# Patient Record
Sex: FEMALE | Race: WHITE | Employment: OTHER | URBAN - METROPOLITAN AREA
[De-identification: names, ages, dates, MRNs, and addresses within clinical notes are randomized per-mention and may not be internally consistent; named-entity substitution may affect disease eponyms.]

---

## 2017-01-12 PROBLEM — J11.1 INFLUENZA: Status: ACTIVE | Noted: 2017-01-12

## 2017-01-12 PROBLEM — B35.3 TINEA PEDIS OF BOTH FEET: Status: ACTIVE | Noted: 2017-01-12

## 2017-03-29 PROBLEM — B35.3 TINEA PEDIS OF BOTH FEET: Status: RESOLVED | Noted: 2017-01-12 | Resolved: 2017-03-29

## 2017-03-29 PROBLEM — J11.1 INFLUENZA: Status: RESOLVED | Noted: 2017-01-12 | Resolved: 2017-03-29

## 2017-03-30 PROBLEM — Z86.79 HISTORY OF ISCHEMIC HEART DISEASE: Status: ACTIVE | Noted: 2017-03-30

## 2017-03-30 PROBLEM — Z87.19 S/P HERNIA REPAIR: Status: ACTIVE | Noted: 2017-03-30

## 2017-03-30 PROBLEM — Z98.890 S/P HERNIA REPAIR: Status: ACTIVE | Noted: 2017-03-30

## 2017-04-17 PROBLEM — E87.6 HYPOKALEMIA: Status: ACTIVE | Noted: 2017-04-17

## 2017-08-18 PROBLEM — M54.16 LUMBAR RADICULOPATHY: Status: ACTIVE | Noted: 2017-08-18

## 2017-08-19 PROBLEM — Z79.899 MEDICATION MANAGEMENT: Status: ACTIVE | Noted: 2017-08-19

## 2017-08-21 PROBLEM — K61.0 PERIANAL ABSCESS: Status: ACTIVE | Noted: 2017-08-21

## 2017-08-21 PROBLEM — B37.0 ORAL THRUSH: Status: ACTIVE | Noted: 2017-08-21
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
## 2017-08-22 PROBLEM — E43 SEVERE MALNUTRITION (HCC): Chronic | Status: ACTIVE | Noted: 2017-08-22

## 2017-08-25 PROBLEM — I73.9 PVD (PERIPHERAL VASCULAR DISEASE) WITH CLAUDICATION (HCC): Status: ACTIVE | Noted: 2017-08-25

## 2017-08-27 PROBLEM — Z86.73 H/O: STROKE: Status: ACTIVE | Noted: 2017-08-27

## 2017-08-27 PROBLEM — S32.010A COMPRESSION FRACTURE OF L1 LUMBAR VERTEBRA (HCC): Status: ACTIVE | Noted: 2017-08-27

## 2017-08-27 PROBLEM — S32.040A COMPRESSION FRACTURE OF L4 LUMBAR VERTEBRA: Status: ACTIVE | Noted: 2017-08-27

## 2017-09-16 PROBLEM — Z02.89 PAIN MEDICATION AGREEMENT: Status: ACTIVE | Noted: 2017-09-16

## 2018-03-21 ENCOUNTER — OFFICE VISIT (OUTPATIENT)
Dept: FAMILY MEDICINE CLINIC | Age: 53
End: 2018-03-21
Payer: MEDICAID

## 2018-03-21 VITALS
WEIGHT: 156 LBS | BODY MASS INDEX: 25.99 KG/M2 | SYSTOLIC BLOOD PRESSURE: 130 MMHG | HEART RATE: 82 BPM | HEIGHT: 65 IN | RESPIRATION RATE: 16 BRPM | OXYGEN SATURATION: 96 % | DIASTOLIC BLOOD PRESSURE: 80 MMHG

## 2018-03-21 DIAGNOSIS — Q79.60 EHLERS-DANLOS SYNDROME: ICD-10-CM

## 2018-03-21 DIAGNOSIS — N76.1 SUBACUTE VAGINITIS: ICD-10-CM

## 2018-03-21 DIAGNOSIS — Z79.899 MEDICATION MANAGEMENT: ICD-10-CM

## 2018-03-21 DIAGNOSIS — J44.9 CHRONIC OBSTRUCTIVE PULMONARY DISEASE, UNSPECIFIED COPD TYPE (HCC): Primary | ICD-10-CM

## 2018-03-21 DIAGNOSIS — F33.1 MODERATE EPISODE OF RECURRENT MAJOR DEPRESSIVE DISORDER (HCC): ICD-10-CM

## 2018-03-21 LAB
CREATININE URINE POCT: NORMAL
HBA1C MFR BLD: 10.7 %
MICROALBUMIN/CREAT 24H UR: NORMAL MG/G{CREAT}
MICROALBUMIN/CREAT UR-RTO: NORMAL

## 2018-03-21 PROCEDURE — G8427 DOCREV CUR MEDS BY ELIG CLIN: HCPCS | Performed by: FAMILY MEDICINE

## 2018-03-21 PROCEDURE — G8598 ASA/ANTIPLAT THER USED: HCPCS | Performed by: FAMILY MEDICINE

## 2018-03-21 PROCEDURE — 3046F HEMOGLOBIN A1C LEVEL >9.0%: CPT | Performed by: FAMILY MEDICINE

## 2018-03-21 PROCEDURE — 99214 OFFICE O/P EST MOD 30 MIN: CPT | Performed by: FAMILY MEDICINE

## 2018-03-21 PROCEDURE — G8926 SPIRO NO PERF OR DOC: HCPCS | Performed by: FAMILY MEDICINE

## 2018-03-21 PROCEDURE — G8419 CALC BMI OUT NRM PARAM NOF/U: HCPCS | Performed by: FAMILY MEDICINE

## 2018-03-21 PROCEDURE — 3017F COLORECTAL CA SCREEN DOC REV: CPT | Performed by: FAMILY MEDICINE

## 2018-03-21 PROCEDURE — 3014F SCREEN MAMMO DOC REV: CPT | Performed by: FAMILY MEDICINE

## 2018-03-21 PROCEDURE — 4004F PT TOBACCO SCREEN RCVD TLK: CPT | Performed by: FAMILY MEDICINE

## 2018-03-21 PROCEDURE — 83036 HEMOGLOBIN GLYCOSYLATED A1C: CPT | Performed by: FAMILY MEDICINE

## 2018-03-21 PROCEDURE — 82044 UR ALBUMIN SEMIQUANTITATIVE: CPT | Performed by: FAMILY MEDICINE

## 2018-03-21 PROCEDURE — 3023F SPIROM DOC REV: CPT | Performed by: FAMILY MEDICINE

## 2018-03-21 PROCEDURE — G8482 FLU IMMUNIZE ORDER/ADMIN: HCPCS | Performed by: FAMILY MEDICINE

## 2018-03-21 RX ORDER — PREDNISONE 1 MG/1
7.5 TABLET ORAL DAILY
Qty: 45 TABLET | Refills: 5 | Status: SHIPPED | OUTPATIENT
Start: 2018-03-21 | End: 2018-05-23

## 2018-03-21 RX ORDER — BUPROPION HYDROCHLORIDE 150 MG/1
450 TABLET ORAL EVERY MORNING
Qty: 270 TABLET | Refills: 1 | Status: ON HOLD | OUTPATIENT
Start: 2018-03-21 | End: 2018-07-07 | Stop reason: HOSPADM

## 2018-03-21 RX ORDER — TIZANIDINE HYDROCHLORIDE 2 MG/1
2 CAPSULE, GELATIN COATED ORAL 3 TIMES DAILY PRN
Qty: 60 CAPSULE | Refills: 2 | Status: SHIPPED | OUTPATIENT
Start: 2018-03-21 | End: 2019-02-06 | Stop reason: SDUPTHER

## 2018-03-21 RX ORDER — HYDROXYZINE PAMOATE 50 MG/1
50 CAPSULE ORAL 3 TIMES DAILY
Qty: 270 CAPSULE | Refills: 3 | Status: SHIPPED | OUTPATIENT
Start: 2018-03-21 | End: 2018-05-24

## 2018-03-22 ASSESSMENT — ENCOUNTER SYMPTOMS
BLOOD IN STOOL: 0
SHORTNESS OF BREATH: 1
HEARTBURN: 0
BACK PAIN: 1
ABDOMINAL PAIN: 0
WHEEZING: 0
NAUSEA: 0
SPUTUM PRODUCTION: 0
COUGH: 1

## 2018-03-22 NOTE — PROGRESS NOTES
CC   Chief Complaint   Patient presents with    Diabetes     check up     HPI:   Patient comes in today for the below    DM2:   Chronic issue, present for years  Patient feels well. Issue is not controlled. Patient does not have complaints or concerns today. Medications reviewed. Currently on long acting insulin and SS insulin at meal time . Taking all medications and tolerating well. Glucose screens being checked  no  hypoglycemic episodes no  Patient is taking ASA Yes  Taking  Ace Inhibitor/ARB. Yes  Patient is  on appropriately-dosed statin. Patient does not see Podiatry regularly. Patient is aware that it is necessary to see an Eye Dr yearly. Patient does smoke. Most recent labs reviewed with patient. Lab Results   Component Value Date    LABA1C 10.7 03/21/2018     No results found for: EAG  Lab Results   Component Value Date    LDLCALC 43 04/13/2016     Chronic pain/Ama Danlos  Patient is working with physical therapy for chronic cervicalgia and upper extremity pain. She is reporting increased pain diffusely. She is seeing improvement in mobility and range of motion however. She thinks therapy is been beneficial  She is reporting increased muscle tone and tightness. Asking for muscle relaxer. Vaginitis  She is reporting her last few weeks intermittent vaginal itching. She denies any severino discharge. No menstrual. Her vaginal bleeding. Denies new sexual contacts. Depression  Chronic issue, present for years  Patient actually ran out of her medications, and her nail away/prepackaged pharmacy has not been including them lately. She has noticed a decompensation in mood. She feels more irritable. COPD:   Chronic issue  Symptoms began several years ago. Patient acutely complains of increased dyspnea because she states she has not been getting her daily prednisone. Patient chronically complains of dyspnea, cough and wheezing. Symptoms  does worsen with exertion.    Cough INJECT 65 UNITS UNDER THE SKIN TWICE DAILY  -     tiZANidine (ZANAFLEX) 2 MG capsule; Take 1 capsule by mouth 3 times daily as needed for Muscle spasms  -     hydrOXYzine (VISTARIL) 50 MG capsule; Take 1 capsule by mouth 3 times daily  -     buPROPion (WELLBUTRIN XL) 150 MG extended release tablet; Take 3 tablets by mouth every morning    Moderate episode of recurrent major depressive disorder ([facility] [address]        Plan:   Discussed her uncontrolled diabetes. I'm not sure why sure sugars have decompensated other than either noncompliance of medications or diet. Encourage compliance with both. Adjusting long-acting insulin as above. COPD issues are ongoing. Refilled her prednisone. Regarding the vaginitis, this is most likely fungal without doing an exam it's difficult to be more specific. Trial of the above; if not improving, would recommend either return to office for pelvic exam or seeing gynecology    Muscle relaxer trial for her soreness after doing therapy. Chronic meds refilled were indicated. Restart her antidepressants. See if this helps her mood    She verbalized comfort and understanding of instructions. Follow Up:     Return in about 3 months (around 6/21/2018), or if symptoms worsen or fail to improve, for DM check. This document was prepared at least partially through the use of voice recognition software. Although effort is taken to assure the accuracy of this document, it is possible that grammatical, syntax,  or spelling errors may occur.       Electronically signed by Young Martinez MD M[facility] with [facility]

## 2018-03-23 ENCOUNTER — TELEPHONE (OUTPATIENT)
Dept: FAMILY MEDICINE CLINIC | Age: 53
End: 2018-03-23

## 2018-04-18 RX ORDER — DIPHENOXYLATE HYDROCHLORIDE AND ATROPINE SULFATE 2.5; .025 MG/1; MG/1
1 TABLET ORAL 4 TIMES DAILY PRN
Qty: 40 TABLET | Refills: 1 | Status: SHIPPED | OUTPATIENT
Start: 2018-04-18 | End: 2018-04-28

## 2018-05-09 DIAGNOSIS — J44.1 COPD EXACERBATION (HCC): ICD-10-CM

## 2018-05-09 RX ORDER — IPRATROPIUM BROMIDE AND ALBUTEROL SULFATE 2.5; .5 MG/3ML; MG/3ML
1 SOLUTION RESPIRATORY (INHALATION) EVERY 6 HOURS PRN
Qty: 360 ML | Refills: 5 | Status: SHIPPED | OUTPATIENT
Start: 2018-05-09 | End: 2018-06-05 | Stop reason: SDUPTHER

## 2018-05-10 ENCOUNTER — TELEPHONE (OUTPATIENT)
Dept: FAMILY MEDICINE CLINIC | Age: 53
End: 2018-05-10

## 2018-05-11 ENCOUNTER — OFFICE VISIT (OUTPATIENT)
Dept: FAMILY MEDICINE CLINIC | Age: 53
End: 2018-05-11
Payer: MEDICAID

## 2018-05-11 VITALS
WEIGHT: 152 LBS | HEIGHT: 65 IN | BODY MASS INDEX: 25.33 KG/M2 | HEART RATE: 98 BPM | TEMPERATURE: 99.1 F | RESPIRATION RATE: 18 BRPM | OXYGEN SATURATION: 96 % | DIASTOLIC BLOOD PRESSURE: 64 MMHG | SYSTOLIC BLOOD PRESSURE: 112 MMHG

## 2018-05-11 DIAGNOSIS — J44.1 COPD EXACERBATION (HCC): Primary | ICD-10-CM

## 2018-05-11 PROCEDURE — 4004F PT TOBACCO SCREEN RCVD TLK: CPT | Performed by: PHYSICIAN ASSISTANT

## 2018-05-11 PROCEDURE — 3023F SPIROM DOC REV: CPT | Performed by: PHYSICIAN ASSISTANT

## 2018-05-11 PROCEDURE — G8926 SPIRO NO PERF OR DOC: HCPCS | Performed by: PHYSICIAN ASSISTANT

## 2018-05-11 PROCEDURE — 99213 OFFICE O/P EST LOW 20 MIN: CPT | Performed by: PHYSICIAN ASSISTANT

## 2018-05-11 PROCEDURE — G8417 CALC BMI ABV UP PARAM F/U: HCPCS | Performed by: PHYSICIAN ASSISTANT

## 2018-05-11 PROCEDURE — 3017F COLORECTAL CA SCREEN DOC REV: CPT | Performed by: PHYSICIAN ASSISTANT

## 2018-05-11 PROCEDURE — 96372 THER/PROPH/DIAG INJ SC/IM: CPT | Performed by: PHYSICIAN ASSISTANT

## 2018-05-11 PROCEDURE — G8427 DOCREV CUR MEDS BY ELIG CLIN: HCPCS | Performed by: PHYSICIAN ASSISTANT

## 2018-05-11 PROCEDURE — G8598 ASA/ANTIPLAT THER USED: HCPCS | Performed by: PHYSICIAN ASSISTANT

## 2018-05-11 RX ORDER — DOXYCYCLINE HYCLATE 100 MG/1
100 CAPSULE ORAL 2 TIMES DAILY
Qty: 20 CAPSULE | Refills: 0 | Status: SHIPPED | OUTPATIENT
Start: 2018-05-11 | End: 2018-05-21

## 2018-05-11 RX ORDER — PREDNISONE 20 MG/1
TABLET ORAL
Qty: 15 TABLET | Refills: 0 | Status: SHIPPED | OUTPATIENT
Start: 2018-05-11 | End: 2018-05-23

## 2018-05-11 RX ORDER — TRIAMCINOLONE ACETONIDE 40 MG/ML
40 INJECTION, SUSPENSION INTRA-ARTICULAR; INTRAMUSCULAR ONCE
Status: COMPLETED | OUTPATIENT
Start: 2018-05-11 | End: 2018-05-11

## 2018-05-11 RX ADMIN — TRIAMCINOLONE ACETONIDE 40 MG: 40 INJECTION, SUSPENSION INTRA-ARTICULAR; INTRAMUSCULAR at 13:19

## 2018-06-05 DIAGNOSIS — J44.1 COPD EXACERBATION (HCC): ICD-10-CM

## 2018-06-05 RX ORDER — IPRATROPIUM BROMIDE AND ALBUTEROL SULFATE 2.5; .5 MG/3ML; MG/3ML
1 SOLUTION RESPIRATORY (INHALATION) EVERY 6 HOURS PRN
Qty: 360 ML | Refills: 5 | Status: SHIPPED | OUTPATIENT
Start: 2018-06-05 | End: 2018-06-27 | Stop reason: SDUPTHER

## 2018-06-27 ENCOUNTER — HOSPITAL ENCOUNTER (OUTPATIENT)
Age: 53
Discharge: HOME | End: 2018-06-29
Payer: MEDICAID

## 2018-06-27 ENCOUNTER — OFFICE VISIT (OUTPATIENT)
Dept: FAMILY MEDICINE CLINIC | Age: 53
End: 2018-06-27
Payer: MEDICAID

## 2018-06-27 VITALS
SYSTOLIC BLOOD PRESSURE: 100 MMHG | DIASTOLIC BLOOD PRESSURE: 58 MMHG | WEIGHT: 146 LBS | BODY MASS INDEX: 24.32 KG/M2 | RESPIRATION RATE: 16 BRPM | HEIGHT: 65 IN | HEART RATE: 97 BPM | OXYGEN SATURATION: 95 %

## 2018-06-27 DIAGNOSIS — Z79.899 MEDICATION MANAGEMENT: ICD-10-CM

## 2018-06-27 DIAGNOSIS — J42 CHRONIC BRONCHITIS, UNSPECIFIED CHRONIC BRONCHITIS TYPE (HCC): ICD-10-CM

## 2018-06-27 DIAGNOSIS — G43.019 INTRACTABLE MIGRAINE WITHOUT AURA AND WITHOUT STATUS MIGRAINOSUS: ICD-10-CM

## 2018-06-27 LAB — HBA1C MFR BLD: 8.7 %

## 2018-06-27 PROCEDURE — G8427 DOCREV CUR MEDS BY ELIG CLIN: HCPCS | Performed by: FAMILY MEDICINE

## 2018-06-27 PROCEDURE — 83036 HEMOGLOBIN GLYCOSYLATED A1C: CPT | Performed by: FAMILY MEDICINE

## 2018-06-27 PROCEDURE — 80053 COMPREHEN METABOLIC PANEL: CPT

## 2018-06-27 PROCEDURE — 3023F SPIROM DOC REV: CPT | Performed by: FAMILY MEDICINE

## 2018-06-27 PROCEDURE — 99214 OFFICE O/P EST MOD 30 MIN: CPT | Performed by: FAMILY MEDICINE

## 2018-06-27 PROCEDURE — 85025 COMPLETE CBC W/AUTO DIFF WBC: CPT

## 2018-06-27 PROCEDURE — 36415 COLL VENOUS BLD VENIPUNCTURE: CPT | Performed by: FAMILY MEDICINE

## 2018-06-27 PROCEDURE — 2022F DILAT RTA XM EVC RTNOPTHY: CPT | Performed by: FAMILY MEDICINE

## 2018-06-27 PROCEDURE — G8420 CALC BMI NORM PARAMETERS: HCPCS | Performed by: FAMILY MEDICINE

## 2018-06-27 PROCEDURE — G8926 SPIRO NO PERF OR DOC: HCPCS | Performed by: FAMILY MEDICINE

## 2018-06-27 PROCEDURE — 3045F PR MOST RECENT HEMOGLOBIN A1C LEVEL 7.0-9.0%: CPT | Performed by: FAMILY MEDICINE

## 2018-06-27 PROCEDURE — 3017F COLORECTAL CA SCREEN DOC REV: CPT | Performed by: FAMILY MEDICINE

## 2018-06-27 PROCEDURE — 4004F PT TOBACCO SCREEN RCVD TLK: CPT | Performed by: FAMILY MEDICINE

## 2018-06-27 PROCEDURE — G8598 ASA/ANTIPLAT THER USED: HCPCS | Performed by: FAMILY MEDICINE

## 2018-06-27 PROCEDURE — 80061 LIPID PANEL: CPT

## 2018-06-27 RX ORDER — IPRATROPIUM BROMIDE AND ALBUTEROL SULFATE 2.5; .5 MG/3ML; MG/3ML
1 SOLUTION RESPIRATORY (INHALATION) EVERY 4 HOURS PRN
Qty: 540 ML | Refills: 5 | Status: SHIPPED | OUTPATIENT
Start: 2018-06-27 | End: 2018-10-17

## 2018-06-27 RX ORDER — SUMATRIPTAN 50 MG/1
50 TABLET, FILM COATED ORAL
Qty: 9 TABLET | Refills: 5 | Status: ON HOLD | OUTPATIENT
Start: 2018-06-27 | End: 2018-07-07 | Stop reason: HOSPADM

## 2018-06-27 RX ORDER — NICOTINE 21 MG/24HR
1 PATCH, TRANSDERMAL 24 HOURS TRANSDERMAL DAILY
Qty: 30 PATCH | Refills: 3 | Status: SHIPPED | OUTPATIENT
Start: 2018-06-27 | End: 2020-01-01

## 2018-06-28 LAB
ALBUMIN SERPL-MCNC: 3.5 G/DL (ref 3.5–5.2)
ALP BLD-CCNC: 64 U/L (ref 35–104)
ALT SERPL-CCNC: 8 U/L (ref 0–32)
ANION GAP SERPL CALCULATED.3IONS-SCNC: 11 MMOL/L (ref 7–16)
AST SERPL-CCNC: 10 U/L (ref 0–31)
BASOPHILS ABSOLUTE: 0.06 E9/L (ref 0–0.2)
BASOPHILS RELATIVE PERCENT: 0.5 % (ref 0–2)
BILIRUB SERPL-MCNC: 0.2 MG/DL (ref 0–1.2)
BUN BLDV-MCNC: 9 MG/DL (ref 6–20)
CALCIUM SERPL-MCNC: 9.4 MG/DL (ref 8.6–10.2)
CHLORIDE BLD-SCNC: 93 MMOL/L (ref 98–107)
CHOLESTEROL, TOTAL: 155 MG/DL (ref 0–199)
CO2: 35 MMOL/L (ref 22–29)
CREAT SERPL-MCNC: 0.3 MG/DL (ref 0.5–1)
EOSINOPHILS ABSOLUTE: 0.22 E9/L (ref 0.05–0.5)
EOSINOPHILS RELATIVE PERCENT: 1.9 % (ref 0–6)
GFR AFRICAN AMERICAN: >60
GFR NON-AFRICAN AMERICAN: >60 ML/MIN/1.73
GLUCOSE BLD-MCNC: 228 MG/DL (ref 74–109)
HCT VFR BLD CALC: 44.5 % (ref 34–48)
HDLC SERPL-MCNC: 34 MG/DL
HEMOGLOBIN: 13.5 G/DL (ref 11.5–15.5)
IMMATURE GRANULOCYTES #: 0.04 E9/L
IMMATURE GRANULOCYTES %: 0.3 % (ref 0–5)
LDL CHOLESTEROL CALCULATED: 52 MG/DL (ref 0–99)
LYMPHOCYTES ABSOLUTE: 3.4 E9/L (ref 1.5–4)
LYMPHOCYTES RELATIVE PERCENT: 29.2 % (ref 20–42)
MCH RBC QN AUTO: 27.8 PG (ref 26–35)
MCHC RBC AUTO-ENTMCNC: 30.3 % (ref 32–34.5)
MCV RBC AUTO: 91.8 FL (ref 80–99.9)
MONOCYTES ABSOLUTE: 0.58 E9/L (ref 0.1–0.95)
MONOCYTES RELATIVE PERCENT: 5 % (ref 2–12)
NEUTROPHILS ABSOLUTE: 7.33 E9/L (ref 1.8–7.3)
NEUTROPHILS RELATIVE PERCENT: 63.1 % (ref 43–80)
PDW BLD-RTO: 13.8 FL (ref 11.5–15)
PLATELET # BLD: 291 E9/L (ref 130–450)
PMV BLD AUTO: 10 FL (ref 7–12)
POTASSIUM SERPL-SCNC: 4 MMOL/L (ref 3.5–5)
RBC # BLD: 4.85 E12/L (ref 3.5–5.5)
SODIUM BLD-SCNC: 139 MMOL/L (ref 132–146)
TOTAL PROTEIN: 6.8 G/DL (ref 6.4–8.3)
TRIGL SERPL-MCNC: 345 MG/DL (ref 0–149)
VLDLC SERPL CALC-MCNC: 69 MG/DL
WBC # BLD: 11.6 E9/L (ref 4.5–11.5)

## 2018-06-28 ASSESSMENT — ENCOUNTER SYMPTOMS
SHORTNESS OF BREATH: 1
DIARRHEA: 1
SINUS PAIN: 0
BACK PAIN: 1
BLOOD IN STOOL: 0
ABDOMINAL PAIN: 0

## 2018-07-01 ENCOUNTER — HOSPITAL ENCOUNTER (EMERGENCY)
Age: 53
Discharge: LEFT BEFORE DISCHARGE | End: 2018-07-01
Attending: EMERGENCY MEDICINE
Payer: MEDICAID

## 2018-07-01 ENCOUNTER — APPOINTMENT (OUTPATIENT)
Dept: CT IMAGING | Age: 53
End: 2018-07-01
Payer: MEDICAID

## 2018-07-01 VITALS
HEART RATE: 106 BPM | WEIGHT: 146 LBS | SYSTOLIC BLOOD PRESSURE: 105 MMHG | TEMPERATURE: 98.3 F | RESPIRATION RATE: 14 BRPM | BODY MASS INDEX: 24.32 KG/M2 | DIASTOLIC BLOOD PRESSURE: 57 MMHG | OXYGEN SATURATION: 97 % | HEIGHT: 65 IN

## 2018-07-01 DIAGNOSIS — K61.2 ABSCESS OF ANAL OR RECTAL REGION: Primary | ICD-10-CM

## 2018-07-01 DIAGNOSIS — Z53.29 LEFT AGAINST MEDICAL ADVICE: ICD-10-CM

## 2018-07-01 LAB
ANION GAP SERPL CALCULATED.3IONS-SCNC: 10 MMOL/L (ref 7–16)
BASOPHILS ABSOLUTE: 0.05 E9/L (ref 0–0.2)
BASOPHILS RELATIVE PERCENT: 0.3 % (ref 0–2)
BUN BLDV-MCNC: 7 MG/DL (ref 6–20)
CALCIUM SERPL-MCNC: 9.4 MG/DL (ref 8.6–10.2)
CHLORIDE BLD-SCNC: 93 MMOL/L (ref 98–107)
CO2: 33 MMOL/L (ref 22–29)
CREAT SERPL-MCNC: 0.3 MG/DL (ref 0.5–1)
EOSINOPHILS ABSOLUTE: 0.14 E9/L (ref 0.05–0.5)
EOSINOPHILS RELATIVE PERCENT: 0.8 % (ref 0–6)
GFR AFRICAN AMERICAN: >60
GFR NON-AFRICAN AMERICAN: >60 ML/MIN/1.73
GLUCOSE BLD-MCNC: 188 MG/DL (ref 74–109)
HCT VFR BLD CALC: 42.6 % (ref 34–48)
HEMOGLOBIN: 13.7 G/DL (ref 11.5–15.5)
IMMATURE GRANULOCYTES #: 0.08 E9/L
IMMATURE GRANULOCYTES %: 0.5 % (ref 0–5)
LYMPHOCYTES ABSOLUTE: 3.26 E9/L (ref 1.5–4)
LYMPHOCYTES RELATIVE PERCENT: 19.4 % (ref 20–42)
MCH RBC QN AUTO: 28.3 PG (ref 26–35)
MCHC RBC AUTO-ENTMCNC: 32.2 % (ref 32–34.5)
MCV RBC AUTO: 88 FL (ref 80–99.9)
MONOCYTES ABSOLUTE: 0.86 E9/L (ref 0.1–0.95)
MONOCYTES RELATIVE PERCENT: 5.1 % (ref 2–12)
NEUTROPHILS ABSOLUTE: 12.4 E9/L (ref 1.8–7.3)
NEUTROPHILS RELATIVE PERCENT: 73.9 % (ref 43–80)
PDW BLD-RTO: 13.4 FL (ref 11.5–15)
PLATELET # BLD: 300 E9/L (ref 130–450)
PMV BLD AUTO: 9.4 FL (ref 7–12)
POTASSIUM SERPL-SCNC: 4.2 MMOL/L (ref 3.5–5)
RBC # BLD: 4.84 E12/L (ref 3.5–5.5)
SODIUM BLD-SCNC: 136 MMOL/L (ref 132–146)
WBC # BLD: 16.8 E9/L (ref 4.5–11.5)

## 2018-07-01 PROCEDURE — 96365 THER/PROPH/DIAG IV INF INIT: CPT

## 2018-07-01 PROCEDURE — 96376 TX/PRO/DX INJ SAME DRUG ADON: CPT

## 2018-07-01 PROCEDURE — 72193 CT PELVIS W/DYE: CPT

## 2018-07-01 PROCEDURE — 85025 COMPLETE CBC W/AUTO DIFF WBC: CPT

## 2018-07-01 PROCEDURE — 6360000004 HC RX CONTRAST MEDICATION: Performed by: RADIOLOGY

## 2018-07-01 PROCEDURE — 99284 EMERGENCY DEPT VISIT MOD MDM: CPT

## 2018-07-01 PROCEDURE — 96375 TX/PRO/DX INJ NEW DRUG ADDON: CPT

## 2018-07-01 PROCEDURE — 6370000000 HC RX 637 (ALT 250 FOR IP): Performed by: NURSE PRACTITIONER

## 2018-07-01 PROCEDURE — 2580000003 HC RX 258: Performed by: NURSE PRACTITIONER

## 2018-07-01 PROCEDURE — 6360000002 HC RX W HCPCS: Performed by: NURSE PRACTITIONER

## 2018-07-01 PROCEDURE — 80048 BASIC METABOLIC PNL TOTAL CA: CPT

## 2018-07-01 RX ORDER — 0.9 % SODIUM CHLORIDE 0.9 %
1000 INTRAVENOUS SOLUTION INTRAVENOUS ONCE
Status: COMPLETED | OUTPATIENT
Start: 2018-07-01 | End: 2018-07-01

## 2018-07-01 RX ORDER — MORPHINE SULFATE 4 MG/ML
4 INJECTION, SOLUTION INTRAMUSCULAR; INTRAVENOUS ONCE
Status: COMPLETED | OUTPATIENT
Start: 2018-07-01 | End: 2018-07-01

## 2018-07-01 RX ORDER — ONDANSETRON 2 MG/ML
4 INJECTION INTRAMUSCULAR; INTRAVENOUS ONCE
Status: COMPLETED | OUTPATIENT
Start: 2018-07-01 | End: 2018-07-01

## 2018-07-01 RX ORDER — DOXYCYCLINE HYCLATE 100 MG
100 TABLET ORAL 2 TIMES DAILY
Qty: 20 TABLET | Refills: 0 | Status: ON HOLD | OUTPATIENT
Start: 2018-07-01 | End: 2018-07-07 | Stop reason: HOSPADM

## 2018-07-01 RX ORDER — DOXYCYCLINE HYCLATE 100 MG/1
100 CAPSULE ORAL ONCE
Status: COMPLETED | OUTPATIENT
Start: 2018-07-01 | End: 2018-07-01

## 2018-07-01 RX ORDER — CEFDINIR 300 MG/1
300 CAPSULE ORAL 2 TIMES DAILY
Qty: 20 CAPSULE | Refills: 0 | Status: ON HOLD | OUTPATIENT
Start: 2018-07-01 | End: 2018-07-07 | Stop reason: HOSPADM

## 2018-07-01 RX ADMIN — DOXYCYCLINE HYCLATE 100 MG: 100 CAPSULE, GELATIN COATED ORAL at 20:19

## 2018-07-01 RX ADMIN — ONDANSETRON 4 MG: 2 INJECTION INTRAMUSCULAR; INTRAVENOUS at 18:30

## 2018-07-01 RX ADMIN — IOPAMIDOL 90 ML: 755 INJECTION, SOLUTION INTRAVENOUS at 19:06

## 2018-07-01 RX ADMIN — CEFTRIAXONE 1 G: 1 INJECTION, POWDER, FOR SOLUTION INTRAMUSCULAR; INTRAVENOUS at 20:20

## 2018-07-01 RX ADMIN — MORPHINE SULFATE 4 MG: 4 INJECTION, SOLUTION INTRAMUSCULAR; INTRAVENOUS at 18:30

## 2018-07-01 RX ADMIN — MORPHINE SULFATE 4 MG: 4 INJECTION, SOLUTION INTRAMUSCULAR; INTRAVENOUS at 20:19

## 2018-07-01 RX ADMIN — SODIUM CHLORIDE 1000 ML: 9 INJECTION, SOLUTION INTRAVENOUS at 18:31

## 2018-07-01 ASSESSMENT — PAIN SCALES - GENERAL
PAINLEVEL_OUTOF10: 7
PAINLEVEL_OUTOF10: 10
PAINLEVEL_OUTOF10: 6

## 2018-07-01 ASSESSMENT — PAIN DESCRIPTION - LOCATION: LOCATION: BUTTOCKS

## 2018-07-01 ASSESSMENT — PAIN DESCRIPTION - DESCRIPTORS: DESCRIPTORS: ACHING

## 2018-07-01 ASSESSMENT — PAIN DESCRIPTION - PAIN TYPE: TYPE: ACUTE PAIN

## 2018-07-02 ENCOUNTER — TELEPHONE (OUTPATIENT)
Dept: FAMILY MEDICINE CLINIC | Age: 53
End: 2018-07-02

## 2018-07-02 NOTE — ED PROVIDER NOTES
signs. No lymphadenopathy. Respiratory:  Clear to auscultation and breath sounds equal.  CV:  Regular rate and rhythm, normal heart sounds, without pathological murmurs, ectopy, gallops, or rubs. GI:  Abdomen Soft, nontender, good bowel sounds. No firm or pulsatile mass. nonsurgical abdomen. Rectal: Female nurse at bedside. Patient has indurated and tender area noted at the 12:00 aspect of the rectum/rectal wall. There is no spontaneous drainage. No surrounding erythema. No fluctuant noted to palpation. No active bleeding. Patient has negative guaiac stool. Back:  No costovertebral tenderness. Extremities: No tenderness or edema noted. Integument:  Normal turgor. Warm, dry, without visible rash, unless noted elsewhere. Lymphatics: No lymphangitis or adenopathy noted. Neurological:  Oriented. Motor functions intact. Patient moves all 4 extremities ×4. Patient is ambulatory in the emergency department.     Lab / Imaging Results   (All laboratory and radiology results have been personally reviewed by myself)  Labs:  Results for orders placed or performed during the hospital encounter of 07/01/18   CBC auto differential   Result Value Ref Range    WBC 16.8 (H) 4.5 - 11.5 E9/L    RBC 4.84 3.50 - 5.50 E12/L    Hemoglobin 13.7 11.5 - 15.5 g/dL    Hematocrit 42.6 34.0 - 48.0 %    MCV 88.0 80.0 - 99.9 fL    MCH 28.3 26.0 - 35.0 pg    MCHC 32.2 32.0 - 34.5 %    RDW 13.4 11.5 - 15.0 fL    Platelets 947 066 - 863 E9/L    MPV 9.4 7.0 - 12.0 fL    Neutrophils % 73.9 43.0 - 80.0 %    Immature Granulocytes % 0.5 0.0 - 5.0 %    Lymphocytes % 19.4 (L) 20.0 - 42.0 %    Monocytes % 5.1 2.0 - 12.0 %    Eosinophils % 0.8 0.0 - 6.0 %    Basophils % 0.3 0.0 - 2.0 %    Neutrophils # 12.40 (H) 1.80 - 7.30 E9/L    Immature Granulocytes # 0.08 E9/L    Lymphocytes # 3.26 1.50 - 4.00 E9/L    Monocytes # 0.86 0.10 - 0.95 E9/L    Eosinophils # 0.14 0.05 - 0.50 E9/L    Basophils # 0.05 0.00 - 0.20 H0/K   Basic metabolic panel   Result Value Ref Range    Sodium 136 132 - 146 mmol/L    Potassium 4.2 3.5 - 5.0 mmol/L    Chloride 93 (L) 98 - 107 mmol/L    CO2 33 (H) 22 - 29 mmol/L    Anion Gap 10 7 - 16 mmol/L    Glucose 188 (H) 74 - 109 mg/dL    BUN 7 6 - 20 mg/dL    CREATININE 0.3 (L) 0.5 - 1.0 mg/dL    GFR Non-African American >60 >=60 mL/min/1.73    GFR African American >60     Calcium 9.4 8.6 - 10.2 mg/dL     Imaging: All Radiology results interpreted by Radiologist unless otherwise noted. CT PELVIS W CONTRAST Additional Contrast? None   Final Result      1. New right perianal abscess measures 2.9 x 1.6 cm with surrounding   inflammatory changes. 2. Periumbilical hernia appears slightly larger compared to prior. ED Course / Medical Decision Making     Medications   0.9 % sodium chloride bolus (0 mLs Intravenous Stopped 7/1/18 1846)   ondansetron (ZOFRAN) injection 4 mg (4 mg Intravenous Given 7/1/18 1830)   morphine injection 4 mg (4 mg Intravenous Given 7/1/18 1830)   iopamidol (ISOVUE-370) 76 % injection 90 mL (90 mLs Intravenous Given 7/1/18 1906)   cefTRIAXone (ROCEPHIN) 1 g in dextrose 5 % 50 mL IVPB (vial-mate) (1 g Intravenous New Bag 7/1/18 2020)   doxycycline hyclate (VIBRAMYCIN) capsule 100 mg (100 mg Oral Given 7/1/18 2019)   morphine injection 4 mg (4 mg Intravenous Given 7/1/18 2019)        Re-examination:  7/1/18       Time: 2000   Patient states that her pain has improved with pain medication however the pain is coming back. Patient was informed of all diagnostic test results. Patient has an elevated white count at 16.8. Patient was informed of her right perianal abscess that measures 2.9 x 1.6cm with surrounding inflammatory changes. I informed patient that we will start IV antibiotics and admit her to the hospital. Patient states that she absolutely refuses admission to the hospital. I explained that she will likely need to see general surgery as well as receive IV antibiotics and pain medication.  Patient states present.   END OF ED PROVIDER NOTE         BEHZAD Mcgovern - SHANKAR  07/01/18 2052

## 2018-07-06 ENCOUNTER — ANESTHESIA (OUTPATIENT)
Dept: OPERATING ROOM | Age: 53
DRG: 720 | End: 2018-07-06
Payer: MEDICAID

## 2018-07-06 ENCOUNTER — TELEPHONE (OUTPATIENT)
Dept: FAMILY MEDICINE CLINIC | Age: 53
End: 2018-07-06

## 2018-07-06 ENCOUNTER — HOSPITAL ENCOUNTER (INPATIENT)
Age: 53
LOS: 1 days | Discharge: HOME HEALTH | DRG: 720 | End: 2018-07-07
Attending: EMERGENCY MEDICINE | Admitting: FAMILY MEDICINE
Payer: MEDICAID

## 2018-07-06 ENCOUNTER — ANESTHESIA EVENT (OUTPATIENT)
Dept: OPERATING ROOM | Age: 53
DRG: 720 | End: 2018-07-06
Payer: MEDICAID

## 2018-07-06 VITALS — OXYGEN SATURATION: 94 % | DIASTOLIC BLOOD PRESSURE: 67 MMHG | TEMPERATURE: 90.3 F | SYSTOLIC BLOOD PRESSURE: 115 MMHG

## 2018-07-06 DIAGNOSIS — K61.0 PERIANAL ABSCESS: Primary | ICD-10-CM

## 2018-07-06 DIAGNOSIS — A41.9 SEPSIS, DUE TO UNSPECIFIED ORGANISM: ICD-10-CM

## 2018-07-06 LAB
ABO/RH: NORMAL
ALBUMIN SERPL-MCNC: 2.9 G/DL (ref 3.5–5.2)
ALP BLD-CCNC: 134 U/L (ref 35–104)
ALT SERPL-CCNC: 5 U/L (ref 0–32)
ANION GAP SERPL CALCULATED.3IONS-SCNC: 10 MMOL/L (ref 7–16)
ANTIBODY SCREEN: NORMAL
AST SERPL-CCNC: 7 U/L (ref 0–31)
BASOPHILS ABSOLUTE: 0.06 E9/L (ref 0–0.2)
BASOPHILS RELATIVE PERCENT: 0.3 % (ref 0–2)
BILIRUB SERPL-MCNC: <0.2 MG/DL (ref 0–1.2)
BUN BLDV-MCNC: 5 MG/DL (ref 6–20)
CALCIUM SERPL-MCNC: 8.3 MG/DL (ref 8.6–10.2)
CHLORIDE BLD-SCNC: 99 MMOL/L (ref 98–107)
CO2: 31 MMOL/L (ref 22–29)
CREAT SERPL-MCNC: 0.3 MG/DL (ref 0.5–1)
EOSINOPHILS ABSOLUTE: 0.09 E9/L (ref 0.05–0.5)
EOSINOPHILS RELATIVE PERCENT: 0.4 % (ref 0–6)
GFR AFRICAN AMERICAN: >60
GFR NON-AFRICAN AMERICAN: >60 ML/MIN/1.73
GLUCOSE BLD-MCNC: 231 MG/DL (ref 74–109)
HCT VFR BLD CALC: 40.2 % (ref 34–48)
HEMOGLOBIN: 13.1 G/DL (ref 11.5–15.5)
IMMATURE GRANULOCYTES #: 0.14 E9/L
IMMATURE GRANULOCYTES %: 0.6 % (ref 0–5)
LACTIC ACID: 1.2 MMOL/L (ref 0.5–2.2)
LIPASE: 10 U/L (ref 13–60)
LYMPHOCYTES ABSOLUTE: 2.32 E9/L (ref 1.5–4)
LYMPHOCYTES RELATIVE PERCENT: 10.7 % (ref 20–42)
MCH RBC QN AUTO: 28.5 PG (ref 26–35)
MCHC RBC AUTO-ENTMCNC: 32.6 % (ref 32–34.5)
MCV RBC AUTO: 87.6 FL (ref 80–99.9)
METER GLUCOSE: 168 MG/DL (ref 70–110)
METER GLUCOSE: 182 MG/DL (ref 70–110)
METER GLUCOSE: 227 MG/DL (ref 70–110)
METER GLUCOSE: 297 MG/DL (ref 70–110)
MONOCYTES ABSOLUTE: 1 E9/L (ref 0.1–0.95)
MONOCYTES RELATIVE PERCENT: 4.6 % (ref 2–12)
NEUTROPHILS ABSOLUTE: 18 E9/L (ref 1.8–7.3)
NEUTROPHILS RELATIVE PERCENT: 83.4 % (ref 43–80)
PDW BLD-RTO: 13.2 FL (ref 11.5–15)
PLATELET # BLD: 379 E9/L (ref 130–450)
PMV BLD AUTO: 9.6 FL (ref 7–12)
POTASSIUM SERPL-SCNC: 4.1 MMOL/L (ref 3.5–5)
RBC # BLD: 4.59 E12/L (ref 3.5–5.5)
SODIUM BLD-SCNC: 140 MMOL/L (ref 132–146)
TOTAL PROTEIN: 5.8 G/DL (ref 6.4–8.3)
WBC # BLD: 21.6 E9/L (ref 4.5–11.5)

## 2018-07-06 PROCEDURE — 83690 ASSAY OF LIPASE: CPT

## 2018-07-06 PROCEDURE — 94640 AIRWAY INHALATION TREATMENT: CPT

## 2018-07-06 PROCEDURE — 87206 SMEAR FLUORESCENT/ACID STAI: CPT

## 2018-07-06 PROCEDURE — 2580000003 HC RX 258: Performed by: GENERAL PRACTICE

## 2018-07-06 PROCEDURE — 85025 COMPLETE CBC W/AUTO DIFF WBC: CPT

## 2018-07-06 PROCEDURE — 6360000002 HC RX W HCPCS

## 2018-07-06 PROCEDURE — 87075 CULTR BACTERIA EXCEPT BLOOD: CPT

## 2018-07-06 PROCEDURE — 86901 BLOOD TYPING SEROLOGIC RH(D): CPT

## 2018-07-06 PROCEDURE — 99254 IP/OBS CNSLTJ NEW/EST MOD 60: CPT | Performed by: SURGERY

## 2018-07-06 PROCEDURE — 6370000000 HC RX 637 (ALT 250 FOR IP): Performed by: STUDENT IN AN ORGANIZED HEALTH CARE EDUCATION/TRAINING PROGRAM

## 2018-07-06 PROCEDURE — 1200000000 HC SEMI PRIVATE

## 2018-07-06 PROCEDURE — 83605 ASSAY OF LACTIC ACID: CPT

## 2018-07-06 PROCEDURE — 86850 RBC ANTIBODY SCREEN: CPT

## 2018-07-06 PROCEDURE — 2500000003 HC RX 250 WO HCPCS

## 2018-07-06 PROCEDURE — 82962 GLUCOSE BLOOD TEST: CPT

## 2018-07-06 PROCEDURE — 36415 COLL VENOUS BLD VENIPUNCTURE: CPT

## 2018-07-06 PROCEDURE — 10061 I&D ABSCESS COMP/MULTIPLE: CPT | Performed by: SURGERY

## 2018-07-06 PROCEDURE — 2580000003 HC RX 258: Performed by: EMERGENCY MEDICINE

## 2018-07-06 PROCEDURE — 7100000001 HC PACU RECOVERY - ADDTL 15 MIN: Performed by: SURGERY

## 2018-07-06 PROCEDURE — 87077 CULTURE AEROBIC IDENTIFY: CPT

## 2018-07-06 PROCEDURE — 96375 TX/PRO/DX INJ NEW DRUG ADDON: CPT

## 2018-07-06 PROCEDURE — 6370000000 HC RX 637 (ALT 250 FOR IP): Performed by: ANESTHESIOLOGY

## 2018-07-06 PROCEDURE — 87040 BLOOD CULTURE FOR BACTERIA: CPT

## 2018-07-06 PROCEDURE — 45990 SURG DX EXAM ANORECTAL: CPT | Performed by: SURGERY

## 2018-07-06 PROCEDURE — 87186 SC STD MICRODIL/AGAR DIL: CPT

## 2018-07-06 PROCEDURE — 3600000012 HC SURGERY LEVEL 2 ADDTL 15MIN: Performed by: SURGERY

## 2018-07-06 PROCEDURE — 96365 THER/PROPH/DIAG IV INF INIT: CPT

## 2018-07-06 PROCEDURE — 87116 MYCOBACTERIA CULTURE: CPT

## 2018-07-06 PROCEDURE — 86900 BLOOD TYPING SEROLOGIC ABO: CPT

## 2018-07-06 PROCEDURE — 3600000002 HC SURGERY LEVEL 2 BASE: Performed by: SURGERY

## 2018-07-06 PROCEDURE — 87070 CULTURE OTHR SPECIMN AEROBIC: CPT

## 2018-07-06 PROCEDURE — 0D9Q3ZZ DRAINAGE OF ANUS, PERCUTANEOUS APPROACH: ICD-10-PCS | Performed by: SURGERY

## 2018-07-06 PROCEDURE — 99285 EMERGENCY DEPT VISIT HI MDM: CPT

## 2018-07-06 PROCEDURE — 6370000000 HC RX 637 (ALT 250 FOR IP): Performed by: GENERAL PRACTICE

## 2018-07-06 PROCEDURE — 80053 COMPREHEN METABOLIC PANEL: CPT

## 2018-07-06 PROCEDURE — 94664 DEMO&/EVAL PT USE INHALER: CPT

## 2018-07-06 PROCEDURE — 6360000002 HC RX W HCPCS: Performed by: EMERGENCY MEDICINE

## 2018-07-06 PROCEDURE — 7100000000 HC PACU RECOVERY - FIRST 15 MIN: Performed by: SURGERY

## 2018-07-06 PROCEDURE — 2709999900 HC NON-CHARGEABLE SUPPLY: Performed by: SURGERY

## 2018-07-06 PROCEDURE — 87205 SMEAR GRAM STAIN: CPT

## 2018-07-06 PROCEDURE — 2580000003 HC RX 258

## 2018-07-06 PROCEDURE — 87015 SPECIMEN INFECT AGNT CONCNTJ: CPT

## 2018-07-06 PROCEDURE — 87102 FUNGUS ISOLATION CULTURE: CPT

## 2018-07-06 PROCEDURE — 3700000000 HC ANESTHESIA ATTENDED CARE: Performed by: SURGERY

## 2018-07-06 PROCEDURE — 3700000001 HC ADD 15 MINUTES (ANESTHESIA): Performed by: SURGERY

## 2018-07-06 RX ORDER — PREDNISONE 20 MG/1
10 TABLET ORAL DAILY
Status: CANCELLED | OUTPATIENT
Start: 2018-07-06

## 2018-07-06 RX ORDER — LIDOCAINE HYDROCHLORIDE 20 MG/ML
INJECTION, SOLUTION INFILTRATION; PERINEURAL PRN
Status: DISCONTINUED | OUTPATIENT
Start: 2018-07-06 | End: 2018-07-06 | Stop reason: SDUPTHER

## 2018-07-06 RX ORDER — NICOTINE 21 MG/24HR
1 PATCH, TRANSDERMAL 24 HOURS TRANSDERMAL DAILY
Status: DISCONTINUED | OUTPATIENT
Start: 2018-07-06 | End: 2018-07-07 | Stop reason: HOSPADM

## 2018-07-06 RX ORDER — ALBUTEROL SULFATE 90 UG/1
2 AEROSOL, METERED RESPIRATORY (INHALATION) EVERY 4 HOURS PRN
Status: CANCELLED | OUTPATIENT
Start: 2018-07-06

## 2018-07-06 RX ORDER — SODIUM CHLORIDE, SODIUM LACTATE, POTASSIUM CHLORIDE, CALCIUM CHLORIDE 600; 310; 30; 20 MG/100ML; MG/100ML; MG/100ML; MG/100ML
INJECTION, SOLUTION INTRAVENOUS CONTINUOUS
Status: DISCONTINUED | OUTPATIENT
Start: 2018-07-06 | End: 2018-07-07

## 2018-07-06 RX ORDER — ACETAMINOPHEN 325 MG/1
650 TABLET ORAL EVERY 6 HOURS PRN
Status: DISCONTINUED | OUTPATIENT
Start: 2018-07-06 | End: 2018-07-07 | Stop reason: HOSPADM

## 2018-07-06 RX ORDER — ONDANSETRON 2 MG/ML
4 INJECTION INTRAMUSCULAR; INTRAVENOUS EVERY 6 HOURS PRN
Status: DISCONTINUED | OUTPATIENT
Start: 2018-07-06 | End: 2018-07-07 | Stop reason: HOSPADM

## 2018-07-06 RX ORDER — FAMOTIDINE 20 MG/1
40 TABLET, FILM COATED ORAL EVERY EVENING
Status: CANCELLED | OUTPATIENT
Start: 2018-07-06

## 2018-07-06 RX ORDER — OXYBUTYNIN CHLORIDE 5 MG/1
1 TABLET, EXTENDED RELEASE ORAL DAILY
Status: DISCONTINUED | OUTPATIENT
Start: 2018-07-06 | End: 2018-07-06 | Stop reason: SDUPTHER

## 2018-07-06 RX ORDER — NICOTINE POLACRILEX 4 MG
15 LOZENGE BUCCAL PRN
Status: DISCONTINUED | OUTPATIENT
Start: 2018-07-06 | End: 2018-07-07 | Stop reason: HOSPADM

## 2018-07-06 RX ORDER — TRAZODONE HYDROCHLORIDE 150 MG/1
150 TABLET ORAL NIGHTLY
Status: CANCELLED | OUTPATIENT
Start: 2018-07-06

## 2018-07-06 RX ORDER — BUMETANIDE 1 MG/1
1 TABLET ORAL DAILY
Status: DISCONTINUED | OUTPATIENT
Start: 2018-07-06 | End: 2018-07-06 | Stop reason: SDUPTHER

## 2018-07-06 RX ORDER — SODIUM CHLORIDE 9 MG/ML
INJECTION, SOLUTION INTRAVENOUS CONTINUOUS PRN
Status: DISCONTINUED | OUTPATIENT
Start: 2018-07-06 | End: 2018-07-06 | Stop reason: SDUPTHER

## 2018-07-06 RX ORDER — 0.9 % SODIUM CHLORIDE 0.9 %
2000 INTRAVENOUS SOLUTION INTRAVENOUS ONCE
Status: COMPLETED | OUTPATIENT
Start: 2018-07-06 | End: 2018-07-06

## 2018-07-06 RX ORDER — ATORVASTATIN CALCIUM 20 MG/1
20 TABLET, FILM COATED ORAL DAILY
Status: DISCONTINUED | OUTPATIENT
Start: 2018-07-06 | End: 2018-07-07 | Stop reason: HOSPADM

## 2018-07-06 RX ORDER — HYDRALAZINE HYDROCHLORIDE 20 MG/ML
10 INJECTION INTRAMUSCULAR; INTRAVENOUS EVERY 6 HOURS PRN
Status: DISCONTINUED | OUTPATIENT
Start: 2018-07-06 | End: 2018-07-07 | Stop reason: HOSPADM

## 2018-07-06 RX ORDER — OXYBUTYNIN CHLORIDE 5 MG/1
1 TABLET, EXTENDED RELEASE ORAL DAILY
Status: CANCELLED | OUTPATIENT
Start: 2018-07-06

## 2018-07-06 RX ORDER — OXYBUTYNIN CHLORIDE 5 MG/1
5 TABLET, EXTENDED RELEASE ORAL DAILY
Status: DISCONTINUED | OUTPATIENT
Start: 2018-07-06 | End: 2018-07-07 | Stop reason: HOSPADM

## 2018-07-06 RX ORDER — ASPIRIN 81 MG/1
81 TABLET ORAL DAILY
Status: CANCELLED | OUTPATIENT
Start: 2018-07-06

## 2018-07-06 RX ORDER — ATORVASTATIN CALCIUM 20 MG/1
1 TABLET, FILM COATED ORAL DAILY
Status: DISCONTINUED | OUTPATIENT
Start: 2018-07-06 | End: 2018-07-06 | Stop reason: SDUPTHER

## 2018-07-06 RX ORDER — SUMATRIPTAN 50 MG/1
50 TABLET, FILM COATED ORAL
Status: CANCELLED | OUTPATIENT
Start: 2018-07-06 | End: 2018-07-06

## 2018-07-06 RX ORDER — IPRATROPIUM BROMIDE AND ALBUTEROL SULFATE 2.5; .5 MG/3ML; MG/3ML
1 SOLUTION RESPIRATORY (INHALATION) ONCE
Status: COMPLETED | OUTPATIENT
Start: 2018-07-06 | End: 2018-07-06

## 2018-07-06 RX ORDER — GUAIFENESIN 600 MG/1
600 TABLET, EXTENDED RELEASE ORAL 2 TIMES DAILY
Status: DISCONTINUED | OUTPATIENT
Start: 2018-07-06 | End: 2018-07-06 | Stop reason: CLARIF

## 2018-07-06 RX ORDER — FENOFIBRATE 160 MG/1
160 TABLET ORAL DAILY
Status: DISCONTINUED | OUTPATIENT
Start: 2018-07-06 | End: 2018-07-07 | Stop reason: HOSPADM

## 2018-07-06 RX ORDER — 0.9 % SODIUM CHLORIDE 0.9 %
1000 INTRAVENOUS SOLUTION INTRAVENOUS ONCE
Status: COMPLETED | OUTPATIENT
Start: 2018-07-06 | End: 2018-07-06

## 2018-07-06 RX ORDER — INSULIN GLARGINE 100 [IU]/ML
40 INJECTION, SOLUTION SUBCUTANEOUS 2 TIMES DAILY
Status: DISCONTINUED | OUTPATIENT
Start: 2018-07-06 | End: 2018-07-06

## 2018-07-06 RX ORDER — ROCURONIUM BROMIDE 10 MG/ML
INJECTION, SOLUTION INTRAVENOUS PRN
Status: DISCONTINUED | OUTPATIENT
Start: 2018-07-06 | End: 2018-07-06 | Stop reason: SDUPTHER

## 2018-07-06 RX ORDER — TIZANIDINE HYDROCHLORIDE 2 MG/1
2 CAPSULE, GELATIN COATED ORAL 3 TIMES DAILY PRN
Status: CANCELLED | OUTPATIENT
Start: 2018-07-06

## 2018-07-06 RX ORDER — PANTOPRAZOLE SODIUM 40 MG/1
40 TABLET, DELAYED RELEASE ORAL
Status: DISCONTINUED | OUTPATIENT
Start: 2018-07-07 | End: 2018-07-07 | Stop reason: HOSPADM

## 2018-07-06 RX ORDER — ERGOCALCIFEROL 1.25 MG/1
50000 CAPSULE ORAL WEEKLY
Status: CANCELLED | OUTPATIENT
Start: 2018-07-06

## 2018-07-06 RX ORDER — GLYCOPYRROLATE 0.2 MG/ML
INJECTION INTRAMUSCULAR; INTRAVENOUS PRN
Status: DISCONTINUED | OUTPATIENT
Start: 2018-07-06 | End: 2018-07-06 | Stop reason: SDUPTHER

## 2018-07-06 RX ORDER — SODIUM CHLORIDE 0.9 % (FLUSH) 0.9 %
10 SYRINGE (ML) INJECTION PRN
Status: DISCONTINUED | OUTPATIENT
Start: 2018-07-06 | End: 2018-07-07 | Stop reason: HOSPADM

## 2018-07-06 RX ORDER — LANOLIN ALCOHOL/MO/W.PET/CERES
1000 CREAM (GRAM) TOPICAL DAILY
Status: CANCELLED | OUTPATIENT
Start: 2018-07-06

## 2018-07-06 RX ORDER — LISINOPRIL 10 MG/1
10 TABLET ORAL DAILY
Status: CANCELLED | OUTPATIENT
Start: 2018-07-06

## 2018-07-06 RX ORDER — INSULIN GLARGINE 100 [IU]/ML
52 INJECTION, SOLUTION SUBCUTANEOUS 2 TIMES DAILY
Status: CANCELLED | OUTPATIENT
Start: 2018-07-06

## 2018-07-06 RX ORDER — TRAZODONE HYDROCHLORIDE 150 MG/1
150 TABLET ORAL NIGHTLY
Status: DISCONTINUED | OUTPATIENT
Start: 2018-07-06 | End: 2018-07-07

## 2018-07-06 RX ORDER — MORPHINE SULFATE 2 MG/ML
2 INJECTION, SOLUTION INTRAMUSCULAR; INTRAVENOUS EVERY 4 HOURS PRN
Status: DISCONTINUED | OUTPATIENT
Start: 2018-07-06 | End: 2018-07-07 | Stop reason: SDUPTHER

## 2018-07-06 RX ORDER — IPRATROPIUM BROMIDE AND ALBUTEROL SULFATE 2.5; .5 MG/3ML; MG/3ML
1 SOLUTION RESPIRATORY (INHALATION) EVERY 4 HOURS PRN
Status: CANCELLED | OUTPATIENT
Start: 2018-07-06

## 2018-07-06 RX ORDER — GUAIFENESIN 400 MG/1
400 TABLET ORAL 3 TIMES DAILY
Status: DISCONTINUED | OUTPATIENT
Start: 2018-07-06 | End: 2018-07-07 | Stop reason: HOSPADM

## 2018-07-06 RX ORDER — KETOCONAZOLE 20 MG/G
CREAM TOPICAL DAILY PRN
Status: DISCONTINUED | OUTPATIENT
Start: 2018-07-06 | End: 2018-07-07 | Stop reason: HOSPADM

## 2018-07-06 RX ORDER — MORPHINE SULFATE 2 MG/ML
1 INJECTION, SOLUTION INTRAMUSCULAR; INTRAVENOUS EVERY 4 HOURS PRN
Status: DISCONTINUED | OUTPATIENT
Start: 2018-07-06 | End: 2018-07-07 | Stop reason: SDUPTHER

## 2018-07-06 RX ORDER — BUMETANIDE 1 MG/1
1 TABLET ORAL DAILY
Status: DISCONTINUED | OUTPATIENT
Start: 2018-07-06 | End: 2018-07-07 | Stop reason: HOSPADM

## 2018-07-06 RX ORDER — BUMETANIDE 1 MG/1
1 TABLET ORAL DAILY
Status: CANCELLED | OUTPATIENT
Start: 2018-07-06

## 2018-07-06 RX ORDER — INSULIN GLARGINE 100 [IU]/ML
40 INJECTION, SOLUTION SUBCUTANEOUS 2 TIMES DAILY
Status: DISCONTINUED | OUTPATIENT
Start: 2018-07-07 | End: 2018-07-07

## 2018-07-06 RX ORDER — GABAPENTIN 800 MG/1
800 TABLET ORAL 3 TIMES DAILY
Status: CANCELLED | OUTPATIENT
Start: 2018-07-06

## 2018-07-06 RX ORDER — MORPHINE SULFATE 4 MG/ML
6 INJECTION, SOLUTION INTRAMUSCULAR; INTRAVENOUS ONCE
Status: COMPLETED | OUTPATIENT
Start: 2018-07-06 | End: 2018-07-06

## 2018-07-06 RX ORDER — SODIUM CHLORIDE 0.9 % (FLUSH) 0.9 %
10 SYRINGE (ML) INJECTION EVERY 12 HOURS SCHEDULED
Status: DISCONTINUED | OUTPATIENT
Start: 2018-07-06 | End: 2018-07-07 | Stop reason: HOSPADM

## 2018-07-06 RX ORDER — PREDNISONE 1 MG/1
5 TABLET ORAL
Status: DISCONTINUED | OUTPATIENT
Start: 2018-07-06 | End: 2018-07-07 | Stop reason: HOSPADM

## 2018-07-06 RX ORDER — TIZANIDINE 4 MG/1
2 TABLET ORAL 3 TIMES DAILY PRN
Status: DISCONTINUED | OUTPATIENT
Start: 2018-07-06 | End: 2018-07-07 | Stop reason: HOSPADM

## 2018-07-06 RX ORDER — GUAIFENESIN 600 MG/1
600 TABLET, EXTENDED RELEASE ORAL 2 TIMES DAILY
Status: CANCELLED | OUTPATIENT
Start: 2018-07-06

## 2018-07-06 RX ORDER — HYDROXYZINE PAMOATE 25 MG/1
50 CAPSULE ORAL 3 TIMES DAILY PRN
Status: DISCONTINUED | OUTPATIENT
Start: 2018-07-06 | End: 2018-07-07 | Stop reason: HOSPADM

## 2018-07-06 RX ORDER — FENOFIBRATE 54 MG/1
54 TABLET ORAL DAILY
Status: CANCELLED | OUTPATIENT
Start: 2018-07-06

## 2018-07-06 RX ORDER — FENTANYL CITRATE 50 UG/ML
INJECTION, SOLUTION INTRAMUSCULAR; INTRAVENOUS PRN
Status: DISCONTINUED | OUTPATIENT
Start: 2018-07-06 | End: 2018-07-06 | Stop reason: SDUPTHER

## 2018-07-06 RX ORDER — CILOSTAZOL 50 MG/1
50 TABLET ORAL
Status: DISCONTINUED | OUTPATIENT
Start: 2018-07-06 | End: 2018-07-07 | Stop reason: HOSPADM

## 2018-07-06 RX ORDER — NICOTINE 21 MG/24HR
1 PATCH, TRANSDERMAL 24 HOURS TRANSDERMAL DAILY
Status: CANCELLED | OUTPATIENT
Start: 2018-07-06

## 2018-07-06 RX ORDER — HYDROXYZINE PAMOATE 25 MG/1
50 CAPSULE ORAL 3 TIMES DAILY PRN
Status: CANCELLED | OUTPATIENT
Start: 2018-07-06

## 2018-07-06 RX ORDER — NEOSTIGMINE METHYLSULFATE 1 MG/ML
INJECTION, SOLUTION INTRAVENOUS PRN
Status: DISCONTINUED | OUTPATIENT
Start: 2018-07-06 | End: 2018-07-06 | Stop reason: SDUPTHER

## 2018-07-06 RX ORDER — DEXTROSE MONOHYDRATE 25 G/50ML
12.5 INJECTION, SOLUTION INTRAVENOUS PRN
Status: DISCONTINUED | OUTPATIENT
Start: 2018-07-06 | End: 2018-07-07 | Stop reason: HOSPADM

## 2018-07-06 RX ORDER — CHOLESTYRAMINE 4 G/9G
4 POWDER, FOR SUSPENSION ORAL 2 TIMES DAILY
Status: CANCELLED | OUTPATIENT
Start: 2018-07-06

## 2018-07-06 RX ORDER — INSULIN GLARGINE 100 [IU]/ML
52 INJECTION, SOLUTION SUBCUTANEOUS 2 TIMES DAILY
Status: DISCONTINUED | OUTPATIENT
Start: 2018-07-06 | End: 2018-07-06

## 2018-07-06 RX ORDER — INSULIN GLARGINE 100 [IU]/ML
20 INJECTION, SOLUTION SUBCUTANEOUS ONCE
Status: COMPLETED | OUTPATIENT
Start: 2018-07-06 | End: 2018-07-06

## 2018-07-06 RX ORDER — DEXTROSE MONOHYDRATE 50 MG/ML
100 INJECTION, SOLUTION INTRAVENOUS PRN
Status: DISCONTINUED | OUTPATIENT
Start: 2018-07-06 | End: 2018-07-07 | Stop reason: HOSPADM

## 2018-07-06 RX ORDER — PROPOFOL 10 MG/ML
INJECTION, EMULSION INTRAVENOUS PRN
Status: DISCONTINUED | OUTPATIENT
Start: 2018-07-06 | End: 2018-07-06 | Stop reason: SDUPTHER

## 2018-07-06 RX ORDER — ONDANSETRON 2 MG/ML
INJECTION INTRAMUSCULAR; INTRAVENOUS PRN
Status: DISCONTINUED | OUTPATIENT
Start: 2018-07-06 | End: 2018-07-06 | Stop reason: SDUPTHER

## 2018-07-06 RX ORDER — ATORVASTATIN CALCIUM 20 MG/1
1 TABLET, FILM COATED ORAL DAILY
Status: CANCELLED | OUTPATIENT
Start: 2018-07-06

## 2018-07-06 RX ORDER — CHOLESTYRAMINE 4 G/9G
4 POWDER, FOR SUSPENSION ORAL 2 TIMES DAILY
Status: DISCONTINUED | OUTPATIENT
Start: 2018-07-06 | End: 2018-07-07 | Stop reason: HOSPADM

## 2018-07-06 RX ORDER — CILOSTAZOL 50 MG/1
50 TABLET ORAL
Status: CANCELLED | OUTPATIENT
Start: 2018-07-06

## 2018-07-06 RX ORDER — KETOCONAZOLE 20 MG/G
CREAM TOPICAL PRN
Status: CANCELLED | OUTPATIENT
Start: 2018-07-06

## 2018-07-06 RX ORDER — IPRATROPIUM BROMIDE AND ALBUTEROL SULFATE 2.5; .5 MG/3ML; MG/3ML
1 SOLUTION RESPIRATORY (INHALATION) EVERY 4 HOURS PRN
Status: DISCONTINUED | OUTPATIENT
Start: 2018-07-06 | End: 2018-07-07 | Stop reason: HOSPADM

## 2018-07-06 RX ORDER — DEXAMETHASONE SODIUM PHOSPHATE 4 MG/ML
INJECTION, SOLUTION INTRA-ARTICULAR; INTRALESIONAL; INTRAMUSCULAR; INTRAVENOUS; SOFT TISSUE PRN
Status: DISCONTINUED | OUTPATIENT
Start: 2018-07-06 | End: 2018-07-06 | Stop reason: SDUPTHER

## 2018-07-06 RX ADMIN — GUAIFENESIN 400 MG: 400 TABLET ORAL at 23:04

## 2018-07-06 RX ADMIN — MORPHINE SULFATE 6 MG: 4 INJECTION, SOLUTION INTRAMUSCULAR; INTRAVENOUS at 10:15

## 2018-07-06 RX ADMIN — LIDOCAINE HYDROCHLORIDE 40 MG: 20 INJECTION, SOLUTION INFILTRATION; PERINEURAL at 13:55

## 2018-07-06 RX ADMIN — Medication 3 MG: at 14:27

## 2018-07-06 RX ADMIN — PROPOFOL 100 MG: 10 INJECTION, EMULSION INTRAVENOUS at 13:55

## 2018-07-06 RX ADMIN — ROFLUMILAST 500 MCG: 500 TABLET ORAL at 17:24

## 2018-07-06 RX ADMIN — PREDNISONE 5 MG: 5 TABLET ORAL at 17:24

## 2018-07-06 RX ADMIN — Medication 0.6 MG: at 14:27

## 2018-07-06 RX ADMIN — IPRATROPIUM BROMIDE AND ALBUTEROL SULFATE 3 ML: .5; 3 SOLUTION RESPIRATORY (INHALATION) at 17:58

## 2018-07-06 RX ADMIN — DEXAMETHASONE SODIUM PHOSPHATE 8 MG: 4 INJECTION, SOLUTION INTRA-ARTICULAR; INTRALESIONAL; INTRAMUSCULAR; INTRAVENOUS; SOFT TISSUE at 13:55

## 2018-07-06 RX ADMIN — CILOSTAZOL 50 MG: 50 TABLET ORAL at 17:24

## 2018-07-06 RX ADMIN — INSULIN GLARGINE 20 UNITS: 100 INJECTION, SOLUTION SUBCUTANEOUS at 23:03

## 2018-07-06 RX ADMIN — SODIUM CHLORIDE, POTASSIUM CHLORIDE, SODIUM LACTATE AND CALCIUM CHLORIDE: 600; 310; 30; 20 INJECTION, SOLUTION INTRAVENOUS at 16:27

## 2018-07-06 RX ADMIN — FENOFIBRATE 160 MG: 160 TABLET ORAL at 17:24

## 2018-07-06 RX ADMIN — BUPROPION HYDROCHLORIDE 450 MG: 300 TABLET, EXTENDED RELEASE ORAL at 17:23

## 2018-07-06 RX ADMIN — BUMETANIDE 1 MG: 1 TABLET ORAL at 17:22

## 2018-07-06 RX ADMIN — PIPERACILLIN SODIUM AND TAZOBACTAM SODIUM 3.38 G: 3; .375 INJECTION, POWDER, LYOPHILIZED, FOR SOLUTION INTRAVENOUS at 10:15

## 2018-07-06 RX ADMIN — FENTANYL CITRATE 100 MCG: 50 INJECTION, SOLUTION INTRAMUSCULAR; INTRAVENOUS at 13:55

## 2018-07-06 RX ADMIN — ROCURONIUM BROMIDE 30 MG: 10 SOLUTION INTRAVENOUS at 13:55

## 2018-07-06 RX ADMIN — SODIUM CHLORIDE 2000 ML: 9 INJECTION, SOLUTION INTRAVENOUS at 10:15

## 2018-07-06 RX ADMIN — GUAIFENESIN 400 MG: 400 TABLET ORAL at 17:22

## 2018-07-06 RX ADMIN — MOMETASONE FUROATE AND FORMOTEROL FUMARATE DIHYDRATE 2 PUFF: 200; 5 AEROSOL RESPIRATORY (INHALATION) at 19:58

## 2018-07-06 RX ADMIN — SODIUM CHLORIDE: 9 INJECTION, SOLUTION INTRAVENOUS at 13:49

## 2018-07-06 RX ADMIN — INSULIN LISPRO 6 UNITS: 100 INJECTION, SOLUTION INTRAVENOUS; SUBCUTANEOUS at 18:02

## 2018-07-06 RX ADMIN — SODIUM CHLORIDE 1000 ML: 9 INJECTION, SOLUTION INTRAVENOUS at 11:38

## 2018-07-06 RX ADMIN — IPRATROPIUM BROMIDE AND ALBUTEROL SULFATE 1 AMPULE: .5; 3 SOLUTION RESPIRATORY (INHALATION) at 12:57

## 2018-07-06 RX ADMIN — OXYBUTYNIN CHLORIDE 5 MG: 5 TABLET, FILM COATED, EXTENDED RELEASE ORAL at 17:23

## 2018-07-06 RX ADMIN — ONDANSETRON 4 MG: 2 INJECTION, SOLUTION INTRAMUSCULAR; INTRAVENOUS at 14:14

## 2018-07-06 RX ADMIN — INSULIN LISPRO 5 UNITS: 100 INJECTION, SOLUTION INTRAVENOUS; SUBCUTANEOUS at 23:04

## 2018-07-06 RX ADMIN — TRAZODONE HYDROCHLORIDE 150 MG: 150 TABLET ORAL at 23:04

## 2018-07-06 RX ADMIN — ATORVASTATIN CALCIUM 20 MG: 20 TABLET, FILM COATED ORAL at 17:24

## 2018-07-06 ASSESSMENT — PULMONARY FUNCTION TESTS
PIF_VALUE: 36
PIF_VALUE: 0
PIF_VALUE: 35
PIF_VALUE: 36
PIF_VALUE: 35
PIF_VALUE: 0
PIF_VALUE: 35
PIF_VALUE: 35
PIF_VALUE: 1
PIF_VALUE: 28
PIF_VALUE: 9
PIF_VALUE: 35
PIF_VALUE: 2
PIF_VALUE: 2
PIF_VALUE: 35
PIF_VALUE: 27
PIF_VALUE: 1
PIF_VALUE: 37
PIF_VALUE: 3
PIF_VALUE: 1
PIF_VALUE: 27
PIF_VALUE: 35
PIF_VALUE: 35
PIF_VALUE: 36
PIF_VALUE: 1
PIF_VALUE: 9
PIF_VALUE: 35
PIF_VALUE: 14
PIF_VALUE: 36
PIF_VALUE: 3
PIF_VALUE: 0
PIF_VALUE: 35
PIF_VALUE: 26
PIF_VALUE: 35
PIF_VALUE: 36
PIF_VALUE: 3
PIF_VALUE: 21
PIF_VALUE: 33
PIF_VALUE: 27
PIF_VALUE: 0
PIF_VALUE: 35
PIF_VALUE: 35
PIF_VALUE: 0
PIF_VALUE: 1
PIF_VALUE: 4
PIF_VALUE: 35
PIF_VALUE: 2

## 2018-07-06 ASSESSMENT — PAIN SCALES - GENERAL
PAINLEVEL_OUTOF10: 10
PAINLEVEL_OUTOF10: 4
PAINLEVEL_OUTOF10: 10
PAINLEVEL_OUTOF10: 8
PAINLEVEL_OUTOF10: 7

## 2018-07-06 ASSESSMENT — PAIN SCALES - WONG BAKER
WONGBAKER_NUMERICALRESPONSE: 0

## 2018-07-06 ASSESSMENT — PAIN DESCRIPTION - DESCRIPTORS
DESCRIPTORS: ACHING
DESCRIPTORS: STABBING
DESCRIPTORS: ACHING;THROBBING

## 2018-07-06 ASSESSMENT — PAIN DESCRIPTION - FREQUENCY
FREQUENCY: CONTINUOUS

## 2018-07-06 ASSESSMENT — PAIN DESCRIPTION - PAIN TYPE
TYPE: SURGICAL PAIN
TYPE: ACUTE PAIN

## 2018-07-06 ASSESSMENT — PAIN DESCRIPTION - ORIENTATION
ORIENTATION: LEFT
ORIENTATION: RIGHT
ORIENTATION: RIGHT

## 2018-07-06 ASSESSMENT — PAIN DESCRIPTION - LOCATION
LOCATION: BUTTOCKS

## 2018-07-06 ASSESSMENT — PAIN DESCRIPTION - ONSET
ONSET: ON-GOING

## 2018-07-06 ASSESSMENT — LIFESTYLE VARIABLES: SMOKING_STATUS: 1

## 2018-07-06 ASSESSMENT — PAIN DESCRIPTION - PROGRESSION: CLINICAL_PROGRESSION: NOT CHANGED

## 2018-07-06 NOTE — ANESTHESIA PRE PROCEDURE
Department of Anesthesiology  Preprocedure Note       Name:  Angelita Luu   Age:  48 y.o.  :  1965                                          MRN:  [id number]         Date:  2018      Surgeon: Natalia Gallagher):  Maricel Gagnon DO    Procedure: Procedure(s):  EXAM UNDER ANESTHESIA  I & D BUTTOCKS    Medications prior to admission:       Current medications:  See epic    Allergies:  No Known Allergies    Problem List:    Patient Active Problem List   Diagnosis Code    COPD exacerbation ([facility] J44.1    Uncontrolled type 2 diabetes mellitus with circulatory disorder, with long-term current use of insulin ([facility] [address] E11.59, E11.65, Z79.4    Hyperlipidemia E78.5    Depression F32.9    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    MARIAA (obstructive sleep apnea) G47.33    Tobacco abuse Z72.0    Carpal tunnel syndrome G56.00    Cerebral infarction ([facility] [address] I63.9    Carotid stenosis I65.29    Vitamin D deficiency E55.9    Ventral hernia K43.9    Diastasis recti M62.08    PAD (peripheral artery disease) ([facility]) I73.9    Leukocytosis D72.829    Low back pain M54.5    Tinea pedis of both feet B35.3    Incarcerated umbilical hernia S22.6    S/P hernia repair Z98.890, Z87.19    History of ischemic heart disease Z86.79    Hypokalemia E87.6    Lumbar radiculopathy M54.16    Medication management Z79.899    Perianal abscess K61.0    Oral thrush B37.0    Fall W19. XXXA    Severe malnutrition ([facility] [address] E43    PVD (peripheral vascular disease) with claudication ([facility]) I73.9    Compression fracture of L4 lumbar vertebra ([facility]) S32.040A    Compression fracture of L1 lumbar vertebra ([facility]) S32.010A    H/O: stroke Z86.73    Pain medication agreement Z02.89    Sepsis ([facility] [address] A41.9       Past Medical History:        Diagnosis Date    Anxiety     Carotid stenosis 2011    50-69% on left    Carotid stenosis 2011    Carpal tunnel syndrome 2011    Cerebral artery occlusion with cerebral infarction ([facility])     Chronic back pain Pulmonary:   (+) COPD:  sleep apnea: on CPAP and noncompliant,  decreased breath sounds,  current smoker    (-) pneumonia                           Cardiovascular:    (+) hypertension:, hyperlipidemia        Rhythm: regular                      Neuro/Psych:   (+) CVA: residual symptoms, neuromuscular disease:, psychiatric history:            GI/Hepatic/Renal:   (+) GERD:,           Endo/Other:    (+) DiabetesType II DM, using insulin, . Abdominal:           Vascular: negative vascular ROS. Anesthesia Plan      general     ASA 3 - emergent     (Prone position)  Induction: intravenous. MIPS: Postoperative opioids intended and Prophylactic antiemetics administered. Anesthetic plan and risks discussed with patient. Plan discussed with CRNA.             [address] Jovanny Mesa DO   7/6/2018     Late entry, patient seen before procedure start

## 2018-07-06 NOTE — H&P
mention of complication, not stated as uncontrolled     Vitamin D deficiency          Past Surgical History:   Procedure Laterality Date    ABDOMINAL EXPLORATION SURGERY  03/29/2017    with bowel resection, incarcerated ventral hernia repair    CARDIAC CATHETERIZATION  1/7/2015    Dr. Piero Pepe  EF=55%   FFR=0.84  distal RCA bifurcation    CYST INCISION AND DRAINAGE Right 07/22/2016    Right gluteal mass incision and drainage    CYST REMOVAL  1/28/2011    excision of left arm inclusion cyst    ECHO COMPL W DOP COLOR FLOW  5/15/2013         ECHOCARDIOGRAM COMPLETE 2D W DOPPLER W COLOR  1/3/2012         ECHOCARDIOGRAM COMPLETE 2D W DOPPLER W COLOR  6/14/2012         EYE SURGERY      OTHER SURGICAL HISTORY  07/26/2016    re exploration left lateral wound    TUBAL LIGATION  1992       Medications Prior to Admission:    Not in a hospital admission. Allergies:    Patient has no known allergies. Social History:    reports that she has been smoking Cigarettes. She has a 17.00 pack-year smoking history. She has never used smokeless tobacco. She reports that she does not drink alcohol or use drugs. Family History:   family history includes COPD in her father and mother; Cancer in her maternal grandmother; Heart Attack in her maternal grandfather; Heart Disease in her brother; High Blood Pressure in her brother, father, and mother; Other in her brother, father, and paternal grandmother. REVIEW OF SYSTEMS:  As above in the HPI, otherwise negative    PHYSICAL EXAM:    Vitals:  BP (!) 111/57   Pulse 98   Temp 98.3 °F (36.8 °C) (Oral)   Resp 14   Ht 5' 5\" (1.651 m)   Wt 146 lb (66.2 kg)   LMP 03/20/2015   SpO2 99%   BMI 24.30 kg/m²     General:  Awake, alert, oriented X 3. Well developed, well nourished, well groomed. No apparent distress. HEENT:  Normocephalic, atraumatic. Pupils equal, round, reactive to light. No scleral icterus. No conjunctival injection. Normal lips, teeth, and gums.   No nasal discharge. Neck:  Supple  Heart:  tachycardic, no murmurs, gallops, or rubs  Lungs:  CTA bilaterally, bilat symmetrical expansion, no wheeze, rales, or rhonchi  Abdomen:   Bowel sounds present, soft, nontender, no masses, no organomegaly, no peritoneal signs  Extremities:  No clubbing, cyanosis, or edema  Skin:  Warm and dry, 8x15 cm erythematous tender, fluctuant abscess located on the right perianal area  Neuro:  Cranial nerves 2-12 intact, no focal deficits    LABS:  CBC with Differential:    Lab Results   Component Value Date    WBC 21.6 07/06/2018    RBC 4.59 07/06/2018    HGB 13.1 07/06/2018    HCT 40.2 07/06/2018     07/06/2018    MCV 87.6 07/06/2018    MCH 28.5 07/06/2018    MCHC 32.6 07/06/2018    RDW 13.2 07/06/2018    SEGSPCT 57 12/10/2013    BANDSPCT 2 07/22/2016    METASPCT 2 07/22/2016    LYMPHOPCT 10.7 07/06/2018    MONOPCT 4.6 07/06/2018    BASOPCT 0.3 07/06/2018    MONOSABS 1.00 07/06/2018    LYMPHSABS 2.32 07/06/2018    EOSABS 0.09 07/06/2018    BASOSABS 0.06 07/06/2018     CMP:    Lab Results   Component Value Date     07/01/2018    K 4.2 07/01/2018    CL 93 07/01/2018    CO2 33 07/01/2018    BUN 7 07/01/2018    CREATININE 0.3 07/01/2018    GFRAA >60 07/01/2018    LABGLOM >60 07/01/2018    GLUCOSE 188 07/01/2018    GLUCOSE 358 05/25/2012    PROT 6.8 06/27/2018    LABALBU 3.5 06/27/2018    LABALBU 4.3 05/25/2012    CALCIUM 9.4 07/01/2018    BILITOT 0.2 06/27/2018    ALKPHOS 64 06/27/2018    AST 10 06/27/2018    ALT 8 06/27/2018     U/A:pending      LIPASE:    Lab Results   Component Value Date    LIPASE 10 07/06/2018         ASSESSMENT/PLAN:    Sepsis  -hypotensive, tachycardic, tachypneic   -blood cultures pending  -received 2L bolus of NS  -monitor vitals and CBC  -ID consulted    Perianal Abscess  -Leukocytosis of 21.6  -received flagyl and zosyn in ER  -blood cultures pending  -surgery consulted  -ID consult  -monitor CBC and CMP    Type II DM  -Lantus 15 units BID  -sliding

## 2018-07-06 NOTE — ED NOTES
Shashank Osullivan is a 48 y.o. female who presented to the ED on 07/06/18 complaining of   Right buttocks abscess for one week. Pt states that she was seen here one week ago for abscess but didn't want to be admitted at that time. Pt now back for abscess on left buttocks that is red and swollen. Wilfrid Records. Odalis Skelton RN  07/06/18 0519 [facility],Third Floor  Odalis Skelton RN  07/06/18 [id number]

## 2018-07-06 NOTE — ED PROVIDER NOTES
ECHO Complete 2D W Doppler W Color (1/3/2012); ECHO Complete 2D W Doppler W Color (6/14/2012); ECHO Compl W Dop Color Flow (5/15/2013); Cardiac catheterization (1/7/2015); cyst incision and drainage (Right, 07/22/2016); other surgical history (07/26/2016); eye surgery; and Abdominal exploration surgery (03/29/2017). Social History:  reports that she has been smoking Cigarettes. She has a 17.00 pack-year smoking history. She has never used smokeless tobacco. She reports that she does not drink alcohol or use drugs. Family History: family history includes COPD in her father and mother; Cancer in her maternal grandmother; Heart Attack in her maternal grandfather; Heart Disease in her brother; High Blood Pressure in her brother, father, and mother; Other in her brother, father, and paternal grandmother. The patients home medications have been reviewed. Allergies: Patient has no known allergies. ---------------------------------------------------PHYSICAL EXAM--------------------------------------    Constitutional/General: Alert and oriented x3, well appearing, non toxic in NAD  Head: Normocephalic and atraumatic  Eyes: PERRL, EOMI  Mouth: Oropharynx clear, handling secretions, no trismus, no asymmetry of the posterior oropharynx or uvular edema   Neck: Supple, full ROM, non tender to palpation in the midline, no stridor, no crepitus, no meningeal signs  Pulmonary: Lungs clear to auscultation bilaterally, no wheezes, rales, or rhonchi. Not in respiratory distress  Cardiovascular:  Tachycardia. Regular rhythm. No murmurs, gallops, or rubs. 2+ distal pulses  Chest: no chest wall tenderness  Abdomen: Soft. Non tender. Non distended. +BS. No rebound, guarding, or rigidity. No pulsatile masses appreciated. Musculoskeletal: Moves all extremities x 4. Warm and well perfused, no clubbing, edema or cyanosis.  Capillary refill <3 seconds  Skin:  Large R sided fluctuant, erythematous and tender area from the perianal region that extends out 8 cm horizontally and 15 cm vertically. Warm and dry. No rashes. Neurologic: GCS 15, CN II-XII grossly intact, no focal deficits, symmetric strength 5/5 in the upper and lower extremities bilaterally  Psych: Normal Affect    -------------------------------------------------- RESULTS -------------------------------------------------  I have personally reviewed all laboratory and imaging results for this patient. Results are listed below.      LABS:  Results for orders placed or performed during the hospital encounter of 07/06/18   CBC auto differential   Result Value Ref Range    WBC 21.6 (H) 4.5 - 11.5 E9/L    RBC 4.59 3.50 - 5.50 E12/L    Hemoglobin 13.1 11.5 - 15.5 g/dL    Hematocrit 40.2 34.0 - 48.0 %    MCV 87.6 80.0 - 99.9 fL    MCH 28.5 26.0 - 35.0 pg    MCHC 32.6 32.0 - 34.5 %    RDW 13.2 11.5 - 15.0 fL    Platelets 934 033 - 527 E9/L    MPV 9.6 7.0 - 12.0 fL    Neutrophils % 83.4 (H) 43.0 - 80.0 %    Immature Granulocytes % 0.6 0.0 - 5.0 %    Lymphocytes % 10.7 (L) 20.0 - 42.0 %    Monocytes % 4.6 2.0 - 12.0 %    Eosinophils % 0.4 0.0 - 6.0 %    Basophils % 0.3 0.0 - 2.0 %    Neutrophils # 18.00 (H) 1.80 - 7.30 E9/L    Immature Granulocytes # 0.14 E9/L    Lymphocytes # 2.32 1.50 - 4.00 E9/L    Monocytes # 1.00 (H) 0.10 - 0.95 E9/L    Eosinophils # 0.09 0.05 - 0.50 E9/L    Basophils # 0.06 0.00 - 0.20 E9/L   Lipase   Result Value Ref Range    Lipase 10 (L) 13 - 60 U/L   Lactic acid, plasma   Result Value Ref Range    Lactic Acid 1.2 0.5 - 2.2 mmol/L   EKG 12 lead   Result Value Ref Range    Ventricular Rate 110 BPM    Atrial Rate 110 BPM    P-R Interval 156 ms    QRS Duration 84 ms    Q-T Interval 324 ms    QTc Calculation (Bazett) 438 ms    P Axis 69 degrees    R Axis -42 degrees    T Axis 77 degrees       RADIOLOGY:  Interpreted by Radiologist.  No orders to display     ------------------------- NURSING NOTES AND VITALS REVIEWED ---------------------------   The nursing documentation has been prepared under my direction and personally reviewed by me in its entirety. I confirm that the note above accurately reflects all work, treatment, procedures, and medical decision making performed by me.            Hipolito Quiroga DO  07/06/18 1053

## 2018-07-06 NOTE — ED NOTES
Pt taken to bathroom by RN. No urine collected as she missed the hat in toilet. Cherelle Diego.  PATY Yung  07/06/18 7203

## 2018-07-06 NOTE — OP NOTE
create a loop. The skin was washed and dried and dressings consisting of 4x4's and a heavy drainage ABD were applied. Needle, sponge, and instrument counts were reported as correct x2. Dr. James Camejo was present and scrubbed throughout the case. The patient tolerated the procedure well without complications. She was transferred to the recovery area in good condition.     Electronically signed by Naima Shea DO on 7/6/18 at 2:28 PM

## 2018-07-07 VITALS
DIASTOLIC BLOOD PRESSURE: 53 MMHG | BODY MASS INDEX: 24.53 KG/M2 | RESPIRATION RATE: 18 BRPM | OXYGEN SATURATION: 92 % | SYSTOLIC BLOOD PRESSURE: 109 MMHG | HEIGHT: 65 IN | HEART RATE: 101 BPM | WEIGHT: 147.2 LBS | TEMPERATURE: 98.2 F

## 2018-07-07 PROBLEM — K61.1 PERIRECTAL ABSCESS: Status: ACTIVE | Noted: 2018-07-07

## 2018-07-07 PROBLEM — A41.9 SEPSIS (HCC): Status: RESOLVED | Noted: 2018-07-06 | Resolved: 2018-07-07

## 2018-07-07 PROBLEM — J41.1 MUCOPURULENT CHRONIC BRONCHITIS (HCC): Chronic | Status: ACTIVE | Noted: 2018-07-07

## 2018-07-07 PROBLEM — K61.1 PERIRECTAL ABSCESS: Status: RESOLVED | Noted: 2018-07-07 | Resolved: 2018-07-07

## 2018-07-07 LAB
ANION GAP SERPL CALCULATED.3IONS-SCNC: 11 MMOL/L (ref 7–16)
BASOPHILS ABSOLUTE: 0.03 E9/L (ref 0–0.2)
BASOPHILS RELATIVE PERCENT: 0.2 % (ref 0–2)
BUN BLDV-MCNC: 5 MG/DL (ref 6–20)
CALCIUM SERPL-MCNC: 8.5 MG/DL (ref 8.6–10.2)
CHLORIDE BLD-SCNC: 98 MMOL/L (ref 98–107)
CO2: 32 MMOL/L (ref 22–29)
CREAT SERPL-MCNC: 0.3 MG/DL (ref 0.5–1)
EOSINOPHILS ABSOLUTE: 0.01 E9/L (ref 0.05–0.5)
EOSINOPHILS RELATIVE PERCENT: 0.1 % (ref 0–6)
GFR AFRICAN AMERICAN: >60
GFR NON-AFRICAN AMERICAN: >60 ML/MIN/1.73
GLUCOSE BLD-MCNC: 207 MG/DL (ref 74–109)
GRAM STAIN ORDERABLE: NORMAL
GRAM STAIN ORDERABLE: NORMAL
HCT VFR BLD CALC: 35.1 % (ref 34–48)
HEMOGLOBIN: 11.3 G/DL (ref 11.5–15.5)
IMMATURE GRANULOCYTES #: 0.18 E9/L
IMMATURE GRANULOCYTES %: 1.1 % (ref 0–5)
LYMPHOCYTES ABSOLUTE: 2.92 E9/L (ref 1.5–4)
LYMPHOCYTES RELATIVE PERCENT: 18.3 % (ref 20–42)
MCH RBC QN AUTO: 28.2 PG (ref 26–35)
MCHC RBC AUTO-ENTMCNC: 32.2 % (ref 32–34.5)
MCV RBC AUTO: 87.5 FL (ref 80–99.9)
METER GLUCOSE: 116 MG/DL (ref 70–110)
METER GLUCOSE: 197 MG/DL (ref 70–110)
METER GLUCOSE: 209 MG/DL (ref 70–110)
METER GLUCOSE: 89 MG/DL (ref 70–110)
MONOCYTES ABSOLUTE: 0.64 E9/L (ref 0.1–0.95)
MONOCYTES RELATIVE PERCENT: 4 % (ref 2–12)
NEUTROPHILS ABSOLUTE: 12.14 E9/L (ref 1.8–7.3)
NEUTROPHILS RELATIVE PERCENT: 76.3 % (ref 43–80)
PDW BLD-RTO: 13.2 FL (ref 11.5–15)
PLATELET # BLD: 337 E9/L (ref 130–450)
PMV BLD AUTO: 9.5 FL (ref 7–12)
POTASSIUM REFLEX MAGNESIUM: 3.6 MMOL/L (ref 3.5–5)
RBC # BLD: 4.01 E12/L (ref 3.5–5.5)
SODIUM BLD-SCNC: 141 MMOL/L (ref 132–146)
WBC # BLD: 15.9 E9/L (ref 4.5–11.5)

## 2018-07-07 PROCEDURE — 2580000003 HC RX 258: Performed by: FAMILY MEDICINE

## 2018-07-07 PROCEDURE — 6370000000 HC RX 637 (ALT 250 FOR IP): Performed by: GENERAL PRACTICE

## 2018-07-07 PROCEDURE — 99222 1ST HOSP IP/OBS MODERATE 55: CPT | Performed by: FAMILY MEDICINE

## 2018-07-07 PROCEDURE — 80048 BASIC METABOLIC PNL TOTAL CA: CPT

## 2018-07-07 PROCEDURE — 6370000000 HC RX 637 (ALT 250 FOR IP): Performed by: STUDENT IN AN ORGANIZED HEALTH CARE EDUCATION/TRAINING PROGRAM

## 2018-07-07 PROCEDURE — 36415 COLL VENOUS BLD VENIPUNCTURE: CPT

## 2018-07-07 PROCEDURE — 76937 US GUIDE VASCULAR ACCESS: CPT

## 2018-07-07 PROCEDURE — 82962 GLUCOSE BLOOD TEST: CPT

## 2018-07-07 PROCEDURE — C1751 CATH, INF, PER/CENT/MIDLINE: HCPCS

## 2018-07-07 PROCEDURE — 02HV33Z INSERTION OF INFUSION DEVICE INTO SUPERIOR VENA CAVA, PERCUTANEOUS APPROACH: ICD-10-PCS | Performed by: FAMILY MEDICINE

## 2018-07-07 PROCEDURE — 6360000002 HC RX W HCPCS: Performed by: FAMILY MEDICINE

## 2018-07-07 PROCEDURE — 94640 AIRWAY INHALATION TREATMENT: CPT

## 2018-07-07 PROCEDURE — 6370000000 HC RX 637 (ALT 250 FOR IP): Performed by: FAMILY MEDICINE

## 2018-07-07 PROCEDURE — 85025 COMPLETE CBC W/AUTO DIFF WBC: CPT

## 2018-07-07 PROCEDURE — 2580000003 HC RX 258: Performed by: STUDENT IN AN ORGANIZED HEALTH CARE EDUCATION/TRAINING PROGRAM

## 2018-07-07 PROCEDURE — 36569 INSJ PICC 5 YR+ W/O IMAGING: CPT

## 2018-07-07 PROCEDURE — 2700000000 HC OXYGEN THERAPY PER DAY

## 2018-07-07 PROCEDURE — 2500000003 HC RX 250 WO HCPCS: Performed by: SPECIALIST

## 2018-07-07 PROCEDURE — 99024 POSTOP FOLLOW-UP VISIT: CPT | Performed by: TRANSPLANT SURGERY

## 2018-07-07 RX ORDER — LIDOCAINE HYDROCHLORIDE 10 MG/ML
5 INJECTION, SOLUTION EPIDURAL; INFILTRATION; INTRACAUDAL; PERINEURAL ONCE
Status: COMPLETED | OUTPATIENT
Start: 2018-07-07 | End: 2018-07-07

## 2018-07-07 RX ORDER — MORPHINE SULFATE 2 MG/ML
1 INJECTION, SOLUTION INTRAMUSCULAR; INTRAVENOUS EVERY 4 HOURS PRN
Status: DISCONTINUED | OUTPATIENT
Start: 2018-07-07 | End: 2018-07-07 | Stop reason: HOSPADM

## 2018-07-07 RX ORDER — LINEZOLID 600 MG/1
600 TABLET, FILM COATED ORAL EVERY 12 HOURS SCHEDULED
Status: DISCONTINUED | OUTPATIENT
Start: 2018-07-07 | End: 2018-07-07 | Stop reason: HOSPADM

## 2018-07-07 RX ORDER — SODIUM CHLORIDE 0.9 % (FLUSH) 0.9 %
10 SYRINGE (ML) INJECTION PRN
Status: DISCONTINUED | OUTPATIENT
Start: 2018-07-07 | End: 2018-07-07 | Stop reason: HOSPADM

## 2018-07-07 RX ORDER — LINEZOLID 600 MG/1
600 TABLET, FILM COATED ORAL 2 TIMES DAILY
Qty: 20 TABLET | Refills: 0 | Status: SHIPPED | OUTPATIENT
Start: 2018-07-07 | End: 2018-07-17

## 2018-07-07 RX ORDER — INSULIN GLARGINE 100 [IU]/ML
65 INJECTION, SOLUTION SUBCUTANEOUS 2 TIMES DAILY
Status: DISCONTINUED | OUTPATIENT
Start: 2018-07-07 | End: 2018-07-07 | Stop reason: HOSPADM

## 2018-07-07 RX ORDER — MORPHINE SULFATE 2 MG/ML
2 INJECTION, SOLUTION INTRAMUSCULAR; INTRAVENOUS EVERY 4 HOURS PRN
Status: DISCONTINUED | OUTPATIENT
Start: 2018-07-07 | End: 2018-07-07 | Stop reason: HOSPADM

## 2018-07-07 RX ADMIN — CILOSTAZOL 50 MG: 50 TABLET ORAL at 16:15

## 2018-07-07 RX ADMIN — IPRATROPIUM BROMIDE AND ALBUTEROL SULFATE 3 ML: .5; 3 SOLUTION RESPIRATORY (INHALATION) at 12:13

## 2018-07-07 RX ADMIN — LIDOCAINE HYDROCHLORIDE 2 ML: 10 INJECTION, SOLUTION EPIDURAL; INFILTRATION; INTRACAUDAL; PERINEURAL at 13:53

## 2018-07-07 RX ADMIN — CILOSTAZOL 50 MG: 50 TABLET ORAL at 06:18

## 2018-07-07 RX ADMIN — ATORVASTATIN CALCIUM 20 MG: 20 TABLET, FILM COATED ORAL at 09:43

## 2018-07-07 RX ADMIN — MOMETASONE FUROATE AND FORMOTEROL FUMARATE DIHYDRATE 2 PUFF: 200; 5 AEROSOL RESPIRATORY (INHALATION) at 09:06

## 2018-07-07 RX ADMIN — PIPERACILLIN SODIUM AND TAZOBACTAM SODIUM 3.38 G: 3; .375 INJECTION, POWDER, LYOPHILIZED, FOR SOLUTION INTRAVENOUS at 09:50

## 2018-07-07 RX ADMIN — INSULIN GLARGINE 65 UNITS: 100 INJECTION, SOLUTION SUBCUTANEOUS at 09:28

## 2018-07-07 RX ADMIN — GUAIFENESIN 400 MG: 400 TABLET ORAL at 13:55

## 2018-07-07 RX ADMIN — IPRATROPIUM BROMIDE AND ALBUTEROL SULFATE 3 ML: .5; 3 SOLUTION RESPIRATORY (INHALATION) at 09:05

## 2018-07-07 RX ADMIN — PREDNISONE 5 MG: 5 TABLET ORAL at 09:44

## 2018-07-07 RX ADMIN — BUPROPION HYDROCHLORIDE 450 MG: 300 TABLET, EXTENDED RELEASE ORAL at 09:42

## 2018-07-07 RX ADMIN — FENOFIBRATE 160 MG: 160 TABLET ORAL at 09:40

## 2018-07-07 RX ADMIN — TIOTROPIUM BROMIDE 18 MCG: 18 CAPSULE ORAL; RESPIRATORY (INHALATION) at 09:06

## 2018-07-07 RX ADMIN — IPRATROPIUM BROMIDE AND ALBUTEROL SULFATE 3 ML: .5; 3 SOLUTION RESPIRATORY (INHALATION) at 16:32

## 2018-07-07 RX ADMIN — PIPERACILLIN SODIUM AND TAZOBACTAM SODIUM 3.38 G: 3; .375 INJECTION, POWDER, LYOPHILIZED, FOR SOLUTION INTRAVENOUS at 16:10

## 2018-07-07 RX ADMIN — PANTOPRAZOLE SODIUM 40 MG: 40 TABLET, DELAYED RELEASE ORAL at 06:18

## 2018-07-07 RX ADMIN — INSULIN LISPRO 6 UNITS: 100 INJECTION, SOLUTION INTRAVENOUS; SUBCUTANEOUS at 13:55

## 2018-07-07 RX ADMIN — OXYBUTYNIN CHLORIDE 5 MG: 5 TABLET, FILM COATED, EXTENDED RELEASE ORAL at 09:44

## 2018-07-07 RX ADMIN — ROFLUMILAST 500 MCG: 500 TABLET ORAL at 09:43

## 2018-07-07 RX ADMIN — Medication 10 ML: at 16:10

## 2018-07-07 RX ADMIN — MOMETASONE FUROATE AND FORMOTEROL FUMARATE DIHYDRATE 2 PUFF: 200; 5 AEROSOL RESPIRATORY (INHALATION) at 20:34

## 2018-07-07 RX ADMIN — IPRATROPIUM BROMIDE AND ALBUTEROL SULFATE 3 ML: .5; 3 SOLUTION RESPIRATORY (INHALATION) at 20:35

## 2018-07-07 RX ADMIN — GUAIFENESIN 400 MG: 400 TABLET ORAL at 09:45

## 2018-07-07 RX ADMIN — BUMETANIDE 1 MG: 1 TABLET ORAL at 09:43

## 2018-07-07 ASSESSMENT — PAIN DESCRIPTION - LOCATION: LOCATION: BUTTOCKS

## 2018-07-07 ASSESSMENT — PAIN DESCRIPTION - PAIN TYPE: TYPE: SURGICAL PAIN

## 2018-07-07 ASSESSMENT — PAIN DESCRIPTION - FREQUENCY: FREQUENCY: CONTINUOUS

## 2018-07-07 ASSESSMENT — PAIN DESCRIPTION - DESCRIPTORS: DESCRIPTORS: THROBBING

## 2018-07-07 ASSESSMENT — PAIN DESCRIPTION - ONSET: ONSET: ON-GOING

## 2018-07-07 ASSESSMENT — PAIN SCALES - GENERAL: PAINLEVEL_OUTOF10: 7

## 2018-07-07 ASSESSMENT — PAIN DESCRIPTION - ORIENTATION: ORIENTATION: RIGHT

## 2018-07-07 NOTE — PROGRESS NOTES
Hepatobiliary and Pancreatic Surgery Progress Note    Chirichella covering for Doneen Walton    CC: rectal pain    Subjective: Patient states that she is feeling much better since the drainage    OBJECTIVE      Physical    VITALS:  BP (!) 109/53   Pulse 101   Temp 98.2 °F (36.8 °C) (Oral)   Resp 18   Ht 5' 5\" (1.651 m)   Wt 147 lb 3.2 oz (66.8 kg)   LMP 03/20/2015   SpO2 92%   BMI 24.50 kg/m²     General appearance: appears in no acute distress  Lungs:CTABL  Heart: RRR  Abdomen: soft, nondistended, nontympanic, no guarding, no peritoneal signs, normoactive bowel sounds  Extremities:no peripheral edema    ASSESSMENT: POD 1 incision and drainage of sally-anal abscess    PLAN:    - check daily labs  - continue with antibiotics  - await cultures  - diet    Thank you for the consultation and allowing me to take part in Ms. Milner's care.       Beatriz Garcia 7/7/2018 10:54 AM

## 2018-07-07 NOTE — PROGRESS NOTES
[facility]  Progress Note    Subjective:    Patient says the pressure/pain in her rectum area has significantly decreased since procedure. Denies fever or chills, cp. She did have some dyspnea as she was walking to the restroom. Asked for a breathing treatment and doing better. Has a dry cough since the treatment. Is asking for Nicotine patch. Past medical, surgical, family and social history were reviewed, non-contributory, and unchanged unless otherwise stated. Objective:    BP (!) 106/55   Pulse 109   Temp 99.1 °F (37.3 °C) (Oral)   Resp 18   Ht 5' 5\" (1.651 m)   Wt 147 lb 3.2 oz (66.8 kg)   LMP 03/20/2015   SpO2 93%   BMI 24.50 kg/m²     Heart:  RRR, no murmurs, gallops, or rubs.   Lungs:  CTA bilaterally, no wheeze, rales or rhonchi  Abd: bowel sounds present, nontender, nondistended, no masses  Extrem:  No clubbing, cyanosis, or edema  Skin: Dressing buttock area c/d/i    Recent Results (from the past 24 hour(s))   EKG 12 lead    Collection Time: 07/06/18  9:42 AM   Result Value Ref Range    Ventricular Rate 110 BPM    Atrial Rate 110 BPM    P-R Interval 156 ms    QRS Duration 84 ms    Q-T Interval 324 ms    QTc Calculation (Bazett) 438 ms    P Axis 69 degrees    R Axis -42 degrees    T Axis 77 degrees   CBC auto differential    Collection Time: 07/06/18  9:55 AM   Result Value Ref Range    WBC 21.6 (H) 4.5 - 11.5 E9/L    RBC 4.59 3.50 - 5.50 E12/L    Hemoglobin 13.1 11.5 - 15.5 g/dL    Hematocrit 40.2 34.0 - 48.0 %    MCV 87.6 80.0 - 99.9 fL    MCH 28.5 26.0 - 35.0 pg    MCHC 32.6 32.0 - 34.5 %    RDW 13.2 11.5 - 15.0 fL    Platelets 701 355 - 499 E9/L    MPV 9.6 7.0 - 12.0 fL    Neutrophils % 83.4 (H) 43.0 - 80.0 %    Immature Granulocytes % 0.6 0.0 - 5.0 %    Lymphocytes % 10.7 (L) 20.0 - 42.0 %    Monocytes % 4.6 2.0 - 12.0 %    Eosinophils % 0.4 0.0 - 6.0 %    Basophils % 0.3 0.0 - 2.0 %    Neutrophils # 18.00 (H) 1.80 - 7.30 E9/L    Immature Granulocytes # 0.14 E9/L    Lymphocytes # 2.32 1.50 - 4.00 E9/L    Monocytes # 1.00 (H) 0.10 - 0.95 E9/L    Eosinophils # 0.09 0.05 - 0.50 E9/L    Basophils # 0.06 0.00 - 0.20 E9/L   Lipase    Collection Time: 07/06/18  9:55 AM   Result Value Ref Range    Lipase 10 (L) 13 - 60 U/L   Lactic acid, plasma    Collection Time: 07/06/18 10:00 AM   Result Value Ref Range    Lactic Acid 1.2 0.5 - 2.2 mmol/L   TYPE AND SCREEN    Collection Time: 07/06/18 11:35 AM   Result Value Ref Range    ABO/Rh A POS     Antibody Screen NEG    POCT Glucose    Collection Time: 07/06/18 11:58 AM   Result Value Ref Range    Meter Glucose 168 (H) 70 - 110 mg/dL   POCT Glucose    Collection Time: 07/06/18  2:47 PM   Result Value Ref Range    Meter Glucose 182 (H) 70 - 110 mg/dL   Comprehensive Metabolic Panel    Collection Time: 07/06/18  5:20 PM   Result Value Ref Range    Sodium 140 132 - 146 mmol/L    Potassium 4.1 3.5 - 5.0 mmol/L    Chloride 99 98 - 107 mmol/L    CO2 31 (H) 22 - 29 mmol/L    Anion Gap 10 7 - 16 mmol/L    Glucose 231 (H) 74 - 109 mg/dL    BUN 5 (L) 6 - 20 mg/dL    CREATININE 0.3 (L) 0.5 - 1.0 mg/dL    GFR Non-African American >60 >=60 mL/min/1.73    GFR African American >60     Calcium 8.3 (L) 8.6 - 10.2 mg/dL    Total Protein 5.8 (L) 6.4 - 8.3 g/dL    Alb 2.9 (L) 3.5 - 5.2 g/dL    Total Bilirubin <0.2 0.0 - 1.2 mg/dL    Alkaline Phosphatase 134 (H) 35 - 104 U/L    ALT 5 0 - 32 U/L    AST 7 0 - 31 U/L   POCT Glucose    Collection Time: 07/06/18  5:33 PM   Result Value Ref Range    Meter Glucose 227 (H) 70 - 110 mg/dL   POCT Glucose    Collection Time: 07/06/18 10:52 PM   Result Value Ref Range    Meter Glucose 297 (H) 70 - 110 mg/dL   Basic Metabolic Panel w/ Reflex to MG    Collection Time: 07/07/18  5:53 AM   Result Value Ref Range    Sodium 141 132 - 146 mmol/L    Potassium reflex Magnesium 3.6 3.5 - 5.0 mmol/L    Chloride 98 98 - 107 mmol/L    CO2 32 (H) 22 - 29 mmol/L    Anion Gap 11 7 - 16 mmol/L    Glucose 207 (H) 74 - 109 mg/dL

## 2018-07-07 NOTE — PROGRESS NOTES
1640 BS=89. Patient given OJ, crackers and peanut butter. Skin warm and dry. Patient oriented and cooperative with care.

## 2018-07-07 NOTE — PROGRESS NOTES
SPoke with Radha Moctezuma about patients possible D/C today and need for home care setup for abx. Antibiotic script on chart.

## 2018-07-07 NOTE — PROGRESS NOTES
[facility]  Progress [facility]    Subjective:    Feels well. Perianal pain much improved. No fever or chills  Past medical, surgical, family and social history were reviewed, non-contributory, and unchanged unless otherwise stated. Objective:    BP (!) 106/55   Pulse 109   Temp 99.1 °F (37.3 °C) (Oral)   Resp 18   Ht 5' 5\" (1.651 m)   Wt 147 lb 3.2 oz (66.8 kg)   LMP 03/20/2015   SpO2 93%   BMI 24.50 kg/m²     Exam is as noted by resident with the following changes, additions or corrections:  Alert no distress  Heart:  RRR, no murmurs, gallops, or rubs. Lungs:  Decreased breath sounds bilaterally  Abd: bowel sounds present, nontender, nondistended, no masses  Extrem:  No clubbing, cyanosis, or edema    Assessment:    Active Hospital Problems    Diagnosis Date Noted    Sepsis ([facility] [address] [A41.9] 07/06/2018       Plan:  Continue zosyn  Ambulate. Attending Physician Statement  I have reviewed the chart, including any radiology or labs, and have seen the patient with the resident(s). I personally reviewed and performed key elements of the history and exam.  I agree with the assessment, plan and orders as documented by the resident. Please refer to the resident note for additional information.       Electronically signed by Ruba Stokes MD on 7/7/2018 at 8:07 AM

## 2018-07-07 NOTE — CONSULTS
mg Intravenous Q6H PRN Angelina Kaur MD        enoxaparin (LOVENOX) injection 40 mg  40 mg Subcutaneous Daily Angelina Kaur MD        traZODone (DESYREL) tablet 150 mg  150 mg Oral Nightly Angelina Kaur MD   150 mg at 07/06/18 2304    tiZANidine (ZANAFLEX) tablet 2 mg  2 mg Oral TID PRN Angelina Kaur MD        Roflumilast (DALIRESP) tablet 500 mcg  500 mcg Oral QAM Angelina Kaur MD   500 mcg at 07/07/18 0943    predniSONE (DELTASONE) tablet 5 mg  5 mg Oral Daily with breakfast Angelina Kaur MD   5 mg at 07/07/18 0944    mometasone-formoterol (DULERA) 200-5 MCG/ACT inhaler 2 puff  2 puff Inhalation BID Angelina Kaur MD   2 puff at 07/07/18 0906    buPROPion (WELLBUTRIN XL) extended release tablet 450 mg  450 mg Oral QAM Angelina Kaur MD   450 mg at 07/07/18 4975    cholestyramine (QUESTRAN) packet 4 g  4 g Oral BID Angelina Kaur MD        cilostazol (PLETAL) tablet 50 mg  50 mg Oral BID AC Angelina Kaur MD   50 mg at 07/07/18 0618    fenofibrate tablet 160 mg  160 mg Oral Daily Angelina Kaur MD   160 mg at 07/07/18 0940    hydrOXYzine (VISTARIL) capsule 50 mg  50 mg Oral TID PRN Angelina Kaur MD        ipratropium-albuterol (DUONEB) nebulizer solution 3 mL  1 vial Inhalation Q4H PRN Angelina Kaur MD   3 mL at 07/07/18 0905    ketoconazole (NIZORAL) 2 % cream   Topical Daily PRN Angelina Kaur MD        nicotine (NICODERM CQ) 21 MG/24HR 1 patch  1 patch Transdermal Daily Angelina Kaur MD   1 patch at 07/07/18 0945    pantoprazole (PROTONIX) tablet 40 mg  40 mg Oral QAM AC Angelina Kaur MD   40 mg at 07/07/18 0618    atorvastatin (LIPITOR) tablet 20 mg  20 mg Oral Daily Angelina Kaur MD   20 mg at 07/07/18 0943    guaiFENesin tablet 400 mg  400 mg Oral TID Angelina Kaur MD   400 mg at 07/07/18 0945    oxybutynin (DITROPAN-XL) extended release tablet 5 mg  5 mg Oral Daily Angelina Kaur MD   5 mg at 07/07/18 0944    acetaminophen (TYLENOL) tablet 650 mg  650 mg Oral Q6H PRN Yu Neighbor ronchi, crackle or rale noted. Heart: S1 S2  Regular rate and rhythm. No rub, murmur or gallop noted. Abdomen: Abdomen soft, non-tender. BS normal. No masses, organomegaly. No hernia noted. Extremities: No edema, Peripheral pulses palpable. Musculoskeletal: Muscular strength appears intact. No joint effusion, tenderness, swelling or warmth noted.   Admission on 07/06/2018   Component Date Value Ref Range Status    WBC 07/06/2018 21.6* 4.5 - 11.5 E9/L Final    RBC 07/06/2018 4.59  3.50 - 5.50 E12/L Final    Hemoglobin 07/06/2018 13.1  11.5 - 15.5 g/dL Final    Hematocrit 07/06/2018 40.2  34.0 - 48.0 % Final    MCV 07/06/2018 87.6  80.0 - 99.9 fL Final    MCH 07/06/2018 28.5  26.0 - 35.0 pg Final    MCHC 07/06/2018 32.6  32.0 - 34.5 % Final    RDW 07/06/2018 13.2  11.5 - 15.0 fL Final    Platelets 48/39/8167 379  130 - 450 E9/L Final    MPV 07/06/2018 9.6  7.0 - 12.0 fL Final    Neutrophils % 07/06/2018 83.4* 43.0 - 80.0 % Final    Immature Granulocytes % 07/06/2018 0.6  0.0 - 5.0 % Final    Lymphocytes % 07/06/2018 10.7* 20.0 - 42.0 % Final    Monocytes % 07/06/2018 4.6  2.0 - 12.0 % Final    Eosinophils % 07/06/2018 0.4  0.0 - 6.0 % Final    Basophils % 07/06/2018 0.3  0.0 - 2.0 % Final    Neutrophils # 07/06/2018 18.00* 1.80 - 7.30 E9/L Final    Immature Granulocytes # 07/06/2018 0.14  E9/L Final    Lymphocytes # 07/06/2018 2.32  1.50 - 4.00 E9/L Final    Monocytes # 07/06/2018 1.00* 0.10 - 0.95 E9/L Final    Eosinophils # 07/06/2018 0.09  0.05 - 0.50 E9/L Final    Basophils # 07/06/2018 0.06  0.00 - 0.20 E9/L Final    Lipase 07/06/2018 10* 13 - 60 U/L Final    Ventricular Rate 07/06/2018 110  BPM Incomplete    Atrial Rate 07/06/2018 110  BPM Incomplete    P-R Interval 07/06/2018 156  ms Incomplete    QRS Duration 07/06/2018 84  ms Incomplete    Q-T Interval 07/06/2018 324  ms Incomplete    QTc Calculation (Bazett) 07/06/2018 438  ms Incomplete    P Axis 07/06/2018 69  degrees Incomplete    R Axis 07/06/2018 -42  degrees Incomplete    T Axis 07/06/2018 77  degrees Incomplete    Lactic Acid 07/06/2018 1.2  0.5 - 2.2 mmol/L Final    ABO/Rh 07/06/2018 A POS   Final    Antibody Screen 07/06/2018 NEG   Final    Meter Glucose 07/06/2018 168* 70 - 110 mg/dL Final    Gram Stain Orderable 07/06/2018    Final                    Value:Gram stain performed from swab, interpret results with  caution. Swab specimens of sterile fluids are inferior to  aspirate specimens for organism recovery.   Abundant Polymorphonuclear leukocytes  Abundant Gram positive cocci in pairs  Moderate Gram positive diplococci  Moderate Gram positive cocci in clusters  Moderate Gram negative rods  Moderate Gram positive cocci in chains  Abundant Gram positive cocci      Gram Stain Result 07/06/2018 Refer to ordered Gram stain for results   Final    Sodium 07/06/2018 140  132 - 146 mmol/L Final    Potassium 07/06/2018 4.1  3.5 - 5.0 mmol/L Final    Chloride 07/06/2018 99  98 - 107 mmol/L Final    CO2 07/06/2018 31* 22 - 29 mmol/L Final    Anion Gap 07/06/2018 10  7 - 16 mmol/L Final    Glucose 07/06/2018 231* 74 - 109 mg/dL Final    BUN 07/06/2018 5* 6 - 20 mg/dL Final    CREATININE 07/06/2018 0.3* 0.5 - 1.0 mg/dL Final    GFR Non- 07/06/2018 >60  >=60 mL/min/1.73 Final    GFR  07/06/2018 >60   Final    Calcium 07/06/2018 8.3* 8.6 - 10.2 mg/dL Final    Total Protein 07/06/2018 5.8* 6.4 - 8.3 g/dL Final    Alb 07/06/2018 2.9* 3.5 - 5.2 g/dL Final    Total Bilirubin 07/06/2018 <0.2  0.0 - 1.2 mg/dL Final    Alkaline Phosphatase 07/06/2018 134* 35 - 104 U/L Final    ALT 07/06/2018 5  0 - 32 U/L Final    AST 07/06/2018 7  0 - 31 U/L Final    Meter Glucose 07/06/2018 182* 70 - 110 mg/dL Final    Gram Stain Result 07/06/2018 Refer to ordered Gram stain for results   Final    Gram Stain Orderable 07/06/2018    Final                    Value:Gram stain performed on unspun 0.01* 0.05 - 0.50 E9/L Final    Basophils # 07/07/2018 0.03  0.00 - 0.20 E9/L Final    Meter Glucose 07/07/2018 197* 70 - 110 mg/dL Final   Admission on 07/01/2018, Discharged on 07/01/2018   Component Date Value Ref Range Status    WBC 07/01/2018 16.8* 4.5 - 11.5 E9/L Final    RBC 07/01/2018 4.84  3.50 - 5.50 E12/L Final    Hemoglobin 07/01/2018 13.7  11.5 - 15.5 g/dL Final    Hematocrit 07/01/2018 42.6  34.0 - 48.0 % Final    MCV 07/01/2018 88.0  80.0 - 99.9 fL Final    MCH 07/01/2018 28.3  26.0 - 35.0 pg Final    MCHC 07/01/2018 32.2  32.0 - 34.5 % Final    RDW 07/01/2018 13.4  11.5 - 15.0 fL Final    Platelets 44/81/0288 300  130 - 450 E9/L Final    MPV 07/01/2018 9.4  7.0 - 12.0 fL Final    Neutrophils % 07/01/2018 73.9  43.0 - 80.0 % Final    Immature Granulocytes % 07/01/2018 0.5  0.0 - 5.0 % Final    Lymphocytes % 07/01/2018 19.4* 20.0 - 42.0 % Final    Monocytes % 07/01/2018 5.1  2.0 - 12.0 % Final    Eosinophils % 07/01/2018 0.8  0.0 - 6.0 % Final    Basophils % 07/01/2018 0.3  0.0 - 2.0 % Final    Neutrophils # 07/01/2018 12.40* 1.80 - 7.30 E9/L Final    Immature Granulocytes # 07/01/2018 0.08  E9/L Final    Lymphocytes # 07/01/2018 3.26  1.50 - 4.00 E9/L Final    Monocytes # 07/01/2018 0.86  0.10 - 0.95 E9/L Final    Eosinophils # 07/01/2018 0.14  0.05 - 0.50 E9/L Final    Basophils # 07/01/2018 0.05  0.00 - 0.20 E9/L Final    Sodium 07/01/2018 136  132 - 146 mmol/L Final    Potassium 07/01/2018 4.2  3.5 - 5.0 mmol/L Final    Chloride 07/01/2018 93* 98 - 107 mmol/L Final    CO2 07/01/2018 33* 22 - 29 mmol/L Final    Anion Gap 07/01/2018 10  7 - 16 mmol/L Final    Glucose 07/01/2018 188* 74 - 109 mg/dL Final    BUN 07/01/2018 7  6 - 20 mg/dL Final    CREATININE 07/01/2018 0.3* 0.5 - 1.0 mg/dL Final    GFR Non- 07/01/2018 >60  >=60 mL/min/1.73 Final    GFR  07/01/2018 >60   Final    Calcium 07/01/2018 9.4  8.6 - 10.2 mg/dL Final   [facility] Outpatient Visit on 06/27/2018   Component Date Value Ref Range Status    WBC 06/27/2018 11.6* 4.5 - 11.5 E9/L Final    RBC 06/27/2018 4.85  3.50 - 5.50 E12/L Final    Hemoglobin 06/27/2018 13.5  11.5 - 15.5 g/dL Final    Hematocrit 06/27/2018 44.5  34.0 - 48.0 % Final    MCV 06/27/2018 91.8  80.0 - 99.9 fL Final    MCH 06/27/2018 27.8  26.0 - 35.0 pg Final    MCHC 06/27/2018 30.3* 32.0 - 34.5 % Final    RDW 06/27/2018 13.8  11.5 - 15.0 fL Final    Platelets 80/83/4304 291  130 - 450 E9/L Final    MPV 06/27/2018 10.0  7.0 - 12.0 fL Final    Neutrophils % 06/27/2018 63.1  43.0 - 80.0 % Final    Immature Granulocytes % 06/27/2018 0.3  0.0 - 5.0 % Final    Lymphocytes % 06/27/2018 29.2  20.0 - 42.0 % Final    Monocytes % 06/27/2018 5.0  2.0 - 12.0 % Final    Eosinophils % 06/27/2018 1.9  0.0 - 6.0 % Final    Basophils % 06/27/2018 0.5  0.0 - 2.0 % Final    Neutrophils # 06/27/2018 7.33* 1.80 - 7.30 E9/L Final    Immature Granulocytes # 06/27/2018 0.04  E9/L Final    Lymphocytes # 06/27/2018 3.40  1.50 - 4.00 E9/L Final    Monocytes # 06/27/2018 0.58  0.10 - 0.95 E9/L Final    Eosinophils # 06/27/2018 0.22  0.05 - 0.50 E9/L Final    Basophils # 06/27/2018 0.06  0.00 - 0.20 E9/L Final    Sodium 06/27/2018 139  132 - 146 mmol/L Final    Potassium 06/27/2018 4.0  3.5 - 5.0 mmol/L Final    Chloride 06/27/2018 93* 98 - 107 mmol/L Final    CO2 06/27/2018 35* 22 - 29 mmol/L Final    Anion Gap 06/27/2018 11  7 - 16 mmol/L Final    Glucose 06/27/2018 228* 74 - 109 mg/dL Final    BUN 06/27/2018 9  6 - 20 mg/dL Final    CREATININE 06/27/2018 0.3* 0.5 - 1.0 mg/dL Final    GFR Non- 06/27/2018 >60  >=60 mL/min/1.73 Final    GFR  06/27/2018 >60   Final    Calcium 06/27/2018 9.4  8.6 - 10.2 mg/dL Final    Total Protein 06/27/2018 6.8  6.4 - 8.3 g/dL Final    Alb 06/27/2018 3.5  3.5 - 5.2 g/dL Final    Total Bilirubin 06/27/2018 0.2  0.0 - 1.2 mg/dL Final    Alkaline Phosphatase 06/27/2018 64  35 - 104 U/L Final    ALT 06/27/2018 8  0 - 32 U/L Final    AST 06/27/2018 10  0 - 31 U/L Final    Cholesterol, Total 06/27/2018 155  0 - 199 mg/dL Final    Triglycerides 06/27/2018 345* 0 - 149 mg/dL Final    HDL 06/27/2018 34  >40 mg/dL Final    LDL Calculated 06/27/2018 52  0 - 99 mg/dL Final    VLDL Cholesterol Calculated 06/27/2018 69  mg/dL Final   Office Visit on 06/27/2018   Component Date Value Ref Range Status    Hemoglobin A1C 06/27/2018 8.7  % Final       ASSESSMENT:  [unfilled]    LISANDRA RECTAL ABSCESS AND PHLEGMON C CELLULITIS RT GLUTEAL AREA     PLAN:  CONTINUE ZOSYN AND ADD ZYVOX( GUERA OF E FAECIUM R 2 PEN)\  MIDLINE   PT WANTS TO GO HOME - WILL PUT IN D/C ORDERS IF ARRANGEMENTS CAN BE MADE          Carly Parra MD, Cony Khan

## 2018-07-07 NOTE — PROGRESS NOTES
Pt took heart monitor off said she is not here for a heart issue and she is not wearing it Dr notified via perfect serve

## 2018-07-07 NOTE — DISCHARGE SUMMARY
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, with long-term current use of insulin ([facility])      tiZANidine (ZANAFLEX) 2 MG capsule Take 1 capsule by mouth 3 times daily as needed for Muscle spasms  Qty: 60 capsule, Refills: 2    Associated Diagnoses: Ama-Danlos syndrome; Medication management      cholestyramine (QUESTRAN) 4 GM/DOSE powder DISSOLVE ONE SCOOPFUL IN LIQUID TWICE DAILY AND DRINK  Refills: 3      VENTOLIN  (90 Base) MCG/ACT inhaler INHALE TWO(2) PUFFS BY MOUTH EVERY 4 HOURS AS NEEDED FOR WHEEZING OR SHORTNESS OF BREATH  Qty: 18 g, Refills: 5    Associated Diagnoses: COPD exacerbation ([facility])      cilostazol (PLETAL) 50 MG tablet TAKE ONE (1) TABLET BY MOUTH TWICE DAILY  Qty: 60 tablet, Refills: 10      omeprazole (PRILOSEC) 40 MG delayed release capsule TAKE 1 CAPSULE BY MOUTH EVERY MORNING WITH BREAKFAST  Qty: 30 capsule, Refills: 11      oxybutynin (DITROPAN-XL) 5 MG extended release tablet TAKE 1 TABLET BY MOUTH ONCE DAILY  Qty: 30 tablet, Refills: 11      bumetanide (BUMEX) 1 MG tablet TAKE 1 TABLET BY MOUTH DAILY  Qty: 30 tablet, Refills: 11    Associated Diagnoses: Depression, unspecified depression type      BREO ELLIPTA 100-25 MCG/INH AEPB inhaler INHALE (1) PUFF BY MOUTH ONCE DAILY  Qty: 1 each, Refills: 11    Associated Diagnoses: COPD exacerbation ([facility])      INCRUSE ELLIPTA 62.5 MCG/INH AEPB INHALE (1) PUFF BY MOUTH ONCE DAILY  Qty: 1 each, Refills: 11    Associated Diagnoses: COPD exacerbation ([facility])      metoprolol tartrate (LOPRESSOR) 25 MG tablet TAKE ONE-HALF TABLET BY MOUTH TWICE DAILY  Qty: 90 tablet, Refills: 3      fenofibrate 160 MG tablet TAKE ONE (1) TABLET BY MOUTH ONCE DAILY  Qty: 30 tablet, Refills: 11      aspirin (ASPIRIN LOW DOSE) 81 MG EC tablet TAKE ONE (1) TABLET BY MOUTH ONCE DAILY  Qty: 30 tablet, Refills: 11      ketoconazole (NIZORAL) 2 % cream Apply to both feet daily. Qty: 30 g, Refills: 2      insulin lispro (HUMALOG KWIKPEN) 100 UNIT/ML pen 5 units before each meal along with 3-12 units on sliding scale as given before  Qty: 3 Pen, Refills: 3    Associated Diagnoses: Diabetes mellitus type 2, uncontrolled (H[facility])      Roflumilast (DALIRESP) 500 MCG tablet Take 500 mcg by mouth daily  Qty: 30 tablet, Refills: 1    Associated Diagnoses: COPD (chronic obstructive pulmonary disease) ([facility]); COPD exacerbation ([facility])      vitamin B-12 (CYANOCOBALAMIN) 1000 MCG tablet Take 1 tablet by mouth daily.   Qty: 30 tablet, Refills: 11    Associated Diagnoses: Medication management      B-D ULTRAFINE III SHORT PEN 31G X 8 MM MISC USE AS DIRECTED WITH LANTUS  Qty: 100 each, Refills: 5      ondansetron (ZOFRAN) 4 MG tablet TAKE 1 TABLET BY MOUTH ONCE DAILY AS NEEDED FOR NAUSEA AND VOMITING  Qty: 30 tablet, Refills: 11    Associated Diagnoses: COPD exacerbation ([facility])         STOP taking these medications       doxycycline hyclate (VIBRA-TABS) 100 MG tablet Comments:   Reason for Stopping:         cefdinir (OMNICEF) 300 MG capsule Comments:   Reason for Stopping:         SUMAtriptan (IMITREX) 50 MG tablet Comments:   Reason for Stopping:         buPROPion (WELLBUTRIN XL) 150 MG extended release tablet Comments:   Reason for Stopping:         traZODone (DESYREL) 150 MG tablet Comments:   Reason for Stopping:             Activity: activity as tolerated  Diet: renal diet    Follow-up With  Details  Why  Contact Dom Cortes MD  Within a week  Hospital follow up   [address] Norman Mcgovern DO  Call in 2 days  call Monday to schedule apt.   [address] [facility] [address]

## 2018-07-08 ENCOUNTER — CARE COORDINATION (OUTPATIENT)
Dept: CASE MANAGEMENT | Age: 53
End: 2018-07-08

## 2018-07-08 NOTE — PROGRESS NOTES
Patient was informed by two nurses to not take Trazodone or wellbutrin until she has been off Zyvox for at least 24 hours due to serious reaction or side effects. Patient and family understood.

## 2018-07-09 ENCOUNTER — TELEPHONE (OUTPATIENT)
Dept: FAMILY MEDICINE CLINIC | Age: 53
End: 2018-07-09

## 2018-07-09 ENCOUNTER — HOSPITAL ENCOUNTER (OUTPATIENT)
Age: 53
Discharge: HOME | End: 2018-07-11
Payer: MEDICAID

## 2018-07-09 LAB
ALBUMIN SERPL-MCNC: 3.1 G/DL (ref 3.5–5.2)
ALP BLD-CCNC: 74 U/L (ref 35–104)
ALT SERPL-CCNC: 7 U/L (ref 0–32)
ANAEROBIC CULTURE: ABNORMAL
ANAEROBIC CULTURE: ABNORMAL
ANION GAP SERPL CALCULATED.3IONS-SCNC: 13 MMOL/L (ref 7–16)
AST SERPL-CCNC: 12 U/L (ref 0–31)
BASOPHILS ABSOLUTE: 0.07 E9/L (ref 0–0.2)
BASOPHILS RELATIVE PERCENT: 0.5 % (ref 0–2)
BILIRUB SERPL-MCNC: 0.2 MG/DL (ref 0–1.2)
BODY FLUID CULTURE, STERILE: ABNORMAL
BUN BLDV-MCNC: 6 MG/DL (ref 6–20)
C-REACTIVE PROTEIN: 4.3 MG/DL (ref 0–0.4)
CALCIUM SERPL-MCNC: 9 MG/DL (ref 8.6–10.2)
CHLORIDE BLD-SCNC: 96 MMOL/L (ref 98–107)
CO2: 34 MMOL/L (ref 22–29)
CREAT SERPL-MCNC: 0.4 MG/DL (ref 0.5–1)
CULTURE SURGICAL: ABNORMAL
EOSINOPHILS ABSOLUTE: 0.26 E9/L (ref 0.05–0.5)
EOSINOPHILS RELATIVE PERCENT: 2 % (ref 0–6)
GFR AFRICAN AMERICAN: >60
GFR NON-AFRICAN AMERICAN: >60 ML/MIN/1.73
GLUCOSE BLD-MCNC: 199 MG/DL (ref 74–109)
GRAM STAIN RESULT: ABNORMAL
GRAM STAIN RESULT: ABNORMAL
HCT VFR BLD CALC: 39.5 % (ref 34–48)
HEMOGLOBIN: 12.3 G/DL (ref 11.5–15.5)
IMMATURE GRANULOCYTES #: 0.23 E9/L
IMMATURE GRANULOCYTES %: 1.8 % (ref 0–5)
LYMPHOCYTES ABSOLUTE: 4.09 E9/L (ref 1.5–4)
LYMPHOCYTES RELATIVE PERCENT: 31.9 % (ref 20–42)
MCH RBC QN AUTO: 28.1 PG (ref 26–35)
MCHC RBC AUTO-ENTMCNC: 31.1 % (ref 32–34.5)
MCV RBC AUTO: 90.2 FL (ref 80–99.9)
MONOCYTES ABSOLUTE: 0.65 E9/L (ref 0.1–0.95)
MONOCYTES RELATIVE PERCENT: 5.1 % (ref 2–12)
NEUTROPHILS ABSOLUTE: 7.53 E9/L (ref 1.8–7.3)
NEUTROPHILS RELATIVE PERCENT: 58.7 % (ref 43–80)
ORGANISM: ABNORMAL
PDW BLD-RTO: 13.6 FL (ref 11.5–15)
PLATELET # BLD: 428 E9/L (ref 130–450)
PMV BLD AUTO: 9.5 FL (ref 7–12)
POTASSIUM SERPL-SCNC: 3.4 MMOL/L (ref 3.5–5)
RBC # BLD: 4.38 E12/L (ref 3.5–5.5)
SEDIMENTATION RATE, ERYTHROCYTE: 40 MM/HR (ref 0–20)
SODIUM BLD-SCNC: 143 MMOL/L (ref 132–146)
TOTAL PROTEIN: 6.5 G/DL (ref 6.4–8.3)
WBC # BLD: 12.8 E9/L (ref 4.5–11.5)

## 2018-07-09 PROCEDURE — 85651 RBC SED RATE NONAUTOMATED: CPT

## 2018-07-09 PROCEDURE — 80053 COMPREHEN METABOLIC PANEL: CPT

## 2018-07-09 PROCEDURE — 86140 C-REACTIVE PROTEIN: CPT

## 2018-07-09 PROCEDURE — 85025 COMPLETE CBC W/AUTO DIFF WBC: CPT

## 2018-07-09 NOTE — CARE COORDINATION
[facility] Transitions Initial Follow Up Call    Call within 2 business days of discharge: Yes    Patient: Lokesh Dobson Patient : 1965   MRN: [id number]  Reason for Admission: There are no discharge diagnoses documented for the most recent discharge. Discharge Date: 18 RARS: Readmission Risk Score: 23     Spoke with: [id number]: DESIRE    Non-face-to-face services provided:  Obtained and reviewed discharge summary and/or continuity of care documents  Education of patient/family/caregiver/guardian to support self-management-review of discharge instructions  Assessment and support for treatment adherence and medication management-review of medications and cg support    Care Transitions 24 Hour Call    Schedule Follow Up Appointment with PCP:  Declined  Do you have any ongoing symptoms?:  No  Do you have a copy of your discharge instructions?:  Yes  Do you have all of your prescriptions and are they filled?:  Yes  Have you been contacted by a [facility] Pharmacist?:  Yes  Have you scheduled your follow up appointment?:  Yes (Comment: awaiting Dr Negrete Null office to call her back with any cancellation)  How are you going to get to your appointment?:  Car - family or friend to transport  Were you discharged with any Home Care or Post Acute Services:  Yes  Post Acute Services:  Home Health  Do you feel like you have everything you need to keep you well at home?:  Yes  Care Transitions Interventions  No Identified Needs         Follow Up:  Initial [facility] phone visit outreach to Conchita @ home. Identified self and role to her voiced understanding. Conchita states is feeling much better since home. [facility] [place], [facility]. have contacted and been out for IV Atb therapy. She and cg son are independent with as the nurses continue with her. She is wearing oxygen @ 5l daily @ home and has been. She has a nebulizer @ home she uses consistently and prn.   She is wearing a nicotine patch but still smoking \"a little\", she states. Conchita is agitated this phone call as I ask her to do medication reconciliation as she states she has been asked to do this repeatedly both upon discharge and when home, from [facility]. nurse. She did reconciliation with me however, has [facility] mail her meds, and all are in home. She displays knowledge of how and when to use all. Po zyvox awaits insurance approval and is only med not in home @ present. Deidre tests her BS 2-4xdaily, she states, and they run between . She is not using a device, states is walking in home about 50 feet safely. Pain is not an issue, she states. Conchita has follow up scheduled with Surgery Dr Rubina Black for next week, July 18. She has not scheduled Dr Olivia Alexander or Dr Justino Murary yet. She states she is awaiting PCP office Dr Felisha Hermosillo to call her back with a cancelled appointment and declines for me to attempt to schedule her. Conchita declines any further follow from [facility] as she states she is followed well thru the [facility] [place], [facility] expresses her knowledge of being able to call them 24h and after hr on call if any problems or concerns, and just has no further need, she states.    Future Appointments  Date Time Provider Lori Kinsey   7/18/2018 2:15 PM Sandra Mueller DO BDM GEN SURG [facility]   8/28/2018 1:00 PM Kathleen Hart MD VAS/MED [facility]   9/26/2018 2:45 PM Tierney Welch MD [facility] Conrado Tejeda RN

## 2018-07-11 LAB
BLOOD CULTURE, ROUTINE: NORMAL
CULTURE, BLOOD 2: NORMAL

## 2018-07-12 ENCOUNTER — HOSPITAL ENCOUNTER (OUTPATIENT)
Age: 53
Discharge: HOME | End: 2018-07-14
Payer: MEDICAID

## 2018-07-12 LAB
ALBUMIN SERPL-MCNC: 3.1 G/DL (ref 3.5–5.2)
ALP BLD-CCNC: 69 U/L (ref 35–104)
ALT SERPL-CCNC: 6 U/L (ref 0–32)
ANION GAP SERPL CALCULATED.3IONS-SCNC: 10 MMOL/L (ref 7–16)
AST SERPL-CCNC: 8 U/L (ref 0–31)
BASOPHILS ABSOLUTE: 0.07 E9/L (ref 0–0.2)
BASOPHILS RELATIVE PERCENT: 0.4 % (ref 0–2)
BILIRUB SERPL-MCNC: <0.2 MG/DL (ref 0–1.2)
BUN BLDV-MCNC: 5 MG/DL (ref 6–20)
CALCIUM SERPL-MCNC: 8.6 MG/DL (ref 8.6–10.2)
CHLORIDE BLD-SCNC: 97 MMOL/L (ref 98–107)
CO2: 33 MMOL/L (ref 22–29)
CREAT SERPL-MCNC: 0.3 MG/DL (ref 0.5–1)
EOSINOPHILS ABSOLUTE: 0.5 E9/L (ref 0.05–0.5)
EOSINOPHILS RELATIVE PERCENT: 2.9 % (ref 0–6)
GFR AFRICAN AMERICAN: >60
GFR NON-AFRICAN AMERICAN: >60 ML/MIN/1.73
GLUCOSE BLD-MCNC: 250 MG/DL (ref 74–109)
HCT VFR BLD CALC: 39.6 % (ref 34–48)
HEMOGLOBIN: 12 G/DL (ref 11.5–15.5)
IMMATURE GRANULOCYTES #: 0.21 E9/L
IMMATURE GRANULOCYTES %: 1.2 % (ref 0–5)
LYMPHOCYTES ABSOLUTE: 4.3 E9/L (ref 1.5–4)
LYMPHOCYTES RELATIVE PERCENT: 24.8 % (ref 20–42)
MCH RBC QN AUTO: 27.6 PG (ref 26–35)
MCHC RBC AUTO-ENTMCNC: 30.3 % (ref 32–34.5)
MCV RBC AUTO: 91 FL (ref 80–99.9)
MONOCYTES ABSOLUTE: 0.64 E9/L (ref 0.1–0.95)
MONOCYTES RELATIVE PERCENT: 3.7 % (ref 2–12)
NEUTROPHILS ABSOLUTE: 11.61 E9/L (ref 1.8–7.3)
NEUTROPHILS RELATIVE PERCENT: 67 % (ref 43–80)
PDW BLD-RTO: 13.6 FL (ref 11.5–15)
PLATELET # BLD: 417 E9/L (ref 130–450)
PMV BLD AUTO: 9.8 FL (ref 7–12)
POTASSIUM SERPL-SCNC: 3.8 MMOL/L (ref 3.5–5)
RBC # BLD: 4.35 E12/L (ref 3.5–5.5)
SODIUM BLD-SCNC: 140 MMOL/L (ref 132–146)
TOTAL PROTEIN: 6.2 G/DL (ref 6.4–8.3)
WBC # BLD: 17.3 E9/L (ref 4.5–11.5)

## 2018-07-12 PROCEDURE — 85025 COMPLETE CBC W/AUTO DIFF WBC: CPT

## 2018-07-12 PROCEDURE — 80053 COMPREHEN METABOLIC PANEL: CPT

## 2018-07-16 ENCOUNTER — HOSPITAL ENCOUNTER (OUTPATIENT)
Age: 53
Discharge: HOME | End: 2018-07-18
Payer: MEDICAID

## 2018-07-16 PROCEDURE — 80053 COMPREHEN METABOLIC PANEL: CPT

## 2018-07-16 PROCEDURE — 86140 C-REACTIVE PROTEIN: CPT

## 2018-07-16 PROCEDURE — 85025 COMPLETE CBC W/AUTO DIFF WBC: CPT

## 2018-07-16 PROCEDURE — 85651 RBC SED RATE NONAUTOMATED: CPT

## 2018-07-17 LAB
ALBUMIN SERPL-MCNC: 3.2 G/DL (ref 3.5–5.2)
ALP BLD-CCNC: 71 U/L (ref 35–104)
ALT SERPL-CCNC: 5 U/L (ref 0–32)
ANION GAP SERPL CALCULATED.3IONS-SCNC: 19 MMOL/L (ref 7–16)
AST SERPL-CCNC: 11 U/L (ref 0–31)
BASOPHILS ABSOLUTE: 0.1 E9/L (ref 0–0.2)
BASOPHILS RELATIVE PERCENT: 0.6 % (ref 0–2)
BILIRUB SERPL-MCNC: <0.2 MG/DL (ref 0–1.2)
BUN BLDV-MCNC: 4 MG/DL (ref 6–20)
C-REACTIVE PROTEIN: 1 MG/DL (ref 0–0.4)
CALCIUM SERPL-MCNC: 9.3 MG/DL (ref 8.6–10.2)
CHLORIDE BLD-SCNC: 92 MMOL/L (ref 98–107)
CO2: 32 MMOL/L (ref 22–29)
CREAT SERPL-MCNC: 0.3 MG/DL (ref 0.5–1)
EOSINOPHILS ABSOLUTE: 0.75 E9/L (ref 0.05–0.5)
EOSINOPHILS RELATIVE PERCENT: 4.4 % (ref 0–6)
GFR AFRICAN AMERICAN: >60
GFR NON-AFRICAN AMERICAN: >60 ML/MIN/1.73
GLUCOSE BLD-MCNC: 110 MG/DL (ref 74–109)
HCT VFR BLD CALC: 43.1 % (ref 34–48)
HEMOGLOBIN: 12.7 G/DL (ref 11.5–15.5)
IMMATURE GRANULOCYTES #: 0.07 E9/L
IMMATURE GRANULOCYTES %: 0.4 % (ref 0–5)
LYMPHOCYTES ABSOLUTE: 3.47 E9/L (ref 1.5–4)
LYMPHOCYTES RELATIVE PERCENT: 20.3 % (ref 20–42)
MCH RBC QN AUTO: 28.1 PG (ref 26–35)
MCHC RBC AUTO-ENTMCNC: 29.5 % (ref 32–34.5)
MCV RBC AUTO: 95.4 FL (ref 80–99.9)
MONOCYTES ABSOLUTE: 0.54 E9/L (ref 0.1–0.95)
MONOCYTES RELATIVE PERCENT: 3.2 % (ref 2–12)
NEUTROPHILS ABSOLUTE: 12.18 E9/L (ref 1.8–7.3)
NEUTROPHILS RELATIVE PERCENT: 71.1 % (ref 43–80)
PDW BLD-RTO: 14.1 FL (ref 11.5–15)
PLATELET # BLD: 449 E9/L (ref 130–450)
PMV BLD AUTO: 9.7 FL (ref 7–12)
POTASSIUM SERPL-SCNC: 3.5 MMOL/L (ref 3.5–5)
RBC # BLD: 4.52 E12/L (ref 3.5–5.5)
SEDIMENTATION RATE, ERYTHROCYTE: 8 MM/HR (ref 0–20)
SODIUM BLD-SCNC: 143 MMOL/L (ref 132–146)
TOTAL PROTEIN: 6.8 G/DL (ref 6.4–8.3)
WBC # BLD: 17.1 E9/L (ref 4.5–11.5)

## 2018-07-18 ENCOUNTER — OFFICE VISIT (OUTPATIENT)
Dept: SURGERY | Age: 53
End: 2018-07-18
Payer: MEDICAID

## 2018-07-18 VITALS
DIASTOLIC BLOOD PRESSURE: 79 MMHG | RESPIRATION RATE: 18 BRPM | WEIGHT: 146 LBS | HEART RATE: 102 BPM | SYSTOLIC BLOOD PRESSURE: 127 MMHG | HEIGHT: 65 IN | OXYGEN SATURATION: 99 % | BODY MASS INDEX: 24.32 KG/M2 | TEMPERATURE: 98.3 F

## 2018-07-18 DIAGNOSIS — L02.31 ABSCESS, GLUTEAL, RIGHT: Primary | ICD-10-CM

## 2018-07-18 PROCEDURE — G8420 CALC BMI NORM PARAMETERS: HCPCS | Performed by: SURGERY

## 2018-07-18 PROCEDURE — 1111F DSCHRG MED/CURRENT MED MERGE: CPT | Performed by: SURGERY

## 2018-07-18 PROCEDURE — 99212 OFFICE O/P EST SF 10 MIN: CPT | Performed by: SURGERY

## 2018-07-18 PROCEDURE — G8427 DOCREV CUR MEDS BY ELIG CLIN: HCPCS | Performed by: SURGERY

## 2018-07-18 PROCEDURE — 4004F PT TOBACCO SCREEN RCVD TLK: CPT | Performed by: SURGERY

## 2018-07-18 PROCEDURE — 3017F COLORECTAL CA SCREEN DOC REV: CPT | Performed by: SURGERY

## 2018-07-18 PROCEDURE — G8598 ASA/ANTIPLAT THER USED: HCPCS | Performed by: SURGERY

## 2018-07-18 NOTE — PROGRESS NOTES
The patient received a denial letter for the inpatient stay from [facility]. MA told the patient that we are going to forward the letter to case management. The patient verbalized understanding. Forwarded message to Kevan.   Electronically signed by Juan Steele on 7/18/18 at 2:31 PM

## 2018-07-19 ENCOUNTER — HOSPITAL ENCOUNTER (OUTPATIENT)
Age: 53
Discharge: HOME | End: 2018-07-21
Payer: MEDICAID

## 2018-07-19 LAB
ALBUMIN SERPL-MCNC: 3.5 G/DL (ref 3.5–5.2)
ALP BLD-CCNC: 75 U/L (ref 35–104)
ALT SERPL-CCNC: 5 U/L (ref 0–32)
ANION GAP SERPL CALCULATED.3IONS-SCNC: 11 MMOL/L (ref 7–16)
AST SERPL-CCNC: 6 U/L (ref 0–31)
BASOPHILS ABSOLUTE: 0.09 E9/L (ref 0–0.2)
BASOPHILS RELATIVE PERCENT: 0.7 % (ref 0–2)
BILIRUB SERPL-MCNC: <0.2 MG/DL (ref 0–1.2)
BUN BLDV-MCNC: 9 MG/DL (ref 6–20)
CALCIUM SERPL-MCNC: 9.3 MG/DL (ref 8.6–10.2)
CHLORIDE BLD-SCNC: 97 MMOL/L (ref 98–107)
CO2: 33 MMOL/L (ref 22–29)
CREAT SERPL-MCNC: 0.4 MG/DL (ref 0.5–1)
EOSINOPHILS ABSOLUTE: 0.72 E9/L (ref 0.05–0.5)
EOSINOPHILS RELATIVE PERCENT: 5.5 % (ref 0–6)
GFR AFRICAN AMERICAN: >60
GFR NON-AFRICAN AMERICAN: >60 ML/MIN/1.73
GLUCOSE BLD-MCNC: 169 MG/DL (ref 74–109)
HCT VFR BLD CALC: 39 % (ref 34–48)
HEMOGLOBIN: 12.2 G/DL (ref 11.5–15.5)
IMMATURE GRANULOCYTES #: 0.04 E9/L
IMMATURE GRANULOCYTES %: 0.3 % (ref 0–5)
LYMPHOCYTES ABSOLUTE: 4.45 E9/L (ref 1.5–4)
LYMPHOCYTES RELATIVE PERCENT: 33.7 % (ref 20–42)
MCH RBC QN AUTO: 28.2 PG (ref 26–35)
MCHC RBC AUTO-ENTMCNC: 31.3 % (ref 32–34.5)
MCV RBC AUTO: 90.1 FL (ref 80–99.9)
MONOCYTES ABSOLUTE: 0.61 E9/L (ref 0.1–0.95)
MONOCYTES RELATIVE PERCENT: 4.6 % (ref 2–12)
NEUTROPHILS ABSOLUTE: 7.3 E9/L (ref 1.8–7.3)
NEUTROPHILS RELATIVE PERCENT: 55.2 % (ref 43–80)
PDW BLD-RTO: 14 FL (ref 11.5–15)
PLATELET # BLD: 403 E9/L (ref 130–450)
PMV BLD AUTO: 9.7 FL (ref 7–12)
POTASSIUM SERPL-SCNC: 3.9 MMOL/L (ref 3.5–5)
RBC # BLD: 4.33 E12/L (ref 3.5–5.5)
SODIUM BLD-SCNC: 141 MMOL/L (ref 132–146)
TOTAL PROTEIN: 6.5 G/DL (ref 6.4–8.3)
WBC # BLD: 13.2 E9/L (ref 4.5–11.5)

## 2018-07-19 PROCEDURE — 80053 COMPREHEN METABOLIC PANEL: CPT

## 2018-07-19 PROCEDURE — 85025 COMPLETE CBC W/AUTO DIFF WBC: CPT

## 2018-07-23 DIAGNOSIS — J44.1 COPD EXACERBATION (HCC): ICD-10-CM

## 2018-07-25 ENCOUNTER — TELEPHONE (OUTPATIENT)
Dept: FAMILY MEDICINE CLINIC | Age: 53
End: 2018-07-25

## 2018-07-25 DIAGNOSIS — K61.1 RECTAL ABSCESS: Primary | ICD-10-CM

## 2018-07-30 LAB
EKG ATRIAL RATE: 110 BPM
EKG P AXIS: 69 DEGREES
EKG P-R INTERVAL: 156 MS
EKG Q-T INTERVAL: 324 MS
EKG QRS DURATION: 84 MS
EKG QTC CALCULATION (BAZETT): 438 MS
EKG R AXIS: -42 DEGREES
EKG T AXIS: 77 DEGREES
EKG VENTRICULAR RATE: 110 BPM

## 2018-07-31 ENCOUNTER — TELEPHONE (OUTPATIENT)
Dept: FAMILY MEDICINE CLINIC | Age: 53
End: 2018-07-31

## 2018-08-02 ENCOUNTER — OFFICE VISIT (OUTPATIENT)
Dept: FAMILY MEDICINE CLINIC | Age: 53
End: 2018-08-02
Payer: MEDICAID

## 2018-08-02 VITALS
HEIGHT: 65 IN | RESPIRATION RATE: 16 BRPM | SYSTOLIC BLOOD PRESSURE: 100 MMHG | BODY MASS INDEX: 23.99 KG/M2 | OXYGEN SATURATION: 92 % | DIASTOLIC BLOOD PRESSURE: 60 MMHG | HEART RATE: 100 BPM | WEIGHT: 144 LBS

## 2018-08-02 DIAGNOSIS — I73.9 PERIPHERAL VASCULAR DISEASE (HCC): ICD-10-CM

## 2018-08-02 DIAGNOSIS — L81.9 DISCOLORATION OF SKIN OF LOWER LEG: Primary | ICD-10-CM

## 2018-08-02 PROCEDURE — 3017F COLORECTAL CA SCREEN DOC REV: CPT | Performed by: FAMILY MEDICINE

## 2018-08-02 PROCEDURE — 1111F DSCHRG MED/CURRENT MED MERGE: CPT | Performed by: FAMILY MEDICINE

## 2018-08-02 PROCEDURE — G8598 ASA/ANTIPLAT THER USED: HCPCS | Performed by: FAMILY MEDICINE

## 2018-08-02 PROCEDURE — 99212 OFFICE O/P EST SF 10 MIN: CPT | Performed by: FAMILY MEDICINE

## 2018-08-02 PROCEDURE — G8420 CALC BMI NORM PARAMETERS: HCPCS | Performed by: FAMILY MEDICINE

## 2018-08-02 PROCEDURE — G8427 DOCREV CUR MEDS BY ELIG CLIN: HCPCS | Performed by: FAMILY MEDICINE

## 2018-08-02 PROCEDURE — 4004F PT TOBACCO SCREEN RCVD TLK: CPT | Performed by: FAMILY MEDICINE

## 2018-08-02 ASSESSMENT — ENCOUNTER SYMPTOMS
BACK PAIN: 1
ABDOMINAL PAIN: 0
SHORTNESS OF BREATH: 1

## 2018-08-13 LAB
FUNGUS (MYCOLOGY) CULTURE: NORMAL
FUNGUS STAIN: NORMAL

## 2018-08-28 LAB
AFB CULTURE (MYCOBACTERIA): NORMAL
AFB SMEAR: NORMAL

## 2018-09-18 DIAGNOSIS — F32.A DEPRESSION, UNSPECIFIED DEPRESSION TYPE: ICD-10-CM

## 2018-09-18 DIAGNOSIS — J44.1 COPD EXACERBATION (HCC): ICD-10-CM

## 2018-09-18 RX ORDER — TRAZODONE HYDROCHLORIDE 150 MG/1
TABLET ORAL
Qty: 30 TABLET | Refills: 10 | Status: SHIPPED | OUTPATIENT
Start: 2018-09-18 | End: 2018-12-05

## 2018-09-18 RX ORDER — BUMETANIDE 1 MG/1
TABLET ORAL
Qty: 30 TABLET | Refills: 10 | Status: SHIPPED | OUTPATIENT
Start: 2018-09-18 | End: 2019-08-22 | Stop reason: SDUPTHER

## 2018-09-18 RX ORDER — ONDANSETRON 4 MG/1
TABLET, FILM COATED ORAL
Qty: 30 TABLET | Refills: 10 | Status: SHIPPED | OUTPATIENT
Start: 2018-09-18 | End: 2018-10-19

## 2018-09-26 ENCOUNTER — OFFICE VISIT (OUTPATIENT)
Dept: FAMILY MEDICINE CLINIC | Age: 53
End: 2018-09-26
Payer: MEDICAID

## 2018-09-26 VITALS
HEART RATE: 97 BPM | HEIGHT: 65 IN | BODY MASS INDEX: 25.66 KG/M2 | WEIGHT: 154 LBS | DIASTOLIC BLOOD PRESSURE: 62 MMHG | RESPIRATION RATE: 16 BRPM | OXYGEN SATURATION: 95 % | SYSTOLIC BLOOD PRESSURE: 100 MMHG

## 2018-09-26 DIAGNOSIS — Z23 NEED FOR IMMUNIZATION AGAINST INFLUENZA: ICD-10-CM

## 2018-09-26 DIAGNOSIS — K29.00 ACUTE GASTRITIS WITHOUT HEMORRHAGE, UNSPECIFIED GASTRITIS TYPE: ICD-10-CM

## 2018-09-26 DIAGNOSIS — F32.1 CURRENT MODERATE EPISODE OF MAJOR DEPRESSIVE DISORDER WITHOUT PRIOR EPISODE (HCC): ICD-10-CM

## 2018-09-26 DIAGNOSIS — Q79.60 EHLERS-DANLOS SYNDROME: ICD-10-CM

## 2018-09-26 DIAGNOSIS — G89.4 CHRONIC PAIN DISORDER: ICD-10-CM
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
## 2018-09-26 LAB — HBA1C MFR BLD: 7.7 %

## 2018-09-26 PROCEDURE — 90686 IIV4 VACC NO PRSV 0.5 ML IM: CPT | Performed by: FAMILY MEDICINE

## 2018-09-26 PROCEDURE — 90471 IMMUNIZATION ADMIN: CPT | Performed by: FAMILY MEDICINE

## 2018-09-26 PROCEDURE — G8598 ASA/ANTIPLAT THER USED: HCPCS | Performed by: FAMILY MEDICINE

## 2018-09-26 PROCEDURE — 99214 OFFICE O/P EST MOD 30 MIN: CPT | Performed by: FAMILY MEDICINE

## 2018-09-26 PROCEDURE — 2022F DILAT RTA XM EVC RTNOPTHY: CPT | Performed by: FAMILY MEDICINE

## 2018-09-26 PROCEDURE — 4004F PT TOBACCO SCREEN RCVD TLK: CPT | Performed by: FAMILY MEDICINE

## 2018-09-26 PROCEDURE — 3017F COLORECTAL CA SCREEN DOC REV: CPT | Performed by: FAMILY MEDICINE

## 2018-09-26 PROCEDURE — G8417 CALC BMI ABV UP PARAM F/U: HCPCS | Performed by: FAMILY MEDICINE

## 2018-09-26 PROCEDURE — 3045F PR MOST RECENT HEMOGLOBIN A1C LEVEL 7.0-9.0%: CPT | Performed by: FAMILY MEDICINE

## 2018-09-26 PROCEDURE — 83036 HEMOGLOBIN GLYCOSYLATED A1C: CPT | Performed by: FAMILY MEDICINE

## 2018-09-26 PROCEDURE — G8427 DOCREV CUR MEDS BY ELIG CLIN: HCPCS | Performed by: FAMILY MEDICINE

## 2018-09-26 RX ORDER — OMEPRAZOLE 40 MG/1
CAPSULE, DELAYED RELEASE ORAL
Qty: 30 CAPSULE | Refills: 11 | Status: SHIPPED | OUTPATIENT
Start: 2018-09-26 | End: 2018-10-10 | Stop reason: SDUPTHER

## 2018-09-26 RX ORDER — NAPROXEN 500 MG/1
500 TABLET ORAL 2 TIMES DAILY WITH MEALS
Qty: 60 TABLET | Refills: 3 | Status: SHIPPED | OUTPATIENT
Start: 2018-09-26 | End: 2019-01-26 | Stop reason: SDUPTHER

## 2018-09-26 RX ORDER — DULOXETIN HYDROCHLORIDE 20 MG/1
20 CAPSULE, DELAYED RELEASE ORAL DAILY
Qty: 30 CAPSULE | Refills: 3 | Status: SHIPPED | OUTPATIENT
Start: 2018-09-26 | End: 2019-01-26 | Stop reason: SDUPTHER

## 2018-09-26 NOTE — PROGRESS NOTES
an implantable opiate pump. She is hesitant to consider. She is inquiring about medical marijuana. Review of Systems  Review of Systems   Constitutional: Positive for malaise/fatigue. Negative for chills and fever. Respiratory: Negative for cough and shortness of breath. Cardiovascular: Positive for claudication. Negative for chest pain, palpitations and leg swelling. Gastrointestinal: Positive for constipation. Negative for blood in stool and melena. Genitourinary: Negative for dysuria and urgency. Musculoskeletal: Positive for back pain and joint pain. Negative for falls. Past Medical History:   Diagnosis Date    Anxiety     Carotid stenosis 12/2011    50-69% on left    Carotid stenosis 12/1/2011    Carpal tunnel syndrome 12/7/2011    Cerebral artery occlusion with cerebral infarction (HCC)     Chronic back pain     COPD (chronic obstructive pulmonary disease) (HCC)     CVA (cerebral infarction) 2011?     right sided thalamic; seen by Dr Dorann Runner syndrome     Fall 8/21/2017    History of ischemic heart disease 3/30/2017    Hyperlipidemia     Hypertension     Obesity     MARIAA (obstructive sleep apnea)     Pneumonia     PVD (peripheral vascular disease) with claudication ([facility] [address] 8/25/2017    S/P hernia repair 3/30/2017    Tobacco abuse     Tobacco abuse     Troponin level elevated 1/5/2015    negative heart cath    Type II or unspecified type diabetes mellitus without mention of complication, not stated as uncontrolled     Vitamin D deficiency          PE:  VS:  /62   Pulse 97   Resp 16   Ht 5' 5\" (1.651 m)   Wt 154 lb (69.9 kg)   LMP 03/20/2015   SpO2 95%   BMI 25.63 kg/m²   Physical Exam  General: Well nourished, well developed, no acute distress  Eyes:  PERRL   Conjunctiva unremarkable   ENT:  TM's intact bilaterally, no effusion   Nasal:  No mucosal edema     No nasal drainage   Oral:  mucosa moist and pink             no unexpectedly or new issues develop     This document was prepared at least partially through the use of voice recognition software. Although effort is taken to assure the accuracy of this document, it is possible that grammatical, syntax,  or spelling errors may occur.       Electronically signed by Melo Gupta MD [facility]

## 2018-09-27 ENCOUNTER — TELEPHONE (OUTPATIENT)
Dept: SURGERY | Age: 53
End: 2018-09-27

## 2018-09-27 ASSESSMENT — ENCOUNTER SYMPTOMS
COUGH: 0
BLOOD IN STOOL: 0
SHORTNESS OF BREATH: 0
BACK PAIN: 1
CONSTIPATION: 1

## 2018-09-27 NOTE — TELEPHONE ENCOUNTER
GLORY called pt and offered appt on 10/1/18 w/Dr. Wood Members; pt declined, stating she has another appt on this date; appt made on 10/3/18 at 1:00 pm; [facility]OS advised pt to bring photo ID, insurance card and list of meds.    Electronically signed by Dayanara Chavez on 9/27/18 at 11:06 AM

## 2018-10-03 ENCOUNTER — TELEPHONE (OUTPATIENT)
Dept: ADMINISTRATIVE | Age: 53
End: 2018-10-03

## 2018-10-10 ENCOUNTER — OFFICE VISIT (OUTPATIENT)
Dept: SURGERY | Age: 53
End: 2018-10-10
Payer: MEDICAID

## 2018-10-10 VITALS
WEIGHT: 154 LBS | RESPIRATION RATE: 18 BRPM | TEMPERATURE: 98.8 F | HEIGHT: 65 IN | BODY MASS INDEX: 25.66 KG/M2 | HEART RATE: 69 BPM | DIASTOLIC BLOOD PRESSURE: 80 MMHG | OXYGEN SATURATION: 97 % | SYSTOLIC BLOOD PRESSURE: 134 MMHG

## 2018-10-10 DIAGNOSIS — K29.00 ACUTE GASTRITIS WITHOUT HEMORRHAGE, UNSPECIFIED GASTRITIS TYPE: ICD-10-CM

## 2018-10-10 PROCEDURE — 3017F COLORECTAL CA SCREEN DOC REV: CPT | Performed by: SURGERY

## 2018-10-10 PROCEDURE — G8482 FLU IMMUNIZE ORDER/ADMIN: HCPCS | Performed by: SURGERY

## 2018-10-10 PROCEDURE — 99243 OFF/OP CNSLTJ NEW/EST LOW 30: CPT | Performed by: SURGERY

## 2018-10-10 PROCEDURE — G8427 DOCREV CUR MEDS BY ELIG CLIN: HCPCS | Performed by: SURGERY

## 2018-10-10 PROCEDURE — G8417 CALC BMI ABV UP PARAM F/U: HCPCS | Performed by: SURGERY

## 2018-10-10 RX ORDER — OMEPRAZOLE 40 MG/1
CAPSULE, DELAYED RELEASE ORAL
Qty: 30 CAPSULE | Refills: 11 | Status: SHIPPED | OUTPATIENT
Start: 2018-10-10 | End: 2018-10-23

## 2018-10-11 ENCOUNTER — TELEPHONE (OUTPATIENT)
Dept: SURGERY | Age: 53
End: 2018-10-11

## 2018-10-11 ENCOUNTER — PREP FOR PROCEDURE (OUTPATIENT)
Dept: SURGERY | Age: 53
End: 2018-10-11

## 2018-10-11 RX ORDER — 0.9 % SODIUM CHLORIDE 0.9 %
10 VIAL (ML) INJECTION EVERY 12 HOURS SCHEDULED
Status: CANCELLED | OUTPATIENT
Start: 2018-10-11 | End: 2019-10-11

## 2018-10-11 RX ORDER — 0.9 % SODIUM CHLORIDE 0.9 %
10 VIAL (ML) INJECTION PRN
Status: CANCELLED | OUTPATIENT
Start: 2018-10-11 | End: 2019-10-11

## 2018-10-11 ASSESSMENT — ENCOUNTER SYMPTOMS
DIARRHEA: 0
WHEEZING: 0
PHOTOPHOBIA: 0
HEARTBURN: 0
BACK PAIN: 0
CONSTIPATION: 0
SORE THROAT: 0
VOMITING: 0
COUGH: 0
SHORTNESS OF BREATH: 0
HEMOPTYSIS: 0
ABDOMINAL PAIN: 1
EYE REDNESS: 0
BLURRED VISION: 0
BLOOD IN STOOL: 0
NAUSEA: 0

## 2018-10-17 ENCOUNTER — OFFICE VISIT (OUTPATIENT)
Dept: FAMILY MEDICINE CLINIC | Age: 53
End: 2018-10-17
Payer: MEDICAID

## 2018-10-17 VITALS
SYSTOLIC BLOOD PRESSURE: 92 MMHG | OXYGEN SATURATION: 97 % | RESPIRATION RATE: 16 BRPM | DIASTOLIC BLOOD PRESSURE: 49 MMHG | BODY MASS INDEX: 25.49 KG/M2 | HEART RATE: 96 BPM | HEIGHT: 65 IN | WEIGHT: 153 LBS

## 2018-10-17 DIAGNOSIS — J44.1 COPD EXACERBATION (HCC): Primary | ICD-10-CM

## 2018-10-17 DIAGNOSIS — J21.9 ACUTE BRONCHIOLITIS DUE TO UNSPECIFIED ORGANISM: ICD-10-CM

## 2018-10-17 PROCEDURE — G8482 FLU IMMUNIZE ORDER/ADMIN: HCPCS | Performed by: FAMILY MEDICINE

## 2018-10-17 PROCEDURE — 3023F SPIROM DOC REV: CPT | Performed by: FAMILY MEDICINE

## 2018-10-17 PROCEDURE — 4004F PT TOBACCO SCREEN RCVD TLK: CPT | Performed by: FAMILY MEDICINE

## 2018-10-17 PROCEDURE — G8926 SPIRO NO PERF OR DOC: HCPCS | Performed by: FAMILY MEDICINE

## 2018-10-17 PROCEDURE — G8417 CALC BMI ABV UP PARAM F/U: HCPCS | Performed by: FAMILY MEDICINE

## 2018-10-17 PROCEDURE — G8428 CUR MEDS NOT DOCUMENT: HCPCS | Performed by: FAMILY MEDICINE

## 2018-10-17 PROCEDURE — 99213 OFFICE O/P EST LOW 20 MIN: CPT | Performed by: FAMILY MEDICINE

## 2018-10-17 PROCEDURE — G8598 ASA/ANTIPLAT THER USED: HCPCS | Performed by: FAMILY MEDICINE

## 2018-10-17 PROCEDURE — 3017F COLORECTAL CA SCREEN DOC REV: CPT | Performed by: FAMILY MEDICINE

## 2018-10-17 RX ORDER — PREDNISONE 10 MG/1
TABLET ORAL
Qty: 28 TABLET | Refills: 0 | Status: SHIPPED | OUTPATIENT
Start: 2018-10-17 | End: 2018-10-27

## 2018-10-17 RX ORDER — DOXYCYCLINE HYCLATE 100 MG
100 TABLET ORAL 2 TIMES DAILY WITH MEALS
Qty: 20 TABLET | Refills: 0 | Status: SHIPPED | OUTPATIENT
Start: 2018-10-17 | End: 2018-10-27

## 2018-10-17 RX ORDER — ALBUTEROL SULFATE 2.5 MG/3ML
2.5 SOLUTION RESPIRATORY (INHALATION) EVERY 4 HOURS PRN
Qty: 180 EACH | Refills: 5 | Status: SHIPPED | OUTPATIENT
Start: 2018-10-17 | End: 2019-07-05

## 2018-10-17 ASSESSMENT — ENCOUNTER SYMPTOMS
SINUS PRESSURE: 1
COUGH: 1
VOMITING: 0
WHEEZING: 1
APNEA: 0
SORE THROAT: 1
NAUSEA: 1
SHORTNESS OF BREATH: 1

## 2018-10-17 NOTE — PROGRESS NOTES
CC   Chief Complaint   Patient presents with    Cough     HPI:   Patient comes in today for the below issue(s). URI  started about 1 week ago  Cough, nonproductive but seems deeper and more barky  Low gr fever  Some myalgia  SOB has increased  +sinus pressure   Smoker  Taking meds as prescribed, wearing oxygen   Has had influenza vaccine     Review of Systems  Review of Systems   Constitutional: Positive for fatigue. HENT: Positive for congestion, sinus pressure and sore throat. Negative for ear pain. Respiratory: Positive for cough, shortness of breath and wheezing. Negative for apnea. Cardiovascular: Positive for leg swelling. Gastrointestinal: Positive for nausea. Negative for vomiting. Past Medical History:   Diagnosis Date    Anxiety     Carotid stenosis 12/2011    50-69% on left    Carotid stenosis 12/1/2011    Carpal tunnel syndrome 12/7/2011    Cerebral artery occlusion with cerebral infarction (HCC)     Chronic back pain     COPD (chronic obstructive pulmonary disease) (HCC)     CVA (cerebral infarction) 2011?     right sided thalamic; seen by Dr Herbert Gonzalez syndrome     Fall 8/21/2017    History of ischemic heart disease 3/30/2017    Hyperlipidemia     Hypertension     Obesity     MARIAA (obstructive sleep apnea)     Pneumonia     PVD (peripheral vascular disease) with claudication ([facility] [address] 8/25/2017    S/P hernia repair 3/30/2017    Tobacco abuse     Tobacco abuse     Troponin level elevated 1/5/2015    negative heart cath    Type II or unspecified type diabetes mellitus without mention of complication, not stated as uncontrolled     Vitamin D deficiency          PE:  VS:  BP (!) 92/49   Pulse 96   Resp 16   Ht 5' 5\" (1.651 m)   Wt 153 lb (69.4 kg)   LMP 03/20/2015   SpO2 97%   BMI 25.46 kg/m²   Physical Exam  General: Ill appearing but otherwise nourished, well developed, no acute distress  Eyes:  PERRL   Conjunctiva unremarkable

## 2018-10-19 ENCOUNTER — TELEPHONE (OUTPATIENT)
Dept: FAMILY MEDICINE CLINIC | Age: 53
End: 2018-10-19

## 2018-10-19 ENCOUNTER — TELEPHONE (OUTPATIENT)
Dept: SURGERY | Age: 53
End: 2018-10-19

## 2018-10-23 RX ORDER — OMEPRAZOLE 40 MG/1
CAPSULE, DELAYED RELEASE ORAL
Qty: 30 CAPSULE | Refills: 10 | Status: SHIPPED | OUTPATIENT
Start: 2018-10-23 | End: 2019-09-20 | Stop reason: SDUPTHER

## 2018-10-23 RX ORDER — OXYBUTYNIN CHLORIDE 5 MG/1
TABLET, EXTENDED RELEASE ORAL
Qty: 30 TABLET | Refills: 10 | Status: SHIPPED | OUTPATIENT
Start: 2018-10-23 | End: 2019-09-20 | Stop reason: SDUPTHER

## 2018-11-19 ENCOUNTER — TELEPHONE (OUTPATIENT)
Dept: FAMILY MEDICINE CLINIC | Age: 53
End: 2018-11-19

## 2018-11-19 NOTE — TELEPHONE ENCOUNTER
Daughter states patient has had an earache since yesterday and now a sore throat. She hasn't taken anything over the counter due to having recent \"issues\". I offered her an appointment for tomorrow but she declined. Please advise.

## 2018-11-20 ENCOUNTER — OFFICE VISIT (OUTPATIENT)
Dept: FAMILY MEDICINE CLINIC | Age: 53
End: 2018-11-20
Payer: MEDICAID

## 2018-11-20 VITALS
OXYGEN SATURATION: 97 % | TEMPERATURE: 98.1 F | HEIGHT: 65 IN | WEIGHT: 164 LBS | BODY MASS INDEX: 27.32 KG/M2 | HEART RATE: 74 BPM | SYSTOLIC BLOOD PRESSURE: 110 MMHG | DIASTOLIC BLOOD PRESSURE: 62 MMHG | RESPIRATION RATE: 18 BRPM

## 2018-11-20 DIAGNOSIS — J06.9 VIRAL URI: ICD-10-CM

## 2018-11-20 DIAGNOSIS — H69.82 DYSFUNCTION OF LEFT EUSTACHIAN TUBE: ICD-10-CM

## 2018-11-20 PROBLEM — H69.92 DYSFUNCTION OF LEFT EUSTACHIAN TUBE: Status: ACTIVE | Noted: 2018-11-20

## 2018-11-20 PROCEDURE — G8482 FLU IMMUNIZE ORDER/ADMIN: HCPCS | Performed by: FAMILY MEDICINE

## 2018-11-20 PROCEDURE — G8427 DOCREV CUR MEDS BY ELIG CLIN: HCPCS | Performed by: FAMILY MEDICINE

## 2018-11-20 PROCEDURE — 3017F COLORECTAL CA SCREEN DOC REV: CPT | Performed by: FAMILY MEDICINE

## 2018-11-20 PROCEDURE — G8417 CALC BMI ABV UP PARAM F/U: HCPCS | Performed by: FAMILY MEDICINE

## 2018-11-20 PROCEDURE — 4004F PT TOBACCO SCREEN RCVD TLK: CPT | Performed by: FAMILY MEDICINE

## 2018-11-20 PROCEDURE — G8598 ASA/ANTIPLAT THER USED: HCPCS | Performed by: FAMILY MEDICINE

## 2018-11-20 PROCEDURE — 99213 OFFICE O/P EST LOW 20 MIN: CPT | Performed by: FAMILY MEDICINE

## 2018-11-20 RX ORDER — FLUTICASONE PROPIONATE 50 MCG
2 SPRAY, SUSPENSION (ML) NASAL DAILY
Qty: 3 BOTTLE | Refills: 1 | Status: SHIPPED | OUTPATIENT
Start: 2018-11-20 | End: 2019-12-12 | Stop reason: SDUPTHER

## 2018-11-20 ASSESSMENT — ENCOUNTER SYMPTOMS
SINUS PAIN: 0
SHORTNESS OF BREATH: 1
TROUBLE SWALLOWING: 1
SORE THROAT: 1
RHINORRHEA: 0
SINUS PRESSURE: 1

## 2018-11-20 NOTE — PROGRESS NOTES
Chief Complaint   Patient presents with    Otalgia     left    Sinusitis    Adenopathy       HPI:  The patient is a 48 y.o. female who presented to the office today with complaints of left ear pain, throat pain, sinus pressure. Left ear pain  - achy a couple of days and started to get worse yesterday  - swollen glands making it difficult to swallow   - no fever or chills  - usual cough, non-productive  - Thermaflu K-cup   - sinus pressure   - nasal congestion   - no worsening cough or shortness of breath       Health Maintenance:  Health Maintenance Due   Topic Date Due    Hepatitis C screen  1965    Diabetic retinal exam  05/03/1975    DTaP/Tdap/Td vaccine (1 - Tdap) 05/03/1984    Cervical cancer screen  05/03/1986    Diabetic foot exam  12/10/2014    Shingles Vaccine (1 of 2 - 2 Dose Series) 05/03/2015    Colon cancer screen colonoscopy  05/03/2015    Breast cancer screen  11/07/2018       Past Medical History:   Diagnosis Date    Anxiety     Carotid stenosis 12/2011    50-69% on left    Carpal tunnel syndrome 12/07/2011    bilateral    Cerebral artery occlusion with cerebral infarction [facility]) 2011? left sided weakness / usually in w/c    Chronic back pain     COPD (chronic obstructive pulmonary disease) ([facility] [address]     no pulmonologist / follows with PCP    CVA (cerebral infarction) 2011?     right sided thalamic; seen by Dr Lalo Bajwa syndrome     GERD (gastroesophageal reflux disease)     History of ischemic heart disease 03/30/2017    no cardiology    Hyperlipidemia     Hypertension     Obesity     PVD (peripheral vascular disease) with claudication ([facility] [address] 8/25/2017    Tobacco abuse     Tobacco abuse     Type II or unspecified type diabetes mellitus without mention of complication, not stated as uncontrolled     Vitamin D deficiency          Current Outpatient Prescriptions:     fluticasone (FLONASE) 50 MCG/ACT nasal spray, 2 sprays by Each Father     Other Father     Heart Disease Brother     High Blood Pressure Brother     Other Brother     Cancer Maternal Grandmother     Heart Attack Maternal Grandfather     Other Paternal Grandmother        Past Surgical History:   Procedure Laterality Date    ABDOMINAL EXPLORATION SURGERY  03/29/2017    with bowel resection, incarcerated ventral hernia repair    CARDIAC CATHETERIZATION  1/7/2015    Dr. Neeru Witt  EF=55%   FFR=0.84  distal RCA bifurcation    CYST INCISION AND DRAINAGE Right 07/22/2016    Right gluteal mass incision and drainage    CYST REMOVAL  1/28/2011    excision of left arm inclusion cyst    ECHO COMPL W DOP COLOR FLOW  5/15/2013         ECHOCARDIOGRAM COMPLETE 2D W DOPPLER W COLOR  1/3/2012         ECHOCARDIOGRAM COMPLETE 2D W DOPPLER W COLOR  6/14/2012         EYE SURGERY Bilateral 2014    cataract w lens implants    HERNIA REPAIR  03/2017    abdominal    OTHER SURGICAL HISTORY  07/26/2016    re exploration left lateral wound / heel    OR DRAIN SKIN ABSCESS COMPLIC N/A 9/3/2786    EXAM UNDER ANESTHESIA  I & D BUTTOCKS performed by Phyllis Calix DO at [address] [employer] [employer]       Social History   Substance Use Topics    Smoking status: Current Every Day Smoker     Packs/day: 0.50     Years: 34.00     Types: Cigarettes    Smokeless tobacco: Never Used      Comment: down to 0.5 pack per day, smoked 5 pk per day for 2 years    Alcohol use No       /62   Pulse 74   Temp 98.1 °F (36.7 °C) (Oral)   Resp 18   Ht 5' 5\" (1.651 m)   Wt 164 lb (74.4 kg)   LMP 03/20/2015   SpO2 97%   Breastfeeding? No   BMI 27.29 kg/m²     Review of Systems   Constitutional: Negative for chills and fever. HENT: Positive for congestion, ear pain, sinus pressure, sore throat and trouble swallowing. Negative for rhinorrhea and sinus pain. Respiratory: Positive for shortness of breath. Cardiovascular: Negative for chest pain. Neurological: Positive for headaches.

## 2018-12-05 ENCOUNTER — OFFICE VISIT (OUTPATIENT)
Dept: FAMILY MEDICINE CLINIC | Age: 53
End: 2018-12-05
Payer: MEDICAID

## 2018-12-05 VITALS
BODY MASS INDEX: 26.66 KG/M2 | DIASTOLIC BLOOD PRESSURE: 75 MMHG | SYSTOLIC BLOOD PRESSURE: 120 MMHG | WEIGHT: 160 LBS | OXYGEN SATURATION: 97 % | HEART RATE: 102 BPM | RESPIRATION RATE: 16 BRPM | HEIGHT: 65 IN

## 2018-12-05 DIAGNOSIS — J41.1 MUCOPURULENT CHRONIC BRONCHITIS (HCC): Chronic | ICD-10-CM

## 2018-12-05 DIAGNOSIS — G47.00 INSOMNIA, UNSPECIFIED TYPE: ICD-10-CM

## 2018-12-05 DIAGNOSIS — M75.02 ADHESIVE CAPSULITIS OF LEFT SHOULDER: Primary | ICD-10-CM

## 2018-12-05 DIAGNOSIS — E43 SEVERE MALNUTRITION (HCC): ICD-10-CM

## 2018-12-05 DIAGNOSIS — K29.00 ACUTE GASTRITIS WITHOUT HEMORRHAGE, UNSPECIFIED GASTRITIS TYPE: ICD-10-CM

## 2018-12-05 DIAGNOSIS — M54.16 LUMBAR RADICULOPATHY: ICD-10-CM

## 2018-12-05 PROCEDURE — G8598 ASA/ANTIPLAT THER USED: HCPCS | Performed by: FAMILY MEDICINE

## 2018-12-05 PROCEDURE — 96160 PT-FOCUSED HLTH RISK ASSMT: CPT | Performed by: FAMILY MEDICINE

## 2018-12-05 PROCEDURE — G8926 SPIRO NO PERF OR DOC: HCPCS | Performed by: FAMILY MEDICINE

## 2018-12-05 PROCEDURE — G8482 FLU IMMUNIZE ORDER/ADMIN: HCPCS | Performed by: FAMILY MEDICINE

## 2018-12-05 PROCEDURE — 3017F COLORECTAL CA SCREEN DOC REV: CPT | Performed by: FAMILY MEDICINE

## 2018-12-05 PROCEDURE — 3023F SPIROM DOC REV: CPT | Performed by: FAMILY MEDICINE

## 2018-12-05 PROCEDURE — G8427 DOCREV CUR MEDS BY ELIG CLIN: HCPCS | Performed by: FAMILY MEDICINE

## 2018-12-05 PROCEDURE — 99214 OFFICE O/P EST MOD 30 MIN: CPT | Performed by: FAMILY MEDICINE

## 2018-12-05 PROCEDURE — G8417 CALC BMI ABV UP PARAM F/U: HCPCS | Performed by: FAMILY MEDICINE

## 2018-12-05 PROCEDURE — 4004F PT TOBACCO SCREEN RCVD TLK: CPT | Performed by: FAMILY MEDICINE

## 2018-12-05 RX ORDER — GABAPENTIN 800 MG/1
TABLET ORAL
Qty: 90 TABLET | Refills: 2 | Status: SHIPPED | OUTPATIENT
Start: 2018-12-05 | End: 2019-12-12 | Stop reason: SDUPTHER

## 2018-12-05 RX ORDER — WHEELCHAIR
EACH MISCELLANEOUS
Qty: 1 EACH | Refills: 0 | Status: SHIPPED | OUTPATIENT
Start: 2018-12-05 | End: 2021-01-01

## 2018-12-05 RX ORDER — DOXEPIN HYDROCHLORIDE 10 MG/1
10 CAPSULE ORAL NIGHTLY
Qty: 30 CAPSULE | Refills: 5 | Status: SHIPPED | OUTPATIENT
Start: 2018-12-05 | End: 2019-02-06

## 2018-12-05 ASSESSMENT — PATIENT HEALTH QUESTIONNAIRE - PHQ9
SUM OF ALL RESPONSES TO PHQ QUESTIONS 1-9: 9
1. LITTLE INTEREST OR PLEASURE IN DOING THINGS: 0
6. FEELING BAD ABOUT YOURSELF - OR THAT YOU ARE A FAILURE OR HAVE LET YOURSELF OR YOUR FAMILY DOWN: 0
4. FEELING TIRED OR HAVING LITTLE ENERGY: 3
5. POOR APPETITE OR OVEREATING: 0
8. MOVING OR SPEAKING SO SLOWLY THAT OTHER PEOPLE COULD HAVE NOTICED. OR THE OPPOSITE, BEING SO FIGETY OR RESTLESS THAT YOU HAVE BEEN MOVING AROUND A LOT MORE THAN USUAL: 0
7. TROUBLE CONCENTRATING ON THINGS, SUCH AS READING THE NEWSPAPER OR WATCHING TELEVISION: 0
9. THOUGHTS THAT YOU WOULD BE BETTER OFF DEAD, OR OF HURTING YOURSELF: 0
SUM OF ALL RESPONSES TO PHQ QUESTIONS 1-9: 9
10. IF YOU CHECKED OFF ANY PROBLEMS, HOW DIFFICULT HAVE THESE PROBLEMS MADE IT FOR YOU TO DO YOUR WORK, TAKE CARE OF THINGS AT HOME, OR GET ALONG WITH OTHER PEOPLE: 0
2. FEELING DOWN, DEPRESSED OR HOPELESS: 3
SUM OF ALL RESPONSES TO PHQ9 QUESTIONS 1 & 2: 3
3. TROUBLE FALLING OR STAYING ASLEEP: 3

## 2018-12-05 NOTE — PROGRESS NOTES
Diagnosis Date    Anxiety     Carotid stenosis 12/2011    50-69% on left    Carpal tunnel syndrome 12/07/2011    bilateral    Cerebral artery occlusion with cerebral infarction [facility]) 2011? left sided weakness / usually in w/c    Chronic back pain     COPD (chronic obstructive pulmonary disease) ([facility] [address]     no pulmonologist / follows with PCP    CVA (cerebral infarction) 2011? right sided thalamic; seen by Dr Bailey Glendale syndrome     GERD (gastroesophageal reflux disease)     History of ischemic heart disease 03/30/2017    no cardiology    Hyperlipidemia     Hypertension     Obesity     PVD (peripheral vascular disease) with claudication ([facility] [address] 8/25/2017    Tobacco abuse     Tobacco abuse     Type II or unspecified type diabetes mellitus without mention of complication, not stated as uncontrolled     Vitamin D deficiency          PE:  VS:  /75   Pulse 102   Resp 16   Ht 5' 5\" (1.651 m)   Wt 160 lb (72.6 kg)   LMP 03/20/2015   SpO2 97%   BMI 26.63 kg/m²   Physical Exam  General: Chronically ill-appearing but otherwise no acute distress  Eyes:  PERRL   Conjunctiva unremarkable   ENT:  TM's intact bilaterally, no effusion   Nasal:  No mucosal edema     No nasal drainage   Oral:  mucosa moist and pink             no posterior pharyngeal drainage     no posterior pharyngeal exudate  Neck:  Supple   No palpable cervical lymphoadenopathy   Thyroid without mass or enlargement  Resp: Lungs CTAB   Equal but diminished air exchange without accessory muscle use   No rales, rhonchi or wheeze   Prolonged expiratory phase  CV: S1S2 RRR   No murmur   Intact distal pulses   No edema  GI: Abdomen Soft, non tender, non distended   Normoactive bowel sounds  MS: Left shoulder with markedly decreased range of motion in all directions.  No palpable deformities, but she reports pain on motion   Intact distal pulses   No clubbing or cyanosis   Skin Warm and dry; no rash or educational materials and/or home exercises printed forpatient's review and were included in patient instructions on his/her After Visit Summary and given to patient at the end of visit. Patient and/or guardian given opportunity to ask questions/raise concerns. She verbalized comfort and understanding of instructions. Follow Up:     Return in about 2 months (around 2/5/2019), or if symptoms worsen or fail to improve, for check on recent med changes/start, recheck of L shoulder . or sooner if the above issues change unexpectedly or new issues develop     This document was prepared at least partially through the use ofGhosteryice recognition software. Although effort is taken to assure the accuracy of this document, it is possible that grammatical, syntax,  or spelling errors may occur.       Electronically signed by Semaj Brar MD [facility]

## 2018-12-07 ASSESSMENT — ENCOUNTER SYMPTOMS
BACK PAIN: 1
TROUBLE SWALLOWING: 0
BLOOD IN STOOL: 0
CHOKING: 0

## 2018-12-17 ENCOUNTER — TELEPHONE (OUTPATIENT)
Dept: FAMILY MEDICINE CLINIC | Age: 53
End: 2018-12-17

## 2018-12-17 NOTE — TELEPHONE ENCOUNTER
Prior auth done for Isaias sensor and reader. See media [facility] pharmacy does not cover this. Sent scripts to [facility] supply to try to cover.

## 2019-01-16 ENCOUNTER — OFFICE VISIT (OUTPATIENT)
Dept: ORTHOPEDIC SURGERY | Age: 54
End: 2019-01-16
Payer: MEDICAID

## 2019-01-16 VITALS
HEIGHT: 65 IN | TEMPERATURE: 98.6 F | HEART RATE: 100 BPM | BODY MASS INDEX: 27.32 KG/M2 | WEIGHT: 164 LBS | SYSTOLIC BLOOD PRESSURE: 130 MMHG | DIASTOLIC BLOOD PRESSURE: 86 MMHG

## 2019-01-16 DIAGNOSIS — G89.29 CHRONIC LEFT SHOULDER PAIN: Primary | ICD-10-CM

## 2019-01-16 DIAGNOSIS — M25.512 CHRONIC LEFT SHOULDER PAIN: Primary | ICD-10-CM

## 2019-01-16 PROCEDURE — G8417 CALC BMI ABV UP PARAM F/U: HCPCS | Performed by: ORTHOPAEDIC SURGERY

## 2019-01-16 PROCEDURE — G8482 FLU IMMUNIZE ORDER/ADMIN: HCPCS | Performed by: ORTHOPAEDIC SURGERY

## 2019-01-16 PROCEDURE — G8598 ASA/ANTIPLAT THER USED: HCPCS | Performed by: ORTHOPAEDIC SURGERY

## 2019-01-16 PROCEDURE — 20610 DRAIN/INJ JOINT/BURSA W/O US: CPT | Performed by: ORTHOPAEDIC SURGERY

## 2019-01-16 PROCEDURE — 3017F COLORECTAL CA SCREEN DOC REV: CPT | Performed by: ORTHOPAEDIC SURGERY

## 2019-01-16 PROCEDURE — 99213 OFFICE O/P EST LOW 20 MIN: CPT | Performed by: ORTHOPAEDIC SURGERY

## 2019-01-16 PROCEDURE — G8427 DOCREV CUR MEDS BY ELIG CLIN: HCPCS | Performed by: ORTHOPAEDIC SURGERY

## 2019-01-16 PROCEDURE — 4004F PT TOBACCO SCREEN RCVD TLK: CPT | Performed by: ORTHOPAEDIC SURGERY

## 2019-01-16 RX ORDER — PREDNISONE 1 MG/1
TABLET ORAL
Refills: 5 | COMMUNITY
Start: 2019-01-02 | End: 2019-02-06

## 2019-01-16 RX ORDER — TRIAMCINOLONE ACETONIDE 40 MG/ML
40 INJECTION, SUSPENSION INTRA-ARTICULAR; INTRAMUSCULAR ONCE
Status: COMPLETED | OUTPATIENT
Start: 2019-01-16 | End: 2019-01-16

## 2019-01-16 RX ORDER — BUPIVACAINE HYDROCHLORIDE 5 MG/ML
2 INJECTION, SOLUTION PERINEURAL ONCE
Status: COMPLETED | OUTPATIENT
Start: 2019-01-16 | End: 2019-01-16

## 2019-01-16 RX ORDER — LIDOCAINE HYDROCHLORIDE 10 MG/ML
2 INJECTION, SOLUTION INFILTRATION; PERINEURAL ONCE
Status: COMPLETED | OUTPATIENT
Start: 2019-01-16 | End: 2019-01-16

## 2019-01-16 RX ORDER — ONDANSETRON 4 MG/1
TABLET, FILM COATED ORAL
Refills: 10 | COMMUNITY
Start: 2019-01-02 | End: 2019-08-22 | Stop reason: SDUPTHER

## 2019-01-16 RX ORDER — IPRATROPIUM BROMIDE AND ALBUTEROL SULFATE 2.5; .5 MG/3ML; MG/3ML
SOLUTION RESPIRATORY (INHALATION)
Refills: 5 | COMMUNITY
Start: 2019-01-02 | End: 2019-05-15

## 2019-01-16 RX ADMIN — TRIAMCINOLONE ACETONIDE 40 MG: 40 INJECTION, SUSPENSION INTRA-ARTICULAR; INTRAMUSCULAR at 15:04

## 2019-01-16 RX ADMIN — LIDOCAINE HYDROCHLORIDE 2 ML: 10 INJECTION, SOLUTION INFILTRATION; PERINEURAL at 15:04

## 2019-01-16 RX ADMIN — BUPIVACAINE HYDROCHLORIDE 10 MG: 5 INJECTION, SOLUTION PERINEURAL at 15:02

## 2019-01-17 ENCOUNTER — EVALUATION (OUTPATIENT)
Dept: PHYSICAL THERAPY | Age: 54
End: 2019-01-17
Payer: MEDICAID

## 2019-01-17 DIAGNOSIS — G89.29 CHRONIC LEFT SHOULDER PAIN: Primary | ICD-10-CM

## 2019-01-17 DIAGNOSIS — M62.512 MUSCLE WASTING AND ATROPHY, NOT ELSEWHERE CLASSIFIED, LEFT SHOULDER: ICD-10-CM

## 2019-01-17 DIAGNOSIS — M25.612 LIMITATION OF JOINT MOTION OF LEFT SHOULDER: ICD-10-CM

## 2019-01-17 DIAGNOSIS — M25.512 CHRONIC LEFT SHOULDER PAIN: Primary | ICD-10-CM

## 2019-01-17 PROCEDURE — 97140 MANUAL THERAPY 1/> REGIONS: CPT | Performed by: PHYSICAL THERAPIST

## 2019-01-17 PROCEDURE — 97535 SELF CARE MNGMENT TRAINING: CPT | Performed by: PHYSICAL THERAPIST

## 2019-01-17 PROCEDURE — 97162 PT EVAL MOD COMPLEX 30 MIN: CPT | Performed by: PHYSICAL THERAPIST

## 2019-01-17 PROCEDURE — 97110 THERAPEUTIC EXERCISES: CPT | Performed by: PHYSICAL THERAPIST

## 2019-01-17 PROCEDURE — G8985 CARRY GOAL STATUS: HCPCS | Performed by: PHYSICAL THERAPIST

## 2019-01-17 PROCEDURE — G8984 CARRY CURRENT STATUS: HCPCS | Performed by: PHYSICAL THERAPIST

## 2019-01-28 ENCOUNTER — TREATMENT (OUTPATIENT)
Dept: PHYSICAL THERAPY | Age: 54
End: 2019-01-28
Payer: MEDICAID

## 2019-01-28 DIAGNOSIS — G89.29 CHRONIC LEFT SHOULDER PAIN: Primary | ICD-10-CM

## 2019-01-28 DIAGNOSIS — M25.612 LIMITATION OF JOINT MOTION OF LEFT SHOULDER: ICD-10-CM

## 2019-01-28 DIAGNOSIS — M25.512 CHRONIC LEFT SHOULDER PAIN: Primary | ICD-10-CM

## 2019-01-28 DIAGNOSIS — M62.512 MUSCLE WASTING AND ATROPHY, NOT ELSEWHERE CLASSIFIED, LEFT SHOULDER: ICD-10-CM

## 2019-01-28 PROCEDURE — 97110 THERAPEUTIC EXERCISES: CPT | Performed by: PHYSICAL THERAPIST

## 2019-01-28 PROCEDURE — 97112 NEUROMUSCULAR REEDUCATION: CPT | Performed by: PHYSICAL THERAPIST

## 2019-01-28 PROCEDURE — G8986 CARRY D/C STATUS: HCPCS | Performed by: PHYSICAL THERAPIST

## 2019-01-28 PROCEDURE — G8985 CARRY GOAL STATUS: HCPCS | Performed by: PHYSICAL THERAPIST

## 2019-02-06 ENCOUNTER — OFFICE VISIT (OUTPATIENT)
Dept: FAMILY MEDICINE CLINIC | Age: 54
End: 2019-02-06
Payer: MEDICAID

## 2019-02-06 ENCOUNTER — HOSPITAL ENCOUNTER (OUTPATIENT)
Age: 54
Discharge: HOME | End: 2019-02-08
Payer: MEDICAID

## 2019-02-06 VITALS
DIASTOLIC BLOOD PRESSURE: 81 MMHG | HEART RATE: 99 BPM | RESPIRATION RATE: 16 BRPM | SYSTOLIC BLOOD PRESSURE: 126 MMHG | HEIGHT: 65 IN | OXYGEN SATURATION: 97 % | WEIGHT: 162 LBS | BODY MASS INDEX: 26.99 KG/M2

## 2019-02-06 DIAGNOSIS — E11.8 CONTROLLED TYPE 2 DIABETES MELLITUS WITH COMPLICATION, WITH LONG-TERM CURRENT USE OF INSULIN (HCC): ICD-10-CM

## 2019-02-06 DIAGNOSIS — M75.02 ADHESIVE CAPSULITIS OF LEFT SHOULDER: Primary | ICD-10-CM

## 2019-02-06 DIAGNOSIS — J41.1 MUCOPURULENT CHRONIC BRONCHITIS (HCC): ICD-10-CM

## 2019-02-06 DIAGNOSIS — E78.5 HYPERLIPIDEMIA, UNSPECIFIED HYPERLIPIDEMIA TYPE: ICD-10-CM

## 2019-02-06 DIAGNOSIS — Q79.60 EHLERS-DANLOS SYNDROME: ICD-10-CM

## 2019-02-06 DIAGNOSIS — Z79.4 CONTROLLED TYPE 2 DIABETES MELLITUS WITH COMPLICATION, WITH LONG-TERM CURRENT USE OF INSULIN (HCC): ICD-10-CM

## 2019-02-06 DIAGNOSIS — M53.3 SACROCOCCYGEAL PAIN: ICD-10-CM

## 2019-02-06 DIAGNOSIS — G47.00 INSOMNIA, UNSPECIFIED TYPE: ICD-10-CM

## 2019-02-06 DIAGNOSIS — Z79.899 MEDICATION MANAGEMENT: ICD-10-CM

## 2019-02-06 PROBLEM — E43 SEVERE MALNUTRITION (HCC): Chronic | Status: RESOLVED | Noted: 2017-08-22 | Resolved: 2019-02-06

## 2019-02-06 LAB
ALBUMIN SERPL-MCNC: 3.8 G/DL (ref 3.5–5.2)
ALP BLD-CCNC: 80 U/L (ref 35–104)
ALT SERPL-CCNC: 8 U/L (ref 0–32)
ANION GAP SERPL CALCULATED.3IONS-SCNC: 9 MMOL/L (ref 7–16)
AST SERPL-CCNC: 9 U/L (ref 0–31)
BASOPHILS ABSOLUTE: 0.06 E9/L (ref 0–0.2)
BASOPHILS RELATIVE PERCENT: 0.5 % (ref 0–2)
BILIRUB SERPL-MCNC: <0.2 MG/DL (ref 0–1.2)
BUN BLDV-MCNC: 7 MG/DL (ref 6–20)
CALCIUM SERPL-MCNC: 8.9 MG/DL (ref 8.6–10.2)
CHLORIDE BLD-SCNC: 97 MMOL/L (ref 98–107)
CHOLESTEROL, TOTAL: 174 MG/DL (ref 0–199)
CO2: 33 MMOL/L (ref 22–29)
CREAT SERPL-MCNC: 0.4 MG/DL (ref 0.5–1)
EOSINOPHILS ABSOLUTE: 0.28 E9/L (ref 0.05–0.5)
EOSINOPHILS RELATIVE PERCENT: 2.6 % (ref 0–6)
GFR AFRICAN AMERICAN: >60
GFR NON-AFRICAN AMERICAN: >60 ML/MIN/1.73
GLUCOSE BLD-MCNC: 189 MG/DL (ref 74–99)
HBA1C MFR BLD: 8.6 % (ref 4–5.6)
HCT VFR BLD CALC: 42.3 % (ref 34–48)
HDLC SERPL-MCNC: 39 MG/DL
HEMOGLOBIN: 13.4 G/DL (ref 11.5–15.5)
IMMATURE GRANULOCYTES #: 0.04 E9/L
IMMATURE GRANULOCYTES %: 0.4 % (ref 0–5)
LDL CHOLESTEROL CALCULATED: ABNORMAL MG/DL (ref 0–99)
LYMPHOCYTES ABSOLUTE: 3.61 E9/L (ref 1.5–4)
LYMPHOCYTES RELATIVE PERCENT: 32.9 % (ref 20–42)
MCH RBC QN AUTO: 29.5 PG (ref 26–35)
MCHC RBC AUTO-ENTMCNC: 31.7 % (ref 32–34.5)
MCV RBC AUTO: 93 FL (ref 80–99.9)
MONOCYTES ABSOLUTE: 0.51 E9/L (ref 0.1–0.95)
MONOCYTES RELATIVE PERCENT: 4.6 % (ref 2–12)
NEUTROPHILS ABSOLUTE: 6.47 E9/L (ref 1.8–7.3)
NEUTROPHILS RELATIVE PERCENT: 59 % (ref 43–80)
PDW BLD-RTO: 13.7 FL (ref 11.5–15)
PLATELET # BLD: 302 E9/L (ref 130–450)
PMV BLD AUTO: 10.4 FL (ref 7–12)
POTASSIUM SERPL-SCNC: 4.1 MMOL/L (ref 3.5–5)
RBC # BLD: 4.55 E12/L (ref 3.5–5.5)
SODIUM BLD-SCNC: 139 MMOL/L (ref 132–146)
TOTAL PROTEIN: 6.7 G/DL (ref 6.4–8.3)
TRIGL SERPL-MCNC: 457 MG/DL (ref 0–149)
TSH SERPL DL<=0.05 MIU/L-ACNC: 2.45 UIU/ML (ref 0.27–4.2)
VLDLC SERPL CALC-MCNC: ABNORMAL MG/DL
WBC # BLD: 11 E9/L (ref 4.5–11.5)

## 2019-02-06 PROCEDURE — 3023F SPIROM DOC REV: CPT | Performed by: FAMILY MEDICINE

## 2019-02-06 PROCEDURE — 80053 COMPREHEN METABOLIC PANEL: CPT

## 2019-02-06 PROCEDURE — G8417 CALC BMI ABV UP PARAM F/U: HCPCS | Performed by: FAMILY MEDICINE

## 2019-02-06 PROCEDURE — 84443 ASSAY THYROID STIM HORMONE: CPT

## 2019-02-06 PROCEDURE — 83036 HEMOGLOBIN GLYCOSYLATED A1C: CPT

## 2019-02-06 PROCEDURE — 80061 LIPID PANEL: CPT

## 2019-02-06 PROCEDURE — 2022F DILAT RTA XM EVC RTNOPTHY: CPT | Performed by: FAMILY MEDICINE

## 2019-02-06 PROCEDURE — 4004F PT TOBACCO SCREEN RCVD TLK: CPT | Performed by: FAMILY MEDICINE

## 2019-02-06 PROCEDURE — G8598 ASA/ANTIPLAT THER USED: HCPCS | Performed by: FAMILY MEDICINE

## 2019-02-06 PROCEDURE — 3046F HEMOGLOBIN A1C LEVEL >9.0%: CPT | Performed by: FAMILY MEDICINE

## 2019-02-06 PROCEDURE — G8926 SPIRO NO PERF OR DOC: HCPCS | Performed by: FAMILY MEDICINE

## 2019-02-06 PROCEDURE — 85025 COMPLETE CBC W/AUTO DIFF WBC: CPT

## 2019-02-06 PROCEDURE — G8427 DOCREV CUR MEDS BY ELIG CLIN: HCPCS | Performed by: FAMILY MEDICINE

## 2019-02-06 PROCEDURE — 3017F COLORECTAL CA SCREEN DOC REV: CPT | Performed by: FAMILY MEDICINE

## 2019-02-06 PROCEDURE — 36415 COLL VENOUS BLD VENIPUNCTURE: CPT | Performed by: FAMILY MEDICINE

## 2019-02-06 PROCEDURE — 99214 OFFICE O/P EST MOD 30 MIN: CPT | Performed by: FAMILY MEDICINE

## 2019-02-06 PROCEDURE — G8482 FLU IMMUNIZE ORDER/ADMIN: HCPCS | Performed by: FAMILY MEDICINE

## 2019-02-06 RX ORDER — PREDNISONE 10 MG/1
TABLET ORAL
Refills: 11 | COMMUNITY
Start: 2019-01-31 | End: 2019-03-21

## 2019-02-06 RX ORDER — TRAZODONE HYDROCHLORIDE 150 MG/1
TABLET ORAL
Refills: 10 | COMMUNITY
Start: 2019-01-31 | End: 2019-08-22 | Stop reason: SDUPTHER

## 2019-02-06 RX ORDER — TIZANIDINE HYDROCHLORIDE 2 MG/1
2 CAPSULE, GELATIN COATED ORAL 3 TIMES DAILY PRN
Qty: 60 CAPSULE | Refills: 2 | Status: SHIPPED | OUTPATIENT
Start: 2019-02-06 | End: 2019-02-14

## 2019-02-06 RX ORDER — RAMELTEON 8 MG/1
8 TABLET ORAL NIGHTLY PRN
Qty: 30 TABLET | Refills: 5 | Status: SHIPPED | OUTPATIENT
Start: 2019-02-06 | End: 2019-05-15

## 2019-02-06 ASSESSMENT — ENCOUNTER SYMPTOMS
BACK PAIN: 1
BLOOD IN STOOL: 0
ABDOMINAL PAIN: 0
CHEST TIGHTNESS: 0
COUGH: 1
SHORTNESS OF BREATH: 1
TROUBLE SWALLOWING: 0
WHEEZING: 0

## 2019-02-14 ENCOUNTER — TELEPHONE (OUTPATIENT)
Dept: FAMILY MEDICINE CLINIC | Age: 54
End: 2019-02-14

## 2019-02-14 DIAGNOSIS — G47.00 INSOMNIA, UNSPECIFIED TYPE: Primary | ICD-10-CM

## 2019-02-14 DIAGNOSIS — Q79.60 EHLERS-DANLOS SYNDROME: ICD-10-CM

## 2019-02-14 DIAGNOSIS — Z79.899 MEDICATION MANAGEMENT: ICD-10-CM

## 2019-02-14 RX ORDER — TIZANIDINE 2 MG/1
2 TABLET ORAL EVERY 8 HOURS PRN
Qty: 60 TABLET | Refills: 2 | Status: SHIPPED | OUTPATIENT
Start: 2019-02-14 | End: 2019-03-28 | Stop reason: SDUPTHER

## 2019-02-20 RX ORDER — ZALEPLON 5 MG/1
5 CAPSULE ORAL NIGHTLY PRN
Qty: 30 CAPSULE | Refills: 0 | Status: SHIPPED | OUTPATIENT
Start: 2019-02-20 | End: 2019-03-22

## 2019-03-25 DIAGNOSIS — E78.5 HYPERLIPIDEMIA, UNSPECIFIED HYPERLIPIDEMIA TYPE: ICD-10-CM

## 2019-03-25 RX ORDER — ATORVASTATIN CALCIUM 20 MG/1
TABLET, FILM COATED ORAL
Qty: 90 TABLET | Refills: 3 | Status: SHIPPED | OUTPATIENT
Start: 2019-03-25 | End: 2019-04-05 | Stop reason: SDUPTHER

## 2019-03-28 ENCOUNTER — OFFICE VISIT (OUTPATIENT)
Dept: FAMILY MEDICINE CLINIC | Age: 54
End: 2019-03-28
Payer: MEDICAID

## 2019-03-28 VITALS
BODY MASS INDEX: 26.82 KG/M2 | HEIGHT: 65 IN | TEMPERATURE: 99.9 F | OXYGEN SATURATION: 98 % | SYSTOLIC BLOOD PRESSURE: 98 MMHG | HEART RATE: 102 BPM | DIASTOLIC BLOOD PRESSURE: 64 MMHG | WEIGHT: 161 LBS | RESPIRATION RATE: 18 BRPM

## 2019-03-28 DIAGNOSIS — R05.9 COUGH: ICD-10-CM

## 2019-03-28 DIAGNOSIS — M54.16 LUMBAR RADICULOPATHY: Primary | ICD-10-CM

## 2019-03-28 DIAGNOSIS — J41.1 MUCOPURULENT CHRONIC BRONCHITIS (HCC): ICD-10-CM

## 2019-03-28 LAB
INFLUENZA A ANTIGEN, POC: NORMAL
INFLUENZA B ANTIGEN, POC: NORMAL

## 2019-03-28 PROCEDURE — G8417 CALC BMI ABV UP PARAM F/U: HCPCS | Performed by: FAMILY MEDICINE

## 2019-03-28 PROCEDURE — 3017F COLORECTAL CA SCREEN DOC REV: CPT | Performed by: FAMILY MEDICINE

## 2019-03-28 PROCEDURE — G8482 FLU IMMUNIZE ORDER/ADMIN: HCPCS | Performed by: FAMILY MEDICINE

## 2019-03-28 PROCEDURE — G8926 SPIRO NO PERF OR DOC: HCPCS | Performed by: FAMILY MEDICINE

## 2019-03-28 PROCEDURE — G8598 ASA/ANTIPLAT THER USED: HCPCS | Performed by: FAMILY MEDICINE

## 2019-03-28 PROCEDURE — G8427 DOCREV CUR MEDS BY ELIG CLIN: HCPCS | Performed by: FAMILY MEDICINE

## 2019-03-28 PROCEDURE — 4004F PT TOBACCO SCREEN RCVD TLK: CPT | Performed by: FAMILY MEDICINE

## 2019-03-28 PROCEDURE — 99213 OFFICE O/P EST LOW 20 MIN: CPT | Performed by: FAMILY MEDICINE

## 2019-03-28 PROCEDURE — 87804 INFLUENZA ASSAY W/OPTIC: CPT | Performed by: FAMILY MEDICINE

## 2019-03-28 PROCEDURE — 94640 AIRWAY INHALATION TREATMENT: CPT | Performed by: FAMILY MEDICINE

## 2019-03-28 PROCEDURE — 3023F SPIROM DOC REV: CPT | Performed by: FAMILY MEDICINE

## 2019-03-28 RX ORDER — ALBUTEROL SULFATE 2.5 MG/3ML
2.5 SOLUTION RESPIRATORY (INHALATION) ONCE
Status: COMPLETED | OUTPATIENT
Start: 2019-03-28 | End: 2019-03-28

## 2019-03-28 RX ORDER — NAPROXEN 500 MG/1
TABLET ORAL
Qty: 60 TABLET | Refills: 2 | Status: SHIPPED | OUTPATIENT
Start: 2019-03-28 | End: 2019-06-07 | Stop reason: SDUPTHER

## 2019-03-28 RX ORDER — TIZANIDINE 2 MG/1
2 TABLET ORAL EVERY 8 HOURS PRN
Qty: 60 TABLET | Refills: 2 | Status: SHIPPED | OUTPATIENT
Start: 2019-03-28 | End: 2019-05-15 | Stop reason: SDUPTHER

## 2019-03-28 RX ORDER — AZITHROMYCIN 250 MG/1
250 TABLET, FILM COATED ORAL SEE ADMIN INSTRUCTIONS
Qty: 6 TABLET | Refills: 0 | Status: SHIPPED | OUTPATIENT
Start: 2019-03-28 | End: 2019-04-02

## 2019-03-28 RX ORDER — PREDNISONE 20 MG/1
40 TABLET ORAL DAILY
Qty: 10 TABLET | Refills: 0 | Status: SHIPPED | OUTPATIENT
Start: 2019-03-28 | End: 2019-04-02

## 2019-03-28 RX ADMIN — ALBUTEROL SULFATE 2.5 MG: 2.5 SOLUTION RESPIRATORY (INHALATION) at 10:54

## 2019-03-28 RX ADMIN — Medication 0.5 MG: at 10:55

## 2019-04-05 DIAGNOSIS — Z79.899 MEDICATION MANAGEMENT: ICD-10-CM

## 2019-04-05 DIAGNOSIS — J42 CHRONIC BRONCHITIS, UNSPECIFIED CHRONIC BRONCHITIS TYPE (HCC): ICD-10-CM

## 2019-04-05 DIAGNOSIS — E78.5 HYPERLIPIDEMIA, UNSPECIFIED HYPERLIPIDEMIA TYPE: ICD-10-CM

## 2019-04-05 RX ORDER — ATORVASTATIN CALCIUM 20 MG/1
TABLET, FILM COATED ORAL
Qty: 90 TABLET | Refills: 3 | Status: SHIPPED
Start: 2019-04-05 | End: 2020-09-24

## 2019-04-05 RX ORDER — IPRATROPIUM BROMIDE AND ALBUTEROL SULFATE 2.5; .5 MG/3ML; MG/3ML
SOLUTION RESPIRATORY (INHALATION)
Qty: 540 ML | Refills: 0 | Status: SHIPPED | OUTPATIENT
Start: 2019-04-05 | End: 2019-05-15 | Stop reason: SDUPTHER

## 2019-05-15 ENCOUNTER — OFFICE VISIT (OUTPATIENT)
Dept: FAMILY MEDICINE CLINIC | Age: 54
End: 2019-05-15
Payer: MEDICAID

## 2019-05-15 VITALS
WEIGHT: 163 LBS | HEART RATE: 100 BPM | HEIGHT: 65 IN | DIASTOLIC BLOOD PRESSURE: 63 MMHG | OXYGEN SATURATION: 81 % | BODY MASS INDEX: 27.16 KG/M2 | RESPIRATION RATE: 16 BRPM | SYSTOLIC BLOOD PRESSURE: 100 MMHG

## 2019-05-15 DIAGNOSIS — Z79.899 MEDICATION MANAGEMENT: ICD-10-CM

## 2019-05-15 DIAGNOSIS — J42 CHRONIC BRONCHITIS, UNSPECIFIED CHRONIC BRONCHITIS TYPE (HCC): Primary | ICD-10-CM

## 2019-05-15 DIAGNOSIS — J01.00 ACUTE NON-RECURRENT MAXILLARY SINUSITIS: ICD-10-CM

## 2019-05-15 LAB — HBA1C MFR BLD: 9.1 %

## 2019-05-15 PROCEDURE — G8598 ASA/ANTIPLAT THER USED: HCPCS | Performed by: FAMILY MEDICINE

## 2019-05-15 PROCEDURE — 3023F SPIROM DOC REV: CPT | Performed by: FAMILY MEDICINE

## 2019-05-15 PROCEDURE — 2022F DILAT RTA XM EVC RTNOPTHY: CPT | Performed by: FAMILY MEDICINE

## 2019-05-15 PROCEDURE — 99214 OFFICE O/P EST MOD 30 MIN: CPT | Performed by: FAMILY MEDICINE

## 2019-05-15 PROCEDURE — 3046F HEMOGLOBIN A1C LEVEL >9.0%: CPT | Performed by: FAMILY MEDICINE

## 2019-05-15 PROCEDURE — 4004F PT TOBACCO SCREEN RCVD TLK: CPT | Performed by: FAMILY MEDICINE

## 2019-05-15 PROCEDURE — G8417 CALC BMI ABV UP PARAM F/U: HCPCS | Performed by: FAMILY MEDICINE

## 2019-05-15 PROCEDURE — 3017F COLORECTAL CA SCREEN DOC REV: CPT | Performed by: FAMILY MEDICINE

## 2019-05-15 PROCEDURE — G8926 SPIRO NO PERF OR DOC: HCPCS | Performed by: FAMILY MEDICINE

## 2019-05-15 PROCEDURE — G8427 DOCREV CUR MEDS BY ELIG CLIN: HCPCS | Performed by: FAMILY MEDICINE

## 2019-05-15 PROCEDURE — 83036 HEMOGLOBIN GLYCOSYLATED A1C: CPT | Performed by: FAMILY MEDICINE

## 2019-05-15 RX ORDER — IPRATROPIUM BROMIDE AND ALBUTEROL SULFATE 2.5; .5 MG/3ML; MG/3ML
SOLUTION RESPIRATORY (INHALATION)
Qty: 540 ML | Refills: 0 | Status: SHIPPED | OUTPATIENT
Start: 2019-05-15 | End: 2019-06-25 | Stop reason: SDUPTHER

## 2019-05-15 RX ORDER — PREDNISONE 10 MG/1
0.5 TABLET ORAL DAILY
Refills: 11 | COMMUNITY
Start: 2019-05-02 | End: 2019-05-15

## 2019-05-15 RX ORDER — AMOXICILLIN AND CLAVULANATE POTASSIUM 875; 125 MG/1; MG/1
1 TABLET, FILM COATED ORAL 2 TIMES DAILY WITH MEALS
Qty: 20 TABLET | Refills: 0 | Status: SHIPPED | OUTPATIENT
Start: 2019-05-15 | End: 2019-05-25

## 2019-05-15 RX ORDER — TIZANIDINE 2 MG/1
2 TABLET ORAL EVERY 8 HOURS PRN
Qty: 60 TABLET | Refills: 2 | Status: SHIPPED | OUTPATIENT
Start: 2019-05-15 | End: 2019-11-13 | Stop reason: SDUPTHER

## 2019-05-15 NOTE — PROGRESS NOTES
CC   Chief Complaint   Patient presents with    Diabetes     HPI:   Patient comes in today for the below issue(s). COPD  Chronic issue, severe  Patient states she's doing well overall but has been having more sinus issues over past 2-3 weeks. States SOBis at baseline. Is compliant with oxygen, nebulizers. Still smoking though. Denies any new productive cough. Denies any new wheezing or shortness of breath. Does report frontal and maxillary sinus pressure, purulent rhinorrhea and postnasal drip. Denies fevers. Insomnia  Follow-up of a recurrent issue, present for years. Restarted trazodone after intolerance toward doxepin. Continues to struggle falling asleep or staying asleep. Diabetes:  Type 2  Chronic issue, present for years  Patient feels well. Issue is somewhat controlled. Patient does not have complaints or concerns today. Medications reviewed. Currently on basal and mealtime insulin   Taking all medications and tolerating well. Glucose screens being checked  yes - did not bring log, states has had very high readings on occasion. Admits to poor compliance with diet. States she enjoys sweets too much. hypoglycemic episodes no  Patient is reporting neuropathy. Denies other SSx  Patient is taking ASA Yes  Taking  Ace Inhibitor/ARB. No  Patient is  on appropriately-dosed statin. Patient does not see Podiatry regularly. Patient does smoke. Most recent labs reviewed with patient. Lab Results   Component Value Date    LABA1C 9.1 05/15/2019     No results found for: EAG      Review of Systems  Review of Systems   Constitutional: Positive for fatigue. Negative for appetite change, chills, diaphoresis and fever. HENT: Negative for trouble swallowing. Respiratory: Positive for cough, shortness of breath and wheezing. Negative for chest tightness. Cardiovascular: Negative for chest pain, palpitations and leg swelling.    Gastrointestinal: Negative for blood in stool and constipation. Endocrine: Positive for polyuria. Genitourinary: Negative for difficulty urinating. Musculoskeletal: Positive for arthralgias. Neurological: Positive for numbness. Negative for dizziness and headaches. Past Medical History:   Diagnosis Date    Anxiety     Carotid stenosis 12/2011    50-69% on left    Carpal tunnel syndrome 12/07/2011    bilateral    Cerebral artery occlusion with cerebral infarction [facility]) 2011? left sided weakness / usually in w/c    Chronic back pain     COPD (chronic obstructive pulmonary disease) ([facility] [address]     no pulmonologist / follows with PCP    CVA (cerebral infarction) 2011?     right sided thalamic; seen by Dr Green Mcburney syndrome     GERD (gastroesophageal reflux disease)     History of ischemic heart disease 03/30/2017    no cardiology    Hyperlipidemia     Hypertension     Obesity     [facility] 8/25/[facility]    Tobacco abuse     Tobacco abuse     Type II or unspecified type diabetes mellitus without mention of complication, not stated as uncontrolled     Vitamin D deficiency          PE:  VS:  /63   Pulse 100   Resp 16   Ht 5' 5\" (1.651 m)   Wt 163 lb (73.9 kg)   LMP 03/20/2015   SpO2 97%   BMI 27.12 kg/m²   Physical Exam  General: Well nourished, well developed, no acute distress  Eyes:  PERRL   Conjunctiva unremarkable   ENT:  TM's intact bilaterally, no effusion   + Bilateral maxillary sinus tenderness   Nasal:  +mucosal edema     No nasal drainage   Oral:  mucosa moist and pink             +posterior pharyngeal drainage     no posterior pharyngeal exudate  Neck:  Supple   No palpable cervical lymphoadenopathy   Thyroid without mass or enlargement  Resp: Lungs CTAB   Equal and adequate air exchange without accessory muscle use   No rales, rhonchi or wheeze  CV: S1S2 RRR   No murmur   Intact distal pulses   No edema  GI: Abdomen Soft, non tender, non stable, will add an antibiotic for the sinusitis. I don't think we're seeing evidence of decompensated COPD to warrant oral steroids. Continue with nebulizers and oxygen  Chronic meds refilled were indicated. Call or go to ED immediately if symptoms worsen or persist. Advised patient to call with any new medication issues, and, as applicable, read all Rx info from pharmacy to assure aware ofall possible risks and side effects of medication before taking. As applicable, educational materials and/or home exercises printed forpatient's review and were included in patient instructions on his/her After Visit Summary and given to patient at the end of visit. Patient and/or guardian given opportunity to ask questions/raise concerns. She verbalized comfort and understanding of instructions. Follow Up:     Return in about 3 months (around 8/15/2019), or if symptoms worsen or fail to improve, for DM check, COPD. or sooner if the above issues change unexpectedly or new issues develop     This document was prepared at least partially through the use ofY'allice recognition software. Although effort is taken to assure the accuracy of this document, it is possible that grammatical, syntax,  or spelling errors may occur.       Electronically signed by Caden Brown MD [facility]

## 2019-05-17 ASSESSMENT — ENCOUNTER SYMPTOMS
SHORTNESS OF BREATH: 1
TROUBLE SWALLOWING: 0
COUGH: 1
CONSTIPATION: 0
WHEEZING: 1
BLOOD IN STOOL: 0
CHEST TIGHTNESS: 0

## 2019-05-23 RX ORDER — ERGOCALCIFEROL 1.25 MG/1
CAPSULE ORAL
Qty: 12 CAPSULE | Refills: 3 | Status: SHIPPED | OUTPATIENT
Start: 2019-05-23 | End: 2020-01-10

## 2019-06-05 DIAGNOSIS — J44.9 CHRONIC OBSTRUCTIVE PULMONARY DISEASE, UNSPECIFIED COPD TYPE (HCC): ICD-10-CM

## 2019-06-05 RX ORDER — PREDNISONE 1 MG/1
7.5 TABLET ORAL DAILY
Qty: 45 TABLET | Refills: 5 | OUTPATIENT
Start: 2019-06-05

## 2019-06-25 DIAGNOSIS — Z79.899 MEDICATION MANAGEMENT: ICD-10-CM

## 2019-06-25 DIAGNOSIS — J42 CHRONIC BRONCHITIS, UNSPECIFIED CHRONIC BRONCHITIS TYPE (HCC): ICD-10-CM

## 2019-06-25 RX ORDER — IPRATROPIUM BROMIDE AND ALBUTEROL SULFATE 2.5; .5 MG/3ML; MG/3ML
SOLUTION RESPIRATORY (INHALATION)
Qty: 540 ML | Refills: 0 | Status: SHIPPED | OUTPATIENT
Start: 2019-06-25 | End: 2019-07-05

## 2019-07-29 DIAGNOSIS — J44.1 COPD EXACERBATION (HCC): ICD-10-CM

## 2019-07-30 RX ORDER — ALBUTEROL SULFATE 90 UG/1
AEROSOL, METERED RESPIRATORY (INHALATION)
Qty: 324 G | Refills: 10 | Status: SHIPPED
Start: 2019-07-30 | End: 2021-01-01

## 2019-08-07 ENCOUNTER — OFFICE VISIT (OUTPATIENT)
Dept: FAMILY MEDICINE CLINIC | Age: 54
End: 2019-08-07
Payer: MEDICAID

## 2019-08-07 VITALS
SYSTOLIC BLOOD PRESSURE: 101 MMHG | DIASTOLIC BLOOD PRESSURE: 68 MMHG | BODY MASS INDEX: 27.12 KG/M2 | HEIGHT: 65 IN | RESPIRATION RATE: 14 BRPM | HEART RATE: 98 BPM

## 2019-08-07 DIAGNOSIS — H10.9 CONJUNCTIVITIS OF RIGHT EYE, UNSPECIFIED CONJUNCTIVITIS TYPE: Primary | ICD-10-CM

## 2019-08-07 DIAGNOSIS — M54.16 LUMBAR RADICULOPATHY: ICD-10-CM

## 2019-08-07 PROCEDURE — G8427 DOCREV CUR MEDS BY ELIG CLIN: HCPCS | Performed by: FAMILY MEDICINE

## 2019-08-07 PROCEDURE — G8417 CALC BMI ABV UP PARAM F/U: HCPCS | Performed by: FAMILY MEDICINE

## 2019-08-07 PROCEDURE — 3017F COLORECTAL CA SCREEN DOC REV: CPT | Performed by: FAMILY MEDICINE

## 2019-08-07 PROCEDURE — 4004F PT TOBACCO SCREEN RCVD TLK: CPT | Performed by: FAMILY MEDICINE

## 2019-08-07 PROCEDURE — G8598 ASA/ANTIPLAT THER USED: HCPCS | Performed by: FAMILY MEDICINE

## 2019-08-07 PROCEDURE — 99213 OFFICE O/P EST LOW 20 MIN: CPT | Performed by: FAMILY MEDICINE

## 2019-08-07 RX ORDER — NAPROXEN 500 MG/1
TABLET ORAL
Qty: 60 TABLET | Refills: 2 | Status: SHIPPED
Start: 2019-08-07 | End: 2020-05-06 | Stop reason: SDUPTHER

## 2019-08-07 RX ORDER — TOBRAMYCIN AND DEXAMETHASONE 3; 1 MG/ML; MG/ML
1 SUSPENSION/ DROPS OPHTHALMIC
Qty: 1 BOTTLE | Refills: 1 | Status: SHIPPED | OUTPATIENT
Start: 2019-08-07 | End: 2019-08-14

## 2019-08-08 ASSESSMENT — ENCOUNTER SYMPTOMS
BACK PAIN: 1
SHORTNESS OF BREATH: 1
EYE DISCHARGE: 1
COUGH: 1
EYE REDNESS: 1
EYE PAIN: 0
WHEEZING: 0

## 2019-08-14 ENCOUNTER — HOSPITAL ENCOUNTER (OUTPATIENT)
Age: 54
Discharge: HOME | End: 2019-08-16
Payer: MEDICAID

## 2019-08-14 ENCOUNTER — TELEPHONE (OUTPATIENT)
Dept: FAMILY MEDICINE CLINIC | Age: 54
End: 2019-08-14

## 2019-08-14 ENCOUNTER — OFFICE VISIT (OUTPATIENT)
Dept: FAMILY MEDICINE CLINIC | Age: 54
End: 2019-08-14
Payer: MEDICAID

## 2019-08-14 VITALS
WEIGHT: 166 LBS | RESPIRATION RATE: 18 BRPM | HEART RATE: 94 BPM | OXYGEN SATURATION: 98 % | BODY MASS INDEX: 27.66 KG/M2 | SYSTOLIC BLOOD PRESSURE: 121 MMHG | HEIGHT: 65 IN | DIASTOLIC BLOOD PRESSURE: 70 MMHG

## 2019-08-14 DIAGNOSIS — L03.115 CELLULITIS OF RIGHT LOWER LEG: ICD-10-CM

## 2019-08-14 DIAGNOSIS — E78.5 HYPERLIPIDEMIA, UNSPECIFIED HYPERLIPIDEMIA TYPE: ICD-10-CM

## 2019-08-14 DIAGNOSIS — Q79.60 EHLERS-DANLOS SYNDROME: ICD-10-CM

## 2019-08-14 DIAGNOSIS — Z79.899 MEDICATION MANAGEMENT: ICD-10-CM

## 2019-08-14 LAB — HBA1C MFR BLD: 8.5 %

## 2019-08-14 PROCEDURE — 3017F COLORECTAL CA SCREEN DOC REV: CPT | Performed by: FAMILY MEDICINE

## 2019-08-14 PROCEDURE — G8598 ASA/ANTIPLAT THER USED: HCPCS | Performed by: FAMILY MEDICINE

## 2019-08-14 PROCEDURE — 83036 HEMOGLOBIN GLYCOSYLATED A1C: CPT | Performed by: FAMILY MEDICINE

## 2019-08-14 PROCEDURE — 80053 COMPREHEN METABOLIC PANEL: CPT

## 2019-08-14 PROCEDURE — 85025 COMPLETE CBC W/AUTO DIFF WBC: CPT

## 2019-08-14 PROCEDURE — 4004F PT TOBACCO SCREEN RCVD TLK: CPT | Performed by: FAMILY MEDICINE

## 2019-08-14 PROCEDURE — 99214 OFFICE O/P EST MOD 30 MIN: CPT | Performed by: FAMILY MEDICINE

## 2019-08-14 PROCEDURE — 2022F DILAT RTA XM EVC RTNOPTHY: CPT | Performed by: FAMILY MEDICINE

## 2019-08-14 PROCEDURE — 80061 LIPID PANEL: CPT

## 2019-08-14 PROCEDURE — 36415 COLL VENOUS BLD VENIPUNCTURE: CPT | Performed by: FAMILY MEDICINE

## 2019-08-14 PROCEDURE — 3045F PR MOST RECENT HEMOGLOBIN A1C LEVEL 7.0-9.0%: CPT | Performed by: FAMILY MEDICINE

## 2019-08-14 PROCEDURE — G8427 DOCREV CUR MEDS BY ELIG CLIN: HCPCS | Performed by: FAMILY MEDICINE

## 2019-08-14 PROCEDURE — 84443 ASSAY THYROID STIM HORMONE: CPT

## 2019-08-14 PROCEDURE — G8417 CALC BMI ABV UP PARAM F/U: HCPCS | Performed by: FAMILY MEDICINE

## 2019-08-14 RX ORDER — DOXYCYCLINE HYCLATE 100 MG
100 TABLET ORAL 2 TIMES DAILY WITH MEALS
Qty: 20 TABLET | Refills: 0 | Status: SHIPPED | OUTPATIENT
Start: 2019-08-14 | End: 2019-08-24

## 2019-08-14 ASSESSMENT — ENCOUNTER SYMPTOMS
COUGH: 1
CONSTIPATION: 0
WHEEZING: 1
SHORTNESS OF BREATH: 1
TROUBLE SWALLOWING: 0
CHEST TIGHTNESS: 0
BLOOD IN STOOL: 0

## 2019-08-14 NOTE — PROGRESS NOTES
tightness. Cardiovascular: Negative for chest pain, palpitations and leg swelling. Gastrointestinal: Negative for blood in stool and constipation. Endocrine: Positive for polyuria. Genitourinary: Negative for difficulty urinating. Musculoskeletal: Positive for arthralgias. Neurological: Positive for numbness. Negative for dizziness and headaches. Past Medical History:   Diagnosis Date    Anxiety     Carotid stenosis 12/2011    50-69% on left    Carpal tunnel syndrome 12/07/2011    bilateral    Cerebral artery occlusion with cerebral infarction [facility]) 2011? left sided weakness / usually in w/c    Chronic back pain     COPD (chronic obstructive pulmonary disease) ([facility] [address]     no pulmonologist / follows with PCP    CVA (cerebral infarction) 2011?     right sided thalamic; seen by Dr Deny Leiva syndrome     GERD (gastroesophageal reflux disease)     History of ischemic heart disease 03/30/2017    no cardiology    Hyperlipidemia     Hypertension     Obesity     PVD (peripheral vascular disease) with claudication ([facility] [address] 8/25/2017    Tobacco abuse     Tobacco abuse     Type II or unspecified type diabetes mellitus without mention of complication, not stated as uncontrolled     Vitamin D deficiency          PE:  VS:  /70   Pulse 94   Resp 18   Ht 5' 5\" (1.651 m)   Wt 166 lb (75.3 kg)   LMP 03/20/2015   SpO2 98%   BMI 27.62 kg/m²   Physical Exam  General: Well nourished, well developed, no acute distress  Eyes:  PERRL   Conjunctiva unremarkable   ENT:  TM's intact bilaterally, no effusion   No frontal or maxillary sinus tenderness   Nasal:  +mucosal edema     No nasal drainage   Oral:  mucosa moist and pink             +posterior pharyngeal drainage     no posterior pharyngeal exudate  Neck:  Supple   No palpable cervical lymphoadenopathy   Thyroid without mass or enlargement  Resp: Lungs markedly diminished but otherwise appears CTAB   Equal and

## 2019-08-15 ENCOUNTER — TELEPHONE (OUTPATIENT)
Dept: FAMILY MEDICINE CLINIC | Age: 54
End: 2019-08-15

## 2019-08-15 LAB
ALBUMIN SERPL-MCNC: 3.7 G/DL (ref 3.5–5.2)
ALP BLD-CCNC: 69 U/L (ref 35–104)
ALT SERPL-CCNC: 10 U/L (ref 0–32)
ANION GAP SERPL CALCULATED.3IONS-SCNC: 15 MMOL/L (ref 7–16)
AST SERPL-CCNC: 15 U/L (ref 0–31)
BASOPHILS ABSOLUTE: 0.06 E9/L (ref 0–0.2)
BASOPHILS RELATIVE PERCENT: 0.7 % (ref 0–2)
BILIRUB SERPL-MCNC: <0.2 MG/DL (ref 0–1.2)
BUN BLDV-MCNC: 8 MG/DL (ref 6–20)
CALCIUM SERPL-MCNC: 9.4 MG/DL (ref 8.6–10.2)
CHLORIDE BLD-SCNC: 99 MMOL/L (ref 98–107)
CHOLESTEROL, TOTAL: 157 MG/DL (ref 0–199)
CO2: 31 MMOL/L (ref 22–29)
CREAT SERPL-MCNC: 0.4 MG/DL (ref 0.5–1)
EOSINOPHILS ABSOLUTE: 0.32 E9/L (ref 0.05–0.5)
EOSINOPHILS RELATIVE PERCENT: 3.6 % (ref 0–6)
GFR AFRICAN AMERICAN: >60
GFR NON-AFRICAN AMERICAN: >60 ML/MIN/1.73
GLUCOSE BLD-MCNC: 258 MG/DL (ref 74–99)
HCT VFR BLD CALC: 41.4 % (ref 34–48)
HDLC SERPL-MCNC: 36 MG/DL
HEMOGLOBIN: 12.9 G/DL (ref 11.5–15.5)
IMMATURE GRANULOCYTES #: 0.03 E9/L
IMMATURE GRANULOCYTES %: 0.3 % (ref 0–5)
LDL CHOLESTEROL CALCULATED: ABNORMAL MG/DL (ref 0–99)
LYMPHOCYTES ABSOLUTE: 3.76 E9/L (ref 1.5–4)
LYMPHOCYTES RELATIVE PERCENT: 42.6 % (ref 20–42)
MCH RBC QN AUTO: 29.6 PG (ref 26–35)
MCHC RBC AUTO-ENTMCNC: 31.2 % (ref 32–34.5)
MCV RBC AUTO: 95 FL (ref 80–99.9)
MONOCYTES ABSOLUTE: 0.37 E9/L (ref 0.1–0.95)
MONOCYTES RELATIVE PERCENT: 4.2 % (ref 2–12)
NEUTROPHILS ABSOLUTE: 4.29 E9/L (ref 1.8–7.3)
NEUTROPHILS RELATIVE PERCENT: 48.6 % (ref 43–80)
PDW BLD-RTO: 13 FL (ref 11.5–15)
PLATELET # BLD: 246 E9/L (ref 130–450)
PMV BLD AUTO: 10.6 FL (ref 7–12)
POTASSIUM SERPL-SCNC: 4.2 MMOL/L (ref 3.5–5)
RBC # BLD: 4.36 E12/L (ref 3.5–5.5)
SODIUM BLD-SCNC: 145 MMOL/L (ref 132–146)
TOTAL PROTEIN: 6.6 G/DL (ref 6.4–8.3)
TRIGL SERPL-MCNC: 416 MG/DL (ref 0–149)
TSH SERPL DL<=0.05 MIU/L-ACNC: 2.77 UIU/ML (ref 0.27–4.2)
VLDLC SERPL CALC-MCNC: ABNORMAL MG/DL
WBC # BLD: 8.8 E9/L (ref 4.5–11.5)

## 2019-08-15 RX ORDER — LANCETS 30 GAUGE
1 EACH MISCELLANEOUS 4 TIMES DAILY
Qty: 120 EACH | Refills: 5 | Status: SHIPPED | OUTPATIENT
Start: 2019-08-15 | End: 2021-01-01

## 2019-08-15 RX ORDER — GLUCOSAMINE HCL/CHONDROITIN SU 500-400 MG
CAPSULE ORAL
Qty: 120 STRIP | Refills: 5 | Status: SHIPPED | OUTPATIENT
Start: 2019-08-15 | End: 2021-01-01

## 2019-08-15 RX ORDER — BLOOD PRESSURE TEST KIT
KIT MISCELLANEOUS
Qty: 120 EACH | Refills: 5 | Status: SHIPPED | OUTPATIENT
Start: 2019-08-15 | End: 2021-01-01

## 2019-08-20 RX ORDER — FENOFIBRATE 54 MG/1
54 TABLET ORAL DAILY
Qty: 30 TABLET | Refills: 5 | Status: SHIPPED
Start: 2019-08-20 | End: 2020-02-11

## 2019-08-22 DIAGNOSIS — F32.A DEPRESSION, UNSPECIFIED DEPRESSION TYPE: ICD-10-CM

## 2019-08-22 DIAGNOSIS — J44.1 COPD EXACERBATION (HCC): ICD-10-CM

## 2019-08-26 RX ORDER — ONDANSETRON 4 MG/1
TABLET, FILM COATED ORAL
Qty: 30 TABLET | Refills: 10 | Status: SHIPPED | OUTPATIENT
Start: 2019-08-26 | End: 2019-09-06 | Stop reason: SDUPTHER

## 2019-08-26 RX ORDER — TRAZODONE HYDROCHLORIDE 150 MG/1
TABLET ORAL
Qty: 30 TABLET | Refills: 10 | Status: SHIPPED | OUTPATIENT
Start: 2019-08-26 | End: 2019-09-06 | Stop reason: SDUPTHER

## 2019-08-26 RX ORDER — UMECLIDINIUM 62.5 UG/1
AEROSOL, POWDER ORAL
Qty: 30 EACH | Refills: 10 | Status: SHIPPED | OUTPATIENT
Start: 2019-08-26 | End: 2019-09-09

## 2019-08-26 RX ORDER — BUMETANIDE 1 MG/1
TABLET ORAL
Qty: 30 TABLET | Refills: 10 | Status: SHIPPED | OUTPATIENT
Start: 2019-08-26 | End: 2019-09-06 | Stop reason: SDUPTHER

## 2019-09-06 DIAGNOSIS — F32.A DEPRESSION, UNSPECIFIED DEPRESSION TYPE: ICD-10-CM

## 2019-09-06 DIAGNOSIS — J44.1 COPD EXACERBATION (HCC): ICD-10-CM

## 2019-09-09 RX ORDER — TRAZODONE HYDROCHLORIDE 150 MG/1
TABLET ORAL
Qty: 30 TABLET | Refills: 10 | Status: SHIPPED
Start: 2019-09-09 | End: 2020-05-08 | Stop reason: SDUPTHER

## 2019-09-09 RX ORDER — BUMETANIDE 1 MG/1
TABLET ORAL
Qty: 30 TABLET | Refills: 10 | Status: SHIPPED
Start: 2019-09-09 | End: 2021-01-01

## 2019-09-09 RX ORDER — UMECLIDINIUM 62.5 UG/1
AEROSOL, POWDER ORAL
Qty: 1 EACH | Refills: 10 | Status: SHIPPED
Start: 2019-09-09 | End: 2020-01-01

## 2019-09-09 RX ORDER — ONDANSETRON 4 MG/1
TABLET, FILM COATED ORAL
Qty: 30 TABLET | Refills: 10 | Status: SHIPPED
Start: 2019-09-09 | End: 2021-01-01

## 2019-09-23 RX ORDER — OMEPRAZOLE 40 MG/1
CAPSULE, DELAYED RELEASE ORAL
Qty: 30 CAPSULE | Refills: 10 | Status: SHIPPED
Start: 2019-09-23 | End: 2021-01-01

## 2019-09-23 RX ORDER — OXYBUTYNIN CHLORIDE 5 MG/1
TABLET, EXTENDED RELEASE ORAL
Qty: 30 TABLET | Refills: 10 | Status: SHIPPED | OUTPATIENT
Start: 2019-09-23 | End: 2019-11-13 | Stop reason: SDUPTHER

## 2019-10-12 NOTE — PROGRESS NOTES
Report called to 5S

## 2019-10-22 DIAGNOSIS — J44.9 CHRONIC OBSTRUCTIVE PULMONARY DISEASE, UNSPECIFIED COPD TYPE (HCC): ICD-10-CM

## 2019-10-22 RX ORDER — PREDNISONE 1 MG/1
TABLET ORAL
Qty: 45 TABLET | Refills: 3 | Status: SHIPPED | OUTPATIENT
Start: 2019-10-22 | End: 2020-05-28 | Stop reason: SDUPTHER

## 2019-11-13 ENCOUNTER — OFFICE VISIT (OUTPATIENT)
Dept: FAMILY MEDICINE CLINIC | Age: 54
End: 2019-11-13
Payer: MEDICAID

## 2019-11-13 VITALS
DIASTOLIC BLOOD PRESSURE: 65 MMHG | SYSTOLIC BLOOD PRESSURE: 104 MMHG | HEIGHT: 65 IN | HEART RATE: 103 BPM | WEIGHT: 167 LBS | RESPIRATION RATE: 18 BRPM | BODY MASS INDEX: 27.82 KG/M2

## 2019-11-13 DIAGNOSIS — L85.3 XEROSIS OF SKIN: ICD-10-CM

## 2019-11-13 DIAGNOSIS — Z79.899 MEDICATION MANAGEMENT: ICD-10-CM

## 2019-11-13 DIAGNOSIS — F32.1 CURRENT MODERATE EPISODE OF MAJOR DEPRESSIVE DISORDER WITHOUT PRIOR EPISODE (HCC): ICD-10-CM

## 2019-11-13 DIAGNOSIS — J44.1 COPD EXACERBATION (HCC): ICD-10-CM

## 2019-11-13 DIAGNOSIS — Z23 NEED FOR IMMUNIZATION AGAINST INFLUENZA: ICD-10-CM

## 2019-11-13 DIAGNOSIS — R00.2 PALPITATIONS: ICD-10-CM

## 2019-11-13 LAB — HBA1C MFR BLD: 9 %

## 2019-11-13 PROCEDURE — 83036 HEMOGLOBIN GLYCOSYLATED A1C: CPT | Performed by: FAMILY MEDICINE

## 2019-11-13 PROCEDURE — G8926 SPIRO NO PERF OR DOC: HCPCS | Performed by: FAMILY MEDICINE

## 2019-11-13 PROCEDURE — G8482 FLU IMMUNIZE ORDER/ADMIN: HCPCS | Performed by: FAMILY MEDICINE

## 2019-11-13 PROCEDURE — 90471 IMMUNIZATION ADMIN: CPT | Performed by: FAMILY MEDICINE

## 2019-11-13 PROCEDURE — 3017F COLORECTAL CA SCREEN DOC REV: CPT | Performed by: FAMILY MEDICINE

## 2019-11-13 PROCEDURE — 93000 ELECTROCARDIOGRAM COMPLETE: CPT | Performed by: FAMILY MEDICINE

## 2019-11-13 PROCEDURE — G8427 DOCREV CUR MEDS BY ELIG CLIN: HCPCS | Performed by: FAMILY MEDICINE

## 2019-11-13 PROCEDURE — 4004F PT TOBACCO SCREEN RCVD TLK: CPT | Performed by: FAMILY MEDICINE

## 2019-11-13 PROCEDURE — G8598 ASA/ANTIPLAT THER USED: HCPCS | Performed by: FAMILY MEDICINE

## 2019-11-13 PROCEDURE — 2022F DILAT RTA XM EVC RTNOPTHY: CPT | Performed by: FAMILY MEDICINE

## 2019-11-13 PROCEDURE — 90686 IIV4 VACC NO PRSV 0.5 ML IM: CPT | Performed by: FAMILY MEDICINE

## 2019-11-13 PROCEDURE — 3023F SPIROM DOC REV: CPT | Performed by: FAMILY MEDICINE

## 2019-11-13 PROCEDURE — 99214 OFFICE O/P EST MOD 30 MIN: CPT | Performed by: FAMILY MEDICINE

## 2019-11-13 PROCEDURE — G8417 CALC BMI ABV UP PARAM F/U: HCPCS | Performed by: FAMILY MEDICINE

## 2019-11-13 RX ORDER — PREDNISONE 10 MG/1
TABLET ORAL
Qty: 28 TABLET | Refills: 0 | Status: SHIPPED | OUTPATIENT
Start: 2019-11-13 | End: 2020-05-28

## 2019-11-13 RX ORDER — AMMONIUM LACTATE 12 G/100G
CREAM TOPICAL
Qty: 1 TUBE | Refills: 11 | Status: SHIPPED | OUTPATIENT
Start: 2019-11-13 | End: 2019-12-13

## 2019-11-13 RX ORDER — OXYBUTYNIN CHLORIDE 5 MG/1
5 TABLET, EXTENDED RELEASE ORAL DAILY
Qty: 30 TABLET | Refills: 11 | Status: SHIPPED | OUTPATIENT
Start: 2019-11-13 | End: 2021-01-01

## 2019-11-13 RX ORDER — TIZANIDINE 2 MG/1
2 TABLET ORAL EVERY 8 HOURS PRN
Qty: 60 TABLET | Refills: 2 | Status: SHIPPED | OUTPATIENT
Start: 2019-11-13 | End: 2020-08-11 | Stop reason: SDUPTHER

## 2019-11-13 RX ORDER — DULOXETIN HYDROCHLORIDE 30 MG/1
30 CAPSULE, DELAYED RELEASE ORAL DAILY
Qty: 30 CAPSULE | Refills: 5 | Status: SHIPPED
Start: 2019-11-13 | End: 2020-05-07

## 2019-11-13 ASSESSMENT — ENCOUNTER SYMPTOMS
WHEEZING: 1
CONSTIPATION: 0
TROUBLE SWALLOWING: 0
ABDOMINAL PAIN: 1
DIARRHEA: 0
BACK PAIN: 1
SHORTNESS OF BREATH: 1
COUGH: 1
CHEST TIGHTNESS: 0

## 2019-11-20 ENCOUNTER — HOSPITAL ENCOUNTER (OUTPATIENT)
Dept: SLEEP CENTER | Age: 54
Discharge: HOME | End: 2019-11-20
Payer: MEDICAID

## 2019-11-20 ENCOUNTER — TELEPHONE (OUTPATIENT)
Dept: ADMINISTRATIVE | Age: 54
End: 2019-11-20

## 2019-11-20 DIAGNOSIS — R00.2 PALPITATIONS: ICD-10-CM

## 2019-11-20 PROCEDURE — 93225 XTRNL ECG REC<48 HRS REC: CPT

## 2019-11-20 PROCEDURE — 93226 XTRNL ECG REC<48 HR SCAN A/R: CPT

## 2019-11-20 NOTE — TELEPHONE ENCOUNTER
Pt's daughter called to schedule a 2 week  follow up appt with Dr Bee Bello results. [facility] spoke with office, PCP unavailable, office will check with  for appt. Pt's daughter Deisy Prado would like the office to call her to schedule, she will be the one bringing pt to [facility]. [phone].

## 2019-12-12 DIAGNOSIS — H69.82 DYSFUNCTION OF LEFT EUSTACHIAN TUBE: ICD-10-CM

## 2019-12-12 RX ORDER — FLUTICASONE PROPIONATE 50 MCG
SPRAY, SUSPENSION (ML) NASAL
Qty: 1 BOTTLE | Refills: 5 | Status: SHIPPED | OUTPATIENT
Start: 2019-12-12 | End: 2021-01-01

## 2020-01-01 ENCOUNTER — VIRTUAL VISIT (OUTPATIENT)
Dept: FAMILY MEDICINE CLINIC | Age: 55
End: 2020-01-01
Payer: MEDICAID

## 2020-01-01 ENCOUNTER — HOSPITAL ENCOUNTER (OUTPATIENT)
Age: 55
Discharge: HOME | End: 2020-12-18
Payer: MEDICAID

## 2020-01-01 ENCOUNTER — TELEPHONE (OUTPATIENT)
Dept: FAMILY MEDICINE CLINIC | Age: 55
End: 2020-01-01

## 2020-01-01 DIAGNOSIS — K62.5 RECTAL BLEEDING: Primary | ICD-10-CM

## 2020-01-01 LAB
BASOPHILS ABSOLUTE: 0.05 E9/L (ref 0–0.2)
BASOPHILS RELATIVE PERCENT: 0.6 % (ref 0–2)
EOSINOPHILS ABSOLUTE: 0.24 E9/L (ref 0.05–0.5)
EOSINOPHILS RELATIVE PERCENT: 2.7 % (ref 0–6)
HCT VFR BLD CALC: 39.2 % (ref 34–48)
HEMOGLOBIN: 12.3 G/DL (ref 11.5–15.5)
IMMATURE GRANULOCYTES #: 0.03 E9/L
IMMATURE GRANULOCYTES %: 0.3 % (ref 0–5)
LYMPHOCYTES ABSOLUTE: 3.41 E9/L (ref 1.5–4)
LYMPHOCYTES RELATIVE PERCENT: 39 % (ref 20–42)
MCH RBC QN AUTO: 31.1 PG (ref 26–35)
MCHC RBC AUTO-ENTMCNC: 31.4 % (ref 32–34.5)
MCV RBC AUTO: 99 FL (ref 80–99.9)
MONOCYTES ABSOLUTE: 0.42 E9/L (ref 0.1–0.95)
MONOCYTES RELATIVE PERCENT: 4.8 % (ref 2–12)
NEUTROPHILS ABSOLUTE: 4.6 E9/L (ref 1.8–7.3)
NEUTROPHILS RELATIVE PERCENT: 52.6 % (ref 43–80)
PDW BLD-RTO: 13.2 FL (ref 11.5–15)
PLATELET # BLD: 299 E9/L (ref 130–450)
PMV BLD AUTO: 9 FL (ref 7–12)
RBC # BLD: 3.96 E12/L (ref 3.5–5.5)
WBC # BLD: 8.8 E9/L (ref 4.5–11.5)

## 2020-01-01 PROCEDURE — 3017F COLORECTAL CA SCREEN DOC REV: CPT | Performed by: FAMILY MEDICINE

## 2020-01-01 PROCEDURE — 85025 COMPLETE CBC W/AUTO DIFF WBC: CPT

## 2020-01-01 PROCEDURE — G8427 DOCREV CUR MEDS BY ELIG CLIN: HCPCS | Performed by: FAMILY MEDICINE

## 2020-01-01 PROCEDURE — 2022F DILAT RTA XM EVC RTNOPTHY: CPT | Performed by: FAMILY MEDICINE

## 2020-01-01 PROCEDURE — 99213 OFFICE O/P EST LOW 20 MIN: CPT | Performed by: FAMILY MEDICINE

## 2020-01-01 PROCEDURE — 3051F HG A1C>EQUAL 7.0%<8.0%: CPT | Performed by: FAMILY MEDICINE

## 2020-01-01 PROCEDURE — 36415 COLL VENOUS BLD VENIPUNCTURE: CPT

## 2020-01-01 RX ORDER — PREDNISONE 10 MG/1
TABLET ORAL
Qty: 30 TABLET | Refills: 0 | Status: SHIPPED
Start: 2020-01-01 | End: 2021-01-01

## 2020-01-01 ASSESSMENT — ENCOUNTER SYMPTOMS
ABDOMINAL PAIN: 1
DIARRHEA: 1
TROUBLE SWALLOWING: 0
BACK PAIN: 1
VOMITING: 0
WHEEZING: 0
SHORTNESS OF BREATH: 1
NAUSEA: 0
CHEST TIGHTNESS: 0

## 2020-01-15 RX ORDER — ALBUTEROL SULFATE 2.5 MG/3ML
SOLUTION RESPIRATORY (INHALATION)
Qty: 540 ML | Refills: 3 | Status: SHIPPED
Start: 2020-01-15 | End: 2020-05-07

## 2020-02-11 RX ORDER — FENOFIBRATE 54 MG/1
54 TABLET ORAL DAILY
Qty: 30 TABLET | Refills: 5 | Status: SHIPPED | OUTPATIENT
Start: 2020-02-11 | End: 2020-08-11 | Stop reason: SDUPTHER

## 2020-02-26 RX ORDER — IPRATROPIUM BROMIDE AND ALBUTEROL SULFATE 2.5; .5 MG/3ML; MG/3ML
SOLUTION RESPIRATORY (INHALATION)
Qty: 540 ML | Refills: 5 | Status: SHIPPED | OUTPATIENT
Start: 2020-02-26 | End: 2020-09-01 | Stop reason: SDUPTHER

## 2020-05-06 RX ORDER — DOXEPIN HYDROCHLORIDE 10 MG/1
CAPSULE ORAL
Qty: 30 CAPSULE | Refills: 5 | Status: SHIPPED | OUTPATIENT
Start: 2020-05-06 | End: 2020-09-23

## 2020-05-07 RX ORDER — DULOXETIN HYDROCHLORIDE 30 MG/1
30 CAPSULE, DELAYED RELEASE ORAL DAILY
Qty: 30 CAPSULE | Refills: 5 | Status: SHIPPED | OUTPATIENT
Start: 2020-05-07 | End: 2020-05-28 | Stop reason: SDUPTHER

## 2020-05-07 RX ORDER — ALBUTEROL SULFATE 2.5 MG/3ML
SOLUTION RESPIRATORY (INHALATION)
Qty: 540 ML | Refills: 3 | Status: ON HOLD | OUTPATIENT
Start: 2020-05-07 | End: 2021-01-01 | Stop reason: HOSPADM

## 2020-05-08 RX ORDER — TRAZODONE HYDROCHLORIDE 150 MG/1
TABLET ORAL
Qty: 30 TABLET | Refills: 10 | Status: SHIPPED | OUTPATIENT
Start: 2020-05-08 | End: 2021-01-01 | Stop reason: SDUPTHER

## 2020-05-18 ENCOUNTER — TELEPHONE (OUTPATIENT)
Dept: FAMILY MEDICINE CLINIC | Age: 55
End: 2020-05-18

## 2020-05-28 ENCOUNTER — VIRTUAL VISIT (OUTPATIENT)
Dept: FAMILY MEDICINE CLINIC | Age: 55
End: 2020-05-28
Payer: MEDICAID

## 2020-05-28 PROCEDURE — 99214 OFFICE O/P EST MOD 30 MIN: CPT | Performed by: FAMILY MEDICINE

## 2020-05-28 PROCEDURE — G8427 DOCREV CUR MEDS BY ELIG CLIN: HCPCS | Performed by: FAMILY MEDICINE

## 2020-05-28 PROCEDURE — 2022F DILAT RTA XM EVC RTNOPTHY: CPT | Performed by: FAMILY MEDICINE

## 2020-05-28 PROCEDURE — 3046F HEMOGLOBIN A1C LEVEL >9.0%: CPT | Performed by: FAMILY MEDICINE

## 2020-05-28 PROCEDURE — 3017F COLORECTAL CA SCREEN DOC REV: CPT | Performed by: FAMILY MEDICINE

## 2020-05-28 RX ORDER — DULOXETIN HYDROCHLORIDE 30 MG/1
30 CAPSULE, DELAYED RELEASE ORAL DAILY
Qty: 30 CAPSULE | Refills: 5 | Status: SHIPPED
Start: 2020-05-28 | End: 2021-01-01

## 2020-05-28 RX ORDER — OLOPATADINE HYDROCHLORIDE 1 MG/ML
1 SOLUTION/ DROPS OPHTHALMIC 2 TIMES DAILY
Qty: 1 BOTTLE | Refills: 5 | Status: SHIPPED | OUTPATIENT
Start: 2020-05-28 | End: 2020-06-27

## 2020-05-28 RX ORDER — PREDNISONE 1 MG/1
TABLET ORAL
Qty: 45 TABLET | Refills: 5 | Status: SHIPPED
Start: 2020-05-28 | End: 2021-01-01

## 2020-05-28 RX ORDER — INSULIN GLARGINE 100 [IU]/ML
80 INJECTION, SOLUTION SUBCUTANEOUS 2 TIMES DAILY
Qty: 5 PEN | Refills: 5 | Status: SHIPPED
Start: 2020-05-28 | End: 2021-01-01 | Stop reason: SDUPTHER

## 2020-05-28 ASSESSMENT — ENCOUNTER SYMPTOMS
SINUS PAIN: 0
EYE ITCHING: 1
EYE PAIN: 0
BACK PAIN: 1
ABDOMINAL PAIN: 0
EYE REDNESS: 1
TROUBLE SWALLOWING: 0

## 2020-05-28 NOTE — PROGRESS NOTES
weakness and numbness. Social History     Tobacco Use    Smoking status: Current Every Day Smoker     Packs/day: 0.50     Years: 34.00     Pack years: 17.00     Types: Cigarettes    Smokeless tobacco: Never Used    Tobacco comment: down to 0.5 pack per day, smoked 5 pk per day for 2 years   Substance Use Topics    Alcohol use: No     Alcohol/week: 0.0 standard drinks    Drug use: No        Past Medical History:   Diagnosis Date    Anxiety     Carotid stenosis 12/2011    50-69% on left    Carpal tunnel syndrome 12/07/2011    bilateral    Cerebral artery occlusion with cerebral infarction [facility]) 2011? left sided weakness / usually in w/c    Chronic back pain     COPD (chronic obstructive pulmonary disease) ([facility] [address]     no pulmonologist / follows with PCP    CVA (cerebral infarction) 2011? right sided thalamic; seen by Dr Willow Caruso syndrome     GERD (gastroesophageal reflux disease)     History of ischemic heart disease 03/30/2017    no cardiology    Hyperlipidemia     Hypertension     Obesity     PVD (peripheral vascular disease) with claudication ([facility] [address] 8/25/2017    Tobacco abuse     Tobacco abuse     Type II or unspecified type diabetes mellitus without mention of complication, not stated as uncontrolled     Vitamin D deficiency        PHYSICAL EXAMINATION:  Vital Signs: (As obtained by patient/caregiver or practitioner observation)    Physical Exam  Constitutional:       Appearance: She is ill-appearing. She is not toxic-appearing. HENT:      Head: Normocephalic and atraumatic. Pulmonary:      Comments: Chronically labored breathing again noted, unchanged from baseline  Neurological:      Mental Status: She is alert. Mental status is at baseline.    Psychiatric:         Mood and Affect: Mood normal.         Behavior: Behavior normal.           Other pertinent observable physical exam findings-     Due to this being a TeleHealth encounter, evaluation of the following organ systems is limited: Vitals/Constitutional/EENT/Resp/CV/GI//MS/Neuro/Skin/Heme-Lymph-Imm. ASSESSMENT/PLAN:  Pete Sarmiento was seen today for diabetes and copd. 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, with long-term current use of insulin ([facility])  -     insulin glargine (BASAGLAR KWIKPEN) 100 UNIT/ML injection pen; Inject 80 Units into the skin 2 times daily    Mucopurulent chronic bronchitis ([facility])    Ama-Danlos syndrome    Chronic obstructive pulmonary disease, unspecified COPD type ([facility])  -     predniSONE (DELTASONE) 5 MG tablet; TAKE 1 AND 1/2 TABLETS BY MOUTH DAILY    Allergic conjunctivitis, unspecified laterality  -     olopatadine (PATANOL) 0.1 % ophthalmic solution; Place 1 drop into both eyes 2 times daily    Current moderate episode of major depressive disorder without prior episode ([facility])  -     DULoxetine (CYMBALTA) 30 MG extended release capsule; Take 1 capsule by mouth daily    Lumbar radiculopathy  -     Kanchan Tavera DO, Pain Medicine, [place]    Chronic painful diabetic neuropathy [facility])  -     [address] DO, Pain Medicine, [place]      Plan  Increase insulin as above. She is overdue for an A1c. She will have it done at her next office visit    Refilled chronic meds including prednisone for her chronic COPD. Ongoing issues relating to shortness of breath and she remains at high risk for lung infection or exacerbation that require hospitalization. Continue to stay home and avoid public crowds given coronavirus pandemic. Refilled prednisone. Given complaints of eye itching and redness will try topical antihistamine eyedrops. Notify if not improving    The complaint of neuropathy and previous issues with depression warranted a trial of Cymbalta. I do not think she has been using it. I will reorder the medication for her.   Given the ongoing complaints of neuropathy and radiculopathy I will send her to pain management. She is agreeable to pursuing. She does not request narcotics but would prefer interventions that would meaningfully improve her quality of life. No follow-ups on file. An  electronic signature was used to authenticate this note. --Fadi Jennings MD on 5/28/2020 at 2:05 PM        Pursuant to the emergency declaration under the [address] [place] [address], [facility] waiver authority and the [email or web address] and [employer] Act, this Virtual  Visit was conducted, with patient's consent, to reduce the patient's risk of exposure to COVID-19 and provide continuity of care for an established patient. Services were provided through a video synchronous discussion virtually to substitute for in-person clinic visit.

## 2020-05-28 NOTE — PROGRESS NOTES
TeleMedicine Patient Consent    This visit was performed as a virtual video visit using a synchronous, two-way, audio-video telehealth technology platform. Patient identification was verified at the start of the visit, including the patient's telephone number and physical location. I discussed with the patient the nature of our telehealth visits, that:     1. Due to the nature of an audio- video modality, the only components of a physical exam that could be done are the elements supported by direct observation. 2. The provider will evaluate the patient and recommend diagnostics and treatments based on their assessment. 3. If it was felt that the patient should be evaluated in clinic or an emergency room setting, then they would be directed there. 4. Our sessions are not being recorded and that personal health information is protected. 5. Our team would provide follow up care in person if/when the patient needs it. Patient does agree to proceed with telemedicine consultation. Patient location: home address in [facility]    Physician location: regular office location    This visit was completed virtually using [employer]. me    This visit was performed during the 5395 public health crisis and COVID-19 pandemic.   *Add GT modifier to all Video Visits*

## 2020-06-12 ENCOUNTER — OFFICE VISIT (OUTPATIENT)
Dept: PAIN MANAGEMENT | Age: 55
End: 2020-06-12
Payer: MEDICAID

## 2020-06-12 VITALS
DIASTOLIC BLOOD PRESSURE: 68 MMHG | RESPIRATION RATE: 24 BRPM | SYSTOLIC BLOOD PRESSURE: 100 MMHG | TEMPERATURE: 98.4 F | WEIGHT: 158 LBS | HEART RATE: 91 BPM | OXYGEN SATURATION: 95 % | BODY MASS INDEX: 26.98 KG/M2 | HEIGHT: 64 IN

## 2020-06-12 PROBLEM — G89.4 CHRONIC PAIN SYNDROME: Status: ACTIVE | Noted: 2020-06-12

## 2020-06-12 PROBLEM — E11.42 DIABETIC POLYNEUROPATHY ASSOCIATED WITH TYPE 2 DIABETES MELLITUS (HCC): Status: ACTIVE | Noted: 2020-06-12

## 2020-06-12 PROBLEM — M79.7 FIBROMYALGIA: Status: ACTIVE | Noted: 2020-06-12

## 2020-06-12 PROCEDURE — 3017F COLORECTAL CA SCREEN DOC REV: CPT | Performed by: PAIN MEDICINE

## 2020-06-12 PROCEDURE — 99204 OFFICE O/P NEW MOD 45 MIN: CPT | Performed by: PAIN MEDICINE

## 2020-06-12 PROCEDURE — 3046F HEMOGLOBIN A1C LEVEL >9.0%: CPT | Performed by: PAIN MEDICINE

## 2020-06-12 PROCEDURE — G8427 DOCREV CUR MEDS BY ELIG CLIN: HCPCS | Performed by: PAIN MEDICINE

## 2020-06-12 PROCEDURE — G8417 CALC BMI ABV UP PARAM F/U: HCPCS | Performed by: PAIN MEDICINE

## 2020-06-12 PROCEDURE — 4004F PT TOBACCO SCREEN RCVD TLK: CPT | Performed by: PAIN MEDICINE

## 2020-06-12 PROCEDURE — 2022F DILAT RTA XM EVC RTNOPTHY: CPT | Performed by: PAIN MEDICINE

## 2020-06-12 RX ORDER — PREGABALIN 75 MG/1
75 CAPSULE ORAL 2 TIMES DAILY
Qty: 14 CAPSULE | Refills: 0 | Status: SHIPPED
Start: 2020-06-12 | End: 2020-01-01

## 2020-06-12 RX ORDER — PREGABALIN 150 MG/1
150 CAPSULE ORAL 2 TIMES DAILY
Qty: 60 CAPSULE | Refills: 0 | Status: SHIPPED
Start: 2020-06-19 | End: 2020-01-01

## 2020-06-12 NOTE — PROGRESS NOTES
[address] [facility] PAIN    [address] [facility]  [address]  Dept: [phone]        Patient:  Abel Madison,  1965    Date of Service:  20     Requesting Physician:  Joseline Sampson MD    Reason for Consult:      Patient presents with complaints of bilateral distal arms and legs pain that started >1 year ago    HISTORY OF PRESENT ILLNESS:      Pain is constant and is described as aching, burning, stabbing and throbbing. Pain does radiate to the upper and lower extremities. She  has numbness, tingling of the the upper and lower extremities and does not have bladder or bowel dysfunction. Alleviating factors include: nothing. Aggravating factors include:  nothing. She has been on anticoagulation medications to include ASA and has not been on herbal supplements. She is diabetic. Imaging:   Lumbar xray 2017 -  The bones appear to be in anatomic alignment. No fracture or foreign body is identified. The disc spaces demonstrate severe degenerative changes. There is moderate loss of the vertebral body height   The are severe degenerative changes involving the facets. No new fracture is identified.               Impression   Findings consistent with degenerative disc disease and   degenerative facets disease     Lumbar MRI 2017 -  FINDINGS:     There is levoconvex scoliosis of lumbar spine. No evidence for   abnormal pre- or paravertebral soft tissue mass is seen. The conus   medullaris terminates at T12-L1 with normal size and signal.       Hypertrophy of ligamentum flavum and facet joints is seen at L3-L4 and   L4-L5. There is presence of a synovial cyst medial to left L5-S1 facet   joint. No significant spinal canal stenosis or dural sac compression   is seen. No evidence for nerve root impingement is seen.       There is hypointense T1 and hyperintense STIR signal intensities seen   at L1 and L4 vertebrae with patchy postcontrast enhancement.  Similar   changes are also weakness / usually in w/c    Chronic back pain     COPD (chronic obstructive pulmonary disease) ([address]     no pulmonologist / follows with PCP    CVA (cerebral infarction) 2011? right sided thalamic; seen by Dr Vernon Whiteside syndrome     GERD (gastroesophageal reflux disease)     History of ischemic heart disease 03/30/2017    no cardiology    Hyperlipidemia     Hypertension     Obesity     PVD (peripheral vascular disease) with claudication ([address] 8/25/2017    Tobacco abuse     Tobacco abuse     Type II or unspecified type diabetes mellitus without mention of complication, not stated as uncontrolled     Vitamin D deficiency        Past Surgical History:   Procedure Laterality Date    ABDOMINAL EXPLORATION SURGERY  03/29/2017    with bowel resection, incarcerated ventral hernia repair    CARDIAC CATHETERIZATION  1/7/2015    Dr. Mccormick Favors  EF=55%   FFR=0.84  distal RCA bifurcation    CYST INCISION AND DRAINAGE Right 07/22/2016    Right gluteal mass incision and drainage    CYST REMOVAL  1/28/2011    excision of left arm inclusion cyst    ECHO COMPL W DOP COLOR FLOW  5/15/2013         ECHOCARDIOGRAM COMPLETE 2D W DOPPLER W COLOR  1/3/2012         ECHOCARDIOGRAM COMPLETE 2D W DOPPLER W COLOR  6/14/2012         EYE SURGERY Bilateral 2014    cataract w lens implants    HERNIA REPAIR  03/2017    abdominal    OTHER SURGICAL HISTORY  07/26/2016    re exploration left lateral wound / heel    OH DRAIN SKIN ABSCESS COMPLIC N/A 7/4/0964    EXAM UNDER ANESTHESIA  I & D BUTTOCKS performed by Zara Justin DO at [address]       Prior to Admission medications    Medication Sig Start Date End Date Taking? Authorizing Provider   pregabalin (LYRICA) 75 MG capsule Take 1 capsule by mouth 2 times daily for 7 days. 6/12/20 6/19/20 Yes Mai Knight DO   pregabalin (LYRICA) 150 MG capsule Take 1 capsule by mouth 2 times daily for 30 days.  6/19/20 7/19/20 Yes Daniel Mcintyre DO   predniSONE (DELTASONE) 5 MG tablet TAKE 1 AND 1/2 TABLETS BY MOUTH DAILY 5/28/20  Yes Ralf Abreu MD   olopatadine (PATANOL) 0.1 % ophthalmic solution Place 1 drop into both eyes 2 times daily 5/28/20 6/27/20 Yes Ralf Abreu MD   insulin glargine [facility]) 100 UNIT/ML injection pen Inject 80 Units into the skin 2 times daily 5/28/20  Yes Ralf Abreu MD   DULoxetine (CYMBALTA) 30 MG extended release capsule Take 1 capsule by mouth daily 5/28/20  Yes Ralf Abreu MD   traZODone (DESYREL) 150 MG tablet TAKE 1 TABLET BY MOUTH EVERY NIGHT AT BEDTIME 5/8/20  Yes Ralf Abreu MD   albuterol (PROVENTIL) (2.5 MG/3ML) 0.083% nebulizer solution USE 1 VIAL VIA NEBULIZER EVERY 4 HOURS AS NEEDED FOR WHEEZING OR SHORTNESS OF BREATH 5/7/20  Yes Ralf Abreu MD   doxepin (SINEQUAN) 10 MG capsule TAKE 1 CAPSULE BY MOUTH EVERY NIGHT 5/6/20  Yes Ralf Abreu MD   naproxen (NAPROXEN) 500 MG EC tablet TAKE 1 TABLET BY MOUTH TWICE DAILY WITH MEALS 5/6/20  Yes Ralf Abreu MD   ipratropium-albuterol (DUONEB) 0.5-2.5 (3) MG/3ML SOLN nebulizer solution USE 3 ML VIA NEBULIZER EVERY 4 HOURS AS NEEDED FOR SHORTNESS OF BREATH 2/26/20  Yes Ralf Abreu MD   fenofibrate (TRICOR) 54 MG tablet TAKE 1 TABLET BY MOUTH DAILY 2/11/20  Yes Ralf Abreu MD   ASPIRIN LOW DOSE 81 MG EC tablet TAKE 1 TABLET BY MOUTH ONCE DAILY 1/10/20  Yes Ralf Abreu MD   vitamin D (ERGOCALCIFEROL) 1.25 MG (02354 UT) CAPS capsule TAKE 1 CAPSULE A DAY EVERY WED  1/10/20  Yes Ralf Abreu MD   fluticasone (FLONASE) 50 MCG/ACT nasal spray SHAKE LIQUID AND USE 2 SPRAYS IN EACH NOSTRIL DAILY 12/12/19  Yes Ralf Abreu MD   tiZANidine (ZANAFLEX) 2 MG tablet Take 1 tablet by mouth every 8 hours as needed (muscle spams) 11/13/19  Yes Ralf Abreu MD   oxybutynin (DITROPAN-XL) 5 MG extended release tablet Take 1 tablet by mouth daily 11/13/19  Yes Rafl Abreu MD   cilostazol SHORT PEN 31G X 8 MM MISC USE AS DIRECTED WITH LANTUS 4/16/18  Yes Nayla Benoit MD   meloxicam (MOBIC) 15 MG tablet TAKE 1 TABLET BY MOUTH ONCE DAILY  Patient taking differently: TAKE 1 TABLET BY MOUTH EVERY MORNING 3/21/18  Yes Nayla Benoit MD   ketoconazole (NIZORAL) 2 % cream Apply to both feet daily. Patient taking differently: Apply topically daily as needed Apply to both feet 1/14/17  Yes Rosina Mars MD   insulin lispro (HUMALOG KWIKPEN) 100 UNIT/ML pen 5 units before each meal along with 3-12 units on sliding scale as given before  Patient taking differently: Inject 5-12 Units into the skin 3 times daily (before meals) 5 units before each meal along with 3-12 units on sliding scale 4/18/16  Yes Nayla Benoit MD   vitamin B-12 (CYANOCOBALAMIN) 1000 MCG tablet Take 1 tablet by mouth daily.   Patient taking differently: Take 1,000 mcg by mouth every morning  6/19/14  Yes Nayla Benoit MD   gabapentin (NEURONTIN) 800 MG tablet TAKE 1 TABLET BY MOUTH THREE TIMES DAILY 12/12/19 5/28/20  Nayla Benoit MD       No Known Allergies    Social History     Socioeconomic History    Marital status: Single     Spouse name: Not on file    Number of children: Not on file    Years of education: Not on file    Highest education level: Not on file   Occupational History     Comment: factory work/ [employer]   Social Needs    Financial resource strain: Not on file    Food insecurity     Worry: Not on file     Inability: Not on file    Transportation needs     Medical: Not on file     Non-medical: Not on file   Tobacco Use    Smoking status: Current Every Day Smoker     Packs/day: 0.50     Years: 34.00     Pack years: 17.00     Types: Cigarettes    Smokeless tobacco: Never Used    Tobacco comment: down to 0.5 pack per day, smoked 5 pk per day for 2 years   Substance and Sexual Activity    Alcohol use: No     Alcohol/week: 0.0 standard drinks    Drug use: No    Sexual activity: Not on motion:abnormal moderately in lateral bending, flexion, extension rotation bilateral and is painful. Musculoskeletal:    Trigger points in Paraveteral:absent bilaterally  SI joint tenderness:positive right with hyperalgesia, positive left with hyperalgesia              ARGELIA test:not done right, not done left  Piriformis tenderness:positive right with hyperalgesia, positive left with hyperalgesia  Trochanteric bursa tenderness:positive right, positive left  SLR:negative right, negative left, sitting     Extremities:    Tremors:None bilaterally upper and lower  Range of motion:pain with internal rotation of hips negative.   Intact:Yes  Varicose veins:absent   Pulses:present Lt radial, difficult to palpate b/l feet  Cyanosis:none  Edema:none x all 4 extremities    Neurological:    Sensory:decreased to light touch in stocking glove distribution   Motor:                Right Quadriceps5/5          Left Quadriceps5/5           Right Gastrocnemius5/5    Left Gastrocnemius5/5  Right Ant Tibialis5/5  Left Ant Tibialis5/5    Reflexes:    Right Quadriceps reflex0-1+  Left Quadriceps reflex0-1+  Right Achilles reflex0-1+  Left Achilles reflex0-1+  Gait: not visualized, in wheelchair    Dermatology:    Skin:dry, peeling skin L>RLE, tobacco discoloration x all fingers, diffuse erythema of left lower leg and foot    Assessment/Plan:    BLE pain as well as pain in hands due to DM neuropathy   Vasculopath, continued smoking  + FM    Per Epic records the last time pt was seen by vascular for decreased BLE pulses was 2014  Pt thinks she has seen vascular since that time  Patient may benefit for repeat vascular consult and referral to podiatry for foot care if this has not already been done  Encouraged patient to discuss with PCP    Reviewed referral documents and imaging  Buccal screen today  [place] report reviewed  Pt is not a candidate for chronic opioid therapy  Failed gabapentin 800 mg tid due to ineffectiveness  Pregabalin titration as tolerated  Patient encouraged to stay active  Treatment plan discussed with the patient including medication and procedure side effects     CC:  Referring physician    AMAN Carter.

## 2020-08-11 RX ORDER — FENOFIBRATE 54 MG/1
54 TABLET ORAL DAILY
Qty: 30 TABLET | Refills: 5 | Status: SHIPPED
Start: 2020-08-11 | End: 2021-01-01 | Stop reason: SDUPTHER

## 2020-08-11 RX ORDER — TIZANIDINE 2 MG/1
2 TABLET ORAL EVERY 8 HOURS PRN
Qty: 60 TABLET | Refills: 2 | Status: SHIPPED
Start: 2020-08-11 | End: 2021-01-01 | Stop reason: SDUPTHER

## 2020-09-01 RX ORDER — IPRATROPIUM BROMIDE AND ALBUTEROL SULFATE 2.5; .5 MG/3ML; MG/3ML
SOLUTION RESPIRATORY (INHALATION)
Qty: 540 ML | Refills: 5 | Status: SHIPPED
Start: 2020-09-01 | End: 2021-01-01 | Stop reason: SDUPTHER

## 2020-09-23 ENCOUNTER — HOSPITAL ENCOUNTER (OUTPATIENT)
Age: 55
Discharge: HOME | End: 2020-09-25
Payer: MEDICAID

## 2020-09-23 ENCOUNTER — OFFICE VISIT (OUTPATIENT)
Dept: FAMILY MEDICINE CLINIC | Age: 55
End: 2020-09-23
Payer: MEDICAID

## 2020-09-23 VITALS
SYSTOLIC BLOOD PRESSURE: 114 MMHG | BODY MASS INDEX: 27.66 KG/M2 | RESPIRATION RATE: 20 BRPM | HEIGHT: 64 IN | TEMPERATURE: 98.7 F | DIASTOLIC BLOOD PRESSURE: 72 MMHG | HEART RATE: 98 BPM | WEIGHT: 162 LBS

## 2020-09-23 LAB
ALBUMIN SERPL-MCNC: 3.8 G/DL (ref 3.5–5.2)
ALP BLD-CCNC: 57 U/L (ref 35–104)
ALT SERPL-CCNC: 8 U/L (ref 0–32)
ANION GAP SERPL CALCULATED.3IONS-SCNC: 16 MMOL/L (ref 7–16)
AST SERPL-CCNC: 12 U/L (ref 0–31)
BASOPHILS ABSOLUTE: 0.05 E9/L (ref 0–0.2)
BASOPHILS RELATIVE PERCENT: 0.7 % (ref 0–2)
BILIRUB SERPL-MCNC: <0.2 MG/DL (ref 0–1.2)
BUN BLDV-MCNC: 7 MG/DL (ref 6–20)
CALCIUM SERPL-MCNC: 9.2 MG/DL (ref 8.6–10.2)
CHLORIDE BLD-SCNC: 95 MMOL/L (ref 98–107)
CHOLESTEROL, TOTAL: 161 MG/DL (ref 0–199)
CO2: 29 MMOL/L (ref 22–29)
CREAT SERPL-MCNC: 0.3 MG/DL (ref 0.5–1)
EOSINOPHILS ABSOLUTE: 0.31 E9/L (ref 0.05–0.5)
EOSINOPHILS RELATIVE PERCENT: 4.5 % (ref 0–6)
GFR AFRICAN AMERICAN: >60
GFR NON-AFRICAN AMERICAN: >60 ML/MIN/1.73
GLUCOSE BLD-MCNC: 272 MG/DL (ref 74–99)
HBA1C MFR BLD: 7.7 %
HCT VFR BLD CALC: 38.5 % (ref 34–48)
HDLC SERPL-MCNC: 43 MG/DL
HEMOGLOBIN: 12.1 G/DL (ref 11.5–15.5)
IMMATURE GRANULOCYTES #: 0.02 E9/L
IMMATURE GRANULOCYTES %: 0.3 % (ref 0–5)
LDL CHOLESTEROL CALCULATED: 56 MG/DL (ref 0–99)
LYMPHOCYTES ABSOLUTE: 2.93 E9/L (ref 1.5–4)
LYMPHOCYTES RELATIVE PERCENT: 42.5 % (ref 20–42)
MCH RBC QN AUTO: 31.1 PG (ref 26–35)
MCHC RBC AUTO-ENTMCNC: 31.4 % (ref 32–34.5)
MCV RBC AUTO: 99 FL (ref 80–99.9)
MONOCYTES ABSOLUTE: 0.31 E9/L (ref 0.1–0.95)
MONOCYTES RELATIVE PERCENT: 4.5 % (ref 2–12)
NEUTROPHILS ABSOLUTE: 3.27 E9/L (ref 1.8–7.3)
NEUTROPHILS RELATIVE PERCENT: 47.5 % (ref 43–80)
PDW BLD-RTO: 13.6 FL (ref 11.5–15)
PLATELET # BLD: 268 E9/L (ref 130–450)
PMV BLD AUTO: 10.5 FL (ref 7–12)
POTASSIUM SERPL-SCNC: 4.3 MMOL/L (ref 3.5–5)
RBC # BLD: 3.89 E12/L (ref 3.5–5.5)
SODIUM BLD-SCNC: 140 MMOL/L (ref 132–146)
TOTAL PROTEIN: 6.5 G/DL (ref 6.4–8.3)
TRIGL SERPL-MCNC: 308 MG/DL (ref 0–149)
VLDLC SERPL CALC-MCNC: 62 MG/DL
WBC # BLD: 6.9 E9/L (ref 4.5–11.5)

## 2020-09-23 PROCEDURE — G8926 SPIRO NO PERF OR DOC: HCPCS | Performed by: FAMILY MEDICINE

## 2020-09-23 PROCEDURE — 85025 COMPLETE CBC W/AUTO DIFF WBC: CPT

## 2020-09-23 PROCEDURE — 4004F PT TOBACCO SCREEN RCVD TLK: CPT | Performed by: FAMILY MEDICINE

## 2020-09-23 PROCEDURE — 3017F COLORECTAL CA SCREEN DOC REV: CPT | Performed by: FAMILY MEDICINE

## 2020-09-23 PROCEDURE — 90686 IIV4 VACC NO PRSV 0.5 ML IM: CPT | Performed by: FAMILY MEDICINE

## 2020-09-23 PROCEDURE — 3023F SPIROM DOC REV: CPT | Performed by: FAMILY MEDICINE

## 2020-09-23 PROCEDURE — 83036 HEMOGLOBIN GLYCOSYLATED A1C: CPT | Performed by: FAMILY MEDICINE

## 2020-09-23 PROCEDURE — 80053 COMPREHEN METABOLIC PANEL: CPT

## 2020-09-23 PROCEDURE — 81002 URINALYSIS NONAUTO W/O SCOPE: CPT | Performed by: FAMILY MEDICINE

## 2020-09-23 PROCEDURE — G8417 CALC BMI ABV UP PARAM F/U: HCPCS | Performed by: FAMILY MEDICINE

## 2020-09-23 PROCEDURE — 82044 UR ALBUMIN SEMIQUANTITATIVE: CPT | Performed by: FAMILY MEDICINE

## 2020-09-23 PROCEDURE — 80061 LIPID PANEL: CPT

## 2020-09-23 PROCEDURE — 3051F HG A1C>EQUAL 7.0%<8.0%: CPT | Performed by: FAMILY MEDICINE

## 2020-09-23 PROCEDURE — G8427 DOCREV CUR MEDS BY ELIG CLIN: HCPCS | Performed by: FAMILY MEDICINE

## 2020-09-23 PROCEDURE — 90471 IMMUNIZATION ADMIN: CPT | Performed by: FAMILY MEDICINE

## 2020-09-23 PROCEDURE — 2022F DILAT RTA XM EVC RTNOPTHY: CPT | Performed by: FAMILY MEDICINE

## 2020-09-23 PROCEDURE — 99214 OFFICE O/P EST MOD 30 MIN: CPT | Performed by: FAMILY MEDICINE

## 2020-09-23 RX ORDER — DOXEPIN HYDROCHLORIDE 10 MG/1
CAPSULE ORAL
Qty: 30 CAPSULE | Refills: 5 | Status: SHIPPED
Start: 2020-09-23 | End: 2021-01-01

## 2020-09-23 RX ORDER — DOXYCYCLINE HYCLATE 100 MG
100 TABLET ORAL 2 TIMES DAILY WITH MEALS
Qty: 20 TABLET | Refills: 0 | Status: SHIPPED | OUTPATIENT
Start: 2020-09-23 | End: 2020-10-03

## 2020-09-23 ASSESSMENT — ENCOUNTER SYMPTOMS
SINUS PRESSURE: 1
CHEST TIGHTNESS: 0
COUGH: 1
ABDOMINAL PAIN: 1
DIARRHEA: 0
SHORTNESS OF BREATH: 1
CONSTIPATION: 0
BACK PAIN: 1
TROUBLE SWALLOWING: 0
WHEEZING: 1

## 2020-09-23 NOTE — PROGRESS NOTES
CC   Chief Complaint   Patient presents with    Diabetes    COPD     HPI:   Patient comes in today for the below issue(s). Diabetes:  Type 2  Chronic issue, present for years  Patient feels well. Issue is not controlled. Patient does have complaints or concerns today. Medications reviewed. Currently on insulin  Taking all medications and tolerating well. Glucose screens being checked  yes -but did not bring in a log. Admits to poor compliance with diet- seems to have worsened dietary habits   hypoglycemic episodes no  Patient is reporting neuropathy. Denies other SSx  Patient is taking ASA Yes  Taking  Ace Inhibitor/ARB. Yes  Patient is  on appropriately-dosed statin. Patient does not see Podiatry regularly. Patient does smoke. Most recent labs reviewed with patient. Lab Results   Component Value Date    LABA1C 7.7 09/23/2020     No results found for: EAG  Lab Results   Component Value Date    LDLCALC - (AA) 08/14/2019     COPD  Follow up  Known issue- severe  Significantly impacting daily life  Is on chronic home Oxygen  States breathing is stable but poor overall   Cough is nonproductive. Denies fever sweats or chills. States compliant with breathing treatments and inhaled medications. No fevers  Still smoking- stopped the vape pen and is back to cigarettes    Sinus congestion/pain  3+ weeks of increased maxillary sinus pain  No fevers  Clear rhinorrhea    Reports significant pain related to her neuropathy and back pain  Has had prior compression fx  Ongoing pain, weakness sin her back and leg  Admits to fall into the shower a few weeks ago, landed on buttocks (incidentally states has  bars shower chair and nonslips but was too weak to grasp). States narcotics have not worked. Saw pain management- note reviewed  Lyrica was prescribed but did not tolerate  Does not want to have this worked up any further.   Refuses to consider new meds, PT, or seeing a surgeon Review of Systems  Review of Systems   Constitutional: Positive for fatigue. Negative for chills, fever and unexpected weight change. HENT: Positive for sinus pressure. Negative for ear pain and trouble swallowing. Eyes: Negative for visual disturbance. Respiratory: Positive for cough, shortness of breath and wheezing. Negative for chest tightness. Cardiovascular: Positive for palpitations. Negative for chest pain and leg swelling. Gastrointestinal: Positive for abdominal pain (Baseline hernia pain). Negative for constipation and diarrhea. Genitourinary: Negative for difficulty urinating, vaginal discharge and vaginal pain. C/o vaginal itching without discharge or bleeding   Musculoskeletal: Positive for arthralgias, back pain and myalgias. Skin: Negative for rash and wound. Neurological: Positive for numbness (Bilateral lower extremities). Negative for dizziness and headaches. Hematological: Bruises/bleeds easily. Psychiatric/Behavioral: Positive for sleep disturbance. Negative for dysphoric mood. Past Medical History:   Diagnosis Date    Anxiety     Carotid stenosis 12/2011    50-69% on left    Carpal tunnel syndrome 12/07/2011    bilateral    Cerebral artery occlusion with cerebral infarction [facility]) 2011? left sided weakness / usually in w/c    Chronic back pain     COPD (chronic obstructive pulmonary disease) ([facility] [address]     no pulmonologist / follows with PCP    CVA (cerebral infarction) 2011?     right sided thalamic; seen by Dr Stroud Bile syndrome     GERD (gastroesophageal reflux disease)     History of ischemic heart disease 03/30/2017    no cardiology    Hyperlipidemia     Hypertension     Obesity     PVD (peripheral vascular disease) with claudication ([facility] [address] 8/25/2017    Tobacco abuse     Tobacco abuse     Type II or unspecified type diabetes mellitus without mention of complication, not stated as uncontrolled     Vitamin D deficiency      Social History     Tobacco Use    Smoking status: Current Every Day Smoker     Packs/day: 0.50     Years: 34.00     Pack years: 17.00     Types: Cigarettes    Smokeless tobacco: Never Used    Tobacco comment: down to 0.5 pack per day, smoked 5 pk per day for 2 years   Substance Use Topics    Alcohol use: No     Alcohol/week: 0.0 standard drinks    Drug use: No         PE:  VS:  /72   Pulse 98   Temp 98.7 °F (37.1 °C) (Temporal)   Resp 20   Ht 5' 4\" (1.626 m)   Wt 162 lb (73.5 kg)   LMP 03/20/2015   BMI 27.81 kg/m²   Physical Exam  General: Well nourished, well developed, no acute distress  Eyes:  PERRL   Conjunctiva unremarkable   ENT:  TM's intact bilaterally, no effusion    + Maxillary sinus tenderness bilaterally   Nasal:  + Mucosal edema     No nasal drainage   Oral:  mucosa moist and pink             no posterior pharyngeal drainage     no posterior pharyngeal exudate  Neck:  Supple   No palpable cervical lymphoadenopathy   Thyroid without mass or enlargement  Resp: Market diminished air exchange, prolongation of expiratory phase with diffuse expiratory wheezing  CV: S1S2 RRR   No murmur or ectopy   Diminished distal pulses   No edema  GI: Abdomen Soft, non tender, non distended   Normoactive bowel sounds  Skin Warm and dry;   Xerotic skin noted on upper and lower extremities  Psych: Euthymic mood, congruent affect, logical thought processes      Assessment (including specific orders/meds/labs):      Kenneth Villanueva was seen today for diabetes and copd. 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 -     POCT Urinalysis no Micro    Fall in home, initial encounter    Need for immunization against influenza  -     INFLUENZA, QUADV, 3 YRS AND OLDER, IM PF, PREFILL SYR OR SDV, 0.5ML (AFLURIA QUADV, PF)    Acute non-recurrent maxillary sinusitis  -     doxycycline hyclate (VIBRA-TABS) 100 MG tablet; Take 1 tablet by mouth 2 times daily (with meals) for 10 days        Plan:     Type 2 diabetes that is uncontrolled. Labs show improvement. She is not particularly compliant with diet. Encouraged continued vigilance regarding diet. COPD symptoms are ongoing and severe. She is still actively smoking. She is not ready to make any significant lifestyle modifications. Continue current treatment. Again reminded her of her need for further evaluation should any acute change in respiratory status develop. Back pain and lumbar radiculopathy after a fall. She also has neuropathy related to his diabetes. I did offer further evaluation of her back, but she declines. She will notify us if issues worsen. Has home safety equipment already in place. Vaginitis is being empirically treated. If not improving, will need to have GYN examination    Treat acute sinusitis as above. Call if not improving    Counseled regarding above diagnosis, including possible risks and complications,  especially if left uncontrolled. Counseled regarding the possible sideeffects, risks, benefits and alternatives to treatment; patient and/or guardian verbalizes understanding, agrees, feels comfortable with and wishes to proceed with above treatment plan.     Call or go to ED immediately if symptoms worsen or persist. Advised patient to call with any new medication issues, and, as applicable, read all Rx info from pharmacy to assure aware ofall

## 2020-09-24 LAB
BILIRUBIN, POC: NEGATIVE
BLOOD URINE, POC: NEGATIVE
CLARITY, POC: NORMAL
COLOR, POC: NORMAL
CREATININE URINE POCT: 200
GLUCOSE URINE, POC: 500
KETONES, POC: NEGATIVE
LEUKOCYTE EST, POC: NEGATIVE
MICROALBUMIN/CREAT 24H UR: 30 MG/G{CREAT}
MICROALBUMIN/CREAT UR-RTO: <30
NITRITE, POC: NEGATIVE
PH, POC: 7
PROTEIN, POC: NEGATIVE
SPECIFIC GRAVITY, POC: 1.02
UROBILINOGEN, POC: 0.2

## 2020-09-24 RX ORDER — ATORVASTATIN CALCIUM 40 MG/1
40 TABLET, FILM COATED ORAL DAILY
Qty: 30 TABLET | Refills: 11 | Status: SHIPPED
Start: 2020-09-24 | End: 2021-01-01

## 2020-11-20 ENCOUNTER — TELEPHONE (OUTPATIENT)
Dept: FAMILY MEDICINE CLINIC | Age: 55
End: 2020-11-20

## 2020-11-20 NOTE — TELEPHONE ENCOUNTER
Patient has been having bloody stools. She has been bloated with constipation and diarrhea for 3 days. Daughter said this happens from time to time but not usually for this long. Please advise.

## 2020-11-20 NOTE — TELEPHONE ENCOUNTER
If there are any concerning sx including heart racing/lightheadedness etc she will need to go to the ER. If this is felt to simply be a prolonged episode of a chronic problem then will await PCP answer which may not be possible until Monday.

## 2020-11-23 NOTE — TELEPHONE ENCOUNTER
Noted, would want her evaluated either by us or can return to [facility] surgeon who helped her initially, Dr Michael Ernandez

## 2020-12-16 NOTE — PROGRESS NOTES
Respiratory: Positive for shortness of breath. Negative for chest tightness and wheezing. Cardiovascular: Negative for palpitations and leg swelling. Gastrointestinal: Positive for abdominal pain and diarrhea. Negative for nausea and vomiting. Genitourinary: Negative for difficulty urinating. Musculoskeletal: Positive for arthralgias and back pain. Social History     Tobacco Use    Smoking status: Current Every Day Smoker     Packs/day: 0.50     Years: 34.00     Pack years: 17.00     Types: Cigarettes    Smokeless tobacco: Never Used    Tobacco comment: down to 0.5 pack per day, smoked 5 pk per day for 2 years   Substance Use Topics    Alcohol use: No     Alcohol/week: 0.0 standard drinks    Drug use: No        Past Medical History:   Diagnosis Date    Anxiety     Carotid stenosis 12/2011    50-69% on left    Carpal tunnel syndrome 12/07/2011    bilateral    Cerebral artery occlusion with cerebral infarction [facility]) 2011? left sided weakness / usually in w/c    Chronic back pain     COPD (chronic obstructive pulmonary disease) ([facility] [address]     no pulmonologist / follows with PCP    CVA (cerebral infarction) 2011? right sided thalamic; seen by Dr Lester Cheung syndrome     GERD (gastroesophageal reflux disease)     History of ischemic heart disease 03/30/2017    no cardiology    Hyperlipidemia     Hypertension     Obesity     PVD (peripheral vascular disease) with claudication ([facility] [address] 8/25/2017    Tobacco abuse     Tobacco abuse     Type II or unspecified type diabetes mellitus without mention of complication, not stated as uncontrolled     Vitamin D deficiency        PHYSICAL EXAMINATION:  Vital Signs: (As obtained by patient/caregiver or practitioner observation)    Physical Exam  Constitutional:       General: She is not in acute distress. Appearance: She is ill-appearing.    Pulmonary:      Effort: Pulmonary effort is normal.   Neurological: Mental Status: She is alert. Psychiatric:         Mood and Affect: Mood normal.         Behavior: Behavior normal.           Other pertinent observable physical exam findings-     Due to this being a TeleHealth encounter, evaluation of the following organ systems is limited: Vitals/Constitutional/EENT/Resp/CV/GI//MS/Neuro/Skin/Heme-Lymph-Imm. ASSESSMENT/PLAN:  Sally Green was seen today for diabetes and copd. 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 Plan  Diabetes had improved on last visit with an A1c under 8. Unable to check labs today but will do it next visit. Continue current treatment. Continue monitor diet. Ongoing neuropathy symptoms. She also has a component of radiculopathy. Refilled her Naprosyn    COPD is ongoing and severe. I think it is best that she stay away from crowds. However because she has a potential for Covid exposure, I did give her prednisone with instructions to use if her work of breathing turns positive. We discussed if household contact turns positive she should wear a mask in the house as much as she can as well as avoiding contact with the ill individual.  There are other people in the house to get help provide some degree of care. I indicated to her I have a low threshold to order Covid testing.   She will notify if issues change    Prescription management performed Return in about 3 months (around 3/16/2021), or if symptoms worsen or fail to improve, for COPD, DM check. An  electronic signature was used to authenticate this note. --Patrice Foote MD on 12/17/2020 at 11:18 AM        Pursuant to the emergency declaration under the [address] [place] [address] waiver authority and the [employer] Act, this Virtual  Visit was conducted, with patient's consent, to reduce the patient's risk of exposure to COVID-19 and provide continuity of care for an established patient. Services were provided through a video synchronous discussion virtually to substitute for in-person clinic visit.

## 2020-12-17 NOTE — TELEPHONE ENCOUNTER
Patient called-spoke to daughter  Bleeding from the rectum during a BM  Spoke to PCP and was told to let this know if this happened again  She has had 1 bloody BM today  Has not had any other bloody BM this week  This was happening about 2 weeks ago  Bright red blood  Not described as pain, but having some throbbing in the rectum area  No dizziness or light headedness  Denies fever/chills  Per med list, on aspirin    Advised if only 1 bloody BM tonight she can continue to monitor. Get obtain CBC tomorrow, IF she has any other episodes tonight, abdominal pain, fever-she should be evaluated in ER.     Daughter and patient verbalized understanding

## 2020-12-18 NOTE — TELEPHONE ENCOUNTER
Spoke to patient's daughter. She said she had some abdominal pain that improved and she will take her today for the lab work.

## 2021-01-01 ENCOUNTER — OFFICE VISIT (OUTPATIENT)
Dept: FAMILY MEDICINE CLINIC | Age: 56
End: 2021-01-01
Payer: MEDICAID

## 2021-01-01 ENCOUNTER — HOSPITAL ENCOUNTER (OUTPATIENT)
Dept: MRI IMAGING | Age: 56
Discharge: HOME | End: 2021-05-03
Payer: MEDICAID

## 2021-01-01 ENCOUNTER — TELEPHONE (OUTPATIENT)
Dept: FAMILY MEDICINE CLINIC | Age: 56
End: 2021-01-01

## 2021-01-01 ENCOUNTER — TELEPHONE (OUTPATIENT)
Dept: VASCULAR SURGERY | Age: 56
End: 2021-01-01

## 2021-01-01 ENCOUNTER — IMMUNIZATION (OUTPATIENT)
Dept: PRIMARY CARE CLINIC | Age: 56
End: 2021-01-01
Payer: MEDICAID

## 2021-01-01 ENCOUNTER — OFFICE VISIT (OUTPATIENT)
Dept: CARDIOLOGY CLINIC | Age: 56
End: 2021-01-01
Payer: MEDICAID

## 2021-01-01 ENCOUNTER — APPOINTMENT (OUTPATIENT)
Dept: GENERAL RADIOLOGY | Age: 56
DRG: 140 | End: 2021-01-01
Payer: MEDICAID

## 2021-01-01 ENCOUNTER — OFFICE VISIT (OUTPATIENT)
Dept: SURGERY | Age: 56
End: 2021-01-01
Payer: MEDICAID

## 2021-01-01 ENCOUNTER — APPOINTMENT (OUTPATIENT)
Dept: INTERVENTIONAL RADIOLOGY/VASCULAR | Age: 56
DRG: 201 | End: 2021-01-01
Payer: MEDICAID

## 2021-01-01 ENCOUNTER — HOSPITAL ENCOUNTER (EMERGENCY)
Age: 56
Discharge: HOME | End: 2021-03-25
Attending: EMERGENCY MEDICINE
Payer: MEDICAID

## 2021-01-01 ENCOUNTER — APPOINTMENT (OUTPATIENT)
Dept: CT IMAGING | Age: 56
DRG: 720 | End: 2021-01-01
Payer: MEDICAID

## 2021-01-01 ENCOUNTER — OFFICE VISIT (OUTPATIENT)
Dept: VASCULAR SURGERY | Age: 56
End: 2021-01-01
Payer: MEDICAID

## 2021-01-01 ENCOUNTER — HOSPITAL ENCOUNTER (OUTPATIENT)
Dept: ULTRASOUND IMAGING | Age: 56
Discharge: HOME | End: 2021-06-22
Payer: MEDICAID

## 2021-01-01 ENCOUNTER — VIRTUAL VISIT (OUTPATIENT)
Dept: FAMILY MEDICINE CLINIC | Age: 56
End: 2021-01-01
Payer: MEDICAID

## 2021-01-01 ENCOUNTER — APPOINTMENT (OUTPATIENT)
Dept: CT IMAGING | Age: 56
End: 2021-01-01
Payer: MEDICAID

## 2021-01-01 ENCOUNTER — HOSPITAL ENCOUNTER (EMERGENCY)
Age: 56
Discharge: HOME | End: 2021-11-17
Attending: EMERGENCY MEDICINE
Payer: MEDICAID

## 2021-01-01 ENCOUNTER — HOSPITAL ENCOUNTER (EMERGENCY)
Age: 56
Discharge: LEFT BEFORE DISCHARGE | End: 2021-04-18
Attending: EMERGENCY MEDICINE
Payer: MEDICAID

## 2021-01-01 ENCOUNTER — APPOINTMENT (OUTPATIENT)
Dept: GENERAL RADIOLOGY | Age: 56
DRG: 137 | End: 2021-01-01
Payer: MEDICAID

## 2021-01-01 ENCOUNTER — HOSPITAL ENCOUNTER (EMERGENCY)
Age: 56
Discharge: LEFT BEFORE DISCHARGE | End: 2021-08-11
Attending: EMERGENCY MEDICINE
Payer: MEDICAID

## 2021-01-01 ENCOUNTER — HOSPITAL ENCOUNTER (INPATIENT)
Age: 56
LOS: 2 days | Discharge: HOME | DRG: 201 | End: 2021-05-29
Attending: EMERGENCY MEDICINE | Admitting: FAMILY MEDICINE
Payer: MEDICAID

## 2021-01-01 ENCOUNTER — APPOINTMENT (OUTPATIENT)
Dept: GENERAL RADIOLOGY | Age: 56
DRG: 720 | End: 2021-01-01
Payer: MEDICAID

## 2021-01-01 ENCOUNTER — TELEPHONE (OUTPATIENT)
Dept: OTHER | Facility: CLINIC | Age: 56
End: 2021-01-01

## 2021-01-01 ENCOUNTER — NURSE TRIAGE (OUTPATIENT)
Dept: OTHER | Facility: CLINIC | Age: 56
End: 2021-01-01

## 2021-01-01 ENCOUNTER — APPOINTMENT (OUTPATIENT)
Dept: GENERAL RADIOLOGY | Age: 56
DRG: 201 | End: 2021-01-01
Payer: MEDICAID

## 2021-01-01 ENCOUNTER — TELEPHONE (OUTPATIENT)
Dept: CARDIOLOGY CLINIC | Age: 56
End: 2021-01-01

## 2021-01-01 ENCOUNTER — HOSPITAL ENCOUNTER (INPATIENT)
Age: 56
LOS: 2 days | Discharge: HOSPICE | DRG: 137 | End: 2021-12-10
Attending: EMERGENCY MEDICINE | Admitting: FAMILY MEDICINE
Payer: MEDICAID

## 2021-01-01 ENCOUNTER — APPOINTMENT (OUTPATIENT)
Dept: CT IMAGING | Age: 56
DRG: 201 | End: 2021-01-01
Payer: MEDICAID

## 2021-01-01 ENCOUNTER — TELEPHONE (OUTPATIENT)
Dept: ADMINISTRATIVE | Age: 56
End: 2021-01-01

## 2021-01-01 ENCOUNTER — HOSPITAL ENCOUNTER (INPATIENT)
Age: 56
LOS: 3 days | Discharge: HOME | DRG: 720 | End: 2021-12-03
Attending: EMERGENCY MEDICINE | Admitting: FAMILY MEDICINE
Payer: MEDICAID

## 2021-01-01 ENCOUNTER — APPOINTMENT (OUTPATIENT)
Dept: CT IMAGING | Age: 56
DRG: 140 | End: 2021-01-01
Payer: MEDICAID

## 2021-01-01 ENCOUNTER — APPOINTMENT (OUTPATIENT)
Dept: GENERAL RADIOLOGY | Age: 56
End: 2021-01-01
Payer: MEDICAID

## 2021-01-01 ENCOUNTER — APPOINTMENT (OUTPATIENT)
Dept: CT IMAGING | Age: 56
DRG: 137 | End: 2021-01-01
Payer: MEDICAID

## 2021-01-01 ENCOUNTER — HOSPITAL ENCOUNTER (INPATIENT)
Age: 56
LOS: 5 days | Discharge: HOME HEALTH | DRG: 140 | End: 2021-10-22
Attending: STUDENT IN AN ORGANIZED HEALTH CARE EDUCATION/TRAINING PROGRAM | Admitting: FAMILY MEDICINE
Payer: MEDICAID

## 2021-01-01 ENCOUNTER — APPOINTMENT (OUTPATIENT)
Dept: ULTRASOUND IMAGING | Age: 56
DRG: 720 | End: 2021-01-01
Payer: MEDICAID

## 2021-01-01 ENCOUNTER — APPOINTMENT (OUTPATIENT)
Dept: ULTRASOUND IMAGING | Age: 56
End: 2021-01-01
Payer: MEDICAID

## 2021-01-01 VITALS
BODY MASS INDEX: 27.32 KG/M2 | HEART RATE: 120 BPM | TEMPERATURE: 97.9 F | SYSTOLIC BLOOD PRESSURE: 109 MMHG | WEIGHT: 164 LBS | RESPIRATION RATE: 18 BRPM | DIASTOLIC BLOOD PRESSURE: 55 MMHG | OXYGEN SATURATION: 97 % | HEIGHT: 65 IN

## 2021-01-01 VITALS
SYSTOLIC BLOOD PRESSURE: 106 MMHG | HEART RATE: 90 BPM | BODY MASS INDEX: 30.89 KG/M2 | OXYGEN SATURATION: 97 % | TEMPERATURE: 97.1 F | DIASTOLIC BLOOD PRESSURE: 52 MMHG | RESPIRATION RATE: 20 BRPM | WEIGHT: 185.44 LBS | HEIGHT: 65 IN

## 2021-01-01 VITALS — BODY MASS INDEX: 30.82 KG/M2 | HEIGHT: 65 IN | WEIGHT: 185 LBS

## 2021-01-01 VITALS
RESPIRATION RATE: 18 BRPM | HEIGHT: 65 IN | BODY MASS INDEX: 26.87 KG/M2 | DIASTOLIC BLOOD PRESSURE: 65 MMHG | HEART RATE: 109 BPM | SYSTOLIC BLOOD PRESSURE: 150 MMHG | OXYGEN SATURATION: 98 % | TEMPERATURE: 98.5 F | WEIGHT: 161.3 LBS

## 2021-01-01 VITALS
DIASTOLIC BLOOD PRESSURE: 44 MMHG | OXYGEN SATURATION: 95 % | TEMPERATURE: 97.8 F | SYSTOLIC BLOOD PRESSURE: 112 MMHG | RESPIRATION RATE: 16 BRPM | HEIGHT: 65 IN | TEMPERATURE: 98.1 F | SYSTOLIC BLOOD PRESSURE: 104 MMHG | WEIGHT: 164.8 LBS | RESPIRATION RATE: 20 BRPM | BODY MASS INDEX: 31.42 KG/M2 | DIASTOLIC BLOOD PRESSURE: 56 MMHG | BODY MASS INDEX: 27.46 KG/M2 | HEIGHT: 65 IN | HEART RATE: 106 BPM | OXYGEN SATURATION: 96 % | HEART RATE: 110 BPM

## 2021-01-01 VITALS
BODY MASS INDEX: 31.46 KG/M2 | DIASTOLIC BLOOD PRESSURE: 59 MMHG | WEIGHT: 188.8 LBS | SYSTOLIC BLOOD PRESSURE: 118 MMHG | HEART RATE: 109 BPM | HEIGHT: 65 IN | RESPIRATION RATE: 20 BRPM

## 2021-01-01 VITALS
OXYGEN SATURATION: 96 % | DIASTOLIC BLOOD PRESSURE: 77 MMHG | HEART RATE: 99 BPM | TEMPERATURE: 97.5 F | SYSTOLIC BLOOD PRESSURE: 122 MMHG | WEIGHT: 192 LBS | RESPIRATION RATE: 16 BRPM | BODY MASS INDEX: 31.99 KG/M2 | HEIGHT: 65 IN

## 2021-01-01 VITALS
RESPIRATION RATE: 20 BRPM | SYSTOLIC BLOOD PRESSURE: 105 MMHG | HEART RATE: 105 BPM | OXYGEN SATURATION: 95 % | TEMPERATURE: 97.5 F | DIASTOLIC BLOOD PRESSURE: 61 MMHG

## 2021-01-01 VITALS
WEIGHT: 188 LBS | HEIGHT: 65 IN | OXYGEN SATURATION: 98 % | DIASTOLIC BLOOD PRESSURE: 82 MMHG | HEART RATE: 69 BPM | BODY MASS INDEX: 31.32 KG/M2 | RESPIRATION RATE: 22 BRPM | TEMPERATURE: 97.8 F | SYSTOLIC BLOOD PRESSURE: 118 MMHG

## 2021-01-01 VITALS
SYSTOLIC BLOOD PRESSURE: 141 MMHG | WEIGHT: 188 LBS | BODY MASS INDEX: 31.32 KG/M2 | DIASTOLIC BLOOD PRESSURE: 65 MMHG | RESPIRATION RATE: 20 BRPM | HEIGHT: 65 IN | HEART RATE: 117 BPM

## 2021-01-01 VITALS
OXYGEN SATURATION: 98 % | RESPIRATION RATE: 18 BRPM | DIASTOLIC BLOOD PRESSURE: 59 MMHG | SYSTOLIC BLOOD PRESSURE: 104 MMHG | WEIGHT: 160 LBS | BODY MASS INDEX: 26.66 KG/M2 | TEMPERATURE: 96.5 F | HEART RATE: 109 BPM | HEIGHT: 65 IN

## 2021-01-01 VITALS
HEART RATE: 56 BPM | BODY MASS INDEX: 30.82 KG/M2 | HEIGHT: 65 IN | WEIGHT: 185 LBS | TEMPERATURE: 98.1 F | SYSTOLIC BLOOD PRESSURE: 108 MMHG | DIASTOLIC BLOOD PRESSURE: 71 MMHG

## 2021-01-01 VITALS
WEIGHT: 185 LBS | BODY MASS INDEX: 30.82 KG/M2 | HEIGHT: 65 IN | DIASTOLIC BLOOD PRESSURE: 63 MMHG | RESPIRATION RATE: 20 BRPM | SYSTOLIC BLOOD PRESSURE: 109 MMHG

## 2021-01-01 VITALS
TEMPERATURE: 98 F | BODY MASS INDEX: 24.75 KG/M2 | WEIGHT: 145 LBS | SYSTOLIC BLOOD PRESSURE: 143 MMHG | RESPIRATION RATE: 20 BRPM | DIASTOLIC BLOOD PRESSURE: 67 MMHG | OXYGEN SATURATION: 99 % | HEIGHT: 64 IN | HEART RATE: 111 BPM

## 2021-01-01 VITALS
HEART RATE: 101 BPM | WEIGHT: 193 LBS | RESPIRATION RATE: 18 BRPM | BODY MASS INDEX: 32.15 KG/M2 | HEIGHT: 65 IN | DIASTOLIC BLOOD PRESSURE: 71 MMHG | TEMPERATURE: 96.8 F | SYSTOLIC BLOOD PRESSURE: 121 MMHG

## 2021-01-01 VITALS
TEMPERATURE: 96.8 F | SYSTOLIC BLOOD PRESSURE: 115 MMHG | DIASTOLIC BLOOD PRESSURE: 61 MMHG | BODY MASS INDEX: 27.29 KG/M2 | WEIGHT: 164 LBS | HEART RATE: 81 BPM

## 2021-01-01 VITALS
HEIGHT: 65 IN | HEART RATE: 90 BPM | OXYGEN SATURATION: 96 % | TEMPERATURE: 97.7 F | BODY MASS INDEX: 30.82 KG/M2 | RESPIRATION RATE: 20 BRPM | SYSTOLIC BLOOD PRESSURE: 105 MMHG | WEIGHT: 185 LBS | DIASTOLIC BLOOD PRESSURE: 67 MMHG

## 2021-01-01 VITALS
HEART RATE: 111 BPM | SYSTOLIC BLOOD PRESSURE: 140 MMHG | WEIGHT: 170.9 LBS | RESPIRATION RATE: 28 BRPM | OXYGEN SATURATION: 95 % | HEIGHT: 65 IN | BODY MASS INDEX: 28.47 KG/M2 | DIASTOLIC BLOOD PRESSURE: 85 MMHG | TEMPERATURE: 97.3 F

## 2021-01-01 VITALS
SYSTOLIC BLOOD PRESSURE: 128 MMHG | HEART RATE: 100 BPM | RESPIRATION RATE: 18 BRPM | HEIGHT: 65 IN | DIASTOLIC BLOOD PRESSURE: 69 MMHG | BODY MASS INDEX: 31.99 KG/M2 | WEIGHT: 192 LBS

## 2021-01-01 VITALS
TEMPERATURE: 98.8 F | BODY MASS INDEX: 29.12 KG/M2 | HEART RATE: 96 BPM | DIASTOLIC BLOOD PRESSURE: 50 MMHG | RESPIRATION RATE: 18 BRPM | SYSTOLIC BLOOD PRESSURE: 94 MMHG | HEIGHT: 65 IN | OXYGEN SATURATION: 94 % | WEIGHT: 174.8 LBS

## 2021-01-01 VITALS
SYSTOLIC BLOOD PRESSURE: 106 MMHG | OXYGEN SATURATION: 96 % | HEIGHT: 65 IN | BODY MASS INDEX: 30.82 KG/M2 | RESPIRATION RATE: 18 BRPM | HEART RATE: 104 BPM | DIASTOLIC BLOOD PRESSURE: 64 MMHG | TEMPERATURE: 98.1 F | WEIGHT: 185 LBS

## 2021-01-01 VITALS
WEIGHT: 192 LBS | BODY MASS INDEX: 31.99 KG/M2 | DIASTOLIC BLOOD PRESSURE: 76 MMHG | RESPIRATION RATE: 14 BRPM | SYSTOLIC BLOOD PRESSURE: 107 MMHG | HEIGHT: 65 IN | HEART RATE: 90 BPM | OXYGEN SATURATION: 95 % | TEMPERATURE: 97.6 F

## 2021-01-01 DIAGNOSIS — Z79.899 MEDICATION MANAGEMENT: ICD-10-CM

## 2021-01-01 DIAGNOSIS — J96.11 CHRONIC RESPIRATORY FAILURE WITH HYPOXIA (HCC): ICD-10-CM

## 2021-01-01 DIAGNOSIS — S32.050A CLOSED COMPRESSION FRACTURE OF L5 LUMBAR VERTEBRA, INITIAL ENCOUNTER (HCC): Primary | ICD-10-CM

## 2021-01-01 DIAGNOSIS — K80.00 CALCULUS OF GALLBLADDER WITH ACUTE CHOLECYSTITIS WITHOUT OBSTRUCTION: ICD-10-CM

## 2021-01-01 DIAGNOSIS — I73.9 PVD (PERIPHERAL VASCULAR DISEASE) WITH CLAUDICATION (HCC): ICD-10-CM

## 2021-01-01 DIAGNOSIS — S32.050A CLOSED COMPRESSION FRACTURE OF L5 LUMBAR VERTEBRA, INITIAL ENCOUNTER (HCC): ICD-10-CM

## 2021-01-01 DIAGNOSIS — H61.23 BILATERAL IMPACTED CERUMEN: ICD-10-CM

## 2021-01-01 DIAGNOSIS — I48.91 ATRIAL FIBRILLATION WITH RAPID VENTRICULAR RESPONSE (HCC): Primary | ICD-10-CM

## 2021-01-01 DIAGNOSIS — N20.0 NEPHROLITHIASIS: ICD-10-CM

## 2021-01-01 DIAGNOSIS — I48.0 PAF (PAROXYSMAL ATRIAL FIBRILLATION) (HCC): Primary | ICD-10-CM

## 2021-01-01 DIAGNOSIS — N30.01 ACUTE CYSTITIS WITH HEMATURIA: Primary | ICD-10-CM

## 2021-01-01 DIAGNOSIS — Z99.81 O2 DEPENDENT: ICD-10-CM

## 2021-01-01 DIAGNOSIS — I48.0 PAF (PAROXYSMAL ATRIAL FIBRILLATION) (HCC): ICD-10-CM

## 2021-01-01 DIAGNOSIS — R07.9 CHEST PAIN, UNSPECIFIED TYPE: ICD-10-CM

## 2021-01-01 DIAGNOSIS — U07.1 COVID: Primary | ICD-10-CM

## 2021-01-01 DIAGNOSIS — B35.9 DERMATOPHYTOSIS: ICD-10-CM

## 2021-01-01 DIAGNOSIS — Q79.60 EHLERS-DANLOS SYNDROME: Primary | ICD-10-CM

## 2021-01-01 DIAGNOSIS — I48.0 PAROXYSMAL ATRIAL FIBRILLATION (HCC): ICD-10-CM

## 2021-01-01 DIAGNOSIS — I48.91 ATRIAL FIBRILLATION WITH RVR (HCC): ICD-10-CM

## 2021-01-01 DIAGNOSIS — K80.20 GALLSTONES: ICD-10-CM

## 2021-01-01 DIAGNOSIS — G43.109 MIGRAINE WITH AURA AND WITHOUT STATUS MIGRAINOSUS, NOT INTRACTABLE: ICD-10-CM

## 2021-01-01 DIAGNOSIS — K80.20 GALLSTONES: Primary | ICD-10-CM

## 2021-01-01 DIAGNOSIS — J43.9 PULMONARY EMPHYSEMA, UNSPECIFIED EMPHYSEMA TYPE (HCC): ICD-10-CM

## 2021-01-01 DIAGNOSIS — I48.0 PAROXYSMAL ATRIAL FIBRILLATION (HCC): Primary | ICD-10-CM

## 2021-01-01 DIAGNOSIS — K59.03 DRUG-INDUCED CONSTIPATION: Primary | ICD-10-CM

## 2021-01-01 DIAGNOSIS — S32.000A COMPRESSION FRACTURE OF LUMBAR VERTEBRA, INITIAL ENCOUNTER, UNSPECIFIED LUMBAR VERTEBRAL LEVEL: Primary | ICD-10-CM

## 2021-01-01 DIAGNOSIS — J44.9 END STAGE COPD (HCC): ICD-10-CM

## 2021-01-01 DIAGNOSIS — R73.9 HYPERGLYCEMIA: ICD-10-CM

## 2021-01-01 DIAGNOSIS — J44.1 COPD EXACERBATION (HCC): Primary | ICD-10-CM

## 2021-01-01 DIAGNOSIS — I25.10 CORONARY ARTERY DISEASE INVOLVING NATIVE CORONARY ARTERY OF NATIVE HEART WITHOUT ANGINA PECTORIS: Primary | ICD-10-CM

## 2021-01-01 DIAGNOSIS — K59.00 CONSTIPATION, UNSPECIFIED CONSTIPATION TYPE: Primary | ICD-10-CM

## 2021-01-01 DIAGNOSIS — R09.02 HYPOXIA: ICD-10-CM

## 2021-01-01 DIAGNOSIS — G47.00 INSOMNIA, UNSPECIFIED TYPE: ICD-10-CM

## 2021-01-01 DIAGNOSIS — Z66 DNR (DO NOT RESUSCITATE): ICD-10-CM

## 2021-01-01 DIAGNOSIS — M25.561 ACUTE PAIN OF RIGHT KNEE: Primary | ICD-10-CM

## 2021-01-01 DIAGNOSIS — F32.1 CURRENT MODERATE EPISODE OF MAJOR DEPRESSIVE DISORDER WITHOUT PRIOR EPISODE (HCC): ICD-10-CM

## 2021-01-01 DIAGNOSIS — R06.82 TACHYPNEA: Primary | ICD-10-CM

## 2021-01-01 DIAGNOSIS — R05.9 COUGH: Primary | ICD-10-CM

## 2021-01-01 DIAGNOSIS — Q79.60 EHLERS-DANLOS SYNDROME: ICD-10-CM

## 2021-01-01 DIAGNOSIS — M23.91 DERANGEMENT, KNEE, RIGHT: ICD-10-CM

## 2021-01-01 DIAGNOSIS — N39.41 URGE INCONTINENCE OF URINE: Primary | ICD-10-CM

## 2021-01-01 DIAGNOSIS — I73.9 PVD (PERIPHERAL VASCULAR DISEASE) WITH CLAUDICATION (HCC): Primary | ICD-10-CM

## 2021-01-01 DIAGNOSIS — M54.16 LUMBAR RADICULOPATHY: ICD-10-CM

## 2021-01-01 DIAGNOSIS — Z86.79 HISTORY OF ISCHEMIC HEART DISEASE: ICD-10-CM

## 2021-01-01 DIAGNOSIS — E86.0 DEHYDRATION: ICD-10-CM

## 2021-01-01 DIAGNOSIS — I65.22 LEFT CAROTID STENOSIS: ICD-10-CM

## 2021-01-01 DIAGNOSIS — I48.91 NEW ONSET ATRIAL FIBRILLATION (HCC): ICD-10-CM

## 2021-01-01 DIAGNOSIS — M54.50 ACUTE BILATERAL LOW BACK PAIN, UNSPECIFIED WHETHER SCIATICA PRESENT: Primary | ICD-10-CM

## 2021-01-01 DIAGNOSIS — K80.20 CALCULUS OF GALLBLADDER WITHOUT CHOLECYSTITIS WITHOUT OBSTRUCTION: ICD-10-CM

## 2021-01-01 DIAGNOSIS — N13.30 HYDRONEPHROSIS, RIGHT: ICD-10-CM

## 2021-01-01 DIAGNOSIS — J44.9 CHRONIC OBSTRUCTIVE PULMONARY DISEASE, UNSPECIFIED COPD TYPE (HCC): ICD-10-CM

## 2021-01-01 DIAGNOSIS — J44.1 COPD EXACERBATION (HCC): ICD-10-CM

## 2021-01-01 DIAGNOSIS — N39.41 URGE INCONTINENCE OF URINE: ICD-10-CM

## 2021-01-01 DIAGNOSIS — N30.01 ACUTE CYSTITIS WITH HEMATURIA: ICD-10-CM

## 2021-01-01 DIAGNOSIS — I65.22 LEFT CAROTID STENOSIS: Primary | ICD-10-CM

## 2021-01-01 DIAGNOSIS — Z23 NEED FOR INFLUENZA VACCINATION: ICD-10-CM

## 2021-01-01 DIAGNOSIS — K59.00 CONSTIPATION, UNSPECIFIED CONSTIPATION TYPE: ICD-10-CM

## 2021-01-01 DIAGNOSIS — R93.6 ABNORMAL X-RAY OF KNEE: ICD-10-CM

## 2021-01-01 DIAGNOSIS — L81.9 DISCOLORATION OF SKIN OF TOE: ICD-10-CM

## 2021-01-01 DIAGNOSIS — R10.32 ABDOMINAL PAIN, LEFT LOWER QUADRANT: ICD-10-CM

## 2021-01-01 DIAGNOSIS — R79.89 BLOOD CULTURE POSITIVE FOR MICROORGANISM: ICD-10-CM

## 2021-01-01 DIAGNOSIS — S32.010D COMPRESSION FRACTURE OF L1 VERTEBRA WITH ROUTINE HEALING, SUBSEQUENT ENCOUNTER: Primary | ICD-10-CM

## 2021-01-01 DIAGNOSIS — J44.9 COPD, SEVERE (HCC): Primary | ICD-10-CM

## 2021-01-01 DIAGNOSIS — E87.29 CO2 RETENTION: ICD-10-CM

## 2021-01-01 DIAGNOSIS — J96.02 ACUTE RESPIRATORY FAILURE WITH HYPERCAPNIA (HCC): Primary | ICD-10-CM

## 2021-01-01 DIAGNOSIS — S32.010D COMPRESSION FRACTURE OF L1 VERTEBRA WITH ROUTINE HEALING, SUBSEQUENT ENCOUNTER: ICD-10-CM

## 2021-01-01 DIAGNOSIS — R90.89 ABNORMAL FINDING ON MRI OF BRAIN: Primary | ICD-10-CM

## 2021-01-01 DIAGNOSIS — R60.0 LEG EDEMA: ICD-10-CM

## 2021-01-01 DIAGNOSIS — I25.10 CORONARY ARTERY DISEASE INVOLVING NATIVE CORONARY ARTERY OF NATIVE HEART WITHOUT ANGINA PECTORIS: ICD-10-CM

## 2021-01-01 DIAGNOSIS — J42 CHRONIC BRONCHITIS, UNSPECIFIED CHRONIC BRONCHITIS TYPE (HCC): ICD-10-CM

## 2021-01-01 DIAGNOSIS — I95.9 HYPOTENSION, UNSPECIFIED HYPOTENSION TYPE: ICD-10-CM

## 2021-01-01 DIAGNOSIS — J43.9 PULMONARY EMPHYSEMA, UNSPECIFIED EMPHYSEMA TYPE (HCC): Primary | ICD-10-CM

## 2021-01-01 LAB
AADO2: 139.4 MMHG
AADO2: 206.3 MMHG
AADO2: 275.5 MMHG
AADO2: 90.7 MMHG
ABO/RH: NORMAL
ADENOVIRUS BY PCR: NOT DETECTED
ALBUMIN SERPL-MCNC: 1.6 G/DL (ref 3.5–5.2)
ALBUMIN SERPL-MCNC: 1.6 G/DL (ref 3.5–5.2)
ALBUMIN SERPL-MCNC: 1.8 G/DL (ref 3.5–5.2)
ALBUMIN SERPL-MCNC: 2.1 G/DL (ref 3.5–5.2)
ALBUMIN SERPL-MCNC: 2.2 G/DL (ref 3.5–5.2)
ALBUMIN SERPL-MCNC: 2.6 G/DL (ref 3.5–5.2)
ALBUMIN SERPL-MCNC: 2.7 G/DL (ref 3.5–5.2)
ALBUMIN SERPL-MCNC: 3.1 G/DL (ref 3.5–5.2)
ALBUMIN SERPL-MCNC: 3.2 G/DL (ref 3.5–5.2)
ALBUMIN SERPL-MCNC: 3.2 G/DL (ref 3.5–5.2)
ALBUMIN SERPL-MCNC: 3.4 G/DL (ref 3.5–5.2)
ALBUMIN SERPL-MCNC: 3.8 G/DL (ref 3.5–5.2)
ALBUMIN SERPL-MCNC: 3.8 G/DL (ref 3.5–5.2)
ALBUMIN SERPL-MCNC: 4 G/DL (ref 3.5–5.2)
ALP BLD-CCNC: 103 U/L (ref 35–104)
ALP BLD-CCNC: 103 U/L (ref 35–104)
ALP BLD-CCNC: 107 U/L (ref 35–104)
ALP BLD-CCNC: 122 U/L (ref 35–104)
ALP BLD-CCNC: 139 U/L (ref 35–104)
ALP BLD-CCNC: 164 U/L (ref 35–104)
ALP BLD-CCNC: 186 U/L (ref 35–104)
ALP BLD-CCNC: 59 U/L (ref 35–104)
ALP BLD-CCNC: 62 U/L (ref 35–104)
ALP BLD-CCNC: 71 U/L (ref 35–104)
ALP BLD-CCNC: 74 U/L (ref 35–104)
ALP BLD-CCNC: 74 U/L (ref 35–104)
ALP BLD-CCNC: 80 U/L (ref 35–104)
ALP BLD-CCNC: 83 U/L (ref 35–104)
ALP BLD-CCNC: 85 U/L (ref 35–104)
ALP BLD-CCNC: 86 U/L (ref 35–104)
ALT SERPL-CCNC: 10 U/L (ref 0–32)
ALT SERPL-CCNC: 11 U/L (ref 0–32)
ALT SERPL-CCNC: 11 U/L (ref 0–32)
ALT SERPL-CCNC: 12 U/L (ref 0–32)
ALT SERPL-CCNC: 12 U/L (ref 0–32)
ALT SERPL-CCNC: 19 U/L (ref 0–32)
ALT SERPL-CCNC: 23 U/L (ref 0–32)
ALT SERPL-CCNC: 7 U/L (ref 0–32)
ALT SERPL-CCNC: 8 U/L (ref 0–32)
ALT SERPL-CCNC: 8 U/L (ref 0–32)
ALT SERPL-CCNC: 9 U/L (ref 0–32)
ALT SERPL-CCNC: 9 U/L (ref 0–32)
AMORPHOUS: ABNORMAL
ANION GAP SERPL CALCULATED.3IONS-SCNC: 10 MMOL/L (ref 7–16)
ANION GAP SERPL CALCULATED.3IONS-SCNC: 10 MMOL/L (ref 7–16)
ANION GAP SERPL CALCULATED.3IONS-SCNC: 3 MMOL/L (ref 7–16)
ANION GAP SERPL CALCULATED.3IONS-SCNC: 4 MMOL/L (ref 7–16)
ANION GAP SERPL CALCULATED.3IONS-SCNC: 4 MMOL/L (ref 7–16)
ANION GAP SERPL CALCULATED.3IONS-SCNC: 6 MMOL/L (ref 7–16)
ANION GAP SERPL CALCULATED.3IONS-SCNC: 7 MMOL/L (ref 7–16)
ANION GAP SERPL CALCULATED.3IONS-SCNC: 7 MMOL/L (ref 7–16)
ANION GAP SERPL CALCULATED.3IONS-SCNC: 8 MMOL/L (ref 7–16)
ANION GAP SERPL CALCULATED.3IONS-SCNC: 9 MMOL/L (ref 7–16)
ANION GAP SERPL CALCULATED.3IONS-SCNC: 9 MMOL/L (ref 7–16)
ANISOCYTOSIS: ABNORMAL
ANTIBODY SCREEN: NORMAL
APTT: 32.2 SEC (ref 24.5–35.1)
APTT: 33.3 SEC (ref 24.5–35.1)
AST SERPL-CCNC: 11 U/L (ref 0–31)
AST SERPL-CCNC: 11 U/L (ref 0–31)
AST SERPL-CCNC: 13 U/L (ref 0–31)
AST SERPL-CCNC: 14 U/L (ref 0–31)
AST SERPL-CCNC: 14 U/L (ref 0–31)
AST SERPL-CCNC: 15 U/L (ref 0–31)
AST SERPL-CCNC: 16 U/L (ref 0–31)
AST SERPL-CCNC: 16 U/L (ref 0–31)
AST SERPL-CCNC: 17 U/L (ref 0–31)
AST SERPL-CCNC: 18 U/L (ref 0–31)
AST SERPL-CCNC: 18 U/L (ref 0–31)
AST SERPL-CCNC: 19 U/L (ref 0–31)
AST SERPL-CCNC: 26 U/L (ref 0–31)
AST SERPL-CCNC: 53 U/L (ref 0–31)
AST SERPL-CCNC: 70 U/L (ref 0–31)
AST SERPL-CCNC: 8 U/L (ref 0–31)
ATYPICAL LYMPHOCYTE RELATIVE PERCENT: 1 % (ref 0–4)
B.E.: 11 MMOL/L (ref -3–3)
B.E.: 12.3 MMOL/L (ref -3–3)
B.E.: 12.8 MMOL/L (ref -3–3)
B.E.: 14.6 MMOL/L (ref -3–3)
B.E.: 17.9 MMOL/L (ref -3–3)
B.E.: 18.3 MMOL/L (ref -3–3)
B.E.: 8.4 MMOL/L (ref -3–3)
BACTERIA: ABNORMAL /HPF
BASOPHILS ABSOLUTE: 0 E9/L (ref 0–0.2)
BASOPHILS ABSOLUTE: 0 E9/L (ref 0–0.2)
BASOPHILS ABSOLUTE: 0.01 E9/L (ref 0–0.2)
BASOPHILS ABSOLUTE: 0.03 E9/L (ref 0–0.2)
BASOPHILS ABSOLUTE: 0.04 E9/L (ref 0–0.2)
BASOPHILS ABSOLUTE: 0.05 E9/L (ref 0–0.2)
BASOPHILS ABSOLUTE: 0.06 E9/L (ref 0–0.2)
BASOPHILS ABSOLUTE: 0.06 E9/L (ref 0–0.2)
BASOPHILS ABSOLUTE: 0.07 E9/L (ref 0–0.2)
BASOPHILS ABSOLUTE: 0.07 E9/L (ref 0–0.2)
BASOPHILS ABSOLUTE: 0.09 E9/L (ref 0–0.2)
BASOPHILS RELATIVE PERCENT: 0 % (ref 0–2)
BASOPHILS RELATIVE PERCENT: 0 % (ref 0–2)
BASOPHILS RELATIVE PERCENT: 0.1 % (ref 0–2)
BASOPHILS RELATIVE PERCENT: 0.1 % (ref 0–2)
BASOPHILS RELATIVE PERCENT: 0.2 % (ref 0–2)
BASOPHILS RELATIVE PERCENT: 0.4 % (ref 0–2)
BASOPHILS RELATIVE PERCENT: 0.5 % (ref 0–2)
BASOPHILS RELATIVE PERCENT: 0.6 % (ref 0–2)
BASOPHILS RELATIVE PERCENT: 0.7 % (ref 0–2)
BASOPHILS RELATIVE PERCENT: 0.7 % (ref 0–2)
BETA-HYDROXYBUTYRATE: 0.08 MMOL/L (ref 0.02–0.27)
BILIRUB SERPL-MCNC: 0.2 MG/DL (ref 0–1.2)
BILIRUB SERPL-MCNC: 0.3 MG/DL (ref 0–1.2)
BILIRUB SERPL-MCNC: 0.4 MG/DL (ref 0–1.2)
BILIRUB SERPL-MCNC: <0.2 MG/DL (ref 0–1.2)
BILIRUBIN DIRECT: <0.2 MG/DL (ref 0–0.3)
BILIRUBIN URINE: ABNORMAL
BILIRUBIN URINE: NEGATIVE
BILIRUBIN URINE: NEGATIVE
BILIRUBIN, INDIRECT: NORMAL MG/DL (ref 0–1)
BLOOD CULTURE, ROUTINE: NORMAL
BLOOD, URINE: ABNORMAL
BLOOD, URINE: NEGATIVE
BORDETELLA PARAPERTUSSIS BY PCR: NOT DETECTED
BORDETELLA PERTUSSIS BY PCR: NOT DETECTED
BOTTLE TYPE: NORMAL
BUN BLDV-MCNC: 10 MG/DL (ref 6–20)
BUN BLDV-MCNC: 10 MG/DL (ref 6–20)
BUN BLDV-MCNC: 11 MG/DL (ref 6–20)
BUN BLDV-MCNC: 12 MG/DL (ref 6–20)
BUN BLDV-MCNC: 14 MG/DL (ref 6–20)
BUN BLDV-MCNC: 14 MG/DL (ref 6–20)
BUN BLDV-MCNC: 3 MG/DL (ref 6–20)
BUN BLDV-MCNC: 6 MG/DL (ref 6–20)
BUN BLDV-MCNC: 6 MG/DL (ref 6–20)
BUN BLDV-MCNC: 7 MG/DL (ref 6–20)
BUN BLDV-MCNC: 8 MG/DL (ref 6–20)
BUN BLDV-MCNC: 9 MG/DL (ref 6–20)
C-REACTIVE PROTEIN: 1.9 MG/DL (ref 0–0.4)
CALCIUM SERPL-MCNC: 6.9 MG/DL (ref 8.6–10.2)
CALCIUM SERPL-MCNC: 7.2 MG/DL (ref 8.6–10.2)
CALCIUM SERPL-MCNC: 7.2 MG/DL (ref 8.6–10.2)
CALCIUM SERPL-MCNC: 7.8 MG/DL (ref 8.6–10.2)
CALCIUM SERPL-MCNC: 7.9 MG/DL (ref 8.6–10.2)
CALCIUM SERPL-MCNC: 8.1 MG/DL (ref 8.6–10.2)
CALCIUM SERPL-MCNC: 8.3 MG/DL (ref 8.6–10.2)
CALCIUM SERPL-MCNC: 8.4 MG/DL (ref 8.6–10.2)
CALCIUM SERPL-MCNC: 8.5 MG/DL (ref 8.6–10.2)
CALCIUM SERPL-MCNC: 8.6 MG/DL (ref 8.6–10.2)
CALCIUM SERPL-MCNC: 8.7 MG/DL (ref 8.6–10.2)
CALCIUM SERPL-MCNC: 8.9 MG/DL (ref 8.6–10.2)
CALCIUM SERPL-MCNC: 9 MG/DL (ref 8.6–10.2)
CALCIUM SERPL-MCNC: 9.2 MG/DL (ref 8.6–10.2)
CALCIUM SERPL-MCNC: 9.3 MG/DL (ref 8.6–10.2)
CALCIUM SERPL-MCNC: 9.3 MG/DL (ref 8.6–10.2)
CALCIUM SERPL-MCNC: 9.6 MG/DL (ref 8.6–10.2)
CANDIDA ALBICANS BY PCR: NOT DETECTED
CANDIDA GLABRATA BY PCR: NOT DETECTED
CANDIDA KRUSEI BY PCR: NOT DETECTED
CANDIDA PARAPSILOSIS BY PCR: NOT DETECTED
CANDIDA TROPICALIS BY PCR: NOT DETECTED
CHLAMYDOPHILIA PNEUMONIAE BY PCR: NOT DETECTED
CHLORIDE BLD-SCNC: 101 MMOL/L (ref 98–107)
CHLORIDE BLD-SCNC: 103 MMOL/L (ref 98–107)
CHLORIDE BLD-SCNC: 103 MMOL/L (ref 98–107)
CHLORIDE BLD-SCNC: 105 MMOL/L (ref 98–107)
CHLORIDE BLD-SCNC: 88 MMOL/L (ref 98–107)
CHLORIDE BLD-SCNC: 90 MMOL/L (ref 98–107)
CHLORIDE BLD-SCNC: 93 MMOL/L (ref 98–107)
CHLORIDE BLD-SCNC: 94 MMOL/L (ref 98–107)
CHLORIDE BLD-SCNC: 94 MMOL/L (ref 98–107)
CHLORIDE BLD-SCNC: 95 MMOL/L (ref 98–107)
CHLORIDE BLD-SCNC: 96 MMOL/L (ref 98–107)
CHLORIDE BLD-SCNC: 97 MMOL/L (ref 98–107)
CHLORIDE BLD-SCNC: 97 MMOL/L (ref 98–107)
CHLORIDE BLD-SCNC: 98 MMOL/L (ref 98–107)
CHLORIDE BLD-SCNC: 99 MMOL/L (ref 98–107)
CHOLESTEROL, TOTAL: 172 MG/DL (ref 0–199)
CHP ED QC CHECK: NORMAL
CHP ED QC CHECK: YES
CHP ED QC CHECK: YES
CK MB: 6.5 NG/ML (ref 0–4.3)
CLARITY: ABNORMAL
CLARITY: ABNORMAL
CLARITY: CLEAR
CO2: 31 MMOL/L (ref 22–29)
CO2: 33 MMOL/L (ref 22–29)
CO2: 35 MMOL/L (ref 22–29)
CO2: 35 MMOL/L (ref 22–29)
CO2: 36 MMOL/L (ref 22–29)
CO2: 37 MMOL/L (ref 22–29)
CO2: 37 MMOL/L (ref 22–29)
CO2: 38 MMOL/L (ref 22–29)
CO2: 38 MMOL/L (ref 22–29)
CO2: 39 MMOL/L (ref 22–29)
CO2: 40 MMOL/L (ref 22–29)
CO2: 40 MMOL/L (ref 22–29)
CO2: 42 MMOL/L (ref 22–29)
COHB: 0.8 % (ref 0–1.5)
COHB: 0.9 % (ref 0–1.5)
COHB: 0.9 % (ref 0–1.5)
COHB: 1.1 % (ref 0–1.5)
COHB: 1.7 % (ref 0–1.5)
COHB: 2.9 % (ref 0–1.5)
COHB: 3.3 % (ref 0–1.5)
COLOR: ABNORMAL
COLOR: ABNORMAL
COLOR: YELLOW
COMMENT: ABNORMAL
CORONAVIRUS 229E BY PCR: NOT DETECTED
CORONAVIRUS HKU1 BY PCR: NOT DETECTED
CORONAVIRUS NL63 BY PCR: NOT DETECTED
CORONAVIRUS OC43 BY PCR: NOT DETECTED
CREAT SERPL-MCNC: 0.2 MG/DL (ref 0.5–1)
CREAT SERPL-MCNC: 0.3 MG/DL (ref 0.5–1)
CREAT SERPL-MCNC: 0.4 MG/DL (ref 0.5–1)
CREAT SERPL-MCNC: 0.5 MG/DL (ref 0.5–1)
CRITICAL: ABNORMAL
CRYSTALS, UA: ABNORMAL /HPF
CULTURE, BLOOD 2: ABNORMAL
CULTURE, BLOOD 2: ABNORMAL
CULTURE, BLOOD 2: NORMAL
CULTURE, BLOOD 2: NORMAL
D DIMER: 473 NG/ML DDU
D DIMER: <200 NG/ML DDU
DATE ANALYZED: ABNORMAL
DATE OF COLLECTION: ABNORMAL
EKG ATRIAL RATE: 103 BPM
EKG ATRIAL RATE: 103 BPM
EKG ATRIAL RATE: 105 BPM
EKG ATRIAL RATE: 112 BPM
EKG ATRIAL RATE: 114 BPM
EKG ATRIAL RATE: 117 BPM
EKG ATRIAL RATE: 153 BPM
EKG ATRIAL RATE: 163 BPM
EKG ATRIAL RATE: 97 BPM
EKG P AXIS: 106 DEGREES
EKG P AXIS: 71 DEGREES
EKG P AXIS: 76 DEGREES
EKG P AXIS: 77 DEGREES
EKG P AXIS: 80 DEGREES
EKG P AXIS: 82 DEGREES
EKG P AXIS: 83 DEGREES
EKG P-R INTERVAL: 128 MS
EKG P-R INTERVAL: 130 MS
EKG P-R INTERVAL: 144 MS
EKG P-R INTERVAL: 148 MS
EKG P-R INTERVAL: 152 MS
EKG P-R INTERVAL: 158 MS
EKG P-R INTERVAL: 160 MS
EKG Q-T INTERVAL: 256 MS
EKG Q-T INTERVAL: 278 MS
EKG Q-T INTERVAL: 302 MS
EKG Q-T INTERVAL: 312 MS
EKG Q-T INTERVAL: 330 MS
EKG Q-T INTERVAL: 332 MS
EKG Q-T INTERVAL: 378 MS
EKG QRS DURATION: 70 MS
EKG QRS DURATION: 70 MS
EKG QRS DURATION: 74 MS
EKG QRS DURATION: 76 MS
EKG QRS DURATION: 78 MS
EKG QRS DURATION: 80 MS
EKG QRS DURATION: 86 MS
EKG QTC CALCULATION (BAZETT): 412 MS
EKG QTC CALCULATION (BAZETT): 421 MS
EKG QTC CALCULATION (BAZETT): 421 MS
EKG QTC CALCULATION (BAZETT): 430 MS
EKG QTC CALCULATION (BAZETT): 432 MS
EKG QTC CALCULATION (BAZETT): 434 MS
EKG QTC CALCULATION (BAZETT): 445 MS
EKG QTC CALCULATION (BAZETT): 457 MS
EKG QTC CALCULATION (BAZETT): 515 MS
EKG R AXIS: -35 DEGREES
EKG R AXIS: -75 DEGREES
EKG R AXIS: -87 DEGREES
EKG R AXIS: -88 DEGREES
EKG R AXIS: -94 DEGREES
EKG R AXIS: 23 DEGREES
EKG R AXIS: 26 DEGREES
EKG R AXIS: 33 DEGREES
EKG R AXIS: 83 DEGREES
EKG T AXIS: 64 DEGREES
EKG T AXIS: 76 DEGREES
EKG T AXIS: 77 DEGREES
EKG T AXIS: 77 DEGREES
EKG T AXIS: 80 DEGREES
EKG T AXIS: 83 DEGREES
EKG T AXIS: 86 DEGREES
EKG VENTRICULAR RATE: 103 BPM
EKG VENTRICULAR RATE: 103 BPM
EKG VENTRICULAR RATE: 105 BPM
EKG VENTRICULAR RATE: 112 BPM
EKG VENTRICULAR RATE: 114 BPM
EKG VENTRICULAR RATE: 117 BPM
EKG VENTRICULAR RATE: 154 BPM
EKG VENTRICULAR RATE: 170 BPM
EKG VENTRICULAR RATE: 97 BPM
ENTEROBACTER CLOACAE COMPLEX BY PCR: NOT DETECTED
ENTEROBACTERALES BY PCR: NOT DETECTED
ENTEROCOCCUS BY PCR: NOT DETECTED
EOSINOPHILS ABSOLUTE: 0 E9/L (ref 0.05–0.5)
EOSINOPHILS ABSOLUTE: 0.01 E9/L (ref 0.05–0.5)
EOSINOPHILS ABSOLUTE: 0.02 E9/L (ref 0.05–0.5)
EOSINOPHILS ABSOLUTE: 0.02 E9/L (ref 0.05–0.5)
EOSINOPHILS ABSOLUTE: 0.04 E9/L (ref 0.05–0.5)
EOSINOPHILS ABSOLUTE: 0.07 E9/L (ref 0.05–0.5)
EOSINOPHILS ABSOLUTE: 0.09 E9/L (ref 0.05–0.5)
EOSINOPHILS ABSOLUTE: 0.1 E9/L (ref 0.05–0.5)
EOSINOPHILS ABSOLUTE: 0.12 E9/L (ref 0.05–0.5)
EOSINOPHILS ABSOLUTE: 0.13 E9/L (ref 0.05–0.5)
EOSINOPHILS ABSOLUTE: 0.19 E9/L (ref 0.05–0.5)
EOSINOPHILS ABSOLUTE: 0.22 E9/L (ref 0.05–0.5)
EOSINOPHILS ABSOLUTE: 0.26 E9/L (ref 0.05–0.5)
EOSINOPHILS ABSOLUTE: 0.3 E9/L (ref 0.05–0.5)
EOSINOPHILS ABSOLUTE: 0.31 E9/L (ref 0.05–0.5)
EOSINOPHILS RELATIVE PERCENT: 0 % (ref 0–6)
EOSINOPHILS RELATIVE PERCENT: 0.1 % (ref 0–6)
EOSINOPHILS RELATIVE PERCENT: 0.2 % (ref 0–6)
EOSINOPHILS RELATIVE PERCENT: 0.3 % (ref 0–6)
EOSINOPHILS RELATIVE PERCENT: 0.5 % (ref 0–6)
EOSINOPHILS RELATIVE PERCENT: 0.6 % (ref 0–6)
EOSINOPHILS RELATIVE PERCENT: 0.8 % (ref 0–6)
EOSINOPHILS RELATIVE PERCENT: 1.1 % (ref 0–6)
EOSINOPHILS RELATIVE PERCENT: 1.3 % (ref 0–6)
EOSINOPHILS RELATIVE PERCENT: 1.5 % (ref 0–6)
EOSINOPHILS RELATIVE PERCENT: 1.8 % (ref 0–6)
EOSINOPHILS RELATIVE PERCENT: 2.3 % (ref 0–6)
EOSINOPHILS RELATIVE PERCENT: 2.3 % (ref 0–6)
EOSINOPHILS RELATIVE PERCENT: 3 % (ref 0–6)
EOSINOPHILS RELATIVE PERCENT: 3.4 % (ref 0–6)
EPITHELIAL CELLS, UA: ABNORMAL /HPF
EPITHELIAL CELLS, UA: ABNORMAL /HPF
ESCHERICHIA COLI BY PCR: NOT DETECTED
FERRITIN: 451 NG/ML
FIBRINOGEN: 417 MG/DL (ref 225–540)
FIO2: 50 %
FIO2: 50 %
FIO2: 60 %
FIO2: 70 %
GFR AFRICAN AMERICAN: >60
GFR NON-AFRICAN AMERICAN: >60 ML/MIN/1.73
GLUCOSE BLD-MCNC: 103 MG/DL (ref 74–99)
GLUCOSE BLD-MCNC: 109 MG/DL
GLUCOSE BLD-MCNC: 130 MG/DL (ref 74–99)
GLUCOSE BLD-MCNC: 136 MG/DL (ref 74–99)
GLUCOSE BLD-MCNC: 153 MG/DL (ref 74–99)
GLUCOSE BLD-MCNC: 181 MG/DL (ref 74–99)
GLUCOSE BLD-MCNC: 185 MG/DL (ref 74–99)
GLUCOSE BLD-MCNC: 186 MG/DL (ref 74–99)
GLUCOSE BLD-MCNC: 194 MG/DL
GLUCOSE BLD-MCNC: 198 MG/DL (ref 74–99)
GLUCOSE BLD-MCNC: 202 MG/DL (ref 74–99)
GLUCOSE BLD-MCNC: 204 MG/DL (ref 74–99)
GLUCOSE BLD-MCNC: 208 MG/DL
GLUCOSE BLD-MCNC: 240 MG/DL (ref 74–99)
GLUCOSE BLD-MCNC: 244 MG/DL (ref 74–99)
GLUCOSE BLD-MCNC: 244 MG/DL (ref 74–99)
GLUCOSE BLD-MCNC: 254 MG/DL (ref 74–99)
GLUCOSE BLD-MCNC: 316 MG/DL (ref 74–99)
GLUCOSE BLD-MCNC: 335 MG/DL (ref 74–99)
GLUCOSE BLD-MCNC: 82 MG/DL (ref 74–99)
GLUCOSE BLD-MCNC: 87 MG/DL (ref 74–99)
GLUCOSE BLD-MCNC: 92 MG/DL (ref 74–99)
GLUCOSE URINE: 100 MG/DL
GLUCOSE URINE: 100 MG/DL
GLUCOSE URINE: >=1000 MG/DL
GLUCOSE URINE: NEGATIVE MG/DL
GLUCOSE URINE: NEGATIVE MG/DL
HAEMOPHILUS INFLUENZAE BY PCR: NOT DETECTED
HBA1C MFR BLD: 7 %
HBA1C MFR BLD: 9.2 %
HCO3: 39.1 MMOL/L (ref 22–26)
HCO3: 41 MMOL/L (ref 22–26)
HCO3: 41.9 MMOL/L (ref 22–26)
HCO3: 42.1 MMOL/L (ref 22–26)
HCO3: 44.8 MMOL/L (ref 22–26)
HCO3: 45.7 MMOL/L (ref 22–26)
HCO3: 50.2 MMOL/L (ref 22–26)
HCT VFR BLD CALC: 32.2 % (ref 34–48)
HCT VFR BLD CALC: 32.7 % (ref 34–48)
HCT VFR BLD CALC: 34.6 % (ref 34–48)
HCT VFR BLD CALC: 34.6 % (ref 34–48)
HCT VFR BLD CALC: 35.2 % (ref 34–48)
HCT VFR BLD CALC: 35.7 % (ref 34–48)
HCT VFR BLD CALC: 37.7 % (ref 34–48)
HCT VFR BLD CALC: 37.8 % (ref 34–48)
HCT VFR BLD CALC: 38.1 % (ref 34–48)
HCT VFR BLD CALC: 38.5 % (ref 34–48)
HCT VFR BLD CALC: 39 % (ref 34–48)
HCT VFR BLD CALC: 39.9 % (ref 34–48)
HCT VFR BLD CALC: 40.2 % (ref 34–48)
HCT VFR BLD CALC: 40.4 % (ref 34–48)
HCT VFR BLD CALC: 40.7 % (ref 34–48)
HCT VFR BLD CALC: 42.3 % (ref 34–48)
HCT VFR BLD CALC: 42.4 % (ref 34–48)
HCT VFR BLD CALC: 44.7 % (ref 34–48)
HCT VFR BLD CALC: 49.1 % (ref 34–48)
HDLC SERPL-MCNC: 44 MG/DL
HEMOGLOBIN: 10 G/DL (ref 11.5–15.5)
HEMOGLOBIN: 10.3 G/DL (ref 11.5–15.5)
HEMOGLOBIN: 11 G/DL (ref 11.5–15.5)
HEMOGLOBIN: 11.2 G/DL (ref 11.5–15.5)
HEMOGLOBIN: 11.5 G/DL (ref 11.5–15.5)
HEMOGLOBIN: 11.6 G/DL (ref 11.5–15.5)
HEMOGLOBIN: 11.8 G/DL (ref 11.5–15.5)
HEMOGLOBIN: 11.9 G/DL (ref 11.5–15.5)
HEMOGLOBIN: 12 G/DL (ref 11.5–15.5)
HEMOGLOBIN: 12.1 G/DL (ref 11.5–15.5)
HEMOGLOBIN: 12.2 G/DL (ref 11.5–15.5)
HEMOGLOBIN: 12.2 G/DL (ref 11.5–15.5)
HEMOGLOBIN: 12.7 G/DL (ref 11.5–15.5)
HEMOGLOBIN: 12.9 G/DL (ref 11.5–15.5)
HEMOGLOBIN: 13.7 G/DL (ref 11.5–15.5)
HEMOGLOBIN: 14.5 G/DL (ref 11.5–15.5)
HEMOGLOBIN: 9.8 G/DL (ref 11.5–15.5)
HHB: 0.7 % (ref 0–5)
HHB: 1.3 % (ref 0–5)
HHB: 1.5 % (ref 0–5)
HHB: 18.6 % (ref 0–5)
HHB: 2.9 % (ref 0–5)
HHB: 3.4 % (ref 0–5)
HHB: 3.9 % (ref 0–5)
HUMAN METAPNEUMOVIRUS BY PCR: NOT DETECTED
HUMAN RHINOVIRUS/ENTEROVIRUS BY PCR: NOT DETECTED
HYPOCHROMIA: ABNORMAL
IMMATURE GRANULOCYTES #: 0.02 E9/L
IMMATURE GRANULOCYTES #: 0.03 E9/L
IMMATURE GRANULOCYTES #: 0.04 E9/L
IMMATURE GRANULOCYTES #: 0.05 E9/L
IMMATURE GRANULOCYTES #: 0.06 E9/L
IMMATURE GRANULOCYTES #: 0.06 E9/L
IMMATURE GRANULOCYTES #: 0.07 E9/L
IMMATURE GRANULOCYTES #: 0.29 E9/L
IMMATURE GRANULOCYTES %: 0.2 % (ref 0–5)
IMMATURE GRANULOCYTES %: 0.3 % (ref 0–5)
IMMATURE GRANULOCYTES %: 0.4 % (ref 0–5)
IMMATURE GRANULOCYTES %: 0.5 % (ref 0–5)
IMMATURE GRANULOCYTES %: 0.6 % (ref 0–5)
IMMATURE GRANULOCYTES %: 0.8 % (ref 0–5)
IMMATURE GRANULOCYTES %: 0.9 % (ref 0–5)
IMMATURE GRANULOCYTES %: 1.1 % (ref 0–5)
IMMATURE GRANULOCYTES %: 3.3 % (ref 0–5)
INFLUENZA A BY PCR: NOT DETECTED
INFLUENZA B BY PCR: NOT DETECTED
INR BLD: 1.1
INR BLD: 1.1
KETONES, URINE: 15 MG/DL
KETONES, URINE: ABNORMAL MG/DL
KETONES, URINE: ABNORMAL MG/DL
KETONES, URINE: NEGATIVE MG/DL
KETONES, URINE: NEGATIVE MG/DL
KLEBSIELLA OXYTOCA BY PCR: NOT DETECTED
KLEBSIELLA PNEUMONIAE GROUP BY PCR: NOT DETECTED
LAB: ABNORMAL
LACTATE DEHYDROGENASE: 262 U/L (ref 135–214)
LACTIC ACID, SEPSIS: 1 MMOL/L (ref 0.5–1.9)
LACTIC ACID, SEPSIS: 1.2 MMOL/L (ref 0.5–1.9)
LACTIC ACID, SEPSIS: 1.3 MMOL/L (ref 0.5–1.9)
LACTIC ACID: 0.9 MMOL/L (ref 0.5–2.2)
LACTIC ACID: 1.5 MMOL/L (ref 0.5–2.2)
LACTIC ACID: 1.9 MMOL/L (ref 0.5–2.2)
LDL CHOLESTEROL CALCULATED: ABNORMAL MG/DL (ref 0–99)
LEUKOCYTE ESTERASE, URINE: ABNORMAL
LEUKOCYTE ESTERASE, URINE: ABNORMAL
LEUKOCYTE ESTERASE, URINE: NEGATIVE
LIPASE: 12 U/L (ref 13–60)
LIPASE: 6 U/L (ref 13–60)
LIPASE: 8 U/L (ref 13–60)
LISTERIA MONOCYTOGENES BY PCR: NOT DETECTED
LV EF: 68 %
LVEF MODALITY: NORMAL
LYMPHOCYTES ABSOLUTE: 0.28 E9/L (ref 1.5–4)
LYMPHOCYTES ABSOLUTE: 0.78 E9/L (ref 1.5–4)
LYMPHOCYTES ABSOLUTE: 0.83 E9/L (ref 1.5–4)
LYMPHOCYTES ABSOLUTE: 1.14 E9/L (ref 1.5–4)
LYMPHOCYTES ABSOLUTE: 1.26 E9/L (ref 1.5–4)
LYMPHOCYTES ABSOLUTE: 1.41 E9/L (ref 1.5–4)
LYMPHOCYTES ABSOLUTE: 1.82 E9/L (ref 1.5–4)
LYMPHOCYTES ABSOLUTE: 2.08 E9/L (ref 1.5–4)
LYMPHOCYTES ABSOLUTE: 2.49 E9/L (ref 1.5–4)
LYMPHOCYTES ABSOLUTE: 2.49 E9/L (ref 1.5–4)
LYMPHOCYTES ABSOLUTE: 2.55 E9/L (ref 1.5–4)
LYMPHOCYTES ABSOLUTE: 2.61 E9/L (ref 1.5–4)
LYMPHOCYTES ABSOLUTE: 2.77 E9/L (ref 1.5–4)
LYMPHOCYTES ABSOLUTE: 2.84 E9/L (ref 1.5–4)
LYMPHOCYTES ABSOLUTE: 3.3 E9/L (ref 1.5–4)
LYMPHOCYTES ABSOLUTE: 3.64 E9/L (ref 1.5–4)
LYMPHOCYTES ABSOLUTE: 4.39 E9/L (ref 1.5–4)
LYMPHOCYTES ABSOLUTE: 4.48 E9/L (ref 1.5–4)
LYMPHOCYTES RELATIVE PERCENT: 14.9 % (ref 20–42)
LYMPHOCYTES RELATIVE PERCENT: 15.5 % (ref 20–42)
LYMPHOCYTES RELATIVE PERCENT: 15.8 % (ref 20–42)
LYMPHOCYTES RELATIVE PERCENT: 17 % (ref 20–42)
LYMPHOCYTES RELATIVE PERCENT: 17.9 % (ref 20–42)
LYMPHOCYTES RELATIVE PERCENT: 18.5 % (ref 20–42)
LYMPHOCYTES RELATIVE PERCENT: 20.2 % (ref 20–42)
LYMPHOCYTES RELATIVE PERCENT: 23.3 % (ref 20–42)
LYMPHOCYTES RELATIVE PERCENT: 25.7 % (ref 20–42)
LYMPHOCYTES RELATIVE PERCENT: 26.9 % (ref 20–42)
LYMPHOCYTES RELATIVE PERCENT: 28.4 % (ref 20–42)
LYMPHOCYTES RELATIVE PERCENT: 31.6 % (ref 20–42)
LYMPHOCYTES RELATIVE PERCENT: 31.9 % (ref 20–42)
LYMPHOCYTES RELATIVE PERCENT: 32 % (ref 20–42)
LYMPHOCYTES RELATIVE PERCENT: 33.6 % (ref 20–42)
LYMPHOCYTES RELATIVE PERCENT: 41.6 % (ref 20–42)
LYMPHOCYTES RELATIVE PERCENT: 45 % (ref 20–42)
LYMPHOCYTES RELATIVE PERCENT: 7 % (ref 20–42)
Lab: ABNORMAL
MAGNESIUM: 1.6 MG/DL (ref 1.6–2.6)
MAGNESIUM: 1.6 MG/DL (ref 1.6–2.6)
MAGNESIUM: 1.8 MG/DL (ref 1.6–2.6)
MAGNESIUM: 1.9 MG/DL (ref 1.6–2.6)
MCH RBC QN AUTO: 28.7 PG (ref 26–35)
MCH RBC QN AUTO: 28.7 PG (ref 26–35)
MCH RBC QN AUTO: 28.9 PG (ref 26–35)
MCH RBC QN AUTO: 28.9 PG (ref 26–35)
MCH RBC QN AUTO: 29 PG (ref 26–35)
MCH RBC QN AUTO: 29 PG (ref 26–35)
MCH RBC QN AUTO: 29.1 PG (ref 26–35)
MCH RBC QN AUTO: 29.2 PG (ref 26–35)
MCH RBC QN AUTO: 29.2 PG (ref 26–35)
MCH RBC QN AUTO: 29.3 PG (ref 26–35)
MCH RBC QN AUTO: 29.3 PG (ref 26–35)
MCH RBC QN AUTO: 29.4 PG (ref 26–35)
MCH RBC QN AUTO: 29.4 PG (ref 26–35)
MCH RBC QN AUTO: 29.6 PG (ref 26–35)
MCH RBC QN AUTO: 30 PG (ref 26–35)
MCH RBC QN AUTO: 30.1 PG (ref 26–35)
MCH RBC QN AUTO: 30.4 PG (ref 26–35)
MCHC RBC AUTO-ENTMCNC: 29.2 % (ref 32–34.5)
MCHC RBC AUTO-ENTMCNC: 29.5 % (ref 32–34.5)
MCHC RBC AUTO-ENTMCNC: 29.8 % (ref 32–34.5)
MCHC RBC AUTO-ENTMCNC: 29.8 % (ref 32–34.5)
MCHC RBC AUTO-ENTMCNC: 30 % (ref 32–34.5)
MCHC RBC AUTO-ENTMCNC: 30.1 % (ref 32–34.5)
MCHC RBC AUTO-ENTMCNC: 30.2 % (ref 32–34.5)
MCHC RBC AUTO-ENTMCNC: 30.4 % (ref 32–34.5)
MCHC RBC AUTO-ENTMCNC: 30.5 % (ref 32–34.5)
MCHC RBC AUTO-ENTMCNC: 30.5 % (ref 32–34.5)
MCHC RBC AUTO-ENTMCNC: 30.6 % (ref 32–34.5)
MCHC RBC AUTO-ENTMCNC: 30.6 % (ref 32–34.5)
MCHC RBC AUTO-ENTMCNC: 30.8 % (ref 32–34.5)
MCHC RBC AUTO-ENTMCNC: 31.2 % (ref 32–34.5)
MCHC RBC AUTO-ENTMCNC: 31.2 % (ref 32–34.5)
MCHC RBC AUTO-ENTMCNC: 31.8 % (ref 32–34.5)
MCHC RBC AUTO-ENTMCNC: 31.8 % (ref 32–34.5)
MCHC RBC AUTO-ENTMCNC: 32 % (ref 32–34.5)
MCHC RBC AUTO-ENTMCNC: 32.2 % (ref 32–34.5)
MCV RBC AUTO: 90.8 FL (ref 80–99.9)
MCV RBC AUTO: 91 FL (ref 80–99.9)
MCV RBC AUTO: 91.1 FL (ref 80–99.9)
MCV RBC AUTO: 92.5 FL (ref 80–99.9)
MCV RBC AUTO: 93 FL (ref 80–99.9)
MCV RBC AUTO: 93.1 FL (ref 80–99.9)
MCV RBC AUTO: 94.2 FL (ref 80–99.9)
MCV RBC AUTO: 95.5 FL (ref 80–99.9)
MCV RBC AUTO: 95.5 FL (ref 80–99.9)
MCV RBC AUTO: 96.2 FL (ref 80–99.9)
MCV RBC AUTO: 96.4 FL (ref 80–99.9)
MCV RBC AUTO: 97.1 FL (ref 80–99.9)
MCV RBC AUTO: 97.2 FL (ref 80–99.9)
MCV RBC AUTO: 98 FL (ref 80–99.9)
MCV RBC AUTO: 98.3 FL (ref 80–99.9)
MCV RBC AUTO: 98.7 FL (ref 80–99.9)
MCV RBC AUTO: 99 FL (ref 80–99.9)
MCV RBC AUTO: 99.3 FL (ref 80–99.9)
MCV RBC AUTO: 99.5 FL (ref 80–99.9)
METER GLUCOSE: 108 MG/DL (ref 74–99)
METER GLUCOSE: 109 MG/DL (ref 74–99)
METER GLUCOSE: 109 MG/DL (ref 74–99)
METER GLUCOSE: 110 MG/DL (ref 74–99)
METER GLUCOSE: 114 MG/DL (ref 74–99)
METER GLUCOSE: 116 MG/DL (ref 74–99)
METER GLUCOSE: 117 MG/DL (ref 74–99)
METER GLUCOSE: 118 MG/DL (ref 74–99)
METER GLUCOSE: 119 MG/DL (ref 74–99)
METER GLUCOSE: 125 MG/DL (ref 74–99)
METER GLUCOSE: 140 MG/DL (ref 74–99)
METER GLUCOSE: 145 MG/DL (ref 74–99)
METER GLUCOSE: 157 MG/DL (ref 74–99)
METER GLUCOSE: 164 MG/DL (ref 74–99)
METER GLUCOSE: 172 MG/DL (ref 74–99)
METER GLUCOSE: 173 MG/DL (ref 74–99)
METER GLUCOSE: 184 MG/DL (ref 74–99)
METER GLUCOSE: 190 MG/DL (ref 74–99)
METER GLUCOSE: 194 MG/DL (ref 74–99)
METER GLUCOSE: 196 MG/DL (ref 74–99)
METER GLUCOSE: 197 MG/DL (ref 74–99)
METER GLUCOSE: 199 MG/DL (ref 74–99)
METER GLUCOSE: 208 MG/DL (ref 74–99)
METER GLUCOSE: 212 MG/DL (ref 74–99)
METER GLUCOSE: 234 MG/DL (ref 74–99)
METER GLUCOSE: 235 MG/DL (ref 74–99)
METER GLUCOSE: 254 MG/DL (ref 74–99)
METER GLUCOSE: 265 MG/DL (ref 74–99)
METER GLUCOSE: 273 MG/DL (ref 74–99)
METER GLUCOSE: 280 MG/DL (ref 74–99)
METER GLUCOSE: 296 MG/DL (ref 74–99)
METER GLUCOSE: 301 MG/DL (ref 74–99)
METER GLUCOSE: 310 MG/DL (ref 74–99)
METER GLUCOSE: 313 MG/DL (ref 74–99)
METER GLUCOSE: 354 MG/DL (ref 74–99)
METER GLUCOSE: 45 MG/DL (ref 74–99)
METER GLUCOSE: 457 MG/DL (ref 74–99)
METER GLUCOSE: 53 MG/DL (ref 74–99)
METER GLUCOSE: 61 MG/DL (ref 74–99)
METER GLUCOSE: 61 MG/DL (ref 74–99)
METER GLUCOSE: 68 MG/DL (ref 74–99)
METER GLUCOSE: 70 MG/DL (ref 74–99)
METER GLUCOSE: 81 MG/DL (ref 74–99)
METER GLUCOSE: 85 MG/DL (ref 74–99)
METER GLUCOSE: 86 MG/DL (ref 74–99)
METER GLUCOSE: 90 MG/DL (ref 74–99)
METER GLUCOSE: 92 MG/DL (ref 74–99)
METER GLUCOSE: 95 MG/DL (ref 74–99)
METER GLUCOSE: 99 MG/DL (ref 74–99)
METHB: 0 % (ref 0–1.5)
METHB: 0.2 % (ref 0–1.5)
METHB: 0.2 % (ref 0–1.5)
METHB: 0.3 % (ref 0–1.5)
METHB: 0.4 % (ref 0–1.5)
MODE: ABNORMAL
MONOCYTES ABSOLUTE: 0.1 E9/L (ref 0.1–0.95)
MONOCYTES ABSOLUTE: 0.15 E9/L (ref 0.1–0.95)
MONOCYTES ABSOLUTE: 0.2 E9/L (ref 0.1–0.95)
MONOCYTES ABSOLUTE: 0.38 E9/L (ref 0.1–0.95)
MONOCYTES ABSOLUTE: 0.39 E9/L (ref 0.1–0.95)
MONOCYTES ABSOLUTE: 0.41 E9/L (ref 0.1–0.95)
MONOCYTES ABSOLUTE: 0.49 E9/L (ref 0.1–0.95)
MONOCYTES ABSOLUTE: 0.49 E9/L (ref 0.1–0.95)
MONOCYTES ABSOLUTE: 0.5 E9/L (ref 0.1–0.95)
MONOCYTES ABSOLUTE: 0.53 E9/L (ref 0.1–0.95)
MONOCYTES ABSOLUTE: 0.53 E9/L (ref 0.1–0.95)
MONOCYTES ABSOLUTE: 0.55 E9/L (ref 0.1–0.95)
MONOCYTES ABSOLUTE: 0.55 E9/L (ref 0.1–0.95)
MONOCYTES ABSOLUTE: 0.58 E9/L (ref 0.1–0.95)
MONOCYTES ABSOLUTE: 0.6 E9/L (ref 0.1–0.95)
MONOCYTES ABSOLUTE: 0.61 E9/L (ref 0.1–0.95)
MONOCYTES ABSOLUTE: 0.65 E9/L (ref 0.1–0.95)
MONOCYTES ABSOLUTE: 0.71 E9/L (ref 0.1–0.95)
MONOCYTES RELATIVE PERCENT: 3 % (ref 2–12)
MONOCYTES RELATIVE PERCENT: 3.3 % (ref 2–12)
MONOCYTES RELATIVE PERCENT: 3.9 % (ref 2–12)
MONOCYTES RELATIVE PERCENT: 4.3 % (ref 2–12)
MONOCYTES RELATIVE PERCENT: 4.4 % (ref 2–12)
MONOCYTES RELATIVE PERCENT: 4.5 % (ref 2–12)
MONOCYTES RELATIVE PERCENT: 4.5 % (ref 2–12)
MONOCYTES RELATIVE PERCENT: 4.7 % (ref 2–12)
MONOCYTES RELATIVE PERCENT: 4.8 % (ref 2–12)
MONOCYTES RELATIVE PERCENT: 5.3 % (ref 2–12)
MONOCYTES RELATIVE PERCENT: 5.5 % (ref 2–12)
MONOCYTES RELATIVE PERCENT: 5.6 % (ref 2–12)
MONOCYTES RELATIVE PERCENT: 5.6 % (ref 2–12)
MONOCYTES RELATIVE PERCENT: 5.8 % (ref 2–12)
MONOCYTES RELATIVE PERCENT: 6.1 % (ref 2–12)
MONOCYTES RELATIVE PERCENT: 6.6 % (ref 2–12)
MONOCYTES RELATIVE PERCENT: 8 % (ref 2–12)
MONOCYTES RELATIVE PERCENT: 9.9 % (ref 2–12)
MUCUS: PRESENT /LPF
MYCOPLASMA PNEUMONIAE BY PCR: NOT DETECTED
MYELOCYTE PERCENT: 1 % (ref 0–0)
NEISSERIA MENINGITIDIS BY PCR: NOT DETECTED
NEUTROPHILS ABSOLUTE: 3.12 E9/L (ref 1.8–7.3)
NEUTROPHILS ABSOLUTE: 3.57 E9/L (ref 1.8–7.3)
NEUTROPHILS ABSOLUTE: 3.59 E9/L (ref 1.8–7.3)
NEUTROPHILS ABSOLUTE: 4.32 E9/L (ref 1.8–7.3)
NEUTROPHILS ABSOLUTE: 4.4 E9/L (ref 1.8–7.3)
NEUTROPHILS ABSOLUTE: 4.66 E9/L (ref 1.8–7.3)
NEUTROPHILS ABSOLUTE: 4.74 E9/L (ref 1.8–7.3)
NEUTROPHILS ABSOLUTE: 5.25 E9/L (ref 1.8–7.3)
NEUTROPHILS ABSOLUTE: 5.29 E9/L (ref 1.8–7.3)
NEUTROPHILS ABSOLUTE: 5.45 E9/L (ref 1.8–7.3)
NEUTROPHILS ABSOLUTE: 6.17 E9/L (ref 1.8–7.3)
NEUTROPHILS ABSOLUTE: 6.45 E9/L (ref 1.8–7.3)
NEUTROPHILS ABSOLUTE: 7.37 E9/L (ref 1.8–7.3)
NEUTROPHILS ABSOLUTE: 7.38 E9/L (ref 1.8–7.3)
NEUTROPHILS ABSOLUTE: 7.85 E9/L (ref 1.8–7.3)
NEUTROPHILS ABSOLUTE: 7.94 E9/L (ref 1.8–7.3)
NEUTROPHILS ABSOLUTE: 8.03 E9/L (ref 1.8–7.3)
NEUTROPHILS ABSOLUTE: 8.99 E9/L (ref 1.8–7.3)
NEUTROPHILS RELATIVE PERCENT: 47.7 % (ref 43–80)
NEUTROPHILS RELATIVE PERCENT: 50.4 % (ref 43–80)
NEUTROPHILS RELATIVE PERCENT: 58.8 % (ref 43–80)
NEUTROPHILS RELATIVE PERCENT: 59.5 % (ref 43–80)
NEUTROPHILS RELATIVE PERCENT: 59.6 % (ref 43–80)
NEUTROPHILS RELATIVE PERCENT: 59.9 % (ref 43–80)
NEUTROPHILS RELATIVE PERCENT: 62.1 % (ref 43–80)
NEUTROPHILS RELATIVE PERCENT: 65.5 % (ref 43–80)
NEUTROPHILS RELATIVE PERCENT: 66.6 % (ref 43–80)
NEUTROPHILS RELATIVE PERCENT: 70.2 % (ref 43–80)
NEUTROPHILS RELATIVE PERCENT: 70.6 % (ref 43–80)
NEUTROPHILS RELATIVE PERCENT: 71.7 % (ref 43–80)
NEUTROPHILS RELATIVE PERCENT: 75.9 % (ref 43–80)
NEUTROPHILS RELATIVE PERCENT: 76.9 % (ref 43–80)
NEUTROPHILS RELATIVE PERCENT: 77.6 % (ref 43–80)
NEUTROPHILS RELATIVE PERCENT: 78.1 % (ref 43–80)
NEUTROPHILS RELATIVE PERCENT: 78.4 % (ref 43–80)
NEUTROPHILS RELATIVE PERCENT: 88 % (ref 43–80)
NITRITE, URINE: NEGATIVE
NITRITE, URINE: POSITIVE
NITRITE, URINE: POSITIVE
O2 CONTENT: 11.2 ML/DL
O2 CONTENT: 15.8 ML/DL
O2 CONTENT: 16.1 ML/DL
O2 CONTENT: 16.2 ML/DL
O2 CONTENT: 16.6 ML/DL
O2 CONTENT: 16.9 ML/DL
O2 CONTENT: 17.4 ML/DL
O2 SATURATION: 80.7 % (ref 92–98.5)
O2 SATURATION: 96 % (ref 92–98.5)
O2 SATURATION: 96.6 % (ref 92–98.5)
O2 SATURATION: 97.1 % (ref 92–98.5)
O2 SATURATION: 98.5 % (ref 92–98.5)
O2 SATURATION: 98.7 % (ref 92–98.5)
O2 SATURATION: 99.3 % (ref 92–98.5)
O2HB: 77.9 % (ref 94–97)
O2HB: 93.2 % (ref 94–97)
O2HB: 95.4 % (ref 94–97)
O2HB: 96 % (ref 94–97)
O2HB: 97.3 % (ref 94–97)
OPERATOR ID: 101
OPERATOR ID: 2485
OPERATOR ID: 516
OPERATOR ID: ABNORMAL
ORDER NUMBER: NORMAL
ORGANISM: ABNORMAL
ORGANISM: ABNORMAL
PARAINFLUENZA VIRUS 1 BY PCR: NOT DETECTED
PARAINFLUENZA VIRUS 2 BY PCR: NOT DETECTED
PARAINFLUENZA VIRUS 3 BY PCR: NOT DETECTED
PARAINFLUENZA VIRUS 4 BY PCR: NOT DETECTED
PATIENT TEMP: 37 C
PCO2: 115.2 MMHG (ref 35–45)
PCO2: 134.5 MMHG (ref 35–45)
PCO2: 66.5 MMHG (ref 35–45)
PCO2: 72.2 MMHG (ref 35–45)
PCO2: 73.9 MMHG (ref 35–45)
PCO2: 87.8 MMHG (ref 35–45)
PCO2: 95 MMHG (ref 35–45)
PDW BLD-RTO: 12.1 FL (ref 11.5–15)
PDW BLD-RTO: 12.5 FL (ref 11.5–15)
PDW BLD-RTO: 12.6 FL (ref 11.5–15)
PDW BLD-RTO: 12.7 FL (ref 11.5–15)
PDW BLD-RTO: 12.8 FL (ref 11.5–15)
PDW BLD-RTO: 13.2 FL (ref 11.5–15)
PDW BLD-RTO: 13.2 FL (ref 11.5–15)
PDW BLD-RTO: 13.3 FL (ref 11.5–15)
PDW BLD-RTO: 13.6 FL (ref 11.5–15)
PDW BLD-RTO: 13.8 FL (ref 11.5–15)
PDW BLD-RTO: 14.9 FL (ref 11.5–15)
PDW BLD-RTO: 15.9 FL (ref 11.5–15)
PDW BLD-RTO: 16.4 FL (ref 11.5–15)
PDW BLD-RTO: 16.5 FL (ref 11.5–15)
PDW BLD-RTO: 16.5 FL (ref 11.5–15)
PDW BLD-RTO: 16.6 FL (ref 11.5–15)
PDW BLD-RTO: 16.7 FL (ref 11.5–15)
PEEP/CPAP: 5 CMH2O
PEEP/CPAP: 8 CMH2O
PFO2: 1.6 MMHG/%
PFO2: 1.95 MMHG/%
PFO2: 2.04 MMHG/%
PFO2: 2.09 MMHG/%
PH BLOOD GAS: 7.14 (ref 7.35–7.45)
PH BLOOD GAS: 7.23 (ref 7.35–7.45)
PH BLOOD GAS: 7.26 (ref 7.35–7.45)
PH BLOOD GAS: 7.3 (ref 7.35–7.45)
PH BLOOD GAS: 7.36 (ref 7.35–7.45)
PH BLOOD GAS: 7.42 (ref 7.35–7.45)
PH BLOOD GAS: 7.42 (ref 7.35–7.45)
PH UA: 6 (ref 5–9)
PH UA: 6 (ref 5–9)
PH UA: 7.5 (ref 5–9)
PH UA: 7.5 (ref 5–9)
PH UA: >=9 (ref 5–9)
PH VENOUS: 7.42 (ref 7.35–7.45)
PLATELET # BLD: 231 E9/L (ref 130–450)
PLATELET # BLD: 257 E9/L (ref 130–450)
PLATELET # BLD: 258 E9/L (ref 130–450)
PLATELET # BLD: 262 E9/L (ref 130–450)
PLATELET # BLD: 263 E9/L (ref 130–450)
PLATELET # BLD: 266 E9/L (ref 130–450)
PLATELET # BLD: 272 E9/L (ref 130–450)
PLATELET # BLD: 286 E9/L (ref 130–450)
PLATELET # BLD: 290 E9/L (ref 130–450)
PLATELET # BLD: 291 E9/L (ref 130–450)
PLATELET # BLD: 299 E9/L (ref 130–450)
PLATELET # BLD: 315 E9/L (ref 130–450)
PLATELET # BLD: 328 E9/L (ref 130–450)
PLATELET # BLD: 351 E9/L (ref 130–450)
PLATELET # BLD: 361 E9/L (ref 130–450)
PLATELET # BLD: 383 E9/L (ref 130–450)
PLATELET # BLD: 398 E9/L (ref 130–450)
PLATELET # BLD: 438 E9/L (ref 130–450)
PLATELET # BLD: 497 E9/L (ref 130–450)
PMV BLD AUTO: 8.8 FL (ref 7–12)
PMV BLD AUTO: 9.1 FL (ref 7–12)
PMV BLD AUTO: 9.2 FL (ref 7–12)
PMV BLD AUTO: 9.2 FL (ref 7–12)
PMV BLD AUTO: 9.3 FL (ref 7–12)
PMV BLD AUTO: 9.3 FL (ref 7–12)
PMV BLD AUTO: 9.4 FL (ref 7–12)
PMV BLD AUTO: 9.5 FL (ref 7–12)
PMV BLD AUTO: 9.5 FL (ref 7–12)
PMV BLD AUTO: 9.6 FL (ref 7–12)
PMV BLD AUTO: 9.7 FL (ref 7–12)
PMV BLD AUTO: 9.8 FL (ref 7–12)
PMV BLD AUTO: 9.9 FL (ref 7–12)
PO2: 102 MMHG (ref 75–100)
PO2: 111.7 MMHG (ref 75–100)
PO2: 125.2 MMHG (ref 75–100)
PO2: 152.3 MMHG (ref 75–100)
PO2: 40.5 MMHG (ref 75–100)
PO2: 80.3 MMHG (ref 75–100)
PO2: 97.7 MMHG (ref 75–100)
POLYCHROMASIA: ABNORMAL
POTASSIUM REFLEX MAGNESIUM: 3.4 MMOL/L (ref 3.5–5)
POTASSIUM REFLEX MAGNESIUM: 3.5 MMOL/L (ref 3.5–5)
POTASSIUM REFLEX MAGNESIUM: 3.6 MMOL/L (ref 3.5–5)
POTASSIUM REFLEX MAGNESIUM: 3.7 MMOL/L (ref 3.5–5)
POTASSIUM REFLEX MAGNESIUM: 3.8 MMOL/L (ref 3.5–5)
POTASSIUM REFLEX MAGNESIUM: 3.8 MMOL/L (ref 3.5–5)
POTASSIUM REFLEX MAGNESIUM: 4 MMOL/L (ref 3.5–5)
POTASSIUM REFLEX MAGNESIUM: 4.1 MMOL/L (ref 3.5–5)
POTASSIUM REFLEX MAGNESIUM: 4.2 MMOL/L (ref 3.5–5)
POTASSIUM REFLEX MAGNESIUM: 4.2 MMOL/L (ref 3.5–5)
POTASSIUM REFLEX MAGNESIUM: 4.3 MMOL/L (ref 3.5–5)
POTASSIUM REFLEX MAGNESIUM: 4.4 MMOL/L (ref 3.5–5)
POTASSIUM REFLEX MAGNESIUM: 5 MMOL/L (ref 3.5–5)
POTASSIUM SERPL-SCNC: 3.5 MMOL/L (ref 3.5–5)
POTASSIUM SERPL-SCNC: 4.2 MMOL/L (ref 3.5–5)
PRO-BNP: 214 PG/ML (ref 0–125)
PRO-BNP: 79 PG/ML (ref 0–125)
PRO-BNP: 925 PG/ML (ref 0–125)
PROCALCITONIN: 0.04 NG/ML (ref 0–0.08)
PROCALCITONIN: 0.05 NG/ML (ref 0–0.08)
PROCALCITONIN: 0.09 NG/ML (ref 0–0.08)
PROTEIN UA: 100 MG/DL
PROTEIN UA: >=300 MG/DL
PROTEIN UA: NEGATIVE MG/DL
PROTEUS SPECIES BY PCR: NOT DETECTED
PROTHROMBIN TIME: 12.2 SEC (ref 9.3–12.4)
PROTHROMBIN TIME: 12.6 SEC (ref 9.3–12.4)
PSEUDOMONAS AERUGINOSA BY PCR: NOT DETECTED
RBC # BLD: 3.37 E12/L (ref 3.5–5.5)
RBC # BLD: 3.4 E12/L (ref 3.5–5.5)
RBC # BLD: 3.53 E12/L (ref 3.5–5.5)
RBC # BLD: 3.74 E12/L (ref 3.5–5.5)
RBC # BLD: 3.81 E12/L (ref 3.5–5.5)
RBC # BLD: 3.87 E12/L (ref 3.5–5.5)
RBC # BLD: 3.93 E12/L (ref 3.5–5.5)
RBC # BLD: 3.95 E12/L (ref 3.5–5.5)
RBC # BLD: 4.06 E12/L (ref 3.5–5.5)
RBC # BLD: 4.07 E12/L (ref 3.5–5.5)
RBC # BLD: 4.09 E12/L (ref 3.5–5.5)
RBC # BLD: 4.09 E12/L (ref 3.5–5.5)
RBC # BLD: 4.11 E12/L (ref 3.5–5.5)
RBC # BLD: 4.14 E12/L (ref 3.5–5.5)
RBC # BLD: 4.18 E12/L (ref 3.5–5.5)
RBC # BLD: 4.4 E12/L (ref 3.5–5.5)
RBC # BLD: 4.49 E12/L (ref 3.5–5.5)
RBC # BLD: 4.68 E12/L (ref 3.5–5.5)
RBC # BLD: 5.05 E12/L (ref 3.5–5.5)
RBC UA: >20 /HPF (ref 0–2)
RBC UA: ABNORMAL /HPF (ref 0–2)
REASON FOR REJECTION: NORMAL
REASON FOR REJECTION: NORMAL
REJECTED TEST: NORMAL
REJECTED TEST: NORMAL
RESPIRATORY SYNCYTIAL VIRUS BY PCR: NOT DETECTED
RI(T): 143 %
RI(T): 165 %
RI(T): 247 %
RI(T): 89 %
RR MECHANICAL: 16 B/MIN
RR MECHANICAL: 20 B/MIN
SARS-COV-2, NAAT: DETECTED
SARS-COV-2, NAAT: NOT DETECTED
SARS-COV-2, PCR: NOT DETECTED
SERRATIA MARCESCENS BY PCR: NOT DETECTED
SMUDGE CELLS: ABNORMAL
SODIUM BLD-SCNC: 135 MMOL/L (ref 132–146)
SODIUM BLD-SCNC: 135 MMOL/L (ref 132–146)
SODIUM BLD-SCNC: 136 MMOL/L (ref 132–146)
SODIUM BLD-SCNC: 138 MMOL/L (ref 132–146)
SODIUM BLD-SCNC: 139 MMOL/L (ref 132–146)
SODIUM BLD-SCNC: 140 MMOL/L (ref 132–146)
SODIUM BLD-SCNC: 140 MMOL/L (ref 132–146)
SODIUM BLD-SCNC: 141 MMOL/L (ref 132–146)
SODIUM BLD-SCNC: 141 MMOL/L (ref 132–146)
SODIUM BLD-SCNC: 142 MMOL/L (ref 132–146)
SODIUM BLD-SCNC: 145 MMOL/L (ref 132–146)
SODIUM BLD-SCNC: 145 MMOL/L (ref 132–146)
SOURCE OF BLOOD CULTURE: NORMAL
SOURCE, BLOOD GAS: ABNORMAL
SPECIFIC GRAVITY UA: 1.01 (ref 1–1.03)
SPECIFIC GRAVITY UA: 1.02 (ref 1–1.03)
SPECIFIC GRAVITY UA: <=1.005 (ref 1–1.03)
STAPHYLOCOCCUS AUREUS BY PCR: NOT DETECTED
STAPHYLOCOCCUS SPECIES BY PCR: NOT DETECTED
STREPTOCOCCUS AGALACTIAE BY PCR: NOT DETECTED
STREPTOCOCCUS PNEUMONIAE BY PCR: NOT DETECTED
STREPTOCOCCUS PYOGENES  BY PCR: NOT DETECTED
STREPTOCOCCUS SPECIES BY PCR: NOT DETECTED
THB: 10.2 G/DL (ref 11.5–16.5)
THB: 11.6 G/DL (ref 11.5–16.5)
THB: 11.9 G/DL (ref 11.5–16.5)
THB: 12 G/DL (ref 11.5–16.5)
THB: 12 G/DL (ref 11.5–16.5)
THB: 12.5 G/DL (ref 11.5–16.5)
THB: 12.5 G/DL (ref 11.5–16.5)
TIME ANALYZED: 1947
TIME ANALYZED: 2
TIME ANALYZED: 2150
TIME ANALYZED: 2219
TIME ANALYZED: 419
TIME ANALYZED: 525
TIME ANALYZED: 540
TOTAL CK: 195 U/L (ref 20–180)
TOTAL CK: 339 U/L (ref 20–180)
TOTAL CK: <7 U/L (ref 20–180)
TOTAL PROTEIN: 4.2 G/DL (ref 6.4–8.3)
TOTAL PROTEIN: 4.7 G/DL (ref 6.4–8.3)
TOTAL PROTEIN: 4.7 G/DL (ref 6.4–8.3)
TOTAL PROTEIN: 4.8 G/DL (ref 6.4–8.3)
TOTAL PROTEIN: 5.1 G/DL (ref 6.4–8.3)
TOTAL PROTEIN: 5.7 G/DL (ref 6.4–8.3)
TOTAL PROTEIN: 5.9 G/DL (ref 6.4–8.3)
TOTAL PROTEIN: 5.9 G/DL (ref 6.4–8.3)
TOTAL PROTEIN: 6 G/DL (ref 6.4–8.3)
TOTAL PROTEIN: 6 G/DL (ref 6.4–8.3)
TOTAL PROTEIN: 6.2 G/DL (ref 6.4–8.3)
TOTAL PROTEIN: 6.4 G/DL (ref 6.4–8.3)
TOTAL PROTEIN: 6.4 G/DL (ref 6.4–8.3)
TOTAL PROTEIN: 6.9 G/DL (ref 6.4–8.3)
TOTAL PROTEIN: 7 G/DL (ref 6.4–8.3)
TOTAL PROTEIN: 7.4 G/DL (ref 6.4–8.3)
TRIGL SERPL-MCNC: 402 MG/DL (ref 0–149)
TROPONIN, HIGH SENSITIVITY: 16 NG/L (ref 0–9)
TROPONIN, HIGH SENSITIVITY: 19 NG/L (ref 0–9)
TROPONIN, HIGH SENSITIVITY: 20 NG/L (ref 0–9)
TROPONIN, HIGH SENSITIVITY: 22 NG/L (ref 0–9)
TROPONIN, HIGH SENSITIVITY: 22 NG/L (ref 0–9)
TROPONIN, HIGH SENSITIVITY: 24 NG/L (ref 0–9)
TROPONIN, HIGH SENSITIVITY: 24 NG/L (ref 0–9)
TROPONIN, HIGH SENSITIVITY: 27 NG/L (ref 0–9)
TROPONIN, HIGH SENSITIVITY: 32 NG/L (ref 0–9)
TROPONIN: <0.01 NG/ML (ref 0–0.03)
TROPONIN: <0.01 NG/ML (ref 0–0.03)
TSH SERPL DL<=0.05 MIU/L-ACNC: 2.53 UIU/ML (ref 0.27–4.2)
URINE CULTURE, ROUTINE: NORMAL
UROBILINOGEN, URINE: 0.2 E.U./DL
UROBILINOGEN, URINE: 2 E.U./DL
UROBILINOGEN, URINE: 4 E.U./DL
VITAMIN D 25-HYDROXY: 33 NG/ML (ref 30–100)
VLDLC SERPL CALC-MCNC: ABNORMAL MG/DL
VT MECHANICAL: 450 ML
VT MECHANICAL: 500 ML
WBC # BLD: 10.3 E9/L (ref 4.5–11.5)
WBC # BLD: 11.2 E9/L (ref 4.5–11.5)
WBC # BLD: 11.5 E9/L (ref 4.5–11.5)
WBC # BLD: 12.3 E9/L (ref 4.5–11.5)
WBC # BLD: 13.4 E9/L (ref 4.5–11.5)
WBC # BLD: 14.3 E9/L (ref 4.5–11.5)
WBC # BLD: 3.5 E9/L (ref 4.5–11.5)
WBC # BLD: 4.6 E9/L (ref 4.5–11.5)
WBC # BLD: 4.6 E9/L (ref 4.5–11.5)
WBC # BLD: 6.2 E9/L (ref 4.5–11.5)
WBC # BLD: 8 E9/L (ref 4.5–11.5)
WBC # BLD: 8.1 E9/L (ref 4.5–11.5)
WBC # BLD: 8.7 E9/L (ref 4.5–11.5)
WBC # BLD: 8.8 E9/L (ref 4.5–11.5)
WBC # BLD: 8.8 E9/L (ref 4.5–11.5)
WBC # BLD: 8.9 E9/L (ref 4.5–11.5)
WBC # BLD: 9.5 E9/L (ref 4.5–11.5)
WBC # BLD: 9.7 E9/L (ref 4.5–11.5)
WBC # BLD: 9.8 E9/L (ref 4.5–11.5)
WBC UA: >20 /HPF (ref 0–5)
WBC UA: ABNORMAL /HPF (ref 0–5)

## 2021-01-01 PROCEDURE — 2580000003 HC RX 258: Performed by: STUDENT IN AN ORGANIZED HEALTH CARE EDUCATION/TRAINING PROGRAM

## 2021-01-01 PROCEDURE — 85025 COMPLETE CBC W/AUTO DIFF WBC: CPT

## 2021-01-01 PROCEDURE — 6370000000 HC RX 637 (ALT 250 FOR IP): Performed by: FAMILY MEDICINE

## 2021-01-01 PROCEDURE — 93010 ELECTROCARDIOGRAM REPORT: CPT | Performed by: INTERNAL MEDICINE

## 2021-01-01 PROCEDURE — 99222 1ST HOSP IP/OBS MODERATE 55: CPT | Performed by: SURGERY

## 2021-01-01 PROCEDURE — 2580000003 HC RX 258: Performed by: FAMILY MEDICINE

## 2021-01-01 PROCEDURE — 80053 COMPREHEN METABOLIC PANEL: CPT

## 2021-01-01 PROCEDURE — 93923 UPR/LXTR ART STDY 3+ LVLS: CPT

## 2021-01-01 PROCEDURE — 96374 THER/PROPH/DIAG INJ IV PUSH: CPT

## 2021-01-01 PROCEDURE — 93000 ELECTROCARDIOGRAM COMPLETE: CPT | Performed by: INTERNAL MEDICINE

## 2021-01-01 PROCEDURE — 36415 COLL VENOUS BLD VENIPUNCTURE: CPT

## 2021-01-01 PROCEDURE — 6360000002 HC RX W HCPCS: Performed by: STUDENT IN AN ORGANIZED HEALTH CARE EDUCATION/TRAINING PROGRAM

## 2021-01-01 PROCEDURE — 1111F DSCHRG MED/CURRENT MED MERGE: CPT | Performed by: FAMILY MEDICINE

## 2021-01-01 PROCEDURE — 83880 ASSAY OF NATRIURETIC PEPTIDE: CPT

## 2021-01-01 PROCEDURE — 82550 ASSAY OF CK (CPK): CPT

## 2021-01-01 PROCEDURE — 2500000003 HC RX 250 WO HCPCS: Performed by: FAMILY MEDICINE

## 2021-01-01 PROCEDURE — 84484 ASSAY OF TROPONIN QUANT: CPT

## 2021-01-01 PROCEDURE — 87040 BLOOD CULTURE FOR BACTERIA: CPT

## 2021-01-01 PROCEDURE — 82805 BLOOD GASES W/O2 SATURATION: CPT

## 2021-01-01 PROCEDURE — 3017F COLORECTAL CA SCREEN DOC REV: CPT | Performed by: INTERNAL MEDICINE

## 2021-01-01 PROCEDURE — 2700000000 HC OXYGEN THERAPY PER DAY

## 2021-01-01 PROCEDURE — 87088 URINE BACTERIA CULTURE: CPT

## 2021-01-01 PROCEDURE — 83735 ASSAY OF MAGNESIUM: CPT

## 2021-01-01 PROCEDURE — 70450 CT HEAD/BRAIN W/O DYE: CPT

## 2021-01-01 PROCEDURE — 99213 OFFICE O/P EST LOW 20 MIN: CPT | Performed by: FAMILY MEDICINE

## 2021-01-01 PROCEDURE — 82962 GLUCOSE BLOOD TEST: CPT

## 2021-01-01 PROCEDURE — 69210 REMOVE IMPACTED EAR WAX UNI: CPT | Performed by: FAMILY MEDICINE

## 2021-01-01 PROCEDURE — 94060 EVALUATION OF WHEEZING: CPT

## 2021-01-01 PROCEDURE — 94640 AIRWAY INHALATION TREATMENT: CPT

## 2021-01-01 PROCEDURE — 80048 BASIC METABOLIC PNL TOTAL CA: CPT

## 2021-01-01 PROCEDURE — 2060000000 HC ICU INTERMEDIATE R&B

## 2021-01-01 PROCEDURE — 6370000000 HC RX 637 (ALT 250 FOR IP): Performed by: INTERNAL MEDICINE

## 2021-01-01 PROCEDURE — 84443 ASSAY THYROID STIM HORMONE: CPT

## 2021-01-01 PROCEDURE — 72131 CT LUMBAR SPINE W/O DYE: CPT

## 2021-01-01 PROCEDURE — 0202U NFCT DS 22 TRGT SARS-COV-2: CPT

## 2021-01-01 PROCEDURE — 2580000003 HC RX 258: Performed by: EMERGENCY MEDICINE

## 2021-01-01 PROCEDURE — G8417 CALC BMI ABV UP PARAM F/U: HCPCS | Performed by: SURGERY

## 2021-01-01 PROCEDURE — 6360000002 HC RX W HCPCS: Performed by: FAMILY MEDICINE

## 2021-01-01 PROCEDURE — 82010 KETONE BODYS QUAN: CPT

## 2021-01-01 PROCEDURE — G8417 CALC BMI ABV UP PARAM F/U: HCPCS | Performed by: FAMILY MEDICINE

## 2021-01-01 PROCEDURE — 71045 X-RAY EXAM CHEST 1 VIEW: CPT

## 2021-01-01 PROCEDURE — 6360000002 HC RX W HCPCS: Performed by: INTERNAL MEDICINE

## 2021-01-01 PROCEDURE — 4004F PT TOBACCO SCREEN RCVD TLK: CPT | Performed by: FAMILY MEDICINE

## 2021-01-01 PROCEDURE — 90471 IMMUNIZATION ADMIN: CPT | Performed by: FAMILY MEDICINE

## 2021-01-01 PROCEDURE — 94660 CPAP INITIATION&MGMT: CPT

## 2021-01-01 PROCEDURE — 1111F DSCHRG MED/CURRENT MED MERGE: CPT | Performed by: SURGERY

## 2021-01-01 PROCEDURE — 4004F PT TOBACCO SCREEN RCVD TLK: CPT | Performed by: INTERNAL MEDICINE

## 2021-01-01 PROCEDURE — 99238 HOSP IP/OBS DSCHRG MGMT 30/<: CPT | Performed by: FAMILY MEDICINE

## 2021-01-01 PROCEDURE — 97530 THERAPEUTIC ACTIVITIES: CPT

## 2021-01-01 PROCEDURE — 84145 PROCALCITONIN (PCT): CPT

## 2021-01-01 PROCEDURE — 3046F HEMOGLOBIN A1C LEVEL >9.0%: CPT | Performed by: STUDENT IN AN ORGANIZED HEALTH CARE EDUCATION/TRAINING PROGRAM

## 2021-01-01 PROCEDURE — 99222 1ST HOSP IP/OBS MODERATE 55: CPT | Performed by: FAMILY MEDICINE

## 2021-01-01 PROCEDURE — 83036 HEMOGLOBIN GLYCOSYLATED A1C: CPT | Performed by: FAMILY MEDICINE

## 2021-01-01 PROCEDURE — G8417 CALC BMI ABV UP PARAM F/U: HCPCS | Performed by: INTERNAL MEDICINE

## 2021-01-01 PROCEDURE — G8926 SPIRO NO PERF OR DOC: HCPCS | Performed by: FAMILY MEDICINE

## 2021-01-01 PROCEDURE — G8926 SPIRO NO PERF OR DOC: HCPCS | Performed by: STUDENT IN AN ORGANIZED HEALTH CARE EDUCATION/TRAINING PROGRAM

## 2021-01-01 PROCEDURE — G8427 DOCREV CUR MEDS BY ELIG CLIN: HCPCS | Performed by: SURGERY

## 2021-01-01 PROCEDURE — 74176 CT ABD & PELVIS W/O CONTRAST: CPT

## 2021-01-01 PROCEDURE — 82800 BLOOD PH: CPT

## 2021-01-01 PROCEDURE — G8427 DOCREV CUR MEDS BY ELIG CLIN: HCPCS | Performed by: FAMILY MEDICINE

## 2021-01-01 PROCEDURE — 93005 ELECTROCARDIOGRAM TRACING: CPT | Performed by: NURSE PRACTITIONER

## 2021-01-01 PROCEDURE — 96375 TX/PRO/DX INJ NEW DRUG ADDON: CPT

## 2021-01-01 PROCEDURE — 97535 SELF CARE MNGMENT TRAINING: CPT

## 2021-01-01 PROCEDURE — 94664 DEMO&/EVAL PT USE INHALER: CPT

## 2021-01-01 PROCEDURE — 6360000002 HC RX W HCPCS: Performed by: EMERGENCY MEDICINE

## 2021-01-01 PROCEDURE — G8427 DOCREV CUR MEDS BY ELIG CLIN: HCPCS | Performed by: INTERNAL MEDICINE

## 2021-01-01 PROCEDURE — 6360000004 HC RX CONTRAST MEDICATION: Performed by: RADIOLOGY

## 2021-01-01 PROCEDURE — 83605 ASSAY OF LACTIC ACID: CPT

## 2021-01-01 PROCEDURE — 81001 URINALYSIS AUTO W/SCOPE: CPT

## 2021-01-01 PROCEDURE — 99214 OFFICE O/P EST MOD 30 MIN: CPT | Performed by: STUDENT IN AN ORGANIZED HEALTH CARE EDUCATION/TRAINING PROGRAM

## 2021-01-01 PROCEDURE — 74177 CT ABD & PELVIS W/CONTRAST: CPT

## 2021-01-01 PROCEDURE — 93306 TTE W/DOPPLER COMPLETE: CPT

## 2021-01-01 PROCEDURE — 75635 CT ANGIO ABDOMINAL ARTERIES: CPT

## 2021-01-01 PROCEDURE — 93880 EXTRACRANIAL BILAT STUDY: CPT

## 2021-01-01 PROCEDURE — 85027 COMPLETE CBC AUTOMATED: CPT

## 2021-01-01 PROCEDURE — 3023F SPIROM DOC REV: CPT | Performed by: FAMILY MEDICINE

## 2021-01-01 PROCEDURE — 99232 SBSQ HOSP IP/OBS MODERATE 35: CPT | Performed by: FAMILY MEDICINE

## 2021-01-01 PROCEDURE — 71046 X-RAY EXAM CHEST 2 VIEWS: CPT

## 2021-01-01 PROCEDURE — 97161 PT EVAL LOW COMPLEX 20 MIN: CPT

## 2021-01-01 PROCEDURE — 96365 THER/PROPH/DIAG IV INF INIT: CPT

## 2021-01-01 PROCEDURE — 2022F DILAT RTA XM EVC RTNOPTHY: CPT | Performed by: FAMILY MEDICINE

## 2021-01-01 PROCEDURE — 4004F PT TOBACCO SCREEN RCVD TLK: CPT | Performed by: STUDENT IN AN ORGANIZED HEALTH CARE EDUCATION/TRAINING PROGRAM

## 2021-01-01 PROCEDURE — 6370000000 HC RX 637 (ALT 250 FOR IP): Performed by: STUDENT IN AN ORGANIZED HEALTH CARE EDUCATION/TRAINING PROGRAM

## 2021-01-01 PROCEDURE — G8482 FLU IMMUNIZE ORDER/ADMIN: HCPCS | Performed by: SURGERY

## 2021-01-01 PROCEDURE — 3017F COLORECTAL CA SCREEN DOC REV: CPT | Performed by: FAMILY MEDICINE

## 2021-01-01 PROCEDURE — 99255 IP/OBS CONSLTJ NEW/EST HI 80: CPT | Performed by: INTERNAL MEDICINE

## 2021-01-01 PROCEDURE — 3017F COLORECTAL CA SCREEN DOC REV: CPT | Performed by: SURGERY

## 2021-01-01 PROCEDURE — 99213 OFFICE O/P EST LOW 20 MIN: CPT | Performed by: INTERNAL MEDICINE

## 2021-01-01 PROCEDURE — 85730 THROMBOPLASTIN TIME PARTIAL: CPT

## 2021-01-01 PROCEDURE — G8427 DOCREV CUR MEDS BY ELIG CLIN: HCPCS | Performed by: STUDENT IN AN ORGANIZED HEALTH CARE EDUCATION/TRAINING PROGRAM

## 2021-01-01 PROCEDURE — 99285 EMERGENCY DEPT VISIT HI MDM: CPT

## 2021-01-01 PROCEDURE — G8482 FLU IMMUNIZE ORDER/ADMIN: HCPCS | Performed by: STUDENT IN AN ORGANIZED HEALTH CARE EDUCATION/TRAINING PROGRAM

## 2021-01-01 PROCEDURE — 0012A COVID-19, MODERNA VACCINE 100MCG/0.5ML DOSE: CPT | Performed by: FAMILY MEDICINE

## 2021-01-01 PROCEDURE — XW033E5 INTRODUCTION OF REMDESIVIR ANTI-INFECTIVE INTO PERIPHERAL VEIN, PERCUTANEOUS APPROACH, NEW TECHNOLOGY GROUP 5: ICD-10-PCS | Performed by: FAMILY MEDICINE

## 2021-01-01 PROCEDURE — G8417 CALC BMI ABV UP PARAM F/U: HCPCS | Performed by: STUDENT IN AN ORGANIZED HEALTH CARE EDUCATION/TRAINING PROGRAM

## 2021-01-01 PROCEDURE — 90674 CCIIV4 VAC NO PRSV 0.5 ML IM: CPT | Performed by: FAMILY MEDICINE

## 2021-01-01 PROCEDURE — 2580000003 HC RX 258: Performed by: RADIOLOGY

## 2021-01-01 PROCEDURE — 6370000000 HC RX 637 (ALT 250 FOR IP): Performed by: EMERGENCY MEDICINE

## 2021-01-01 PROCEDURE — 99223 1ST HOSP IP/OBS HIGH 75: CPT | Performed by: FAMILY MEDICINE

## 2021-01-01 PROCEDURE — 99284 EMERGENCY DEPT VISIT MOD MDM: CPT

## 2021-01-01 PROCEDURE — 82553 CREATINE MB FRACTION: CPT

## 2021-01-01 PROCEDURE — 6370000000 HC RX 637 (ALT 250 FOR IP): Performed by: NURSE PRACTITIONER

## 2021-01-01 PROCEDURE — 36600 WITHDRAWAL OF ARTERIAL BLOOD: CPT

## 2021-01-01 PROCEDURE — 86901 BLOOD TYPING SEROLOGIC RH(D): CPT

## 2021-01-01 PROCEDURE — 4004F PT TOBACCO SCREEN RCVD TLK: CPT | Performed by: SURGERY

## 2021-01-01 PROCEDURE — 83036 HEMOGLOBIN GLYCOSYLATED A1C: CPT | Performed by: STUDENT IN AN ORGANIZED HEALTH CARE EDUCATION/TRAINING PROGRAM

## 2021-01-01 PROCEDURE — 99214 OFFICE O/P EST MOD 30 MIN: CPT | Performed by: FAMILY MEDICINE

## 2021-01-01 PROCEDURE — 82306 VITAMIN D 25 HYDROXY: CPT

## 2021-01-01 PROCEDURE — 80076 HEPATIC FUNCTION PANEL: CPT

## 2021-01-01 PROCEDURE — 73560 X-RAY EXAM OF KNEE 1 OR 2: CPT

## 2021-01-01 PROCEDURE — 97165 OT EVAL LOW COMPLEX 30 MIN: CPT

## 2021-01-01 PROCEDURE — 80061 LIPID PANEL: CPT

## 2021-01-01 PROCEDURE — 83690 ASSAY OF LIPASE: CPT

## 2021-01-01 PROCEDURE — 96361 HYDRATE IV INFUSION ADD-ON: CPT

## 2021-01-01 PROCEDURE — 99214 OFFICE O/P EST MOD 30 MIN: CPT | Performed by: INTERNAL MEDICINE

## 2021-01-01 PROCEDURE — APPSS60 APP SPLIT SHARED TIME 46-60 MINUTES: Performed by: NURSE PRACTITIONER

## 2021-01-01 PROCEDURE — 99213 OFFICE O/P EST LOW 20 MIN: CPT | Performed by: STUDENT IN AN ORGANIZED HEALTH CARE EDUCATION/TRAINING PROGRAM

## 2021-01-01 PROCEDURE — 2500000003 HC RX 250 WO HCPCS: Performed by: STUDENT IN AN ORGANIZED HEALTH CARE EDUCATION/TRAINING PROGRAM

## 2021-01-01 PROCEDURE — 93000 ELECTROCARDIOGRAM COMPLETE: CPT | Performed by: FAMILY MEDICINE

## 2021-01-01 PROCEDURE — 83615 LACTATE (LD) (LDH) ENZYME: CPT

## 2021-01-01 PROCEDURE — 85610 PROTHROMBIN TIME: CPT

## 2021-01-01 PROCEDURE — 0011A COVID-19, MODERNA VACCINE 100MCG/0.5ML DOSE: CPT | Performed by: FAMILY MEDICINE

## 2021-01-01 PROCEDURE — 93005 ELECTROCARDIOGRAM TRACING: CPT | Performed by: EMERGENCY MEDICINE

## 2021-01-01 PROCEDURE — 6360000002 HC RX W HCPCS

## 2021-01-01 PROCEDURE — 85384 FIBRINOGEN ACTIVITY: CPT

## 2021-01-01 PROCEDURE — 73521 X-RAY EXAM HIPS BI 2 VIEWS: CPT

## 2021-01-01 PROCEDURE — 85378 FIBRIN DEGRADE SEMIQUANT: CPT

## 2021-01-01 PROCEDURE — 93005 ELECTROCARDIOGRAM TRACING: CPT | Performed by: STUDENT IN AN ORGANIZED HEALTH CARE EDUCATION/TRAINING PROGRAM

## 2021-01-01 PROCEDURE — 82728 ASSAY OF FERRITIN: CPT

## 2021-01-01 PROCEDURE — 91301 COVID-19, MODERNA VACCINE 100MCG/0.5ML DOSE: CPT | Performed by: FAMILY MEDICINE

## 2021-01-01 PROCEDURE — 94729 DIFFUSING CAPACITY: CPT

## 2021-01-01 PROCEDURE — 73721 MRI JNT OF LWR EXTRE W/O DYE: CPT

## 2021-01-01 PROCEDURE — 2500000003 HC RX 250 WO HCPCS: Performed by: EMERGENCY MEDICINE

## 2021-01-01 PROCEDURE — 99205 OFFICE O/P NEW HI 60 MIN: CPT | Performed by: INTERNAL MEDICINE

## 2021-01-01 PROCEDURE — 96360 HYDRATION IV INFUSION INIT: CPT

## 2021-01-01 PROCEDURE — 3017F COLORECTAL CA SCREEN DOC REV: CPT | Performed by: STUDENT IN AN ORGANIZED HEALTH CARE EDUCATION/TRAINING PROGRAM

## 2021-01-01 PROCEDURE — 99213 OFFICE O/P EST LOW 20 MIN: CPT | Performed by: SURGERY

## 2021-01-01 PROCEDURE — 93005 ELECTROCARDIOGRAM TRACING: CPT | Performed by: PHYSICIAN ASSISTANT

## 2021-01-01 PROCEDURE — G8484 FLU IMMUNIZE NO ADMIN: HCPCS | Performed by: INTERNAL MEDICINE

## 2021-01-01 PROCEDURE — 3051F HG A1C>EQUAL 7.0%<8.0%: CPT | Performed by: FAMILY MEDICINE

## 2021-01-01 PROCEDURE — 72125 CT NECK SPINE W/O DYE: CPT

## 2021-01-01 PROCEDURE — 2580000003 HC RX 258

## 2021-01-01 PROCEDURE — 1111F DSCHRG MED/CURRENT MED MERGE: CPT | Performed by: INTERNAL MEDICINE

## 2021-01-01 PROCEDURE — 86900 BLOOD TYPING SEROLOGIC ABO: CPT

## 2021-01-01 PROCEDURE — 87635 SARS-COV-2 COVID-19 AMP PRB: CPT

## 2021-01-01 PROCEDURE — 99244 OFF/OP CNSLTJ NEW/EST MOD 40: CPT | Performed by: SURGERY

## 2021-01-01 PROCEDURE — 2022F DILAT RTA XM EVC RTNOPTHY: CPT | Performed by: STUDENT IN AN ORGANIZED HEALTH CARE EDUCATION/TRAINING PROGRAM

## 2021-01-01 PROCEDURE — 99283 EMERGENCY DEPT VISIT LOW MDM: CPT

## 2021-01-01 PROCEDURE — 96366 THER/PROPH/DIAG IV INF ADDON: CPT

## 2021-01-01 PROCEDURE — 6360000002 HC RX W HCPCS: Performed by: NURSE PRACTITIONER

## 2021-01-01 PROCEDURE — 93005 ELECTROCARDIOGRAM TRACING: CPT | Performed by: FAMILY MEDICINE

## 2021-01-01 PROCEDURE — 6370000000 HC RX 637 (ALT 250 FOR IP): Performed by: SURGERY

## 2021-01-01 PROCEDURE — 3023F SPIROM DOC REV: CPT | Performed by: STUDENT IN AN ORGANIZED HEALTH CARE EDUCATION/TRAINING PROGRAM

## 2021-01-01 PROCEDURE — 94726 PLETHYSMOGRAPHY LUNG VOLUMES: CPT

## 2021-01-01 PROCEDURE — 81003 URINALYSIS AUTO W/O SCOPE: CPT

## 2021-01-01 PROCEDURE — 86850 RBC ANTIBODY SCREEN: CPT

## 2021-01-01 PROCEDURE — 86140 C-REACTIVE PROTEIN: CPT

## 2021-01-01 PROCEDURE — 87077 CULTURE AEROBIC IDENTIFY: CPT

## 2021-01-01 PROCEDURE — 99214 OFFICE O/P EST MOD 30 MIN: CPT | Performed by: SURGERY

## 2021-01-01 PROCEDURE — 73552 X-RAY EXAM OF FEMUR 2/>: CPT

## 2021-01-01 PROCEDURE — 76705 ECHO EXAM OF ABDOMEN: CPT

## 2021-01-01 PROCEDURE — 96372 THER/PROPH/DIAG INJ SC/IM: CPT

## 2021-01-01 PROCEDURE — 87186 SC STD MICRODIL/AGAR DIL: CPT

## 2021-01-01 RX ORDER — ARFORMOTEROL TARTRATE 15 UG/2ML
15 SOLUTION RESPIRATORY (INHALATION) 2 TIMES DAILY
Status: DISCONTINUED | OUTPATIENT
Start: 2021-01-01 | End: 2021-01-01 | Stop reason: HOSPADM

## 2021-01-01 RX ORDER — ONDANSETRON 4 MG/1
4 TABLET, ORALLY DISINTEGRATING ORAL EVERY 8 HOURS PRN
Status: DISCONTINUED | OUTPATIENT
Start: 2021-01-01 | End: 2021-01-01 | Stop reason: HOSPADM

## 2021-01-01 RX ORDER — ASPIRIN 81 MG/1
324 TABLET, CHEWABLE ORAL ONCE
Status: COMPLETED | OUTPATIENT
Start: 2021-01-01 | End: 2021-01-01

## 2021-01-01 RX ORDER — INSULIN LISPRO 100 [IU]/ML
10 INJECTION, SOLUTION INTRAVENOUS; SUBCUTANEOUS
Qty: 9 ML | Refills: 1 | Status: SHIPPED | OUTPATIENT
Start: 2021-01-01 | End: 2021-01-01

## 2021-01-01 RX ORDER — LANOLIN ALCOHOL/MO/W.PET/CERES
1000 CREAM (GRAM) TOPICAL DAILY
Qty: 30 TABLET | Refills: 11 | Status: SHIPPED | OUTPATIENT
Start: 2021-01-01

## 2021-01-01 RX ORDER — NORTRIPTYLINE HYDROCHLORIDE 25 MG/1
25 CAPSULE ORAL NIGHTLY
Qty: 30 CAPSULE | Refills: 3 | Status: SHIPPED | OUTPATIENT
Start: 2021-01-01

## 2021-01-01 RX ORDER — PREDNISONE 20 MG/1
40 TABLET ORAL DAILY
Qty: 10 TABLET | Refills: 0 | Status: SHIPPED | OUTPATIENT
Start: 2021-01-01 | End: 2021-01-01

## 2021-01-01 RX ORDER — TIZANIDINE 4 MG/1
4 TABLET ORAL EVERY 6 HOURS PRN
COMMUNITY
End: 2021-01-01

## 2021-01-01 RX ORDER — TAMSULOSIN HYDROCHLORIDE 0.4 MG/1
0.4 CAPSULE ORAL DAILY
Qty: 7 CAPSULE | Refills: 0 | Status: SHIPPED
Start: 2021-01-01 | End: 2021-01-01 | Stop reason: SDUPTHER

## 2021-01-01 RX ORDER — HYDROXYZINE HYDROCHLORIDE 10 MG/1
10 TABLET, FILM COATED ORAL NIGHTLY PRN
Status: COMPLETED | OUTPATIENT
Start: 2021-01-01 | End: 2021-01-01

## 2021-01-01 RX ORDER — URSODIOL 300 MG/1
300 CAPSULE ORAL
Status: DISCONTINUED | OUTPATIENT
Start: 2021-01-01 | End: 2021-01-01 | Stop reason: HOSPADM

## 2021-01-01 RX ORDER — DEXTROSE MONOHYDRATE 25 G/50ML
12.5 INJECTION, SOLUTION INTRAVENOUS PRN
Status: DISCONTINUED | OUTPATIENT
Start: 2021-01-01 | End: 2021-01-01 | Stop reason: HOSPADM

## 2021-01-01 RX ORDER — BUDESONIDE 0.25 MG/2ML
1000 INHALANT ORAL 2 TIMES DAILY
Status: DISCONTINUED | OUTPATIENT
Start: 2021-01-01 | End: 2021-01-01 | Stop reason: HOSPADM

## 2021-01-01 RX ORDER — KETOCONAZOLE 20 MG/G
CREAM TOPICAL
Qty: 30 G | Refills: 1 | Status: SHIPPED
Start: 2021-01-01 | End: 2021-01-01 | Stop reason: CLARIF

## 2021-01-01 RX ORDER — ERGOCALCIFEROL 1.25 MG/1
CAPSULE ORAL
Qty: 12 CAPSULE | Refills: 5 | Status: SHIPPED | OUTPATIENT
Start: 2021-01-01

## 2021-01-01 RX ORDER — IPRATROPIUM BROMIDE AND ALBUTEROL SULFATE 2.5; .5 MG/3ML; MG/3ML
1 SOLUTION RESPIRATORY (INHALATION) ONCE
Status: COMPLETED | OUTPATIENT
Start: 2021-01-01 | End: 2021-01-01

## 2021-01-01 RX ORDER — DOXYCYCLINE HYCLATE 100 MG/1
100 CAPSULE ORAL 2 TIMES DAILY
Status: DISCONTINUED | OUTPATIENT
Start: 2021-01-01 | End: 2021-01-01

## 2021-01-01 RX ORDER — SODIUM CHLORIDE 0.9 % (FLUSH) 0.9 %
5-40 SYRINGE (ML) INJECTION EVERY 12 HOURS SCHEDULED
Status: DISCONTINUED | OUTPATIENT
Start: 2021-01-01 | End: 2021-01-01 | Stop reason: HOSPADM

## 2021-01-01 RX ORDER — TIZANIDINE 4 MG/1
2 TABLET ORAL EVERY 8 HOURS PRN
Status: DISCONTINUED | OUTPATIENT
Start: 2021-01-01 | End: 2021-01-01 | Stop reason: HOSPADM

## 2021-01-01 RX ORDER — NICOTINE POLACRILEX 4 MG
15 LOZENGE BUCCAL PRN
Status: DISCONTINUED | OUTPATIENT
Start: 2021-01-01 | End: 2021-01-01 | Stop reason: HOSPADM

## 2021-01-01 RX ORDER — SODIUM CHLORIDE 9 MG/ML
25 INJECTION, SOLUTION INTRAVENOUS PRN
Status: DISCONTINUED | OUTPATIENT
Start: 2021-01-01 | End: 2021-01-01 | Stop reason: HOSPADM

## 2021-01-01 RX ORDER — BUDESONIDE AND FORMOTEROL FUMARATE DIHYDRATE 160; 4.5 UG/1; UG/1
2 AEROSOL RESPIRATORY (INHALATION) NIGHTLY
Status: DISCONTINUED | OUTPATIENT
Start: 2021-01-01 | End: 2021-01-01 | Stop reason: HOSPADM

## 2021-01-01 RX ORDER — OXYCODONE AND ACETAMINOPHEN 7.5; 325 MG/1; MG/1
1 TABLET ORAL EVERY 6 HOURS PRN
Status: DISCONTINUED | OUTPATIENT
Start: 2021-01-01 | End: 2021-01-01 | Stop reason: RX

## 2021-01-01 RX ORDER — SODIUM CHLORIDE 0.9 % (FLUSH) 0.9 %
10 SYRINGE (ML) INJECTION PRN
Status: DISCONTINUED | OUTPATIENT
Start: 2021-01-01 | End: 2021-01-01

## 2021-01-01 RX ORDER — TRAZODONE HYDROCHLORIDE 150 MG/1
75 TABLET ORAL NIGHTLY
Status: DISCONTINUED | OUTPATIENT
Start: 2021-01-01 | End: 2021-01-01 | Stop reason: HOSPADM

## 2021-01-01 RX ORDER — DIAZEPAM 2 MG/1
2 TABLET ORAL DAILY PRN
Status: DISCONTINUED | OUTPATIENT
Start: 2021-01-01 | End: 2021-01-01

## 2021-01-01 RX ORDER — DILTIAZEM HYDROCHLORIDE 5 MG/ML
20 INJECTION INTRAVENOUS ONCE
Status: COMPLETED | OUTPATIENT
Start: 2021-01-01 | End: 2021-01-01

## 2021-01-01 RX ORDER — BUTALBITAL, ACETAMINOPHEN AND CAFFEINE 50; 325; 40 MG/1; MG/1; MG/1
1 TABLET ORAL EVERY 4 HOURS PRN
Qty: 180 TABLET | Refills: 3 | Status: SHIPPED | OUTPATIENT
Start: 2021-01-01

## 2021-01-01 RX ORDER — MINERAL OIL 100 G/100G
1 OIL RECTAL DAILY PRN
Status: DISCONTINUED | OUTPATIENT
Start: 2021-01-01 | End: 2021-01-01 | Stop reason: HOSPADM

## 2021-01-01 RX ORDER — DOXYCYCLINE HYCLATE 100 MG
100 TABLET ORAL 2 TIMES DAILY
Qty: 14 TABLET | Refills: 0 | Status: SHIPPED | OUTPATIENT
Start: 2021-01-01 | End: 2021-01-01

## 2021-01-01 RX ORDER — SODIUM CHLORIDE 9 MG/ML
INJECTION, SOLUTION INTRAVENOUS ONCE
Status: COMPLETED | OUTPATIENT
Start: 2021-01-01 | End: 2021-01-01

## 2021-01-01 RX ORDER — FENTANYL CITRATE 50 UG/ML
25 INJECTION, SOLUTION INTRAMUSCULAR; INTRAVENOUS ONCE
Status: COMPLETED | OUTPATIENT
Start: 2021-01-01 | End: 2021-01-01

## 2021-01-01 RX ORDER — CHOLECALCIFEROL (VITAMIN D3) 50 MCG
2000 TABLET ORAL DAILY
Status: DISCONTINUED | OUTPATIENT
Start: 2021-01-01 | End: 2021-01-01 | Stop reason: HOSPADM

## 2021-01-01 RX ORDER — CEFDINIR 300 MG/1
300 CAPSULE ORAL 2 TIMES DAILY
Qty: 14 CAPSULE | Refills: 0 | Status: SHIPPED | OUTPATIENT
Start: 2021-01-01 | End: 2021-01-01

## 2021-01-01 RX ORDER — NORTRIPTYLINE HYDROCHLORIDE 25 MG/1
25 CAPSULE ORAL NIGHTLY
Status: DISCONTINUED | OUTPATIENT
Start: 2021-01-01 | End: 2021-01-01 | Stop reason: HOSPADM

## 2021-01-01 RX ORDER — ACETAMINOPHEN 650 MG/1
650 SUPPOSITORY RECTAL EVERY 6 HOURS PRN
Status: DISCONTINUED | OUTPATIENT
Start: 2021-01-01 | End: 2021-01-01 | Stop reason: HOSPADM

## 2021-01-01 RX ORDER — POLYETHYLENE GLYCOL 3350 17 G/17G
17 POWDER, FOR SOLUTION ORAL DAILY
Status: DISCONTINUED | OUTPATIENT
Start: 2021-01-01 | End: 2021-01-01 | Stop reason: HOSPADM

## 2021-01-01 RX ORDER — POTASSIUM CHLORIDE 20 MEQ/1
40 TABLET, EXTENDED RELEASE ORAL ONCE
Status: DISCONTINUED | OUTPATIENT
Start: 2021-01-01 | End: 2021-01-01 | Stop reason: HOSPADM

## 2021-01-01 RX ORDER — ALBUTEROL SULFATE 90 UG/1
2 AEROSOL, METERED RESPIRATORY (INHALATION)
Status: DISCONTINUED | OUTPATIENT
Start: 2021-01-01 | End: 2021-01-01 | Stop reason: HOSPADM

## 2021-01-01 RX ORDER — TAMSULOSIN HYDROCHLORIDE 0.4 MG/1
0.4 CAPSULE ORAL DAILY
Qty: 7 CAPSULE | Refills: 0 | Status: ON HOLD
Start: 2021-01-01 | End: 2021-01-01

## 2021-01-01 RX ORDER — LACTULOSE 10 G/15ML
20 SOLUTION ORAL 2 TIMES DAILY
Status: CANCELLED | OUTPATIENT
Start: 2021-01-01

## 2021-01-01 RX ORDER — OXYCODONE HYDROCHLORIDE AND ACETAMINOPHEN 5; 325 MG/1; MG/1
1 TABLET ORAL EVERY 6 HOURS PRN
Status: DISCONTINUED | OUTPATIENT
Start: 2021-01-01 | End: 2021-01-01

## 2021-01-01 RX ORDER — SODIUM CHLORIDE 9 MG/ML
INJECTION, SOLUTION INTRAVENOUS CONTINUOUS
Status: DISCONTINUED | OUTPATIENT
Start: 2021-01-01 | End: 2021-01-01

## 2021-01-01 RX ORDER — PREDNISONE 10 MG/1
TABLET ORAL
Qty: 28 TABLET | Refills: 0 | Status: SHIPPED
Start: 2021-01-01 | End: 2021-01-01 | Stop reason: ALTCHOICE

## 2021-01-01 RX ORDER — DEXTROSE MONOHYDRATE 50 MG/ML
100 INJECTION, SOLUTION INTRAVENOUS PRN
Status: DISCONTINUED | OUTPATIENT
Start: 2021-01-01 | End: 2021-01-01 | Stop reason: HOSPADM

## 2021-01-01 RX ORDER — PANTOPRAZOLE SODIUM 40 MG/1
40 TABLET, DELAYED RELEASE ORAL
Status: DISCONTINUED | OUTPATIENT
Start: 2021-01-01 | End: 2021-01-01 | Stop reason: HOSPADM

## 2021-01-01 RX ORDER — CEFDINIR 300 MG/1
300 CAPSULE ORAL 2 TIMES DAILY
Qty: 14 CAPSULE | Refills: 0 | Status: ON HOLD | OUTPATIENT
Start: 2021-01-01 | End: 2021-01-01 | Stop reason: HOSPADM

## 2021-01-01 RX ORDER — ONDANSETRON 2 MG/ML
4 INJECTION INTRAMUSCULAR; INTRAVENOUS EVERY 6 HOURS PRN
Status: DISCONTINUED | OUTPATIENT
Start: 2021-01-01 | End: 2021-01-01 | Stop reason: HOSPADM

## 2021-01-01 RX ORDER — DEXAMETHASONE SODIUM PHOSPHATE 10 MG/ML
10 INJECTION INTRAMUSCULAR; INTRAVENOUS ONCE
Status: COMPLETED | OUTPATIENT
Start: 2021-01-01 | End: 2021-01-01

## 2021-01-01 RX ORDER — URSODIOL 300 MG/1
300 CAPSULE ORAL
Qty: 90 CAPSULE | Refills: 2 | Status: SHIPPED | OUTPATIENT
Start: 2021-01-01 | End: 2022-09-08

## 2021-01-01 RX ORDER — FENOFIBRATE 54 MG/1
54 TABLET ORAL DAILY
Status: DISCONTINUED | OUTPATIENT
Start: 2021-01-01 | End: 2021-01-01 | Stop reason: HOSPADM

## 2021-01-01 RX ORDER — ONDANSETRON 4 MG/1
4 TABLET, ORALLY DISINTEGRATING ORAL EVERY 8 HOURS PRN
Qty: 30 TABLET | Refills: 1 | Status: SHIPPED
Start: 2021-01-01 | End: 2021-01-01

## 2021-01-01 RX ORDER — INSULIN GLARGINE 100 [IU]/ML
10 INJECTION, SOLUTION SUBCUTANEOUS NIGHTLY
Status: DISCONTINUED | OUTPATIENT
Start: 2021-01-01 | End: 2021-01-01 | Stop reason: HOSPADM

## 2021-01-01 RX ORDER — HYDROCODONE BITARTRATE AND ACETAMINOPHEN 5; 325 MG/1; MG/1
1 TABLET ORAL EVERY 8 HOURS PRN
Qty: 15 TABLET | Refills: 0 | Status: SHIPPED | OUTPATIENT
Start: 2021-01-01 | End: 2021-01-01

## 2021-01-01 RX ORDER — PREDNISONE 20 MG/1
40 TABLET ORAL DAILY
Status: DISCONTINUED | OUTPATIENT
Start: 2021-01-01 | End: 2021-01-01 | Stop reason: HOSPADM

## 2021-01-01 RX ORDER — HYDROCODONE BITARTRATE AND ACETAMINOPHEN 5; 325 MG/1; MG/1
1 TABLET ORAL EVERY 8 HOURS PRN
Qty: 30 TABLET | Refills: 0 | Status: SHIPPED | OUTPATIENT
Start: 2021-01-01 | End: 2021-01-01

## 2021-01-01 RX ORDER — 0.9 % SODIUM CHLORIDE 0.9 %
1000 INTRAVENOUS SOLUTION INTRAVENOUS ONCE
Status: COMPLETED | OUTPATIENT
Start: 2021-01-01 | End: 2021-01-01

## 2021-01-01 RX ORDER — FLASH GLUCOSE SCANNING READER
1 EACH MISCELLANEOUS 4 TIMES DAILY
Qty: 1 EACH | Refills: 0 | Status: SHIPPED | OUTPATIENT
Start: 2021-01-01

## 2021-01-01 RX ORDER — MORPHINE SULFATE 20 MG/5ML
2 SOLUTION ORAL
Qty: 100 ML | Refills: 0 | Status: SHIPPED | OUTPATIENT
Start: 2021-01-01 | End: 2022-01-08

## 2021-01-01 RX ORDER — PREDNISONE 10 MG/1
TABLET ORAL
Qty: 30 TABLET | Refills: 0 | Status: ON HOLD | OUTPATIENT
Start: 2021-01-01 | End: 2021-01-01 | Stop reason: HOSPADM

## 2021-01-01 RX ORDER — SODIUM CHLORIDE 0.9 % (FLUSH) 0.9 %
5-40 SYRINGE (ML) INJECTION PRN
Status: DISCONTINUED | OUTPATIENT
Start: 2021-01-01 | End: 2021-01-01 | Stop reason: HOSPADM

## 2021-01-01 RX ORDER — BUDESONIDE 0.25 MG/2ML
250 INHALANT ORAL 2 TIMES DAILY
Status: DISCONTINUED | OUTPATIENT
Start: 2021-01-01 | End: 2021-01-01 | Stop reason: CLARIF

## 2021-01-01 RX ORDER — ZINC SULFATE 50(220)MG
50 CAPSULE ORAL DAILY
Status: DISCONTINUED | OUTPATIENT
Start: 2021-01-01 | End: 2021-01-01 | Stop reason: HOSPADM

## 2021-01-01 RX ORDER — INSULIN LISPRO 100 [IU]/ML
INJECTION, SOLUTION INTRAVENOUS; SUBCUTANEOUS
Qty: 3 PEN | Refills: 5 | Status: SHIPPED
Start: 2021-01-01 | End: 2021-01-01 | Stop reason: SDUPTHER

## 2021-01-01 RX ORDER — OXYCODONE HYDROCHLORIDE AND ACETAMINOPHEN 5; 325 MG/1; MG/1
1 TABLET ORAL EVERY 4 HOURS PRN
Status: DISCONTINUED | OUTPATIENT
Start: 2021-01-01 | End: 2021-01-01 | Stop reason: HOSPADM

## 2021-01-01 RX ORDER — HYDROCODONE BITARTRATE AND ACETAMINOPHEN 5; 325 MG/1; MG/1
1 TABLET ORAL EVERY 8 HOURS PRN
Qty: 10 TABLET | Refills: 0 | Status: SHIPPED | OUTPATIENT
Start: 2021-01-01 | End: 2021-01-01 | Stop reason: SDUPTHER

## 2021-01-01 RX ORDER — DILTIAZEM HYDROCHLORIDE 120 MG/1
120 CAPSULE, COATED, EXTENDED RELEASE ORAL DAILY
Status: DISCONTINUED | OUTPATIENT
Start: 2021-01-01 | End: 2021-01-01 | Stop reason: HOSPADM

## 2021-01-01 RX ORDER — DILTIAZEM HYDROCHLORIDE 5 MG/ML
20 INJECTION INTRAVENOUS ONCE
Status: DISCONTINUED | OUTPATIENT
Start: 2021-01-01 | End: 2021-01-01

## 2021-01-01 RX ORDER — UNDERPADS 23" X 36"
EACH MISCELLANEOUS
Qty: 120 EACH | Refills: 5 | Status: SHIPPED | OUTPATIENT
Start: 2021-01-01

## 2021-01-01 RX ORDER — CLOPIDOGREL BISULFATE 75 MG/1
75 TABLET ORAL DAILY
Qty: 7 TABLET | Refills: 0 | Status: SHIPPED | OUTPATIENT
Start: 2021-01-01 | End: 2021-01-01

## 2021-01-01 RX ORDER — CILOSTAZOL 100 MG/1
100 TABLET ORAL 2 TIMES DAILY
Qty: 60 TABLET | Refills: 11 | Status: SHIPPED
Start: 2021-01-01 | End: 2021-01-01 | Stop reason: CLARIF

## 2021-01-01 RX ORDER — NORTRIPTYLINE HYDROCHLORIDE 25 MG/1
25 CAPSULE ORAL NIGHTLY
Qty: 30 CAPSULE | Refills: 3 | Status: SHIPPED
Start: 2021-01-01 | End: 2021-01-01 | Stop reason: SDUPTHER

## 2021-01-01 RX ORDER — DILTIAZEM HYDROCHLORIDE 240 MG/1
240 CAPSULE, COATED, EXTENDED RELEASE ORAL DAILY
Qty: 30 CAPSULE | Refills: 5 | Status: SHIPPED
Start: 2021-01-01 | End: 2021-01-01

## 2021-01-01 RX ORDER — NAPROXEN 500 MG/1
500 TABLET ORAL 2 TIMES DAILY
Qty: 14 TABLET | Refills: 0 | Status: SHIPPED | OUTPATIENT
Start: 2021-01-01 | End: 2021-01-01

## 2021-01-01 RX ORDER — BUDESONIDE 0.25 MG/2ML
250 INHALANT ORAL 2 TIMES DAILY
Status: DISCONTINUED | OUTPATIENT
Start: 2021-01-01 | End: 2021-01-01

## 2021-01-01 RX ORDER — OMEPRAZOLE 40 MG/1
40 CAPSULE, DELAYED RELEASE ORAL
Qty: 30 CAPSULE | Refills: 5 | Status: SHIPPED | OUTPATIENT
Start: 2021-01-01

## 2021-01-01 RX ORDER — ROSUVASTATIN CALCIUM 20 MG/1
20 TABLET, COATED ORAL NIGHTLY
Qty: 30 TABLET | Refills: 0 | Status: SHIPPED
Start: 2021-01-01 | Stop reason: SDUPTHER

## 2021-01-01 RX ORDER — GABAPENTIN 300 MG/1
300 CAPSULE ORAL 3 TIMES DAILY
Status: DISCONTINUED | OUTPATIENT
Start: 2021-01-01 | End: 2021-01-01 | Stop reason: HOSPADM

## 2021-01-01 RX ORDER — TIZANIDINE 2 MG/1
2 TABLET ORAL EVERY 8 HOURS PRN
Qty: 60 TABLET | Refills: 2 | Status: ON HOLD
Start: 2021-01-01 | End: 2021-01-01

## 2021-01-01 RX ORDER — PANTOPRAZOLE SODIUM 40 MG/1
40 TABLET, DELAYED RELEASE ORAL
Status: DISCONTINUED | OUTPATIENT
Start: 2021-01-01 | End: 2021-01-01

## 2021-01-01 RX ORDER — FENOFIBRATE 54 MG/1
54 TABLET ORAL DAILY
Qty: 30 TABLET | Refills: 0 | Status: SHIPPED
Start: 2021-01-01 | End: 2021-01-01

## 2021-01-01 RX ORDER — INSULIN GLARGINE 100 [IU]/ML
20 INJECTION, SOLUTION SUBCUTANEOUS 2 TIMES DAILY
Status: DISCONTINUED | OUTPATIENT
Start: 2021-01-01 | End: 2021-01-01

## 2021-01-01 RX ORDER — LEVALBUTEROL 1.25 MG/.5ML
3.75 SOLUTION, CONCENTRATE RESPIRATORY (INHALATION)
Status: COMPLETED | OUTPATIENT
Start: 2021-01-01 | End: 2021-01-01

## 2021-01-01 RX ORDER — GLYCOPYRROLATE 0.2 MG/ML
0.1 INJECTION INTRAMUSCULAR; INTRAVENOUS EVERY 4 HOURS PRN
Status: DISCONTINUED | OUTPATIENT
Start: 2021-01-01 | End: 2021-01-01 | Stop reason: HOSPADM

## 2021-01-01 RX ORDER — ACETAMINOPHEN 325 MG/1
650 TABLET ORAL EVERY 6 HOURS PRN
Status: DISCONTINUED | OUTPATIENT
Start: 2021-01-01 | End: 2021-01-01 | Stop reason: HOSPADM

## 2021-01-01 RX ORDER — TRAZODONE HYDROCHLORIDE 150 MG/1
TABLET ORAL
Qty: 30 TABLET | Refills: 10 | Status: SHIPPED
Start: 2021-01-01 | End: 2021-01-01

## 2021-01-01 RX ORDER — LORAZEPAM 2 MG/ML
1 INJECTION INTRAMUSCULAR
Status: DISCONTINUED | OUTPATIENT
Start: 2021-01-01 | End: 2021-01-01 | Stop reason: HOSPADM

## 2021-01-01 RX ORDER — HYDROCORTISONE ACETATE, PRAMOXINE HCL 2.5; 1 G/100G; G/100G
CREAM TOPICAL 3 TIMES DAILY
Status: DISCONTINUED | OUTPATIENT
Start: 2021-01-01 | End: 2021-01-01 | Stop reason: HOSPADM

## 2021-01-01 RX ORDER — DILTIAZEM HYDROCHLORIDE 120 MG/1
240 CAPSULE, COATED, EXTENDED RELEASE ORAL DAILY
Qty: 30 CAPSULE | Refills: 1 | Status: SHIPPED
Start: 2021-01-01 | End: 2021-01-01 | Stop reason: CLARIF

## 2021-01-01 RX ORDER — BUDESONIDE 0.25 MG/2ML
250 INHALANT ORAL 2 TIMES DAILY
Status: DISCONTINUED | OUTPATIENT
Start: 2021-01-01 | End: 2021-01-01 | Stop reason: HOSPADM

## 2021-01-01 RX ORDER — INSULIN LISPRO 100 [IU]/ML
INJECTION, SOLUTION INTRAVENOUS; SUBCUTANEOUS
Qty: 9 ML | Refills: 1 | Status: SHIPPED | OUTPATIENT
Start: 2021-01-01

## 2021-01-01 RX ORDER — INSULIN GLARGINE 100 [IU]/ML
10 INJECTION, SOLUTION SUBCUTANEOUS ONCE
Status: COMPLETED | OUTPATIENT
Start: 2021-01-01 | End: 2021-01-01

## 2021-01-01 RX ORDER — IPRATROPIUM BROMIDE AND ALBUTEROL SULFATE 2.5; .5 MG/3ML; MG/3ML
SOLUTION RESPIRATORY (INHALATION)
Qty: 540 ML | Refills: 5 | Status: SHIPPED
Start: 2021-01-01 | End: 2021-01-01 | Stop reason: SDUPTHER

## 2021-01-01 RX ORDER — INSULIN GLARGINE 100 [IU]/ML
20 INJECTION, SOLUTION SUBCUTANEOUS DAILY
Status: DISCONTINUED | OUTPATIENT
Start: 2021-01-01 | End: 2021-01-01 | Stop reason: HOSPADM

## 2021-01-01 RX ORDER — ROSUVASTATIN CALCIUM 20 MG/1
20 TABLET, COATED ORAL NIGHTLY
Status: DISCONTINUED | OUTPATIENT
Start: 2021-01-01 | End: 2021-01-01 | Stop reason: HOSPADM

## 2021-01-01 RX ORDER — ROSUVASTATIN CALCIUM 20 MG/1
20 TABLET, COATED ORAL NIGHTLY
Qty: 30 TABLET | Refills: 5 | Status: SHIPPED | OUTPATIENT
Start: 2021-01-01

## 2021-01-01 RX ORDER — TIZANIDINE 2 MG/1
2 TABLET ORAL EVERY 8 HOURS PRN
Qty: 60 TABLET | Refills: 2 | Status: SHIPPED
Start: 2021-01-01 | End: 2021-01-01 | Stop reason: SDUPTHER

## 2021-01-01 RX ORDER — OXYCODONE HYDROCHLORIDE AND ACETAMINOPHEN 5; 325 MG/1; MG/1
1 TABLET ORAL EVERY 4 HOURS PRN
COMMUNITY
Start: 2021-01-01

## 2021-01-01 RX ORDER — DOXYCYCLINE HYCLATE 100 MG/1
100 CAPSULE ORAL 2 TIMES DAILY
Status: COMPLETED | OUTPATIENT
Start: 2021-01-01 | End: 2021-01-01

## 2021-01-01 RX ORDER — PREGABALIN 150 MG/1
150 CAPSULE ORAL 2 TIMES DAILY
Qty: 60 CAPSULE | Refills: 0 | Status: SHIPPED
Start: 2021-01-01 | End: 2021-01-01

## 2021-01-01 RX ORDER — POLYETHYLENE GLYCOL 3350 17 G/17G
17 POWDER, FOR SOLUTION ORAL DAILY PRN
Status: DISCONTINUED | OUTPATIENT
Start: 2021-01-01 | End: 2021-01-01 | Stop reason: HOSPADM

## 2021-01-01 RX ORDER — PEN NEEDLE, DIABETIC 31 GX5/16"
NEEDLE, DISPOSABLE MISCELLANEOUS
Qty: 100 EACH | Refills: 0 | Status: SHIPPED
Start: 2021-01-01 | End: 2021-01-01

## 2021-01-01 RX ORDER — POLYETHYLENE GLYCOL 3350 17 G/17G
17 POWDER, FOR SOLUTION ORAL DAILY
Qty: 255 G | Refills: 1 | Status: SHIPPED
Start: 2021-01-01 | End: 2021-01-01

## 2021-01-01 RX ORDER — INSULIN GLARGINE 100 [IU]/ML
20 INJECTION, SOLUTION SUBCUTANEOUS 2 TIMES DAILY
Qty: 5 PEN | Refills: 1 | Status: SHIPPED
Start: 2021-01-01

## 2021-01-01 RX ORDER — MORPHINE SULFATE 2 MG/ML
2 INJECTION, SOLUTION INTRAMUSCULAR; INTRAVENOUS
Status: DISCONTINUED | OUTPATIENT
Start: 2021-01-01 | End: 2021-01-01 | Stop reason: HOSPADM

## 2021-01-01 RX ORDER — FENOFIBRATE 54 MG/1
54 TABLET ORAL DAILY
Qty: 30 TABLET | Refills: 5 | Status: SHIPPED
Start: 2021-01-01 | End: 2021-01-01 | Stop reason: SDUPTHER

## 2021-01-01 RX ORDER — SENNA AND DOCUSATE SODIUM 50; 8.6 MG/1; MG/1
2 TABLET, FILM COATED ORAL DAILY
Qty: 30 TABLET | Refills: 1 | Status: SHIPPED | OUTPATIENT
Start: 2021-01-01

## 2021-01-01 RX ORDER — FLUTICASONE PROPIONATE 44 UG/1
2 AEROSOL, METERED RESPIRATORY (INHALATION) 2 TIMES DAILY
Status: DISCONTINUED | OUTPATIENT
Start: 2021-01-01 | End: 2021-01-01

## 2021-01-01 RX ORDER — POLYETHYLENE GLYCOL 3350 17 G/17G
17 POWDER, FOR SOLUTION ORAL DAILY PRN
Qty: 170 G | Refills: 0 | Status: SHIPPED | OUTPATIENT
Start: 2021-01-01 | End: 2021-01-01 | Stop reason: SDUPTHER

## 2021-01-01 RX ORDER — IPRATROPIUM BROMIDE AND ALBUTEROL SULFATE 2.5; .5 MG/3ML; MG/3ML
1 SOLUTION RESPIRATORY (INHALATION)
Status: DISCONTINUED | OUTPATIENT
Start: 2021-01-01 | End: 2021-01-01 | Stop reason: HOSPADM

## 2021-01-01 RX ORDER — NAPROXEN SODIUM 550 MG/1
550 TABLET ORAL 2 TIMES DAILY WITH MEALS
Qty: 60 TABLET | Refills: 5 | Status: SHIPPED | OUTPATIENT
Start: 2021-01-01 | End: 2021-01-01

## 2021-01-01 RX ORDER — PROMETHAZINE HYDROCHLORIDE 25 MG/1
12.5 TABLET ORAL EVERY 6 HOURS PRN
Status: DISCONTINUED | OUTPATIENT
Start: 2021-01-01 | End: 2021-01-01 | Stop reason: HOSPADM

## 2021-01-01 RX ORDER — ASCORBIC ACID 500 MG
500 TABLET ORAL DAILY
Status: DISCONTINUED | OUTPATIENT
Start: 2021-01-01 | End: 2021-01-01 | Stop reason: HOSPADM

## 2021-01-01 RX ORDER — IPRATROPIUM BROMIDE AND ALBUTEROL SULFATE 2.5; .5 MG/3ML; MG/3ML
3 SOLUTION RESPIRATORY (INHALATION) ONCE
Status: COMPLETED | OUTPATIENT
Start: 2021-01-01 | End: 2021-01-01

## 2021-01-01 RX ORDER — POLYETHYLENE GLYCOL 3350 17 G/17G
17 POWDER, FOR SOLUTION ORAL 2 TIMES DAILY
Qty: 170 G | Refills: 2 | Status: SHIPPED | OUTPATIENT
Start: 2021-01-01

## 2021-01-01 RX ORDER — BISACODYL 10 MG
10 SUPPOSITORY, RECTAL RECTAL PRN
Qty: 15 SUPPOSITORY | Refills: 1 | Status: SHIPPED | OUTPATIENT
Start: 2021-01-01

## 2021-01-01 RX ORDER — DILTIAZEM HYDROCHLORIDE 5 MG/ML
10 INJECTION INTRAVENOUS ONCE
Status: COMPLETED | OUTPATIENT
Start: 2021-01-01 | End: 2021-01-01

## 2021-01-01 RX ORDER — HYDROCORTISONE ACETATE, PRAMOXINE HCL 2.5; 1 G/100G; G/100G
CREAM TOPICAL 3 TIMES DAILY
Qty: 28.4 G | Refills: 1 | Status: SHIPPED | OUTPATIENT
Start: 2021-01-01

## 2021-01-01 RX ORDER — PREDNISONE 10 MG/1
TABLET ORAL
Qty: 45 TABLET | Refills: 0 | Status: SHIPPED
Start: 2021-01-01 | End: 2021-01-01

## 2021-01-01 RX ORDER — INSULIN GLARGINE 100 [IU]/ML
70 INJECTION, SOLUTION SUBCUTANEOUS 2 TIMES DAILY
Status: DISCONTINUED | OUTPATIENT
Start: 2021-01-01 | End: 2021-01-01 | Stop reason: HOSPADM

## 2021-01-01 RX ORDER — LORAZEPAM 0.5 MG/1
1 TABLET ORAL EVERY 4 HOURS PRN
Qty: 60 TABLET | Refills: 1 | Status: SHIPPED | OUTPATIENT
Start: 2021-01-01 | End: 2022-01-08

## 2021-01-01 RX ORDER — GABAPENTIN 300 MG/1
300 CAPSULE ORAL 3 TIMES DAILY
Qty: 90 CAPSULE | Refills: 2 | Status: SHIPPED | OUTPATIENT
Start: 2021-01-01 | End: 2021-01-01

## 2021-01-01 RX ORDER — OXYCODONE HYDROCHLORIDE AND ACETAMINOPHEN 5; 325 MG/1; MG/1
1 TABLET ORAL EVERY 6 HOURS PRN
Status: DISCONTINUED | OUTPATIENT
Start: 2021-01-01 | End: 2021-01-01 | Stop reason: HOSPADM

## 2021-01-01 RX ORDER — TIZANIDINE 2 MG/1
2 TABLET ORAL EVERY 6 HOURS PRN
COMMUNITY

## 2021-01-01 RX ORDER — BUDESONIDE 0.25 MG/2ML
250 INHALANT ORAL 2 TIMES DAILY
Qty: 60 AMPULE | Refills: 3 | Status: SHIPPED
Start: 2021-01-01 | End: 2021-01-01 | Stop reason: SDUPTHER

## 2021-01-01 RX ORDER — LIDOCAINE 50 MG/G
1 PATCH TOPICAL DAILY
Qty: 30 PATCH | Refills: 0 | Status: SHIPPED | OUTPATIENT
Start: 2021-01-01 | End: 2021-01-01

## 2021-01-01 RX ORDER — SENNA AND DOCUSATE SODIUM 50; 8.6 MG/1; MG/1
2 TABLET, FILM COATED ORAL DAILY
Qty: 60 TABLET | Refills: 1 | Status: ON HOLD
Start: 2021-01-01 | End: 2021-01-01 | Stop reason: HOSPADM

## 2021-01-01 RX ORDER — GLYCOPYRROLATE 1 MG/1
2 TABLET ORAL EVERY 6 HOURS PRN
Qty: 90 TABLET | Refills: 1 | Status: SHIPPED | OUTPATIENT
Start: 2021-01-01

## 2021-01-01 RX ORDER — DIGOXIN 0.25 MG/ML
250 INJECTION INTRAMUSCULAR; INTRAVENOUS DAILY
Status: DISCONTINUED | OUTPATIENT
Start: 2021-01-01 | End: 2021-01-01

## 2021-01-01 RX ORDER — DOXYCYCLINE HYCLATE 100 MG
100 TABLET ORAL 2 TIMES DAILY WITH MEALS
Qty: 20 TABLET | Refills: 0 | Status: ON HOLD
Start: 2021-01-01 | End: 2021-01-01 | Stop reason: HOSPADM

## 2021-01-01 RX ORDER — DIAPER,BRIEF,INFANT-TODD,DISP
EACH MISCELLANEOUS 2 TIMES DAILY
Status: CANCELLED | OUTPATIENT
Start: 2021-01-01

## 2021-01-01 RX ORDER — LORAZEPAM 2 MG/ML
1 INJECTION INTRAMUSCULAR ONCE
Status: COMPLETED | OUTPATIENT
Start: 2021-01-01 | End: 2021-01-01

## 2021-01-01 RX ORDER — DILTIAZEM HYDROCHLORIDE 240 MG/1
240 CAPSULE, COATED, EXTENDED RELEASE ORAL DAILY
Status: DISCONTINUED | OUTPATIENT
Start: 2021-01-01 | End: 2021-01-01 | Stop reason: HOSPADM

## 2021-01-01 RX ORDER — INSULIN LISPRO 100 [IU]/ML
10 INJECTION, SOLUTION INTRAVENOUS; SUBCUTANEOUS
Status: CANCELLED | OUTPATIENT
Start: 2021-01-01

## 2021-01-01 RX ORDER — IPRATROPIUM BROMIDE AND ALBUTEROL SULFATE 2.5; .5 MG/3ML; MG/3ML
1 SOLUTION RESPIRATORY (INHALATION)
Status: DISCONTINUED | OUTPATIENT
Start: 2021-01-01 | End: 2021-01-01

## 2021-01-01 RX ORDER — METHYLPREDNISOLONE SODIUM SUCCINATE 40 MG/ML
40 INJECTION, POWDER, LYOPHILIZED, FOR SOLUTION INTRAMUSCULAR; INTRAVENOUS EVERY 12 HOURS
Status: DISCONTINUED | OUTPATIENT
Start: 2021-01-01 | End: 2021-01-01

## 2021-01-01 RX ORDER — SODIUM CHLORIDE FOR INHALATION 0.9 %
3 VIAL, NEBULIZER (ML) INHALATION ONCE
Status: COMPLETED | OUTPATIENT
Start: 2021-01-01 | End: 2021-01-01

## 2021-01-01 RX ORDER — ONDANSETRON 4 MG/1
4 TABLET, ORALLY DISINTEGRATING ORAL EVERY 8 HOURS PRN
Qty: 10 TABLET | Refills: 0 | Status: SHIPPED | OUTPATIENT
Start: 2021-01-01 | End: 2022-11-17

## 2021-01-01 RX ORDER — ALBUTEROL SULFATE 2.5 MG/3ML
2.5 SOLUTION RESPIRATORY (INHALATION) ONCE
Status: COMPLETED | OUTPATIENT
Start: 2021-01-01 | End: 2021-01-01

## 2021-01-01 RX ORDER — CEFDINIR 300 MG/1
300 CAPSULE ORAL 2 TIMES DAILY
Status: DISCONTINUED | OUTPATIENT
Start: 2021-01-01 | End: 2021-01-01 | Stop reason: HOSPADM

## 2021-01-01 RX ORDER — DILTIAZEM HYDROCHLORIDE 240 MG/1
CAPSULE, COATED, EXTENDED RELEASE ORAL
Qty: 30 CAPSULE | Refills: 5 | Status: SHIPPED | OUTPATIENT
Start: 2021-01-01

## 2021-01-01 RX ORDER — OXYCODONE HYDROCHLORIDE 5 MG/1
2.5 TABLET ORAL EVERY 6 HOURS PRN
Status: DISCONTINUED | OUTPATIENT
Start: 2021-01-01 | End: 2021-01-01 | Stop reason: HOSPADM

## 2021-01-01 RX ORDER — TRAZODONE HYDROCHLORIDE 150 MG/1
75 TABLET ORAL NIGHTLY
Qty: 4 TABLET | Refills: 0
Start: 2021-01-01 | End: 2021-01-01

## 2021-01-01 RX ORDER — HYDROCODONE BITARTRATE AND ACETAMINOPHEN 5; 325 MG/1; MG/1
1 TABLET ORAL EVERY 6 HOURS PRN
Qty: 12 TABLET | Refills: 0 | Status: SHIPPED | OUTPATIENT
Start: 2021-01-01 | End: 2021-01-01

## 2021-01-01 RX ORDER — ROSUVASTATIN CALCIUM 20 MG/1
20 TABLET, COATED ORAL NIGHTLY
Qty: 30 TABLET | Refills: 1 | Status: SHIPPED | OUTPATIENT
Start: 2021-01-01 | End: 2021-01-01

## 2021-01-01 RX ORDER — BENZONATATE 100 MG/1
100 CAPSULE ORAL 3 TIMES DAILY PRN
Qty: 30 CAPSULE | Refills: 0 | Status: SHIPPED | OUTPATIENT
Start: 2021-01-01 | End: 2021-01-01

## 2021-01-01 RX ORDER — IBUPROFEN 600 MG/1
1 TABLET ORAL PRN
Qty: 1 KIT | Refills: 0 | Status: SHIPPED
Start: 2021-01-01 | End: 2021-01-01

## 2021-01-01 RX ORDER — IPRATROPIUM BROMIDE AND ALBUTEROL SULFATE 2.5; .5 MG/3ML; MG/3ML
SOLUTION RESPIRATORY (INHALATION)
Qty: 540 ML | Refills: 5 | Status: SHIPPED | OUTPATIENT
Start: 2021-01-01

## 2021-01-01 RX ORDER — CEFDINIR 300 MG/1
300 CAPSULE ORAL ONCE
Status: COMPLETED | OUTPATIENT
Start: 2021-01-01 | End: 2021-01-01

## 2021-01-01 RX ORDER — INSULIN GLARGINE 100 [IU]/ML
75 INJECTION, SOLUTION SUBCUTANEOUS 2 TIMES DAILY
Qty: 5 PEN | Refills: 5 | Status: ON HOLD | OUTPATIENT
Start: 2021-01-01 | End: 2021-01-01 | Stop reason: SDUPTHER

## 2021-01-01 RX ORDER — 0.9 % SODIUM CHLORIDE 0.9 %
30 INTRAVENOUS SOLUTION INTRAVENOUS PRN
Status: DISCONTINUED | OUTPATIENT
Start: 2021-01-01 | End: 2021-01-01 | Stop reason: HOSPADM

## 2021-01-01 RX ORDER — IPRATROPIUM BROMIDE AND ALBUTEROL SULFATE 2.5; .5 MG/3ML; MG/3ML
1 SOLUTION RESPIRATORY (INHALATION)
Status: DISCONTINUED | OUTPATIENT
Start: 2021-01-01 | End: 2021-01-01 | Stop reason: CLARIF

## 2021-01-01 RX ORDER — IPRATROPIUM BROMIDE AND ALBUTEROL SULFATE 2.5; .5 MG/3ML; MG/3ML
1 SOLUTION RESPIRATORY (INHALATION) EVERY 4 HOURS
Status: DISCONTINUED | OUTPATIENT
Start: 2021-01-01 | End: 2021-01-01 | Stop reason: HOSPADM

## 2021-01-01 RX ORDER — DIAZEPAM 2 MG/1
2 TABLET ORAL EVERY 12 HOURS PRN
Status: DISCONTINUED | OUTPATIENT
Start: 2021-01-01 | End: 2021-01-01 | Stop reason: HOSPADM

## 2021-01-01 RX ORDER — DEXAMETHASONE 4 MG/1
6 TABLET ORAL DAILY
Status: DISCONTINUED | OUTPATIENT
Start: 2021-01-01 | End: 2021-01-01 | Stop reason: HOSPADM

## 2021-01-01 RX ORDER — SENNA AND DOCUSATE SODIUM 50; 8.6 MG/1; MG/1
1 TABLET, FILM COATED ORAL 2 TIMES DAILY
Status: DISCONTINUED | OUTPATIENT
Start: 2021-01-01 | End: 2021-01-01 | Stop reason: HOSPADM

## 2021-01-01 RX ORDER — BUTALBITAL, ACETAMINOPHEN AND CAFFEINE 50; 325; 40 MG/1; MG/1; MG/1
1 TABLET ORAL EVERY 4 HOURS PRN
Status: DISCONTINUED | OUTPATIENT
Start: 2021-01-01 | End: 2021-01-01 | Stop reason: HOSPADM

## 2021-01-01 RX ORDER — INSULIN GLARGINE 100 [IU]/ML
10 INJECTION, SOLUTION SUBCUTANEOUS 2 TIMES DAILY
Status: DISCONTINUED | OUTPATIENT
Start: 2021-01-01 | End: 2021-01-01 | Stop reason: HOSPADM

## 2021-01-01 RX ORDER — SODIUM CHLORIDE 0.9 % (FLUSH) 0.9 %
10 SYRINGE (ML) INJECTION PRN
Status: DISCONTINUED | OUTPATIENT
Start: 2021-01-01 | End: 2021-01-01 | Stop reason: HOSPADM

## 2021-01-01 RX ORDER — CILOSTAZOL 100 MG/1
100 TABLET ORAL 2 TIMES DAILY
COMMUNITY

## 2021-01-01 RX ORDER — INSULIN GLARGINE 100 [IU]/ML
80 INJECTION, SOLUTION SUBCUTANEOUS 2 TIMES DAILY
Qty: 5 PEN | Refills: 5 | Status: ON HOLD
Start: 2021-01-01 | End: 2021-01-01 | Stop reason: SDUPTHER

## 2021-01-01 RX ORDER — INSULIN GLARGINE 100 [IU]/ML
80 INJECTION, SOLUTION SUBCUTANEOUS 2 TIMES DAILY
Status: DISCONTINUED | OUTPATIENT
Start: 2021-01-01 | End: 2021-01-01

## 2021-01-01 RX ORDER — PSYLLIUM HUSK 3.4 G/7G
POWDER ORAL
COMMUNITY
Start: 2021-01-01 | End: 2021-01-01 | Stop reason: SDUPTHER

## 2021-01-01 RX ORDER — DIAZEPAM 5 MG/1
TABLET ORAL
COMMUNITY
Start: 2021-01-01

## 2021-01-01 RX ORDER — IPRATROPIUM BROMIDE AND ALBUTEROL SULFATE 2.5; .5 MG/3ML; MG/3ML
1 SOLUTION RESPIRATORY (INHALATION) 4 TIMES DAILY
Status: DISCONTINUED | OUTPATIENT
Start: 2021-01-01 | End: 2021-01-01 | Stop reason: HOSPADM

## 2021-01-01 RX ORDER — HYDROCODONE BITARTRATE AND ACETAMINOPHEN 5; 325 MG/1; MG/1
1 TABLET ORAL EVERY 8 HOURS PRN
Qty: 10 TABLET | Refills: 0 | Status: SHIPPED
Start: 2021-01-01 | End: 2021-01-01 | Stop reason: SDUPTHER

## 2021-01-01 RX ORDER — DOCUSATE SODIUM 100 MG/1
100 CAPSULE, LIQUID FILLED ORAL 2 TIMES DAILY
Status: CANCELLED | COMMUNITY
Start: 2021-01-01

## 2021-01-01 RX ORDER — DILTIAZEM HYDROCHLORIDE 120 MG/1
120 CAPSULE, COATED, EXTENDED RELEASE ORAL DAILY
Qty: 30 CAPSULE | Refills: 1 | Status: SHIPPED | OUTPATIENT
Start: 2021-01-01 | End: 2021-01-01

## 2021-01-01 RX ORDER — BUDESONIDE 0.25 MG/2ML
250 INHALANT ORAL 2 TIMES DAILY
Qty: 60 AMPULE | Refills: 3 | Status: SHIPPED | OUTPATIENT
Start: 2021-01-01

## 2021-01-01 RX ADMIN — OXYCODONE AND ACETAMINOPHEN 1 TABLET: 5; 325 TABLET ORAL at 04:59

## 2021-01-01 RX ADMIN — Medication 10 ML: at 08:05

## 2021-01-01 RX ADMIN — URSODIOL 300 MG: 300 CAPSULE ORAL at 14:40

## 2021-01-01 RX ADMIN — OXYCODONE AND ACETAMINOPHEN 1 TABLET: 5; 325 TABLET ORAL at 05:40

## 2021-01-01 RX ADMIN — IOPAMIDOL 75 ML: 755 INJECTION, SOLUTION INTRAVENOUS at 14:07

## 2021-01-01 RX ADMIN — BUDESONIDE 1000 MCG: 0.25 SUSPENSION RESPIRATORY (INHALATION) at 08:52

## 2021-01-01 RX ADMIN — IPRATROPIUM BROMIDE AND ALBUTEROL SULFATE 3 AMPULE: .5; 3 SOLUTION RESPIRATORY (INHALATION) at 22:34

## 2021-01-01 RX ADMIN — DEXTROSE MONOHYDRATE 5 MG/HR: 50 INJECTION, SOLUTION INTRAVENOUS at 18:14

## 2021-01-01 RX ADMIN — IPRATROPIUM BROMIDE AND ALBUTEROL SULFATE 1 AMPULE: 2.5; .5 SOLUTION RESPIRATORY (INHALATION) at 15:48

## 2021-01-01 RX ADMIN — ZINC SULFATE 220 MG (50 MG) CAPSULE 50 MG: CAPSULE at 16:24

## 2021-01-01 RX ADMIN — DILTIAZEM HYDROCHLORIDE 30 MG: 30 TABLET, FILM COATED ORAL at 18:30

## 2021-01-01 RX ADMIN — APIXABAN 5 MG: 5 TABLET, FILM COATED ORAL at 19:55

## 2021-01-01 RX ADMIN — GABAPENTIN 300 MG: 300 CAPSULE ORAL at 13:45

## 2021-01-01 RX ADMIN — IPRATROPIUM BROMIDE AND ALBUTEROL SULFATE 1 AMPULE: 2.5; .5 SOLUTION RESPIRATORY (INHALATION) at 04:08

## 2021-01-01 RX ADMIN — REMDESIVIR 200 MG: 100 INJECTION, POWDER, LYOPHILIZED, FOR SOLUTION INTRAVENOUS at 09:19

## 2021-01-01 RX ADMIN — APIXABAN 5 MG: 5 TABLET, FILM COATED ORAL at 08:33

## 2021-01-01 RX ADMIN — IPRATROPIUM BROMIDE AND ALBUTEROL SULFATE 1 AMPULE: .5; 3 SOLUTION RESPIRATORY (INHALATION) at 16:54

## 2021-01-01 RX ADMIN — SODIUM CHLORIDE 1000 ML: 9 INJECTION, SOLUTION INTRAVENOUS at 20:25

## 2021-01-01 RX ADMIN — IPRATROPIUM BROMIDE AND ALBUTEROL SULFATE 1 AMPULE: 2.5; .5 SOLUTION RESPIRATORY (INHALATION) at 08:52

## 2021-01-01 RX ADMIN — TRAZODONE HYDROCHLORIDE 75 MG: 150 TABLET ORAL at 01:39

## 2021-01-01 RX ADMIN — IPRATROPIUM BROMIDE AND ALBUTEROL SULFATE 1 AMPULE: 2.5; .5 SOLUTION RESPIRATORY (INHALATION) at 20:55

## 2021-01-01 RX ADMIN — INSULIN GLARGINE 20 UNITS: 100 INJECTION, SOLUTION SUBCUTANEOUS at 09:54

## 2021-01-01 RX ADMIN — MORPHINE SULFATE 2 MG: 2 INJECTION, SOLUTION INTRAMUSCULAR; INTRAVENOUS at 04:06

## 2021-01-01 RX ADMIN — ALBUTEROL SULFATE 2 PUFF: 90 AEROSOL, METERED RESPIRATORY (INHALATION) at 20:46

## 2021-01-01 RX ADMIN — SODIUM CHLORIDE: 9 INJECTION, SOLUTION INTRAVENOUS at 21:40

## 2021-01-01 RX ADMIN — SODIUM CHLORIDE: 9 INJECTION, SOLUTION INTRAVENOUS at 21:30

## 2021-01-01 RX ADMIN — IPRATROPIUM BROMIDE AND ALBUTEROL SULFATE 1 AMPULE: 2.5; .5 SOLUTION RESPIRATORY (INHALATION) at 03:03

## 2021-01-01 RX ADMIN — IPRATROPIUM BROMIDE AND ALBUTEROL SULFATE 1 AMPULE: 2.5; .5 SOLUTION RESPIRATORY (INHALATION) at 12:28

## 2021-01-01 RX ADMIN — IPRATROPIUM BROMIDE AND ALBUTEROL SULFATE 1 AMPULE: 2.5; .5 SOLUTION RESPIRATORY (INHALATION) at 06:27

## 2021-01-01 RX ADMIN — SODIUM CHLORIDE, PRESERVATIVE FREE 10 ML: 5 INJECTION INTRAVENOUS at 16:58

## 2021-01-01 RX ADMIN — FENOFIBRATE 54 MG: 54 TABLET ORAL at 09:58

## 2021-01-01 RX ADMIN — IPRATROPIUM BROMIDE AND ALBUTEROL SULFATE 1 AMPULE: 2.5; .5 SOLUTION RESPIRATORY (INHALATION) at 11:56

## 2021-01-01 RX ADMIN — IPRATROPIUM BROMIDE AND ALBUTEROL SULFATE 1 AMPULE: 2.5; .5 SOLUTION RESPIRATORY (INHALATION) at 07:53

## 2021-01-01 RX ADMIN — URSODIOL 300 MG: 300 CAPSULE ORAL at 16:03

## 2021-01-01 RX ADMIN — LEVALBUTEROL 3.75 MG: 1.25 SOLUTION, CONCENTRATE RESPIRATORY (INHALATION) at 21:00

## 2021-01-01 RX ADMIN — PANTOPRAZOLE SODIUM 40 MG: 40 TABLET, DELAYED RELEASE ORAL at 06:39

## 2021-01-01 RX ADMIN — INSULIN LISPRO 2 UNITS: 100 INJECTION, SOLUTION INTRAVENOUS; SUBCUTANEOUS at 08:05

## 2021-01-01 RX ADMIN — CEFDINIR 300 MG: 300 CAPSULE ORAL at 01:50

## 2021-01-01 RX ADMIN — CEFEPIME HYDROCHLORIDE 1000 MG: 1 INJECTION, POWDER, FOR SOLUTION INTRAMUSCULAR; INTRAVENOUS at 08:43

## 2021-01-01 RX ADMIN — INSULIN LISPRO 2 UNITS: 100 INJECTION, SOLUTION INTRAVENOUS; SUBCUTANEOUS at 16:12

## 2021-01-01 RX ADMIN — OXYCODONE 2.5 MG: 5 TABLET ORAL at 02:01

## 2021-01-01 RX ADMIN — Medication 10 ML: at 09:10

## 2021-01-01 RX ADMIN — IPRATROPIUM BROMIDE AND ALBUTEROL SULFATE 1 AMPULE: 2.5; .5 SOLUTION RESPIRATORY (INHALATION) at 23:56

## 2021-01-01 RX ADMIN — DILTIAZEM HYDROCHLORIDE 240 MG: 240 CAPSULE, COATED, EXTENDED RELEASE ORAL at 08:04

## 2021-01-01 RX ADMIN — INSULIN GLARGINE 20 UNITS: 100 INJECTION, SOLUTION SUBCUTANEOUS at 17:15

## 2021-01-01 RX ADMIN — DOXYCYCLINE HYCLATE 100 MG: 100 CAPSULE ORAL at 17:15

## 2021-01-01 RX ADMIN — OXYCODONE AND ACETAMINOPHEN 1 TABLET: 5; 325 TABLET ORAL at 17:28

## 2021-01-01 RX ADMIN — GABAPENTIN 300 MG: 300 CAPSULE ORAL at 09:09

## 2021-01-01 RX ADMIN — INSULIN GLARGINE 80 UNITS: 100 INJECTION, SOLUTION SUBCUTANEOUS at 10:54

## 2021-01-01 RX ADMIN — AZITHROMYCIN MONOHYDRATE 250 MG: 500 INJECTION, POWDER, LYOPHILIZED, FOR SOLUTION INTRAVENOUS at 20:43

## 2021-01-01 RX ADMIN — POLYETHYLENE GLYCOL 3350 17 G: 17 POWDER, FOR SOLUTION ORAL at 08:13

## 2021-01-01 RX ADMIN — SODIUM CHLORIDE, PRESERVATIVE FREE 10 ML: 5 INJECTION INTRAVENOUS at 21:40

## 2021-01-01 RX ADMIN — PANTOPRAZOLE SODIUM 40 MG: 40 TABLET, DELAYED RELEASE ORAL at 05:37

## 2021-01-01 RX ADMIN — METHYLPREDNISOLONE SODIUM SUCCINATE 40 MG: 40 INJECTION, POWDER, LYOPHILIZED, FOR SOLUTION INTRAMUSCULAR; INTRAVENOUS at 11:33

## 2021-01-01 RX ADMIN — OXYCODONE 2.5 MG: 5 TABLET ORAL at 21:41

## 2021-01-01 RX ADMIN — OXYCODONE 2.5 MG: 5 TABLET ORAL at 18:03

## 2021-01-01 RX ADMIN — DEXTROSE MONOHYDRATE 12.5 G: 25 INJECTION, SOLUTION INTRAVENOUS at 02:23

## 2021-01-01 RX ADMIN — IPRATROPIUM BROMIDE AND ALBUTEROL SULFATE 1 AMPULE: 2.5; .5 SOLUTION RESPIRATORY (INHALATION) at 16:52

## 2021-01-01 RX ADMIN — OXYCODONE HYDROCHLORIDE AND ACETAMINOPHEN 500 MG: 500 TABLET ORAL at 16:24

## 2021-01-01 RX ADMIN — SODIUM CHLORIDE, PRESERVATIVE FREE 10 ML: 5 INJECTION INTRAVENOUS at 20:42

## 2021-01-01 RX ADMIN — INSULIN LISPRO 3 UNITS: 100 INJECTION, SOLUTION INTRAVENOUS; SUBCUTANEOUS at 21:38

## 2021-01-01 RX ADMIN — IPRATROPIUM BROMIDE AND ALBUTEROL SULFATE 1 AMPULE: .5; 3 SOLUTION RESPIRATORY (INHALATION) at 00:20

## 2021-01-01 RX ADMIN — PANTOPRAZOLE SODIUM 40 MG: 40 TABLET, DELAYED RELEASE ORAL at 06:38

## 2021-01-01 RX ADMIN — OXYCODONE AND ACETAMINOPHEN 1 TABLET: 5; 325 TABLET ORAL at 11:10

## 2021-01-01 RX ADMIN — CEFDINIR 300 MG: 300 CAPSULE ORAL at 09:21

## 2021-01-01 RX ADMIN — FENOFIBRATE 54 MG: 54 TABLET ORAL at 08:13

## 2021-01-01 RX ADMIN — SODIUM CHLORIDE, PRESERVATIVE FREE 10 ML: 5 INJECTION INTRAVENOUS at 11:30

## 2021-01-01 RX ADMIN — DEXTROSE MONOHYDRATE 5 MG/HR: 50 INJECTION, SOLUTION INTRAVENOUS at 14:05

## 2021-01-01 RX ADMIN — BUDESONIDE 1000 MCG: 0.25 SUSPENSION RESPIRATORY (INHALATION) at 20:46

## 2021-01-01 RX ADMIN — URSODIOL 300 MG: 300 CAPSULE ORAL at 17:24

## 2021-01-01 RX ADMIN — ROSUVASTATIN CALCIUM 20 MG: 20 TABLET, FILM COATED ORAL at 21:40

## 2021-01-01 RX ADMIN — GABAPENTIN 300 MG: 300 CAPSULE ORAL at 20:37

## 2021-01-01 RX ADMIN — URSODIOL 300 MG: 300 CAPSULE ORAL at 08:04

## 2021-01-01 RX ADMIN — OXYCODONE 2.5 MG: 5 TABLET ORAL at 16:14

## 2021-01-01 RX ADMIN — Medication 10 ML: at 23:37

## 2021-01-01 RX ADMIN — LEVALBUTEROL 3.75 MG: 1.25 SOLUTION, CONCENTRATE RESPIRATORY (INHALATION) at 20:55

## 2021-01-01 RX ADMIN — NORTRIPTYLINE HYDROCHLORIDE 25 MG: 25 CAPSULE ORAL at 21:00

## 2021-01-01 RX ADMIN — FENOFIBRATE 54 MG: 54 TABLET ORAL at 09:48

## 2021-01-01 RX ADMIN — OXYCODONE AND ACETAMINOPHEN 1 TABLET: 5; 325 TABLET ORAL at 02:34

## 2021-01-01 RX ADMIN — DEXAMETHASONE 6 MG: 4 TABLET ORAL at 06:11

## 2021-01-01 RX ADMIN — SODIUM CHLORIDE, PRESERVATIVE FREE 10 ML: 5 INJECTION INTRAVENOUS at 14:59

## 2021-01-01 RX ADMIN — OXYCODONE AND ACETAMINOPHEN 1 TABLET: 5; 325 TABLET ORAL at 21:12

## 2021-01-01 RX ADMIN — IPRATROPIUM BROMIDE AND ALBUTEROL SULFATE 1 AMPULE: 2.5; .5 SOLUTION RESPIRATORY (INHALATION) at 20:46

## 2021-01-01 RX ADMIN — BUDESONIDE 1000 MCG: 0.25 SUSPENSION RESPIRATORY (INHALATION) at 09:02

## 2021-01-01 RX ADMIN — APIXABAN 5 MG: 5 TABLET, FILM COATED ORAL at 09:48

## 2021-01-01 RX ADMIN — PANTOPRAZOLE SODIUM 40 MG: 40 TABLET, DELAYED RELEASE ORAL at 08:04

## 2021-01-01 RX ADMIN — IPRATROPIUM BROMIDE AND ALBUTEROL SULFATE 1 AMPULE: 2.5; .5 SOLUTION RESPIRATORY (INHALATION) at 20:28

## 2021-01-01 RX ADMIN — ROSUVASTATIN CALCIUM 20 MG: 20 TABLET, FILM COATED ORAL at 20:37

## 2021-01-01 RX ADMIN — DILTIAZEM HYDROCHLORIDE 240 MG: 240 CAPSULE, COATED, EXTENDED RELEASE ORAL at 08:14

## 2021-01-01 RX ADMIN — IPRATROPIUM BROMIDE AND ALBUTEROL SULFATE 1 AMPULE: 2.5; .5 SOLUTION RESPIRATORY (INHALATION) at 13:54

## 2021-01-01 RX ADMIN — INSULIN GLARGINE 80 UNITS: 100 INJECTION, SOLUTION SUBCUTANEOUS at 01:39

## 2021-01-01 RX ADMIN — LEVALBUTEROL 3.75 MG: 1.25 SOLUTION, CONCENTRATE RESPIRATORY (INHALATION) at 23:26

## 2021-01-01 RX ADMIN — GABAPENTIN 300 MG: 300 CAPSULE ORAL at 16:14

## 2021-01-01 RX ADMIN — DEXAMETHASONE SODIUM PHOSPHATE 10 MG: 10 INJECTION INTRAMUSCULAR; INTRAVENOUS at 00:06

## 2021-01-01 RX ADMIN — SODIUM CHLORIDE 1000 ML: 9 INJECTION, SOLUTION INTRAVENOUS at 18:48

## 2021-01-01 RX ADMIN — URSODIOL 300 MG: 300 CAPSULE ORAL at 11:33

## 2021-01-01 RX ADMIN — AZITHROMYCIN MONOHYDRATE 250 MG: 500 INJECTION, POWDER, LYOPHILIZED, FOR SOLUTION INTRAVENOUS at 21:41

## 2021-01-01 RX ADMIN — PREDNISONE 40 MG: 20 TABLET ORAL at 08:17

## 2021-01-01 RX ADMIN — ALBUTEROL SULFATE 2.5 MG: 2.5 SOLUTION RESPIRATORY (INHALATION) at 14:55

## 2021-01-01 RX ADMIN — ACETAMINOPHEN 650 MG: 325 TABLET ORAL at 11:56

## 2021-01-01 RX ADMIN — Medication 10 ML: at 09:49

## 2021-01-01 RX ADMIN — IPRATROPIUM BROMIDE AND ALBUTEROL SULFATE 1 AMPULE: .5; 3 SOLUTION RESPIRATORY (INHALATION) at 08:41

## 2021-01-01 RX ADMIN — URSODIOL 300 MG: 300 CAPSULE ORAL at 09:22

## 2021-01-01 RX ADMIN — SODIUM CHLORIDE, PRESERVATIVE FREE 10 ML: 5 INJECTION INTRAVENOUS at 14:07

## 2021-01-01 RX ADMIN — NORTRIPTYLINE HYDROCHLORIDE 25 MG: 25 CAPSULE ORAL at 20:37

## 2021-01-01 RX ADMIN — BUDESONIDE 250 MCG: 0.25 SUSPENSION RESPIRATORY (INHALATION) at 20:55

## 2021-01-01 RX ADMIN — INSULIN LISPRO 2 UNITS: 100 INJECTION, SOLUTION INTRAVENOUS; SUBCUTANEOUS at 09:22

## 2021-01-01 RX ADMIN — Medication 1000 MG: at 21:11

## 2021-01-01 RX ADMIN — FENOFIBRATE 54 MG: 54 TABLET ORAL at 08:43

## 2021-01-01 RX ADMIN — DILTIAZEM HYDROCHLORIDE 10 MG: 5 INJECTION, SOLUTION INTRAVENOUS at 18:11

## 2021-01-01 RX ADMIN — SODIUM CHLORIDE 1000 ML: 9 INJECTION, SOLUTION INTRAVENOUS at 23:30

## 2021-01-01 RX ADMIN — SODIUM CHLORIDE 1000 ML: 9 INJECTION, SOLUTION INTRAVENOUS at 12:47

## 2021-01-01 RX ADMIN — DEXTROSE MONOHYDRATE 5 MG/HR: 50 INJECTION, SOLUTION INTRAVENOUS at 07:55

## 2021-01-01 RX ADMIN — INSULIN LISPRO 9 UNITS: 100 INJECTION, SOLUTION INTRAVENOUS; SUBCUTANEOUS at 17:26

## 2021-01-01 RX ADMIN — CEFEPIME HYDROCHLORIDE 1000 MG: 1 INJECTION, POWDER, FOR SOLUTION INTRAMUSCULAR; INTRAVENOUS at 09:03

## 2021-01-01 RX ADMIN — INSULIN LISPRO 6 UNITS: 100 INJECTION, SOLUTION INTRAVENOUS; SUBCUTANEOUS at 16:03

## 2021-01-01 RX ADMIN — OXYCODONE AND ACETAMINOPHEN 1 TABLET: 5; 325 TABLET ORAL at 06:13

## 2021-01-01 RX ADMIN — FENOFIBRATE 54 MG: 54 TABLET ORAL at 10:37

## 2021-01-01 RX ADMIN — PANTOPRAZOLE SODIUM 40 MG: 40 TABLET, DELAYED RELEASE ORAL at 06:12

## 2021-01-01 RX ADMIN — GABAPENTIN 300 MG: 300 CAPSULE ORAL at 20:00

## 2021-01-01 RX ADMIN — GABAPENTIN 300 MG: 300 CAPSULE ORAL at 08:17

## 2021-01-01 RX ADMIN — INSULIN GLARGINE 10 UNITS: 100 INJECTION, SOLUTION SUBCUTANEOUS at 20:45

## 2021-01-01 RX ADMIN — OXYCODONE AND ACETAMINOPHEN 1 TABLET: 5; 325 TABLET ORAL at 18:45

## 2021-01-01 RX ADMIN — IPRATROPIUM BROMIDE AND ALBUTEROL SULFATE 1 AMPULE: 2.5; .5 SOLUTION RESPIRATORY (INHALATION) at 00:29

## 2021-01-01 RX ADMIN — IPRATROPIUM BROMIDE AND ALBUTEROL SULFATE 1 AMPULE: 2.5; .5 SOLUTION RESPIRATORY (INHALATION) at 12:47

## 2021-01-01 RX ADMIN — BUDESONIDE 250 MCG: 0.25 SUSPENSION RESPIRATORY (INHALATION) at 06:27

## 2021-01-01 RX ADMIN — DOXYCYCLINE HYCLATE 100 MG: 100 CAPSULE ORAL at 08:32

## 2021-01-01 RX ADMIN — IPRATROPIUM BROMIDE AND ALBUTEROL SULFATE 1 AMPULE: .5; 3 SOLUTION RESPIRATORY (INHALATION) at 12:35

## 2021-01-01 RX ADMIN — GABAPENTIN 300 MG: 300 CAPSULE ORAL at 09:02

## 2021-01-01 RX ADMIN — NORTRIPTYLINE HYDROCHLORIDE 25 MG: 25 CAPSULE ORAL at 01:40

## 2021-01-01 RX ADMIN — CEFEPIME HYDROCHLORIDE 1000 MG: 1 INJECTION, POWDER, FOR SOLUTION INTRAMUSCULAR; INTRAVENOUS at 21:00

## 2021-01-01 RX ADMIN — MORPHINE SULFATE 2 MG: 2 INJECTION, SOLUTION INTRAMUSCULAR; INTRAVENOUS at 14:59

## 2021-01-01 RX ADMIN — GABAPENTIN 300 MG: 300 CAPSULE ORAL at 08:13

## 2021-01-01 RX ADMIN — NORTRIPTYLINE HYDROCHLORIDE 25 MG: 25 CAPSULE ORAL at 21:40

## 2021-01-01 RX ADMIN — APIXABAN 5 MG: 5 TABLET, FILM COATED ORAL at 08:43

## 2021-01-01 RX ADMIN — URSODIOL 300 MG: 300 CAPSULE ORAL at 08:13

## 2021-01-01 RX ADMIN — APIXABAN 5 MG: 5 TABLET, FILM COATED ORAL at 09:09

## 2021-01-01 RX ADMIN — BUDESONIDE 1000 MCG: 0.25 SUSPENSION RESPIRATORY (INHALATION) at 21:08

## 2021-01-01 RX ADMIN — OXYCODONE AND ACETAMINOPHEN 1 TABLET: 5; 325 TABLET ORAL at 04:33

## 2021-01-01 RX ADMIN — BUDESONIDE 1000 MCG: 0.25 SUSPENSION RESPIRATORY (INHALATION) at 08:54

## 2021-01-01 RX ADMIN — INSULIN LISPRO 4 UNITS: 100 INJECTION, SOLUTION INTRAVENOUS; SUBCUTANEOUS at 09:04

## 2021-01-01 RX ADMIN — APIXABAN 5 MG: 5 TABLET, FILM COATED ORAL at 21:01

## 2021-01-01 RX ADMIN — URSODIOL 300 MG: 300 CAPSULE ORAL at 09:58

## 2021-01-01 RX ADMIN — AZITHROMYCIN 500 MG: 500 INJECTION, POWDER, LYOPHILIZED, FOR SOLUTION INTRAVENOUS at 02:53

## 2021-01-01 RX ADMIN — ALBUTEROL SULFATE 2 PUFF: 90 AEROSOL, METERED RESPIRATORY (INHALATION) at 13:39

## 2021-01-01 RX ADMIN — CEFEPIME HYDROCHLORIDE 2000 MG: 2 INJECTION, POWDER, FOR SOLUTION INTRAVENOUS at 21:00

## 2021-01-01 RX ADMIN — DEXTROSE MONOHYDRATE 12.5 G: 25 INJECTION, SOLUTION INTRAVENOUS at 06:13

## 2021-01-01 RX ADMIN — INSULIN GLARGINE 20 UNITS: 100 INJECTION, SOLUTION SUBCUTANEOUS at 08:21

## 2021-01-01 RX ADMIN — APIXABAN 5 MG: 5 TABLET, FILM COATED ORAL at 21:12

## 2021-01-01 RX ADMIN — PANTOPRAZOLE SODIUM 40 MG: 40 TABLET, DELAYED RELEASE ORAL at 09:21

## 2021-01-01 RX ADMIN — INSULIN LISPRO 9 UNITS: 100 INJECTION, SOLUTION INTRAVENOUS; SUBCUTANEOUS at 16:16

## 2021-01-01 RX ADMIN — METHYLPREDNISOLONE SODIUM SUCCINATE 40 MG: 40 INJECTION, POWDER, LYOPHILIZED, FOR SOLUTION INTRAMUSCULAR; INTRAVENOUS at 00:12

## 2021-01-01 RX ADMIN — IOPAMIDOL 120 ML: 755 INJECTION, SOLUTION INTRAVENOUS at 18:56

## 2021-01-01 RX ADMIN — ALBUTEROL SULFATE 2 PUFF: 90 AEROSOL, METERED RESPIRATORY (INHALATION) at 12:32

## 2021-01-01 RX ADMIN — APIXABAN 5 MG: 5 TABLET, FILM COATED ORAL at 08:04

## 2021-01-01 RX ADMIN — IPRATROPIUM BROMIDE AND ALBUTEROL SULFATE 1 AMPULE: 2.5; .5 SOLUTION RESPIRATORY (INHALATION) at 17:37

## 2021-01-01 RX ADMIN — BUDESONIDE 250 MCG: 0.25 SUSPENSION RESPIRATORY (INHALATION) at 20:10

## 2021-01-01 RX ADMIN — PANTOPRAZOLE SODIUM 40 MG: 40 TABLET, DELAYED RELEASE ORAL at 21:12

## 2021-01-01 RX ADMIN — GABAPENTIN 300 MG: 300 CAPSULE ORAL at 14:55

## 2021-01-01 RX ADMIN — BUDESONIDE AND FORMOTEROL FUMARATE DIHYDRATE 2 PUFF: 160; 4.5 AEROSOL RESPIRATORY (INHALATION) at 05:15

## 2021-01-01 RX ADMIN — GABAPENTIN 300 MG: 300 CAPSULE ORAL at 08:04

## 2021-01-01 RX ADMIN — PANTOPRAZOLE SODIUM 40 MG: 40 TABLET, DELAYED RELEASE ORAL at 09:58

## 2021-01-01 RX ADMIN — BUDESONIDE 1000 MCG: 0.25 SUSPENSION RESPIRATORY (INHALATION) at 20:28

## 2021-01-01 RX ADMIN — METHYLPREDNISOLONE SODIUM SUCCINATE 40 MG: 40 INJECTION, POWDER, LYOPHILIZED, FOR SOLUTION INTRAMUSCULAR; INTRAVENOUS at 09:09

## 2021-01-01 RX ADMIN — OXYCODONE AND ACETAMINOPHEN 1 TABLET: 5; 325 TABLET ORAL at 10:59

## 2021-01-01 RX ADMIN — SODIUM CHLORIDE: 9 INJECTION, SOLUTION INTRAVENOUS at 01:04

## 2021-01-01 RX ADMIN — DILTIAZEM HYDROCHLORIDE 240 MG: 240 CAPSULE, COATED, EXTENDED RELEASE ORAL at 08:18

## 2021-01-01 RX ADMIN — INSULIN LISPRO 3 UNITS: 100 INJECTION, SOLUTION INTRAVENOUS; SUBCUTANEOUS at 22:38

## 2021-01-01 RX ADMIN — ARFORMOTEROL TARTRATE 15 MCG: 15 SOLUTION RESPIRATORY (INHALATION) at 08:18

## 2021-01-01 RX ADMIN — SODIUM CHLORIDE, PRESERVATIVE FREE 10 ML: 5 INJECTION INTRAVENOUS at 17:44

## 2021-01-01 RX ADMIN — DOXYCYCLINE HYCLATE 100 MG: 100 CAPSULE ORAL at 01:39

## 2021-01-01 RX ADMIN — APIXABAN 5 MG: 5 TABLET, FILM COATED ORAL at 10:00

## 2021-01-01 RX ADMIN — ARFORMOTEROL TARTRATE 15 MCG: 15 SOLUTION RESPIRATORY (INHALATION) at 20:29

## 2021-01-01 RX ADMIN — GABAPENTIN 300 MG: 300 CAPSULE ORAL at 08:43

## 2021-01-01 RX ADMIN — DILTIAZEM HYDROCHLORIDE 30 MG: 30 TABLET, FILM COATED ORAL at 12:24

## 2021-01-01 RX ADMIN — NORTRIPTYLINE HYDROCHLORIDE 25 MG: 25 CAPSULE ORAL at 19:55

## 2021-01-01 RX ADMIN — IPRATROPIUM BROMIDE AND ALBUTEROL SULFATE 1 AMPULE: 2.5; .5 SOLUTION RESPIRATORY (INHALATION) at 15:58

## 2021-01-01 RX ADMIN — FENOFIBRATE 54 MG: 54 TABLET ORAL at 09:08

## 2021-01-01 RX ADMIN — FENOFIBRATE 54 MG: 54 TABLET ORAL at 08:33

## 2021-01-01 RX ADMIN — ROSUVASTATIN CALCIUM 20 MG: 20 TABLET, FILM COATED ORAL at 20:41

## 2021-01-01 RX ADMIN — APIXABAN 5 MG: 5 TABLET, FILM COATED ORAL at 08:17

## 2021-01-01 RX ADMIN — ROSUVASTATIN CALCIUM 20 MG: 20 TABLET, FILM COATED ORAL at 19:55

## 2021-01-01 RX ADMIN — Medication 10 ML: at 19:55

## 2021-01-01 RX ADMIN — ALBUTEROL SULFATE 2 PUFF: 90 AEROSOL, METERED RESPIRATORY (INHALATION) at 14:58

## 2021-01-01 RX ADMIN — SODIUM CHLORIDE 1000 ML: 9 INJECTION, SOLUTION INTRAVENOUS at 21:00

## 2021-01-01 RX ADMIN — DILTIAZEM HYDROCHLORIDE 30 MG: 30 TABLET, FILM COATED ORAL at 00:05

## 2021-01-01 RX ADMIN — IPRATROPIUM BROMIDE AND ALBUTEROL SULFATE 1 AMPULE: 2.5; .5 SOLUTION RESPIRATORY (INHALATION) at 21:07

## 2021-01-01 RX ADMIN — CEFDINIR 300 MG: 300 CAPSULE ORAL at 20:42

## 2021-01-01 RX ADMIN — GABAPENTIN 300 MG: 300 CAPSULE ORAL at 13:30

## 2021-01-01 RX ADMIN — OXYCODONE AND ACETAMINOPHEN 1 TABLET: 5; 325 TABLET ORAL at 11:33

## 2021-01-01 RX ADMIN — BUDESONIDE 250 MCG: 0.25 SUSPENSION RESPIRATORY (INHALATION) at 08:19

## 2021-01-01 RX ADMIN — DEXTROSE MONOHYDRATE 12.5 G: 25 INJECTION, SOLUTION INTRAVENOUS at 21:11

## 2021-01-01 RX ADMIN — ALBUTEROL SULFATE 2 PUFF: 90 AEROSOL, METERED RESPIRATORY (INHALATION) at 11:37

## 2021-01-01 RX ADMIN — URSODIOL 300 MG: 300 CAPSULE ORAL at 16:51

## 2021-01-01 RX ADMIN — URSODIOL 300 MG: 300 CAPSULE ORAL at 11:48

## 2021-01-01 RX ADMIN — APIXABAN 5 MG: 5 TABLET, FILM COATED ORAL at 10:37

## 2021-01-01 RX ADMIN — CHOLECALCIFEROL TAB 50 MCG (2000 UNIT) 2000 UNITS: 50 TAB at 16:25

## 2021-01-01 RX ADMIN — ONDANSETRON 4 MG: 2 INJECTION INTRAMUSCULAR; INTRAVENOUS at 13:06

## 2021-01-01 RX ADMIN — FENOFIBRATE 54 MG: 54 TABLET ORAL at 08:04

## 2021-01-01 RX ADMIN — SODIUM CHLORIDE 1000 ML: 9 INJECTION, SOLUTION INTRAVENOUS at 00:44

## 2021-01-01 RX ADMIN — INSULIN LISPRO 6 UNITS: 100 INJECTION, SOLUTION INTRAVENOUS; SUBCUTANEOUS at 07:00

## 2021-01-01 RX ADMIN — INSULIN LISPRO 3 UNITS: 100 INJECTION, SOLUTION INTRAVENOUS; SUBCUTANEOUS at 11:10

## 2021-01-01 RX ADMIN — GABAPENTIN 300 MG: 300 CAPSULE ORAL at 21:01

## 2021-01-01 RX ADMIN — SODIUM CHLORIDE: 9 INJECTION, SOLUTION INTRAVENOUS at 06:18

## 2021-01-01 RX ADMIN — APIXABAN 5 MG: 5 TABLET, FILM COATED ORAL at 08:14

## 2021-01-01 RX ADMIN — FENOFIBRATE 54 MG: 54 TABLET ORAL at 08:18

## 2021-01-01 RX ADMIN — INSULIN LISPRO 3 UNITS: 100 INJECTION, SOLUTION INTRAVENOUS; SUBCUTANEOUS at 11:22

## 2021-01-01 RX ADMIN — IPRATROPIUM BROMIDE AND ALBUTEROL SULFATE 1 AMPULE: 2.5; .5 SOLUTION RESPIRATORY (INHALATION) at 20:20

## 2021-01-01 RX ADMIN — GABAPENTIN 300 MG: 300 CAPSULE ORAL at 14:00

## 2021-01-01 RX ADMIN — IPRATROPIUM BROMIDE AND ALBUTEROL SULFATE 1 AMPULE: 2.5; .5 SOLUTION RESPIRATORY (INHALATION) at 14:14

## 2021-01-01 RX ADMIN — SODIUM CHLORIDE: 9 INJECTION, SOLUTION INTRAVENOUS at 23:25

## 2021-01-01 RX ADMIN — BUDESONIDE 250 MCG: 0.25 SUSPENSION RESPIRATORY (INHALATION) at 08:41

## 2021-01-01 RX ADMIN — INSULIN LISPRO 2 UNITS: 100 INJECTION, SOLUTION INTRAVENOUS; SUBCUTANEOUS at 06:23

## 2021-01-01 RX ADMIN — PANTOPRAZOLE SODIUM 40 MG: 40 TABLET, DELAYED RELEASE ORAL at 05:02

## 2021-01-01 RX ADMIN — URSODIOL 300 MG: 300 CAPSULE ORAL at 09:08

## 2021-01-01 RX ADMIN — SODIUM CHLORIDE: 9 INJECTION, SOLUTION INTRAVENOUS at 14:55

## 2021-01-01 RX ADMIN — TIOTROPIUM BROMIDE INHALATION SPRAY 2 PUFF: 3.12 SPRAY, METERED RESPIRATORY (INHALATION) at 05:14

## 2021-01-01 RX ADMIN — GLYCOPYRROLATE 0.1 MG: 0.2 INJECTION INTRAMUSCULAR; INTRAVENOUS at 22:47

## 2021-01-01 RX ADMIN — GABAPENTIN 300 MG: 300 CAPSULE ORAL at 21:40

## 2021-01-01 RX ADMIN — INSULIN LISPRO 2 UNITS: 100 INJECTION, SOLUTION INTRAVENOUS; SUBCUTANEOUS at 11:16

## 2021-01-01 RX ADMIN — IPRATROPIUM BROMIDE AND ALBUTEROL SULFATE 1 AMPULE: 2.5; .5 SOLUTION RESPIRATORY (INHALATION) at 00:59

## 2021-01-01 RX ADMIN — BUDESONIDE 250 MCG: 0.25 SUSPENSION RESPIRATORY (INHALATION) at 20:29

## 2021-01-01 RX ADMIN — MORPHINE SULFATE 2 MG: 2 INJECTION, SOLUTION INTRAMUSCULAR; INTRAVENOUS at 22:48

## 2021-01-01 RX ADMIN — IPRATROPIUM BROMIDE AND ALBUTEROL SULFATE 1 AMPULE: 2.5; .5 SOLUTION RESPIRATORY (INHALATION) at 17:14

## 2021-01-01 RX ADMIN — INSULIN LISPRO 4 UNITS: 100 INJECTION, SOLUTION INTRAVENOUS; SUBCUTANEOUS at 10:50

## 2021-01-01 RX ADMIN — IPRATROPIUM BROMIDE AND ALBUTEROL SULFATE 1 AMPULE: .5; 3 SOLUTION RESPIRATORY (INHALATION) at 13:20

## 2021-01-01 RX ADMIN — IPRATROPIUM BROMIDE AND ALBUTEROL SULFATE 1 AMPULE: 2.5; .5 SOLUTION RESPIRATORY (INHALATION) at 04:12

## 2021-01-01 RX ADMIN — OXYCODONE AND ACETAMINOPHEN 1 TABLET: 5; 325 TABLET ORAL at 17:30

## 2021-01-01 RX ADMIN — Medication 10 ML: at 23:25

## 2021-01-01 RX ADMIN — INSULIN LISPRO 5 UNITS: 100 INJECTION, SOLUTION INTRAVENOUS; SUBCUTANEOUS at 19:30

## 2021-01-01 RX ADMIN — ARFORMOTEROL TARTRATE 15 MCG: 15 SOLUTION RESPIRATORY (INHALATION) at 20:55

## 2021-01-01 RX ADMIN — ISODIUM CHLORIDE 3 ML: 0.03 SOLUTION RESPIRATORY (INHALATION) at 21:00

## 2021-01-01 RX ADMIN — ASPIRIN 324 MG: 81 TABLET, CHEWABLE ORAL at 22:14

## 2021-01-01 RX ADMIN — APIXABAN 5 MG: 5 TABLET, FILM COATED ORAL at 01:40

## 2021-01-01 RX ADMIN — Medication 10 ML: at 21:19

## 2021-01-01 RX ADMIN — DILTIAZEM HYDROCHLORIDE 240 MG: 240 CAPSULE, COATED, EXTENDED RELEASE ORAL at 09:48

## 2021-01-01 RX ADMIN — FAMOTIDINE 20 MG: 10 INJECTION, SOLUTION INTRAVENOUS at 20:42

## 2021-01-01 RX ADMIN — GABAPENTIN 300 MG: 300 CAPSULE ORAL at 10:00

## 2021-01-01 RX ADMIN — AZITHROMYCIN MONOHYDRATE 250 MG: 500 INJECTION, POWDER, LYOPHILIZED, FOR SOLUTION INTRAVENOUS at 23:34

## 2021-01-01 RX ADMIN — AZITHROMYCIN MONOHYDRATE 250 MG: 500 INJECTION, POWDER, LYOPHILIZED, FOR SOLUTION INTRAVENOUS at 21:51

## 2021-01-01 RX ADMIN — SENNOSIDES AND DOCUSATE SODIUM 1 TABLET: 50; 8.6 TABLET ORAL at 21:00

## 2021-01-01 RX ADMIN — METHYLPREDNISOLONE SODIUM SUCCINATE 40 MG: 40 INJECTION, POWDER, LYOPHILIZED, FOR SOLUTION INTRAMUSCULAR; INTRAVENOUS at 23:36

## 2021-01-01 RX ADMIN — FENTANYL CITRATE 25 MCG: 50 INJECTION, SOLUTION INTRAMUSCULAR; INTRAVENOUS at 12:47

## 2021-01-01 RX ADMIN — FENTANYL CITRATE 25 MCG: 50 INJECTION, SOLUTION INTRAMUSCULAR; INTRAVENOUS at 13:14

## 2021-01-01 RX ADMIN — MORPHINE SULFATE 2 MG: 2 INJECTION, SOLUTION INTRAMUSCULAR; INTRAVENOUS at 12:32

## 2021-01-01 RX ADMIN — MICONAZOLE NITRATE: 2 OINTMENT TOPICAL at 08:33

## 2021-01-01 RX ADMIN — INSULIN GLARGINE 80 UNITS: 100 INJECTION, SOLUTION SUBCUTANEOUS at 21:38

## 2021-01-01 RX ADMIN — METHYLPREDNISOLONE SODIUM SUCCINATE 40 MG: 40 INJECTION, POWDER, LYOPHILIZED, FOR SOLUTION INTRAMUSCULAR; INTRAVENOUS at 11:09

## 2021-01-01 RX ADMIN — OXYCODONE AND ACETAMINOPHEN 1 TABLET: 5; 325 TABLET ORAL at 20:56

## 2021-01-01 RX ADMIN — TRAZODONE HYDROCHLORIDE 75 MG: 150 TABLET ORAL at 21:39

## 2021-01-01 RX ADMIN — URSODIOL 300 MG: 300 CAPSULE ORAL at 11:09

## 2021-01-01 RX ADMIN — IPRATROPIUM BROMIDE AND ALBUTEROL SULFATE 1 AMPULE: .5; 3 SOLUTION RESPIRATORY (INHALATION) at 20:10

## 2021-01-01 RX ADMIN — PANTOPRAZOLE SODIUM 40 MG: 40 TABLET, DELAYED RELEASE ORAL at 06:13

## 2021-01-01 RX ADMIN — SODIUM CHLORIDE: 9 INJECTION, SOLUTION INTRAVENOUS at 07:50

## 2021-01-01 RX ADMIN — GABAPENTIN 300 MG: 300 CAPSULE ORAL at 09:47

## 2021-01-01 RX ADMIN — IPRATROPIUM BROMIDE AND ALBUTEROL SULFATE 1 AMPULE: .5; 3 SOLUTION RESPIRATORY (INHALATION) at 18:43

## 2021-01-01 RX ADMIN — ALBUTEROL SULFATE 2 PUFF: 90 AEROSOL, METERED RESPIRATORY (INHALATION) at 09:15

## 2021-01-01 RX ADMIN — OXYCODONE AND ACETAMINOPHEN 1 TABLET: 5; 325 TABLET ORAL at 14:00

## 2021-01-01 RX ADMIN — MICONAZOLE NITRATE: 2 OINTMENT TOPICAL at 21:42

## 2021-01-01 RX ADMIN — APIXABAN 5 MG: 5 TABLET, FILM COATED ORAL at 21:39

## 2021-01-01 RX ADMIN — PANTOPRAZOLE SODIUM 40 MG: 40 TABLET, DELAYED RELEASE ORAL at 06:51

## 2021-01-01 RX ADMIN — SODIUM CHLORIDE, PRESERVATIVE FREE 10 ML: 5 INJECTION INTRAVENOUS at 11:48

## 2021-01-01 RX ADMIN — MORPHINE SULFATE 2 MG: 2 INJECTION, SOLUTION INTRAMUSCULAR; INTRAVENOUS at 17:45

## 2021-01-01 RX ADMIN — URSODIOL 300 MG: 300 CAPSULE ORAL at 08:17

## 2021-01-01 RX ADMIN — BUDESONIDE 1000 MCG: 0.25 SUSPENSION RESPIRATORY (INHALATION) at 20:20

## 2021-01-01 RX ADMIN — OXYCODONE AND ACETAMINOPHEN 1 TABLET: 5; 325 TABLET ORAL at 02:37

## 2021-01-01 RX ADMIN — NORTRIPTYLINE HYDROCHLORIDE 25 MG: 25 CAPSULE ORAL at 21:12

## 2021-01-01 RX ADMIN — IPRATROPIUM BROMIDE AND ALBUTEROL SULFATE 1 AMPULE: 2.5; .5 SOLUTION RESPIRATORY (INHALATION) at 12:29

## 2021-01-01 RX ADMIN — GABAPENTIN 300 MG: 300 CAPSULE ORAL at 14:40

## 2021-01-01 RX ADMIN — NORTRIPTYLINE HYDROCHLORIDE 25 MG: 25 CAPSULE ORAL at 20:41

## 2021-01-01 RX ADMIN — OXYCODONE AND ACETAMINOPHEN 1 TABLET: 5; 325 TABLET ORAL at 09:13

## 2021-01-01 RX ADMIN — INSULIN LISPRO 15 UNITS: 100 INJECTION, SOLUTION INTRAVENOUS; SUBCUTANEOUS at 08:11

## 2021-01-01 RX ADMIN — GLYCOPYRROLATE 0.1 MG: 0.2 INJECTION INTRAMUSCULAR; INTRAVENOUS at 11:41

## 2021-01-01 RX ADMIN — ALBUTEROL SULFATE 2 PUFF: 90 AEROSOL, METERED RESPIRATORY (INHALATION) at 16:25

## 2021-01-01 RX ADMIN — OXYCODONE AND ACETAMINOPHEN 1 TABLET: 5; 325 TABLET ORAL at 02:23

## 2021-01-01 RX ADMIN — IPRATROPIUM BROMIDE AND ALBUTEROL SULFATE 1 AMPULE: 2.5; .5 SOLUTION RESPIRATORY (INHALATION) at 08:22

## 2021-01-01 RX ADMIN — SODIUM CHLORIDE 1000 ML: 9 INJECTION, SOLUTION INTRAVENOUS at 17:53

## 2021-01-01 RX ADMIN — INSULIN LISPRO 2 UNITS: 100 INJECTION, SOLUTION INTRAVENOUS; SUBCUTANEOUS at 19:55

## 2021-01-01 RX ADMIN — Medication 10 ML: at 21:07

## 2021-01-01 RX ADMIN — SODIUM CHLORIDE: 9 INJECTION, SOLUTION INTRAVENOUS at 07:57

## 2021-01-01 RX ADMIN — GLYCOPYRROLATE 0.1 MG: 0.2 INJECTION INTRAMUSCULAR; INTRAVENOUS at 14:59

## 2021-01-01 RX ADMIN — BUDESONIDE 250 MCG: 0.25 SUSPENSION RESPIRATORY (INHALATION) at 08:22

## 2021-01-01 RX ADMIN — IPRATROPIUM BROMIDE AND ALBUTEROL SULFATE 1 AMPULE: 2.5; .5 SOLUTION RESPIRATORY (INHALATION) at 13:12

## 2021-01-01 RX ADMIN — BUDESONIDE 250 MCG: 0.25 SUSPENSION RESPIRATORY (INHALATION) at 07:53

## 2021-01-01 RX ADMIN — IPRATROPIUM BROMIDE AND ALBUTEROL SULFATE 1 AMPULE: 2.5; .5 SOLUTION RESPIRATORY (INHALATION) at 08:54

## 2021-01-01 RX ADMIN — SODIUM CHLORIDE: 9 INJECTION, SOLUTION INTRAVENOUS at 10:49

## 2021-01-01 RX ADMIN — METHYLPREDNISOLONE SODIUM SUCCINATE 40 MG: 40 INJECTION, POWDER, LYOPHILIZED, FOR SOLUTION INTRAMUSCULAR; INTRAVENOUS at 23:14

## 2021-01-01 RX ADMIN — SODIUM CHLORIDE, PRESERVATIVE FREE 10 ML: 5 INJECTION INTRAVENOUS at 10:37

## 2021-01-01 RX ADMIN — Medication 10 ML: at 08:14

## 2021-01-01 RX ADMIN — ARFORMOTEROL TARTRATE 15 MCG: 15 SOLUTION RESPIRATORY (INHALATION) at 07:55

## 2021-01-01 RX ADMIN — DIGOXIN 250 MCG: 0.25 INJECTION INTRAMUSCULAR; INTRAVENOUS at 20:14

## 2021-01-01 RX ADMIN — APIXABAN 5 MG: 5 TABLET, FILM COATED ORAL at 09:02

## 2021-01-01 RX ADMIN — ARFORMOTEROL TARTRATE 15 MCG: 15 SOLUTION RESPIRATORY (INHALATION) at 07:53

## 2021-01-01 RX ADMIN — APIXABAN 5 MG: 5 TABLET, FILM COATED ORAL at 21:40

## 2021-01-01 RX ADMIN — Medication 10 ML: at 08:18

## 2021-01-01 RX ADMIN — IPRATROPIUM BROMIDE AND ALBUTEROL SULFATE 1 AMPULE: 2.5; .5 SOLUTION RESPIRATORY (INHALATION) at 09:01

## 2021-01-01 RX ADMIN — PANTOPRAZOLE SODIUM 40 MG: 40 TABLET, DELAYED RELEASE ORAL at 05:39

## 2021-01-01 RX ADMIN — INSULIN GLARGINE 20 UNITS: 100 INJECTION, SOLUTION SUBCUTANEOUS at 08:43

## 2021-01-01 RX ADMIN — URSODIOL 300 MG: 300 CAPSULE ORAL at 17:02

## 2021-01-01 RX ADMIN — DILTIAZEM HYDROCHLORIDE 240 MG: 240 CAPSULE, COATED, EXTENDED RELEASE ORAL at 09:09

## 2021-01-01 RX ADMIN — FENOFIBRATE 54 MG: 54 TABLET ORAL at 10:00

## 2021-01-01 RX ADMIN — INSULIN GLARGINE 10 UNITS: 100 INJECTION, SOLUTION SUBCUTANEOUS at 09:22

## 2021-01-01 RX ADMIN — DILTIAZEM HYDROCHLORIDE 20 MG: 5 INJECTION INTRAVENOUS at 13:14

## 2021-01-01 RX ADMIN — INSULIN LISPRO 18 UNITS: 100 INJECTION, SOLUTION INTRAVENOUS; SUBCUTANEOUS at 17:05

## 2021-01-01 RX ADMIN — OXYCODONE 2.5 MG: 5 TABLET ORAL at 08:16

## 2021-01-01 RX ADMIN — IPRATROPIUM BROMIDE AND ALBUTEROL SULFATE 1 AMPULE: 2.5; .5 SOLUTION RESPIRATORY (INHALATION) at 07:55

## 2021-01-01 RX ADMIN — NORTRIPTYLINE HYDROCHLORIDE 25 MG: 25 CAPSULE ORAL at 21:39

## 2021-01-01 RX ADMIN — IPRATROPIUM BROMIDE AND ALBUTEROL SULFATE 1 AMPULE: 2.5; .5 SOLUTION RESPIRATORY (INHALATION) at 04:24

## 2021-01-01 RX ADMIN — URSODIOL 300 MG: 300 CAPSULE ORAL at 11:22

## 2021-01-01 RX ADMIN — FENOFIBRATE 54 MG: 54 TABLET ORAL at 09:22

## 2021-01-01 RX ADMIN — IOPAMIDOL 75 ML: 755 INJECTION, SOLUTION INTRAVENOUS at 00:07

## 2021-01-01 RX ADMIN — HYDROXYZINE HYDROCHLORIDE 10 MG: 10 TABLET ORAL at 01:39

## 2021-01-01 RX ADMIN — INSULIN GLARGINE 10 UNITS: 100 INJECTION, SOLUTION SUBCUTANEOUS at 22:37

## 2021-01-01 RX ADMIN — WATER 10 ML: 1 INJECTION INTRAMUSCULAR; INTRAVENOUS; SUBCUTANEOUS at 09:09

## 2021-01-01 RX ADMIN — INSULIN LISPRO 2 UNITS: 100 INJECTION, SOLUTION INTRAVENOUS; SUBCUTANEOUS at 17:15

## 2021-01-01 RX ADMIN — IPRATROPIUM BROMIDE AND ALBUTEROL SULFATE 1 AMPULE: 2.5; .5 SOLUTION RESPIRATORY (INHALATION) at 00:15

## 2021-01-01 RX ADMIN — BUDESONIDE 250 MCG: 0.25 SUSPENSION RESPIRATORY (INHALATION) at 07:56

## 2021-01-01 RX ADMIN — SODIUM CHLORIDE 1000 ML: 9 INJECTION, SOLUTION INTRAVENOUS at 00:06

## 2021-01-01 RX ADMIN — DILTIAZEM HYDROCHLORIDE 120 MG: 120 CAPSULE, COATED, EXTENDED RELEASE ORAL at 08:32

## 2021-01-01 RX ADMIN — INSULIN LISPRO 12 UNITS: 100 INJECTION, SOLUTION INTRAVENOUS; SUBCUTANEOUS at 12:38

## 2021-01-01 RX ADMIN — SODIUM CHLORIDE, PRESERVATIVE FREE 10 ML: 5 INJECTION INTRAVENOUS at 23:21

## 2021-01-01 RX ADMIN — GABAPENTIN 300 MG: 300 CAPSULE ORAL at 21:12

## 2021-01-01 RX ADMIN — DEXTROSE MONOHYDRATE 15 MG/HR: 50 INJECTION, SOLUTION INTRAVENOUS at 01:04

## 2021-01-01 RX ADMIN — METHYLPREDNISOLONE SODIUM SUCCINATE 40 MG: 40 INJECTION, POWDER, LYOPHILIZED, FOR SOLUTION INTRAMUSCULAR; INTRAVENOUS at 09:48

## 2021-01-01 RX ADMIN — IPRATROPIUM BROMIDE AND ALBUTEROL SULFATE 1 AMPULE: 2.5; .5 SOLUTION RESPIRATORY (INHALATION) at 20:29

## 2021-01-01 RX ADMIN — APIXABAN 5 MG: 5 TABLET, FILM COATED ORAL at 20:37

## 2021-01-01 RX ADMIN — SODIUM CHLORIDE 1000 ML: 9 INJECTION, SOLUTION INTRAVENOUS at 17:58

## 2021-01-01 RX ADMIN — LORAZEPAM 1 MG: 2 INJECTION INTRAMUSCULAR; INTRAVENOUS at 11:30

## 2021-01-01 RX ADMIN — OXYCODONE 2.5 MG: 5 TABLET ORAL at 11:33

## 2021-01-01 RX ADMIN — IPRATROPIUM BROMIDE AND ALBUTEROL SULFATE 1 AMPULE: .5; 3 SOLUTION RESPIRATORY (INHALATION) at 05:51

## 2021-01-01 RX ADMIN — IPRATROPIUM BROMIDE AND ALBUTEROL SULFATE 1 AMPULE: 2.5; .5 SOLUTION RESPIRATORY (INHALATION) at 16:38

## 2021-01-01 RX ADMIN — Medication 10 ML: at 23:20

## 2021-01-01 RX ADMIN — GABAPENTIN 300 MG: 300 CAPSULE ORAL at 15:16

## 2021-01-01 RX ADMIN — OXYCODONE 2.5 MG: 5 TABLET ORAL at 11:16

## 2021-01-01 RX ADMIN — INSULIN LISPRO 2 UNITS: 100 INJECTION, SOLUTION INTRAVENOUS; SUBCUTANEOUS at 06:26

## 2021-01-01 SDOH — ECONOMIC STABILITY: FOOD INSECURITY: WITHIN THE PAST 12 MONTHS, THE FOOD YOU BOUGHT JUST DIDN'T LAST AND YOU DIDN'T HAVE MONEY TO GET MORE.: NEVER TRUE

## 2021-01-01 ASSESSMENT — ENCOUNTER SYMPTOMS
DIARRHEA: 0
SHORTNESS OF BREATH: 1
VOMITING: 0
COUGH: 1
SORE THROAT: 0
EYE PAIN: 0
ABDOMINAL PAIN: 1
CONSTIPATION: 1
SORE THROAT: 0
DIARRHEA: 0
ABDOMINAL PAIN: 1
SHORTNESS OF BREATH: 1
DIARRHEA: 1
BACK PAIN: 1
TROUBLE SWALLOWING: 0
CONSTIPATION: 1
ANAL BLEEDING: 1
PHOTOPHOBIA: 0
DIARRHEA: 0
VOMITING: 0
SHORTNESS OF BREATH: 0
ABDOMINAL PAIN: 0
CONSTIPATION: 0
WHEEZING: 0
TROUBLE SWALLOWING: 0
SORE THROAT: 0
EYE DISCHARGE: 0
EYE PAIN: 0
ABDOMINAL PAIN: 0
VOMITING: 0
DIARRHEA: 0
VOMITING: 1
ABDOMINAL PAIN: 0
WHEEZING: 1
SINUS PRESSURE: 1
SHORTNESS OF BREATH: 1
TROUBLE SWALLOWING: 0
COUGH: 0
BACK PAIN: 1
DIARRHEA: 0
EYE PAIN: 0
VOMITING: 0
NAUSEA: 0
ABDOMINAL PAIN: 0
VOMITING: 0
ABDOMINAL PAIN: 0
COUGH: 0
NAUSEA: 0
NAUSEA: 0
ABDOMINAL DISTENTION: 0
VOMITING: 0
DIARRHEA: 0
CHOKING: 0
COUGH: 1
VOMITING: 0
CHEST TIGHTNESS: 0
EYE REDNESS: 0
ABDOMINAL DISTENTION: 0
COUGH: 1
NAUSEA: 1
NAUSEA: 0
SORE THROAT: 0
ABDOMINAL PAIN: 1
BACK PAIN: 0
DIARRHEA: 0
BLOOD IN STOOL: 0
VOMITING: 0
BLOOD IN STOOL: 0
TROUBLE SWALLOWING: 0
CONSTIPATION: 1
NAUSEA: 0
RECTAL PAIN: 0
EYE DISCHARGE: 0
COUGH: 1
TROUBLE SWALLOWING: 0
COUGH: 0
EYE REDNESS: 0
CONSTIPATION: 0
WHEEZING: 0
BACK PAIN: 0
ABDOMINAL DISTENTION: 0
PHOTOPHOBIA: 0
CONSTIPATION: 1
COUGH: 0
COUGH: 0
NAUSEA: 0
SHORTNESS OF BREATH: 0
DIARRHEA: 0
SHORTNESS OF BREATH: 1
VOMITING: 0
COUGH: 0
ABDOMINAL PAIN: 0
VOMITING: 0
VOMITING: 0
NAUSEA: 0
ABDOMINAL PAIN: 0
VOMITING: 0
CHEST TIGHTNESS: 0
SHORTNESS OF BREATH: 1
ABDOMINAL DISTENTION: 0
NAUSEA: 1
SORE THROAT: 0
EYE PAIN: 0
NAUSEA: 1
CHEST TIGHTNESS: 1
NAUSEA: 0
SORE THROAT: 0
ABDOMINAL PAIN: 0
VOMITING: 0
SORE THROAT: 0
SHORTNESS OF BREATH: 1
VOMITING: 0
RHINORRHEA: 0
ABDOMINAL PAIN: 0
NAUSEA: 0
ABDOMINAL PAIN: 1
WHEEZING: 0
COUGH: 0
RHINORRHEA: 0
CONSTIPATION: 0
COUGH: 1
BLOOD IN STOOL: 0
CONSTIPATION: 1
VOMITING: 0
NAUSEA: 1
BACK PAIN: 0
ABDOMINAL PAIN: 1
PHOTOPHOBIA: 0
SHORTNESS OF BREATH: 0
ABDOMINAL PAIN: 0
ABDOMINAL PAIN: 1
ABDOMINAL PAIN: 1
VOMITING: 1
BLOOD IN STOOL: 0
COUGH: 1
DIARRHEA: 0
BACK PAIN: 1
BACK PAIN: 1
BELCHING: 0
EYE PAIN: 0
SINUS PRESSURE: 0
ABDOMINAL DISTENTION: 1
NAUSEA: 1
VOMITING: 1
COUGH: 1
EYE PAIN: 0
CONSTIPATION: 0
COUGH: 1
DIARRHEA: 0
CHEST TIGHTNESS: 0
NAUSEA: 1
DIARRHEA: 0
RHINORRHEA: 0
ABDOMINAL PAIN: 1
COUGH: 1
VOMITING: 0
BLOOD IN STOOL: 0
BACK PAIN: 1
SORE THROAT: 0
RHINORRHEA: 0
SHORTNESS OF BREATH: 1
SINUS PRESSURE: 0
NAUSEA: 0
TROUBLE SWALLOWING: 0
WHEEZING: 0
WHEEZING: 1
DIARRHEA: 0
ABDOMINAL DISTENTION: 1
SHORTNESS OF BREATH: 0
SHORTNESS OF BREATH: 0
WHEEZING: 1
COUGH: 0
BACK PAIN: 1
ABDOMINAL PAIN: 0
NAUSEA: 0
ABDOMINAL PAIN: 0
COUGH: 1
CONSTIPATION: 1
CONSTIPATION: 1
SHORTNESS OF BREATH: 0
SHORTNESS OF BREATH: 1
RHINORRHEA: 0
WHEEZING: 0
NAUSEA: 0
BACK PAIN: 0
COUGH: 0
PHOTOPHOBIA: 0
RHINORRHEA: 0
EYES NEGATIVE: 1
SHORTNESS OF BREATH: 0
NAUSEA: 0
SHORTNESS OF BREATH: 0
SHORTNESS OF BREATH: 0
COUGH: 0
SHORTNESS OF BREATH: 1
DIARRHEA: 1
BACK PAIN: 1
ABDOMINAL DISTENTION: 1
CHEST TIGHTNESS: 0
WHEEZING: 0
SORE THROAT: 0
DIARRHEA: 0
ABDOMINAL PAIN: 0
WHEEZING: 0
BLOOD IN STOOL: 1
SHORTNESS OF BREATH: 1
ABDOMINAL DISTENTION: 0
CHEST TIGHTNESS: 0
WHEEZING: 0
BACK PAIN: 0
CHEST TIGHTNESS: 1
SORE THROAT: 0
COLOR CHANGE: 0
WHEEZING: 1
SHORTNESS OF BREATH: 1
SINUS PAIN: 0
EYE REDNESS: 0

## 2021-01-01 ASSESSMENT — PAIN SCALES - PAIN ASSESSMENT IN ADVANCED DEMENTIA (PAINAD)
CONSOLABILITY: 0
NEGVOCALIZATION: 1
CONSOLABILITY: 0
FACIALEXPRESSION: 1
FACIALEXPRESSION: 1
TOTALSCORE: 2
NEGVOCALIZATION: 1
BODYLANGUAGE: 0
BREATHING: 0
TOTALSCORE: 2
BREATHING: 0
NEGVOCALIZATION: 1
CONSOLABILITY: 0
FACIALEXPRESSION: 1
TOTALSCORE: 2
NEGVOCALIZATION: 1
CONSOLABILITY: 0
TOTALSCORE: 2
BREATHING: 0
CONSOLABILITY: 0
NEGVOCALIZATION: 1
TOTALSCORE: 2
NEGVOCALIZATION: 1
FACIALEXPRESSION: 1
BREATHING: 0
BODYLANGUAGE: 0
BREATHING: 0
BODYLANGUAGE: 0
TOTALSCORE: 2
NEGVOCALIZATION: 1
BODYLANGUAGE: 0
BREATHING: 0
FACIALEXPRESSION: 1
BREATHING: 0
CONSOLABILITY: 0
FACIALEXPRESSION: 1
TOTALSCORE: 2
FACIALEXPRESSION: 1
BODYLANGUAGE: 0
CONSOLABILITY: 0

## 2021-01-01 ASSESSMENT — PAIN DESCRIPTION - DESCRIPTORS
DESCRIPTORS: SHARP
DESCRIPTORS: ACHING;PRESSURE
DESCRIPTORS: ACHING;DISCOMFORT;CONSTANT
DESCRIPTORS: HEADACHE
DESCRIPTORS: ACHING;DISCOMFORT
DESCRIPTORS: ACHING;DISCOMFORT
DESCRIPTORS: DISCOMFORT;CONSTANT
DESCRIPTORS: ACHING
DESCRIPTORS: ACHING

## 2021-01-01 ASSESSMENT — PAIN SCALES - GENERAL
PAINLEVEL_OUTOF10: 8
PAINLEVEL_OUTOF10: 0
PAINLEVEL_OUTOF10: 0
PAINLEVEL_OUTOF10: 10
PAINLEVEL_OUTOF10: 0
PAINLEVEL_OUTOF10: 7
PAINLEVEL_OUTOF10: 0
PAINLEVEL_OUTOF10: 6
PAINLEVEL_OUTOF10: 10
PAINLEVEL_OUTOF10: 7
PAINLEVEL_OUTOF10: 2
PAINLEVEL_OUTOF10: 2
PAINLEVEL_OUTOF10: 10
PAINLEVEL_OUTOF10: 0
PAINLEVEL_OUTOF10: 3
PAINLEVEL_OUTOF10: 7
PAINLEVEL_OUTOF10: 10
PAINLEVEL_OUTOF10: 10
PAINLEVEL_OUTOF10: 0
PAINLEVEL_OUTOF10: 8
PAINLEVEL_OUTOF10: 9
PAINLEVEL_OUTOF10: 8
PAINLEVEL_OUTOF10: 8
PAINLEVEL_OUTOF10: 10
PAINLEVEL_OUTOF10: 8
PAINLEVEL_OUTOF10: 8
PAINLEVEL_OUTOF10: 0
PAINLEVEL_OUTOF10: 0
PAINLEVEL_OUTOF10: 8
PAINLEVEL_OUTOF10: 8
PAINLEVEL_OUTOF10: 5
PAINLEVEL_OUTOF10: 7
PAINLEVEL_OUTOF10: 3
PAINLEVEL_OUTOF10: 7
PAINLEVEL_OUTOF10: 0
PAINLEVEL_OUTOF10: 7
PAINLEVEL_OUTOF10: 8
PAINLEVEL_OUTOF10: 0
PAINLEVEL_OUTOF10: 10
PAINLEVEL_OUTOF10: 3
PAINLEVEL_OUTOF10: 2
PAINLEVEL_OUTOF10: 8
PAINLEVEL_OUTOF10: 5
PAINLEVEL_OUTOF10: 9
PAINLEVEL_OUTOF10: 8
PAINLEVEL_OUTOF10: 0
PAINLEVEL_OUTOF10: 0
PAINLEVEL_OUTOF10: 10
PAINLEVEL_OUTOF10: 5
PAINLEVEL_OUTOF10: 7
PAINLEVEL_OUTOF10: 8
PAINLEVEL_OUTOF10: 0
PAINLEVEL_OUTOF10: 0

## 2021-01-01 ASSESSMENT — PAIN DESCRIPTION - PAIN TYPE
TYPE: ACUTE PAIN
TYPE: CHRONIC PAIN
TYPE: ACUTE PAIN
TYPE: ACUTE PAIN
TYPE: CHRONIC PAIN
TYPE: ACUTE PAIN
TYPE: CHRONIC PAIN

## 2021-01-01 ASSESSMENT — PAIN DESCRIPTION - FREQUENCY
FREQUENCY: CONTINUOUS

## 2021-01-01 ASSESSMENT — PAIN DESCRIPTION - LOCATION
LOCATION: LEG
LOCATION: BACK
LOCATION: BUTTOCKS
LOCATION: BACK
LOCATION: KNEE
LOCATION: HEAD
LOCATION: BACK
LOCATION: HEAD
LOCATION: KNEE
LOCATION: BACK;CHEST
LOCATION: BACK
LOCATION: BACK

## 2021-01-01 ASSESSMENT — PAIN DESCRIPTION - ORIENTATION
ORIENTATION: RIGHT
ORIENTATION: MID
ORIENTATION: LOWER
ORIENTATION: LEFT
ORIENTATION: LOWER
ORIENTATION: MID
ORIENTATION: LOWER
ORIENTATION: RIGHT
ORIENTATION: LOWER

## 2021-01-01 ASSESSMENT — PAIN DESCRIPTION - PROGRESSION
CLINICAL_PROGRESSION: NOT CHANGED

## 2021-01-01 ASSESSMENT — PAIN DESCRIPTION - ONSET
ONSET: ON-GOING

## 2021-01-01 ASSESSMENT — SOCIAL DETERMINANTS OF HEALTH (SDOH): HOW HARD IS IT FOR YOU TO PAY FOR THE VERY BASICS LIKE FOOD, HOUSING, MEDICAL CARE, AND HEATING?: NOT HARD AT ALL

## 2021-02-12 NOTE — PROGRESS NOTES
S: 54 y.o. female with   Chief Complaint   Patient presents with    COPD    Cough       Pt worried that she had a COPD exaserbation. She is usually on 5 L and is still on 5L. She is at 98%. She usually takes 5mg preg. 3 days ago she started with dry cough and congestion. No fever, no increased SOB, no wheezing. She is using her nebulizer Q4.    O: VS:  vitals were not taken for this visit. BP Readings from Last 3 Encounters:   09/23/20 114/72   06/12/20 100/68   11/13/19 104/65     See resident note      Impression/Plan:   1) increased cough - tessalon perles given for supportive care. Pt ed on when to call back or go to ER. Health Maintenance Due   Topic Date Due    Hepatitis C screen  1965    Diabetic retinal exam  05/03/1975    Hepatitis B vaccine (1 of 3 - Risk 3-dose series) 05/03/1984    DTaP/Tdap/Td vaccine (1 - Tdap) 05/03/1984    Cervical cancer screen  05/03/1986    Diabetic foot exam  12/10/2014    Shingles Vaccine (1 of 2) 05/03/2015    Colon cancer screen colonoscopy  05/03/2015    Breast cancer screen  11/07/2018         Attending Physician Statement  I have discussed the case, including pertinent history and exam findings with the resident. I agree with the documented assessment and plan.       George Carr MD

## 2021-02-12 NOTE — PROGRESS NOTES
TeleMedicine Patient Consent    This visit was performed as a virtual video visit using a synchronous, two-way, audio-video telehealth technology platform. Patient identification was verified at the start of the visit, including the patient's telephone number and physical location. I discussed with the patient the nature of our telehealth visits, that:     1. Due to the nature of an audio- video modality, the only components of a physical exam that could be done are the elements supported by direct observation. 2. The provider will evaluate the patient and recommend diagnostics and treatments based on their assessment. 3. If it was felt that the patient should be evaluated in clinic or an emergency room setting, then they would be directed there. 4. Our sessions are not being recorded and that personal health information is protected. 5. Our team would provide follow up care in person if/when the patient needs it. Patient does agree to proceed with telemedicine consultation. Patient location: home address in [facility]    Physician location: regular office location Other people involved in call: Preceptor  This visit was completed virtually using Doxy. me    This visit was performed during the 2053 public health crisis and COVID-19 pandemic.   *Add GT modifier to all Video Visits*

## 2021-02-12 NOTE — PROGRESS NOTES
2/13/2021    Hussain Chacon is a 54 y.o. female participating in a virtual visit with me for the following concern(s):    Chief Complaint   Patient presents with    COPD    Cough        HPI:  Cough  - Hx of COPD. Wears 5 L O2. She reports SpO2= 98% on 5 L O2 at home. - Takes prednisone 5 mg QD for COPD control  - Thinks that she may have a current COPD exacerbation   - Started 2-3 days ago   - Symptoms include dry cough and congestion. She reports baseline shortness of breath. - Denies fevers, chills, chest pain, palpitations, orthopnea, PND, and leg swelling   - Feels similar to her past COPD exacerbations   - Still smoking 0.5 ppd of cigarettes   - Using her Duoneb nebulizer q 4 hours with improvement in her symptoms  - Follows with Pulmonology, Dr. Shona Mead     No Known Allergies    Past Medical & Surgical History:      Diagnosis Date    Anxiety     Carotid stenosis 12/2011    50-69% on left    Carpal tunnel syndrome 12/07/2011    bilateral    Cerebral artery occlusion with cerebral infarction [facility]) 2011? left sided weakness / usually in w/c    Chronic back pain     COPD (chronic obstructive pulmonary disease) ([facility] [address]     no pulmonologist / follows with PCP    CVA (cerebral infarction) 2011?     right sided thalamic; seen by Dr Herber Kemp syndrome     GERD (gastroesophageal reflux disease)     History of ischemic heart disease 03/30/2017    no cardiology    Hyperlipidemia     Hypertension     Obesity     PVD (peripheral vascular disease) with claudication ([facility] [address] 8/25/2017    Tobacco abuse     Tobacco abuse     Type II or unspecified type diabetes mellitus without mention of complication, not stated as uncontrolled     Vitamin D deficiency      Past Surgical History:   Procedure Laterality Date    ABDOMINAL EXPLORATION SURGERY  03/29/2017    with bowel resection, incarcerated ventral hernia repair    CARDIAC CATHETERIZATION  1/7/2015 Dr. Mariel Goddard  EF=55%   FFR=0.84  distal RCA bifurcation    CYST INCISION AND DRAINAGE Right 07/22/2016    Right gluteal mass incision and drainage    CYST REMOVAL  1/28/2011    excision of left arm inclusion cyst    ECHO COMPL W DOP COLOR FLOW  5/15/2013         ECHOCARDIOGRAM COMPLETE 2D W DOPPLER W COLOR  1/3/2012         ECHOCARDIOGRAM COMPLETE 2D W DOPPLER W COLOR  6/14/2012         EYE SURGERY Bilateral 2014    cataract w lens implants    HERNIA REPAIR  03/2017    abdominal    OTHER SURGICAL HISTORY  07/26/2016    re exploration left lateral wound / heel    DE DRAIN SKIN ABSCESS COMPLIC N/A 2/3/4628    EXAM UNDER ANESTHESIA  I & D BUTTOCKS performed by Andrew Barragan DO at [address] [employer] [employer]       Family History:      Problem Relation Age of Onset    High Blood Pressure Mother     COPD Mother     COPD Father     High Blood Pressure Father     Other Father     Heart Disease Brother     High Blood Pressure Brother     Other Brother     Cancer Maternal Grandmother     Heart Attack Maternal Grandfather     Other Paternal Grandmother        Social History:  Social History     Tobacco Use    Smoking status: Current Every Day Smoker     Packs/day: 0.50     Years: 34.00     Pack years: 17.00     Types: Cigarettes    Smokeless tobacco: Never Used    Tobacco comment: down to 0.5 pack per day, smoked 5 pk per day for 2 years   Substance Use Topics    Alcohol use: No     Alcohol/week: 0.0 standard drinks       Immunization History   Administered Date(s) Administered    Influenza 11/11/2010, 12/06/2011, 10/16/2012, 10/08/2013    Influenza Vaccine, unspecified formulation 12/06/2011, 10/16/2012, 10/08/2013, 10/17/2016, 09/26/2018    Influenza Virus Vaccine 09/09/2015    Influenza, High Dose (Fluzone 65 yrs and older) 11/20/2017    Influenza, Idell Ivans, IM, PF (6 mo and older Fluzone, Flulaval, Fluarix, and 3 yrs and older Afluria) 10/17/2016, 09/26/2018, 11/13/2019, 09/23/2020  Pneumococcal Polysaccharide ([id number]) 11/03/2013       Review of Systems   Constitutional: Negative for chills and fever. Respiratory: Positive for cough (dry), chest tightness (when coughing) and shortness of breath (baseline). Negative for wheezing. Cardiovascular: Negative for chest pain, palpitations and leg swelling. Gastrointestinal: Positive for abdominal pain (baseline), constipation (baseline) and diarrhea (baseline). Negative for blood in stool, nausea and vomiting. Neurological: Negative for dizziness, syncope and light-headedness. BP Readings from Last 3 Encounters:   09/23/20 114/72   06/12/20 100/68   11/13/19 104/65       VS:  LMP 03/20/2015     Physical Exam  Constitutional:       General: She is awake. She is not in acute distress. Appearance: She is not ill-appearing, toxic-appearing or diaphoretic. Eyes:      General: No scleral icterus. Conjunctiva/sclera:      Right eye: Right conjunctiva is not injected. Left eye: Left conjunctiva is not injected. Pulmonary:      Effort: No accessory muscle usage or respiratory distress. Skin:     Coloration: Skin is not cyanotic, jaundiced or pale. Neurological:      General: No focal deficit present. Mental Status: She is alert. Psychiatric:         Attention and Perception: Attention normal.         Mood and Affect: Mood normal.         Speech: Speech normal.         Behavior: Behavior is cooperative. Thought Content: Thought content normal.         Cognition and Memory: Cognition normal.         Judgment: Judgment normal.         Assessment/Plan:  1.  Cough Unlike to be COPD exacerbation at this time as patient does not have increased dyspnea, increased sputum production, increased O2 demands, or hypoxia. Advised patients to continue her chronic prednisone regimen and nebulizers as prescribed. However, if patient develops worsening dyspnea, increased sputum production, a change in sputum color, fevers, chills, or increased O2 demands, she is to call the office for re-evaluation. She voices understanding with this plan. Will prescribe Tessalon perles to help with her cough. - benzonatate (TESSALON) 100 MG capsule; Take 1 capsule by mouth 3 times daily as needed for Cough  Dispense: 30 capsule; Refill: 0      Return if symptoms worsen or fail to improve.     Tashi Britt DO, PGY-3

## 2021-02-22 NOTE — TELEPHONE ENCOUNTER
Patient complains of productive cough and trouble sleeping. She is wanting to see PCP (no availability) and doesn't feel she is improving from visit on 12/12. Please advise.

## 2021-02-23 NOTE — PROGRESS NOTES
2021    TELEHEALTH EVALUATION -- Audio/Visual (During MREQV-10 public health emergency)    HPI:    Jaswant Collins (:  1965) has requested an audio/video evaluation. Consent obtained per MA note, with attention to limitations inherent to a video visit for the following concern(s):    COPD with exacerbation  Patient was seen via telehealth last week, with symptoms consistent with an exacerbation of her COPD. She reports compliance with her inhalers and nebulizers. Still taking a baseline level of prednisone. No interventions were recommended on Tessalon Perles which she states not working  She returns today to discuss further  Primary concern is increased cough and shortness of breath. She is showing baseline wheezing but this is not increased  She has not increased her oxygen that she is on chronically  She reports shortness of breath with activity but also at rest  Cough is productive only of white sputum  Reports fatigue but no fever sweats or chills  No known ill contacts  Has not had COVID-19    Insomnia  Follow-up  She states that she ran out of her trazodone. Subsequently her sleep has declined in quantity and quality. She states she is only sleeping between 2 and 4 hours a day  She notes a negative impact on her depression since being off the medication as well  She would like to restart it    She also needs refills of chronic meds    Med list, [facility] reviewed    PHYSICAL EXAMINATION:  Vital Signs: (As obtained by patient/caregiver or practitioner observation)    Physical Exam  Vitals signs reviewed. Pulmonary:      Effort: No respiratory distress. Psychiatric:         Mood and Affect: Mood normal.         Behavior: Behavior normal.           Other pertinent observable physical exam findings-     Due to this being a TeleHealth encounter, evaluation of the following organ systems is limited: Vitals/Constitutional/EENT/Resp/CV/GI//MS/Neuro/Skin/Heme-Lymph-Imm.     ASSESSMENT/PLAN:

## 2021-02-23 NOTE — PROGRESS NOTES
TeleMedicine Patient Consent    This visit was performed as a virtual video visit using a synchronous, two-way, audio-video telehealth technology platform. Patient identification was verified at the start of the visit, including the patient's telephone number and physical location. I discussed with the patient the nature of our telehealth visits, that:     1. Due to the nature of an audio- video modality, the only components of a physical exam that could be done are the elements supported by direct observation. 2. The provider will evaluate the patient and recommend diagnostics and treatments based on their assessment. 3. If it was felt that the patient should be evaluated in clinic or an emergency room setting, then they would be directed there. 4. Our sessions are not being recorded and that personal health information is protected. 5. Our team would provide follow up care in person if/when the patient needs it. Patient does agree to proceed with telemedicine consultation. Patient location: home address in [facility]    Physician location: regular office location    This visit was completed virtually using D[employer]. me    This visit was performed during the 8547 public health crisis and COVID-19 pandemic.   *Add GT modifier to all Video Visits*

## 2021-03-25 NOTE — TELEPHONE ENCOUNTER
Spoke to [place] patient's daughter in law. She updated the patient refused to go to the ED. She is having a severe headache, heaviness in chest, can't sleep, balance issues. Her HR 83 but her pulse ox was 78% when she woke up. After 2 breathing treatments it came up between 85-98%. She is on 8L oxygen. Patient has been out of [address] [place] [address] for 3 weeks. She has been using pens that  in December. She is also out of [employer]. I am unable to pend the Humalog refill due to it last being written in . Spoke to the pharmacy, called [facility], and completed a second prior auth because of quantity limits. Please advise.

## 2021-03-25 NOTE — ED PROVIDER NOTES
arthralgias and back pain. Skin: Negative for rash and wound. Neurological: Negative for headaches. All other systems reviewed and are negative. Physical Exam  Vitals signs and nursing note reviewed. Constitutional:       General: She is not in acute distress. Appearance: She is well-developed. She is ill-appearing (Chronically). HENT:      Head: Normocephalic and atraumatic. Eyes:      Extraocular Movements: Extraocular movements intact. Neck:      Musculoskeletal: Normal range of motion and neck supple. Cardiovascular:      Rate and Rhythm: Regular rhythm. Tachycardia present. Pulses: Normal pulses. Pulmonary:      Effort: Pulmonary effort is normal. No respiratory distress. Breath sounds: Normal breath sounds. No wheezing or rales. Abdominal:      Palpations: Abdomen is soft. Tenderness: There is no abdominal tenderness. There is no guarding or rebound. Musculoskeletal:      Right lower leg: Edema present. Left lower leg: Edema present. Comments: Mild 1+ pitting edema. Chronic skin changes. Skin:     General: Skin is warm and dry. Neurological:      Mental Status: She is alert and oriented to person, place, and time. Motor: No weakness. Procedures     MDM  Number of Diagnoses or Management Options     Amount and/or Complexity of Data Reviewed  Decide to obtain previous medical records or to obtain history from someone other than the patient: yes    Patient presented to the emergency department for hyperglycemia, chest pain, shortness of breath, nausea. She is clinically stable, vital signs stable, nontoxic-appearing. When patient arrived she was on 8 L of oxygen. Put her down to her baseline 5. She maintained O2 saturation. Explained to patient and daughter about the concern of patient patients with COPD on higher oxygen requirements, they verbalized understanding. Work-up initiated.   Initial differential diagnosis included but was not limited to DKA, ACS, pneumonia, COPD exacerbation, PE.  EKG with no acute ischemic changes, troponin less than 0.01, history and physical exam less concerning for ACS. Work-up initiated. Patient is hyperglycemic with a glucose of 254. She has no evidence of DKA. No anion gap, elevated bicarb. Normal pH. The rest of the lab work essentially unremarkable. She is Wells low risk and a D-dimer was performed and less than 200, making pulmonary embolism less likely. She is given duo nebs in the department and symptoms greatly improved. She is also given her home dose of Humalog here in the department. Patient does not have Humalog at home as she is having trouble with insurance. She does have her long-acting which has been taking. Chest x-ray essentially remarkable. With reassuring lab work and imaging findings as well as patient improving after supportive care in the department, she is okay for close outpatient evaluation and care. Shared decision making had with patient and daughter about steroids. They are concerned as they feel like patient always improves when she gets a steroid burst.  I discussed that I am worried as she is not taking her short acting insulin as well as the concerned that steroids will increase her sugar levels. Patient wanting a steroid course. Extensive conversation had that she needs to check her sugars multiple times throughout the day as well as contact her primary care provider and insurance company to have her Humalog refilled. Risks and benefits of steroids discussed, patient still wanting steroids. Educated patient and daughter about symptoms, diagnosis and supportive care at home. Offered duo nebs as well as inhalers for home and they states she already has them and does not need refills at this time. Also discussed patient's oxygen requirements. They verbalized understanding of agreeable to plan. Strict return precautions were discussed.   Patient ambulated around the room with no desaturations. Patient was discharged. ED Course as of Mar 26 1218   Thu Mar 25, 2021   1726 EKG: This EKG is signed and interpreted by me. Rate: 103  Rhythm: Sinus  Interpretation: Sinus tachycardia, left axis deviation, normal CT interval, normal QRS, normal QTC, no acute ST or T wave changes  Comparison: No significant change when compared to prior EKG      [JA]   1802 I reviewed the patient's chart. ECHO 10/21/15:   Left ventricular wall thickness, regional wall motion, and systolic   function are normal.   There is doppler evidence for stage Ia diastolic dysfunction. Mild mitral annular calcification is present. The aortic valve was difficult to visualize but appears grossly normal.   Normal tricuspid valve structure and function. Right ventricular systolic   pressure is within normal limits. [JA]      ED Course User Index  [JA] Ira Street MD      --------------------------------------------- PAST HISTORY ---------------------------------------------  Past Medical History:  has a past medical history of Anxiety, Carotid stenosis, Carpal tunnel syndrome, Cerebral artery occlusion with cerebral infarction ([address] Chronic back pain, COPD (chronic obstructive pulmonary disease) ([address] CVA (cerebral infarction), Depression, Ehler's-Danlos syndrome, GERD (gastroesophageal reflux disease), History of ischemic heart disease, Hyperlipidemia, Hypertension, Obesity, PVD (peripheral vascular disease) with claudication ([address] Tobacco abuse, Tobacco abuse, Type II or unspecified type diabetes mellitus without mention of complication, not stated as uncontrolled, and Vitamin D deficiency. Past Surgical History:  has a past surgical history that includes Tubal ligation (1992); cyst removal (1/28/2011); ECHO Complete 2D W Doppler W Color (1/3/2012); ECHO Complete 2D W Doppler W Color (6/14/2012); ECHO Compl W Dop Color Flow (5/15/2013);  Cardiac catheterization (1/7/2015); cyst incision and drainage (Right, 07/22/2016); other surgical history (07/26/2016); Abdominal exploration surgery (03/29/2017); pr drain skin abscess complic (N/A, 5/7/9320); hernia repair (03/2017); and eye surgery (Bilateral, 2014). Social History:  reports that she has been smoking cigarettes. She has a 17.00 pack-year smoking history. She has never used smokeless tobacco. She reports that she does not drink alcohol or use drugs. Family History: family history includes COPD in her father and mother; Cancer in her maternal grandmother; Heart Attack in her maternal grandfather; Heart Disease in her brother; High Blood Pressure in her brother, father, and mother; Other in her brother, father, and paternal grandmother. The patients home medications have been reviewed. Allergies: Patient has no known allergies.     -------------------------------------------------- RESULTS -------------------------------------------------  Labs:  Results for orders placed or performed during the hospital encounter of 03/25/21   CBC Auto Differential   Result Value Ref Range    WBC 9.8 4.5 - 11.5 E9/L    RBC 3.81 3.50 - 5.50 E12/L    Hemoglobin 11.6 11.5 - 15.5 g/dL    Hematocrit 37.7 34.0 - 48.0 %    MCV 99.0 80.0 - 99.9 fL    MCH 30.4 26.0 - 35.0 pg    MCHC 30.8 (L) 32.0 - 34.5 %    RDW 13.2 11.5 - 15.0 fL    Platelets 737 596 - 718 E9/L    MPV 9.1 7.0 - 12.0 fL    Neutrophils % 47.7 43.0 - 80.0 %    Immature Granulocytes % 0.4 0.0 - 5.0 %    Lymphocytes % 45.0 (H) 20.0 - 42.0 %    Monocytes % 3.9 2.0 - 12.0 %    Eosinophils % 2.3 0.0 - 6.0 %    Basophils % 0.7 0.0 - 2.0 %    Neutrophils Absolute 4.66 1.80 - 7.30 E9/L    Immature Granulocytes # 0.04 E9/L    Lymphocytes Absolute 4.39 (H) 1.50 - 4.00 E9/L    Monocytes Absolute 0.38 0.10 - 0.95 E9/L    Eosinophils Absolute 0.22 0.05 - 0.50 E9/L    Basophils Absolute 0.07 0.00 - 0.20 E9/L   Comprehensive Metabolic Panel w/ Reflex to MG   Result Value Ref Range    Sodium 138 132 - 146 mmol/L    Potassium reflex Magnesium 4.2 3.5 - 5.0 mmol/L    Chloride 96 (L) 98 - 107 mmol/L    CO2 36 (H) 22 - 29 mmol/L    Anion Gap 6 (L) 7 - 16 mmol/L    Glucose 254 (H) 74 - 99 mg/dL    BUN 14 6 - 20 mg/dL    CREATININE 0.4 (L) 0.5 - 1.0 mg/dL    GFR Non-African American >60 >=60 mL/min/1.73    GFR African American >60     Calcium 9.0 8.6 - 10.2 mg/dL    Total Protein 6.9 6.4 - 8.3 g/dL    Albumin 3.8 3.5 - 5.2 g/dL    Total Bilirubin <0.2 0.0 - 1.2 mg/dL    Alkaline Phosphatase 62 35 - 104 U/L    ALT 10 0 - 32 U/L    AST 11 0 - 31 U/L   Lactic Acid, Plasma   Result Value Ref Range    Lactic Acid 1.9 0.5 - 2.2 mmol/L   Lipase   Result Value Ref Range    Lipase 12 (L) 13 - 60 U/L   Troponin   Result Value Ref Range    Troponin <0.01 0.00 - 0.03 ng/mL   Urinalysis, reflex to microscopic   Result Value Ref Range    Color, UA Yellow Straw/Yellow    Clarity, UA Clear Clear    Glucose, Ur >=1000 (A) Negative mg/dL    Bilirubin Urine Negative Negative    Ketones, Urine Negative Negative mg/dL    Specific Gravity, UA 1.025 1.005 - 1.030    Blood, Urine Negative Negative    pH, UA 6.0 5.0 - 9.0    Protein, UA Negative Negative mg/dL    Urobilinogen, Urine 0.2 <2.0 E.U./dL    Nitrite, Urine Negative Negative    Leukocyte Esterase, Urine Negative Negative   pH, venous   Result Value Ref Range    pH, [place] 7.42 7.35 - 7.45   Beta-Hydroxybutyrate   Result Value Ref Range    Beta-Hydroxybutyrate 0.08 0.02 - 0.27 mmol/L   Brain Natriuretic Peptide   Result Value Ref Range    Pro-BNP 79 0 - 125 pg/mL   D-Dimer, Quantitative   Result Value Ref Range    D-Dimer, Quant <200 ng/mL DDU   POCT Glucose   Result Value Ref Range    Meter Glucose 310 (H) 74 - 99 mg/dL   EKG 12 Lead   Result Value Ref Range    Ventricular Rate 103 BPM    Atrial Rate 103 BPM    P-R Interval 144 ms    QRS Duration 76 ms    Q-T Interval 330 ms    QTc Calculation (Bazett) 432 ms    P Axis 76 degrees    R Axis -87 degrees    T Axis 77 degrees Radiology:  XR CHEST PORTABLE   Final Result   No acute process. ------------------------- NURSING NOTES AND VITALS REVIEWED ---------------------------  Date / Time Roomed:  3/25/2021  5:00 PM  ED Bed Assignment:  26/26    The nursing notes within the ED encounter and vital signs as below have been reviewed. BP (!) 143/67   Pulse 111   Temp 98 °F (36.7 °C) (Tympanic)   Resp 20   Ht 5' 4\" (1.626 m)   Wt 145 lb (65.8 kg)   LMP 03/20/2015   SpO2 99%   BMI 24.89 kg/m²   Oxygen Saturation Interpretation: Normal      ------------------------------------------ PROGRESS NOTES ------------------------------------------  ED COURSE MEDICATIONS:                Medications   0.9 % sodium chloride bolus (0 mLs Intravenous Stopped 3/25/21 1924)   ipratropium-albuterol (DUONEB) nebulizer solution 1 ampule (1 ampule Inhalation Given 3/25/21 1843)   insulin lispro (HUMALOG) injection vial 5 Units (5 Units Subcutaneous Given 3/25/21 1930)       I have spoken with the patient and discussed todays results, in addition to providing specific details for the plan of care and counseling regarding the diagnosis and prognosis. Their questions are answered at this time and they are agreeable with the plan. I discussed at length with them reasons for immediate return here for re evaluation. They will followup with primary care by calling their office tomorrow. --------------------------------- ADDITIONAL PROVIDER NOTES ---------------------------------  At this time the patient is without objective evidence of an acute process requiring hospitalization or inpatient management. They have remained hemodynamically stable throughout their entire ED visit and are stable for discharge with outpatient follow-up. The plan has been discussed in detail and they are aware of the specific conditions for emergent return, as well as the importance of follow-up.       Discharge Medication List as of 3/25/2021  8:22 PM

## 2021-03-26 NOTE — ED NOTES
SpO2 100% on 5L NC, patient ambulated in room and SpO2 dropped to 99%      Viviana Ng RN  03/25/21 2008

## 2021-04-02 NOTE — PROGRESS NOTES
[address]  Precepting Note    Subjective:  ER f/u for hyperglycemia and COPD  Was having issues with insulin coverage and ran out- has since been addressed  A1C is up to 9.2. Most readings back under 200 with fastings under 100  Have not been giving the basaglar unless bss are over 200  Asking for a CGM    COPD  Increased cough and chagne in color  Using 5LPM home O2  Having trouble with inhalers but good with aersols  Is finishing steroids but not given Abx in the ER    ROS otherwise negative     Past medical, surgical, family and social history were reviewed, non-contributory, and unchanged unless otherwise stated. Objective:    /71   Pulse 101   Temp 96.8 °F (36 °C) (Temporal)   Resp 18   Ht 5' 5\" (1.651 m)   Wt 193 lb (87.5 kg)   LMP 03/20/2015   BMI 32.12 kg/m²     Exam is as noted by resident with the following changes, additions or corrections:    General:  NAD; alert & oriented x 3   Heart:  RRR, no murmurs, gallops, or rubs. Lungs:  Diminished with some exp wheeze    Abd: bowel sounds present, nontender, nondistended, no masses  Extrem:  No clubbing, cyanosis, or edema     Assessment/Plan:  COPD exac   Add doxy   Add pulmicort  IDDM type 2   consistency with basal insulin   A1C in 3 mo     Attending Physician Statement  I have reviewed the chart, including any radiology or labs. I have discussed the case, including pertinent history and exam findings with the resident. I agree with the assessment, plan and orders as documented by the resident. Please refer to the resident note for additional information.       Electronically signed by Ivania Laws MD on 4/2/2021 at 11:06 AM

## 2021-04-18 NOTE — PROGRESS NOTES
Patient seen and examined in the emergency department. Patient states that she started having knee pain in her right knee immediately after she went from her wheelchair to standing and she did feel a pop in the right side of her right knee, which happened yesterday. Patient states the pain is severe and 8 out of 10 on the pain scale. Patient states that nothing seems to make this better and states that putting weight on that knee makes the pain worse. Patient states the pain radiates down the lateral shin. Patient states that she also has associated palpitations. Patient denies any shortness of breath, fever, chills, chest pain, lightheadedness, or dizziness. Patient was evaluated in the emergency department and found to be in A. fib RVR. Patient was given diltiazem, however her rate was still high, therefore the primary team was called to admit the patient. Physical exam shows an irregular heart rate and rhythm, diminished breath sounds bilaterally with wheezing with an oxygen saturation of 85% on 6 L, mild swelling of the right knee with pain on the lateral joint line and laxity in valgus testing of the right knee, with generalized abdominal tenderness to palpation. Explained to patient that she has new onset A. fib and needs evaluated and admitted to the hospital, however patient states that she does not want to come to the hospital and is refusing admission. Patient states that it is her anxiety of being in the emergency department at the hospital which is causing her heart rate to go so high, and this will calm down after she goes home. Patient states that she will follow-up with a cardiologist and her primary care physician this week. Explained to the patient the risk of permanent disability and/or death from her A. fib RVR, and she states her understanding, however still is refusing admission.   Because the patient is refusing to come into the hospital for admission, will send her home on an increased dosage of her metoprolol to 25 mg twice daily and will add Plavix 75 mg to her medication regimen. We will also try to get the patient seen and evaluated in the outpatient setting as soon as possible.

## 2021-04-18 NOTE — ED PROVIDER NOTES
ED Attending  [facility]  Department of Emergency Medicine   ED  Encounter Note  Admit Date/RoomTime: 2021 12:17 PM  ED Room:     NAME: Tonia Mcfarland  : 1965  MRN: [id number]     Chief Complaint:  Knee Pain (Right side, pt stood up from sitting position and pt felt a pop)    History of Present Illness       Tonia Mcfarland is a 54 y.o. old female who presents to the emergency department by private vehicle, for non-traumatic popping sensation to right knee  which occured 1 day(s) prior to arrival.  The complaint is due to was getting out of her wheelchair to get into the [place] when she felt a popping sensation and immediate pain. Since onset the symptoms have been remaining constant. Patient has no prior history of pain/injury with regards to today's visit. Her pain is aggraveated by any movement, any use of or pressure on or palpation of and relieved by nothing, as no treatment has been provided prior to this visit. Her weight bearing ability:  unable secondary to pain. She denies any head injury, headache, loss of consciousness, confusion, dizziness, neck pain, chest pain, abdominal pain, back pain, numbness, weakness, blurred vision, nausea or vomiting. Patient's heart rate was in the 150s during triage. EKG was completed. Patient denies any chest pain or shortness of breath. She wears 5 L of oxygen via nasal cannula at all times. Patient's family states that she is unable to bear any weight and they have been lifting her to get around. ROS   Pertinent positives and negatives are stated within HPI, all other systems reviewed and are negative.     Past Medical History:  has a past medical history of Anxiety, Carotid stenosis, Carpal tunnel syndrome, Cerebral artery occlusion with cerebral infarction [facility]), Chronic back pain, COPD (chronic obstructive pulmonary disease) ([facility] [address] CVA (cerebral infarction), Depression, Ehler's-Danlos syndrome, GERD Absolute 0.05 0.00 - 0.20 S0/S   Basic Metabolic Panel w/ Reflex to MG   Result Value Ref Range    Sodium 140 132 - 146 mmol/L    Potassium reflex Magnesium 4.4 3.5 - 5.0 mmol/L    Chloride 97 (L) 98 - 107 mmol/L    CO2 37 (H) 22 - 29 mmol/L    Anion Gap 6 (L) 7 - 16 mmol/L    Glucose 244 (H) 74 - 99 mg/dL    BUN 10 6 - 20 mg/dL    CREATININE 0.3 (L) 0.5 - 1.0 mg/dL    GFR Non-African American >60 >=60 mL/min/1.73    GFR African American >60     Calcium 9.6 8.6 - 10.2 mg/dL   Troponin   Result Value Ref Range    Troponin <0.01 0.00 - 0.03 ng/mL   EKG 12 Lead   Result Value Ref Range    Ventricular Rate 154 BPM    Atrial Rate 153 BPM    QRS Duration 86 ms    Q-T Interval 278 ms    QTc Calculation (Bazett) 445 ms    R Axis -75 degrees    T Axis 80 degrees     Imaging: All Radiology results interpreted by Radiologist unless otherwise noted. XR CHEST PORTABLE   Final Result   No acute pulmonary process         XR KNEE RIGHT (1-2 VIEWS)   Final Result   1. No fracture or acute osseous abnormality. 2.  Small suprapatellar effusion, new compared to previous. Consider further   correlation with MRI to exclude internal derangement. EKG: This EKG is signed and interpreted by Dr. Shayna Martin. Rate: 154  Rhythm: Atrial fibrillation  Interpretation: A. fib RVR, no STEMI      ED Course / Medical Decision Making     Medications   dilTIAZem 100 mg in dextrose 5 % 100 mL infusion (ADD-Humble) (5 mg/hr Intravenous New Bag 4/18/21 1405)   dilTIAZem injection 20 mg (20 mg Intravenous Given 4/18/21 1314)   fentaNYL (SUBLIMAZE) injection 25 mcg (25 mcg Intravenous Given 4/18/21 1314)     ED Course as of Apr 18 1505   Sun Apr 18, 2021   1442 Patient here right knee pain. Patient does have swollen right knee but does not appear to be infected. Patient incidentally noted to be in A. fib RVR which patient has no history of.   Patient was given Cardizem which did slow the rate but continued to be persistently tachycardic so patient was started on a Cardizem drip. I did recommend that the patient be admitted both for work-up further of irregular rhythm that she has no history of as well as her right knee pain that may need further work-up and imaging. Patient states she does not want to stay in the hospital.  I explained to the patient that she is at risk for stroke as well as other pathology related to her irregular heartbeat that needs further worked up. She is currently on no anticoagulation other than a baby aspirin and that she is at significant risk for worsening complications the patient states she has had strokes before and will return if symptoms like that develop. I explained that there is risk with that and that if she does develop a stroke it may be too late for intervention. Patient's son was also concerned about her right knee pain. I also have concerns about this that she may have difficulty walking at home. The patient states that her family will take her in and out of the car and she will get around with her walker. I did explain that the risks of this are that she may have falls at home that could lead to further injury such as head bleed hip fractures or other extremity injuries. Patient stated she adamantly did not want to stay in the hospital.  I explained to the patient that I could offer her an Ace wrap and anti-inflammatory and Tylenol medications for symptomatic treatment. It is not felt Norco is appropriate at this time. I did speak and have the patient's primary care physician come to the emergency department Dr. Steven Madison who in conjunction with Dr. Ileana Polanco was willing to give the patient metoprolol and Plavix. [CF]   1312 I did discuss the patient in front of family medicine resident Dr. Steven Madison and both of us were comfortable the patient signing out [address] since she understood the risks and is felt to be mentally competent to make decisions.   Patient was discharged with short course of medications and will follow up with Dr. Becca Ackerman soon. She is given recommendation to return to the emergency department at any point for further care if she desires. [CF]      ED Course User Index  [CF] Roc Reynolds,      1740-Dr. Marcy Trivedi, family medicine resident at bedside speaking with patient. Consult(s):   IP CONSULT TO INTERNAL MEDICINE  26- Dr. Natalia Collins with Family medicine resident, Dr. Ethan King. He states he will come to evaluate the patient new onset A. Fib    Procedure(s):   none. MDM: Patient presented the emerge department for right knee pain after feeling a popping sensation last night. Upon her arrival patient's heart rate was in the 150s. EKG revealed A. fib with RVR which is new compared to her previous EKGs. CBC was unremarkable. BMP revealed elevated glucose at 240 for which patient is a diabetic. Troponin was less than 0.01. Chest x-ray revealed no acute pulmonary processes. X-ray of the right knee revealed no fractures or osseous abnormalities. There is a small suprapatellar effusion. Patient was given a Cardizem bolus and then started on a Cardizem drip due to patient's A. fib RVR. Heart rate has decreased oral being on the drip. Patient refused to be admitted to the hospital.  Family medicine resident who is covering for patient's PCP was consulted and spoke with patient also regarding this. Patient is alert and oriented x4. Dr. Evens Oviedo spoke in depth of the risk of signing out AMA including death. She states verbal understanding. She states she has a walker to help with assistance and family will help her. Patient was discharged home with Plavix and metoprolol that was discussed with family medicine resident. She will follow up with them outpatient due to signing out AMA with not wanting to be admitted today.       Plan of Care/Counseling:  Emergency Attending Physician and I reviewed today's visit with the patient and family member son in addition to providing specific details for the plan of care and counseling regarding the diagnosis and prognosis. Questions are answered at this time and are NOT  agreeable with the plan. Assessment      1. Acute pain of right knee    2. New onset atrial fibrillation ([address]    3. Atrial fibrillation with RVR ([facility] [address]      Plan   AMA  Patient condition is fair    New Medications     New Prescriptions    CLOPIDOGREL (PLAVIX) 75 MG TABLET    Take 1 tablet by mouth daily for 7 doses    METOPROLOL SUCCINATE 25 MG CS24    Take 1 Dose by mouth 2 times daily     Electronically signed by BEHZAD Cartagena CNP   DD: 4/18/21  **This report was transcribed using voice recognition software. Every effort was made to ensure accuracy; however, inadvertent computerized transcription errors may be present.   END OF ED PROVIDER NOTE       BEHZAD Cartagena CNP  04/18/21 5501

## 2021-04-19 NOTE — TELEPHONE ENCOUNTER
Pt's daughter called to request an appt with Dr Luis Poole for an ER f/u from [facility] on 4/18. Pt went to ER when her knee popped, but was Dx with New onset of Afib w/RVR, pt refused admission to hospital, ER did xray, no further treatment done since pt refused admission. PCP unavailable, d/t unfavorable experience w/  ER , lauren will only be seen by PCP Dr Luis Poole. Please contact [facility] at [phone].

## 2021-04-20 NOTE — PATIENT INSTRUCTIONS
Patient Education        Learning About Atrial Fibrillation  What is atrial fibrillation? Atrial fibrillation (say \"AY-tree-jus abx-zvvk-SJF-danaun\") is a common type of irregular heartbeat (arrhythmia). Normally, the heart beats in a strong, steady rhythm. In atrial fibrillation, a problem with the heart's electrical system causes the two upper chambers of the heart (called the atria) to quiver, or fibrillate. Atrial fibrillation can be dangerous. This is because if the heartbeat isn't strong and steady, blood can collect, or pool, in the atria. And pooled blood is more likely to form clots. Clots can travel to the brain, block blood flow, and cause a stroke. Atrial fibrillation can also lead to heart failure. This condition also upsets the normal rhythm between the atria and the lower chambers of the heart. (These chambers are called the ventricles.) The ventricles may beat fast and without a regular rhythm. What are the symptoms? Some people feel symptoms when they have episodes of atrial fibrillation. But other people don't notice any symptoms. If you have symptoms, you may feel:  · A fluttering, racing, or pounding feeling in your chest called palpitations. · Weak or tired. · Dizzy or lightheaded. · Short of breath. · Chest pain. · Confused. You may notice signs of atrial fibrillation when you check your pulse. Your pulse may seem uneven or fast.  What can you expect when you have atrial fibrillation? At first, spells of atrial fibrillation may come on suddenly and last a short time. They may go away on their own or with treatment. Over time, the spells may last longer and occur more often. They often don't go away on their own. How is it treated? Treatments can help you feel better and prevent future problems, especially stroke and heart failure. Your treatment will depend on the cause of your atrial fibrillation, your symptoms, and your risk for stroke.  Types of treatment include:  · Heart rate treatment. Medicine may be used to slow your heart rate. Your heartbeat may still be irregular. But these medicines keep your heart from beating too fast. They may also help relieve symptoms. · Heart rhythm treatment. Different treatments may be used to try to stop atrial fibrillation and keep it from returning. They can also relieve symptoms. These treatments include medicine, electrical cardioversion to shock the heart back to a normal rhythm, a procedure called catheter ablation, and heart surgery. · Stroke prevention. You and your doctor can decide how to lower your risk. You may decide to take a blood-thinning medicine called an anticoagulant. What is a heart-healthy lifestyle for atrial fibrillation? You can live well and help manage atrial fibrillation by having a heart-healthy lifestyle. This lifestyle may help reduce how often you have episodes of atrial fibrillation. Don't smoke. Avoid secondhand smoke too. Quitting smoking is the best thing you can do for your heart. Be active. Talk to your doctor about what type and level of exercise is safe for you. Eat a heart-healthy diet. These foods include vegetables, fruits, nuts, beans, lean meat, fish, and whole grains. Limit sodium, alcohol, and sugar. Avoid alcohol if it triggers symptoms. Stay at a healthy weight. Lose weight if you need to. Losing weight can help relieve symptoms. Manage other health problems. These problems include diabetes, high blood pressure, and high cholesterol. If you think you may have a problem with alcohol or drug use, talk to your doctor. Manage stress. Options like yoga, biofeedback, and meditation may help. Where can you learn more? Go to https://richy.KKBOX. org and sign in to your [employer]mension account. Enter C594 in the Ubitricity box to learn more about \"Learning About Atrial Fibrillation. \"     If you do not have an account, please click on the \"Sign Up Now\" link.  Current as of: August 31, 2020               Content Version: 12.8  © 7710-2158 [employer]. Care instructions adapted under license by [facility] ([facility]). If you have questions about a medical condition or this instruction, always ask your healthcare professional. [address] any warranty or liability for your use of this information.

## 2021-04-20 NOTE — PROGRESS NOTES
CC   Chief Complaint   Patient presents with    Atrial Fibrillation     ED F/U 4/18    Leg Pain     R side, began Saturday with a \"popping\" then extreme pain (10/10)     HPI:   Patient comes in today for the below issue(s). R knee pain  Injured self with a standing up 3 days ago 4/17  Felt a pop diffusely  Seen in ER- note reviewed  X-ray in the ER showed no bony deformity but did show soft tissue swelling. MRI was recommended  Since coming home, continues to feel some degree of instability but no falls. Pain is located primarily posterior but also lateral and medial components are noted  Small amount of soft tissue swelling medially is noted but no erythema or warmth to the touch    A. fib RVR  New onset  Identified incidentally in the ER  Patient has been recommended to be admitted but she declined, with signing out [address]  Was relatively asymptomatic, and did not want to be in the hospital.  Also states that was not when she came to the ER for. Has chronic dyspnea, severe COPD  Question of coronary disease that have been managed medically  No prior diagnosis of this  She declined oral anticoagulation but did agree to restarting metoprolol, taking aspirin and Plavix  She is unaware of any symptoms, reporting only fatigue and shortness of breath  Denies orthopnea, chest pain, palpitation, or worsening lower extremity edema        Review of Systems  Review of Systems   Constitutional: Positive for fatigue. Negative for chills and fever. Respiratory: Positive for cough, shortness of breath (Chronic, unchanged) and wheezing. Cardiovascular: Negative for chest pain, palpitations and leg swelling. Gastrointestinal: Negative for abdominal pain and nausea. Musculoskeletal: Positive for arthralgias and gait problem.           Past Medical History:   Diagnosis Date    Anxiety     Carotid stenosis 12/2011    50-69% on left    Carpal tunnel syndrome 12/07/2011    bilateral    Cerebral artery occlusion with cerebral infarction [facility]) 2011? left sided weakness / usually in w/c    Chronic back pain     COPD (chronic obstructive pulmonary disease) ([facility] [address]     no pulmonologist / follows with PCP    CVA (cerebral infarction) 2011? right sided thalamic; seen by Dr Noble Running syndrome     GERD (gastroesophageal reflux disease)     History of ischemic heart disease 03/30/2017    no cardiology    Hyperlipidemia     Hypertension     Obesity     PVD (peripheral vascular disease) with claudication ([address] 8/25/2017    Tobacco abuse     Tobacco abuse     Type II or unspecified type diabetes mellitus without mention of complication, not stated as uncontrolled     Vitamin D deficiency          PE:  VS:  /76 (Site: Right Upper Arm, Position: Sitting, Cuff Size: Medium Adult)   Pulse 90   Temp 97.6 °F (36.4 °C) (Temporal)   Resp 14   Ht 5' 5\" (1.651 m)   Wt 192 lb (87.1 kg) Comment: Patient reported  LMP 03/20/2015   SpO2 95%   BMI 31.95 kg/m²   Physical Exam  Vitals signs reviewed. Constitutional:       Appearance: She is ill-appearing (Chronically, unchanged). HENT:      Head: Normocephalic and atraumatic. Nose: Congestion present. Mouth/Throat:      Mouth: Mucous membranes are moist.      Pharynx: Oropharynx is clear. Neck:      Musculoskeletal: Neck supple. No muscular tenderness. Cardiovascular:      Rate and Rhythm: Normal rate and regular rhythm. No extrasystoles are present. Pulses: Normal pulses. Heart sounds: No murmur. Pulmonary:      Breath sounds: Decreased air movement present. Decreased breath sounds present. No wheezing, rhonchi or rales. Musculoskeletal:      Right knee: She exhibits decreased range of motion (Pain limited her participation in the exam) and effusion (Medially). Tenderness found. Medial joint line and lateral joint line tenderness noted.       Left knee: Normal. She exhibits normal range of motion. Right lower leg: No edema. Left lower leg: No edema. Lymphadenopathy:      Cervical: No cervical adenopathy. Neurological:      Mental Status: She is alert. Psychiatric:         Attention and Perception: Attention normal.         Mood and Affect: Mood and affect normal.         Judgment: Judgment normal.       Data: EKG in office showed sinus rhythm, no A. fib    Assessment (including specific orders/meds/labs):      Suzy Rivas was seen today for atrial fibrillation and leg pain. Diagnoses and all orders for this visit:    Paroxysmal atrial fibrillation (HCC)  -     apixaban starter pack (ELIQUIS) 5 MG TBPK tablet; Take 1 tablet by mouth See Admin Instructions  -     apixaban (ELIQUIS) 5 MG TABS tablet; Take 1 tablet by mouth 2 times daily Start after done with started pack  -     Metoprolol Succinate 25 MG CS24; Take 1 Dose by mouth 2 times daily    Atrial fibrillation with RVR ([facility]  -     EKG 12 Lead  -     Franko Fernández MD, Cardiology, [place]    Derangement, knee, right  -     MRI KNEE RIGHT WO CONTRAST; Future  -     Elastic Bandages & Supports (KNEE IMMOBILIZER 22\") MISC; As directed, dx sprain/derangement R knee    Abnormal x-ray of knee  -     MRI KNEE RIGHT WO CONTRAST; Future    Medication management  -     vitamin B-12 (CYANOCOBALAMIN) 1000 MCG tablet; Take 1 tablet by mouth daily  -     ketoconazole (NIZORAL) 2 % cream; Apply topically daily. Plan:     Patient was educated about the A. fib RVR. I explained to her this was a potentially life-threatening or life altering new development. I explained to her my preference for being admitted on Sunday. I explained to her that I had been made aware by the ER and recommended admission. However, I think at this point the atrial fibrillation is most likely related to her severe COPD with probable pulmonary hypertension and left atrial strain. Although paroxysmal now, may return in the future, becoming more permanent. Recommend anticoagulation and rate control. She is agreeable. Risks and benefits were explained. Risk of bleeding was discussed at length. She is agreeable. Also is agreeable to cardiology evaluation. Defer echo and any other appropriate assessment to them. Blood pressure is controlled. Continue with treatments for her severe COPD    Knee derangement is identified. Pain limited my exam, I cannot rule out meniscal involvement or other structural damage. We will pursue MRI as recommended by imaging. Placed in a knee immobilizer for a few days, use walker as needed. She also needed chronic meds refilled. That was addressed      Counseled regarding above diagnosis, including possible risks and complications,  especially if left uncontrolled. Counseled regarding the possible sideeffects, risks, benefits and alternatives to treatment; patient and/or guardian verbalizes understanding, agrees, feels comfortable with and wishes to proceed with above treatment plan. Call or go to ED immediately if symptoms worsen or persist. Advised patient to call with any new medication issues, and, as applicable, read all Rx info from pharmacy to assure aware ofall possible risks and side effects of medication before taking. As applicable, educational materials and/or home exercises printed forpatient's review and were included in patient instructions on his/her After Visit Summary and given to patient at the end of visit. Patient and/or guardian given opportunity to ask questions/raise concerns. She verbalized comfort and understanding of instructions. Follow Up:     Return for keep next appt as scheduled or see sooner if neded. or sooner if the above issues change unexpectedly or new issues develop     This document was prepared at least partially through the use ofEscapeer.comice recognition software.  Although effort is taken to assure the accuracy of this document, it is possible that grammatical, syntax, or spelling errors may occur.       Electronically signed by Gertrude Berkowitz MD [facility]

## 2021-04-21 NOTE — TELEPHONE ENCOUNTER
Pt would like a shower chair with adjustable legs. They would like the larger size that goes half in the tub and half out. Order needs sent to [facility].

## 2021-04-26 PROBLEM — B37.0 ORAL THRUSH: Status: RESOLVED | Noted: 2017-08-21 | Resolved: 2021-01-01

## 2021-04-26 PROBLEM — Z86.79 HISTORY OF ISCHEMIC HEART DISEASE: Status: RESOLVED | Noted: 2017-03-30 | Resolved: 2021-01-01

## 2021-04-26 PROBLEM — Z98.890 S/P HERNIA REPAIR: Status: RESOLVED | Noted: 2017-03-30 | Resolved: 2021-01-01

## 2021-04-26 PROBLEM — B35.3 TINEA PEDIS OF BOTH FEET: Status: RESOLVED | Noted: 2017-01-12 | Resolved: 2021-01-01

## 2021-04-26 PROBLEM — I48.0 PAF (PAROXYSMAL ATRIAL FIBRILLATION) (HCC): Status: ACTIVE | Noted: 2021-01-01

## 2021-04-26 PROBLEM — J41.1 MUCOPURULENT CHRONIC BRONCHITIS (HCC): Chronic | Status: RESOLVED | Noted: 2018-07-07 | Resolved: 2021-01-01

## 2021-04-26 PROBLEM — J06.9 VIRAL URI: Status: RESOLVED | Noted: 2018-11-20 | Resolved: 2021-01-01

## 2021-04-26 PROBLEM — M54.16 LUMBAR RADICULOPATHY: Status: RESOLVED | Noted: 2017-08-18 | Resolved: 2021-01-01

## 2021-04-26 PROBLEM — H69.92 DYSFUNCTION OF LEFT EUSTACHIAN TUBE: Status: RESOLVED | Noted: 2018-11-20 | Resolved: 2021-01-01

## 2021-04-26 PROBLEM — G89.4 CHRONIC PAIN SYNDROME: Status: RESOLVED | Noted: 2020-06-12 | Resolved: 2021-01-01

## 2021-04-26 PROBLEM — Z02.89 PAIN MEDICATION AGREEMENT: Status: RESOLVED | Noted: 2017-09-16 | Resolved: 2021-01-01

## 2021-04-26 PROBLEM — K61.0 PERIANAL ABSCESS: Status: RESOLVED | Noted: 2017-08-21 | Resolved: 2021-01-01

## 2021-04-26 PROBLEM — M79.7 FIBROMYALGIA: Status: RESOLVED | Noted: 2020-06-12 | Resolved: 2021-01-01

## 2021-04-26 PROBLEM — E87.6 HYPOKALEMIA: Status: RESOLVED | Noted: 2017-04-17 | Resolved: 2021-01-01

## 2021-04-26 PROBLEM — H69.82 DYSFUNCTION OF LEFT EUSTACHIAN TUBE: Status: RESOLVED | Noted: 2018-11-20 | Resolved: 2021-01-01

## 2021-04-26 PROBLEM — Z79.899 MEDICATION MANAGEMENT: Status: RESOLVED | Noted: 2017-08-19 | Resolved: 2021-01-01

## 2021-04-26 PROBLEM — I25.10 CORONARY ARTERY DISEASE INVOLVING NATIVE CORONARY ARTERY OF NATIVE HEART WITHOUT ANGINA PECTORIS: Status: ACTIVE | Noted: 2021-01-01

## 2021-04-26 PROBLEM — Z87.19 S/P HERNIA REPAIR: Status: RESOLVED | Noted: 2017-03-30 | Resolved: 2021-01-01

## 2021-04-26 NOTE — PROGRESS NOTES
Chief Complaint   Patient presents with    Atrial Fibrillation    Coronary Artery Disease       Patient Active Problem List    Diagnosis Date Noted    Diastasis recti 09/25/2013     Priority: Low    Hyperlipidemia      Priority: Low    Depression      Priority: Low    Ama-Danlos syndrome      Priority: Low    MARIAA (obstructive sleep apnea)      Priority: Low    Tobacco abuse      Priority: Low    PAF (paroxysmal atrial fibrillation) ([facility] 04/26/2021     Overview Note:     A. CHADSVASC = 6      Coronary artery disease involving native coronary artery of native heart without angina pectoris 04/26/2021     Overview Note:     A. CATH 2015: mild CAD, no obstructive lesions, normal EF      Diabetic polyneuropathy associated with type 2 diabetes mellitus ([facility] 06/12/2020    Compression fracture of L4 lumbar vertebra 08/27/2017     Overview Note:     Replacing deprecated diagnoses      Compression fracture of L1 lumbar vertebra ([facility] 08/27/2017    H/O: stroke 08/27/2017    Vitamin D deficiency     Carpal tunnel syndrome 12/07/2011    Carotid stenosis 12/01/2011     Overview Note:     50-69% on left         Current Outpatient Medications   Medication Sig Dispense Refill    diclofenac sodium (VOLTAREN) 1 % GEL Apply 2 g topically 3 times daily as needed for Pain      apixaban (ELIQUIS) 5 MG TABS tablet Take 1 tablet by mouth 2 times daily Start after done with started pack 60 tablet 5    Metoprolol Succinate 25 MG CS24 Take 1 Dose by mouth 2 times daily 60 capsule 5    vitamin B-12 (CYANOCOBALAMIN) 1000 MCG tablet Take 1 tablet by mouth daily 30 tablet 11    ketoconazole (NIZORAL) 2 % cream Apply topically daily.  30 g 1    budesonide (PULMICORT) 0.25 MG/2ML nebulizer suspension Take 2 mLs by nebulization 2 times daily 60 ampule 3    insulin lispro, 1 Unit Dial, (HUMALOG KWIKPEN) 100 UNIT/ML SOPN 5-12 units SC before meals as per sliding scale 3 pen 5    insulin glargine (BASAGLAR KWIKPEN) 100 UNIT/ML injection pen Inject 80 Units into the skin 2 times daily 5 pen 5    vitamin D (ERGOCALCIFEROL) 1.25 MG (65717 UT) CAPS capsule TAKE 1 CAPSULE PO q weekly 12 capsule 5    traZODone (DESYREL) 150 MG tablet TAKE 1 TABLET BY MOUTH EVERY NIGHT AT BEDTIME 30 tablet 10    fenofibrate (TRICOR) 54 MG tablet Take 1 tablet by mouth daily 30 tablet 5    tiZANidine (ZANAFLEX) 2 MG tablet Take 1 tablet by mouth every 8 hours as needed (muscle spams) 60 tablet 2    ipratropium-albuterol (DUONEB) 0.5-2.5 (3) MG/3ML SOLN nebulizer solution USE 3 ML VIA NEBULIZER EVERY 4 HOURS AS NEEDED FOR SHORTNESS OF BREATH 540 mL 5    albuterol (PROVENTIL) (2.5 MG/3ML) 0.083% nebulizer solution USE 1 VIAL VIA NEBULIZER EVERY 4 HOURS AS NEEDED FOR WHEEZING OR SHORTNESS OF BREATH 540 mL 3    ASPIRIN LOW DOSE 81 MG EC tablet TAKE 1 TABLET BY MOUTH ONCE DAILY 30 tablet 5    OXYGEN Inhale 5 L into the lungs continuous       Current Facility-Administered Medications   Medication Dose Route Frequency Provider Last Rate Last Admin    perflutren lipid microspheres (DEFINITY) injection 1.65 mg  1.5 mL Intravenous ONCE PRN Amanda Olguin MD            No Known Allergies    Vitals:    04/26/21 1204   BP: 128/69   Pulse: 100   Resp: 18   Weight: 192 lb (87.1 kg)   Height: 5' 5\" (1.651 m)       Social History     Socioeconomic History    Marital status: Single     Spouse name: Not on file    Number of children: Not on file    Years of education: Not on file    Highest education level: Not on file   Occupational History     Comment: factory work/ [employer]   Social Needs    Financial resource strain: Not on file    Food insecurity     Worry: Not on file     Inability: Not on file    Transportation needs     Medical: Not on file     Non-medical: Not on file   Tobacco Use    Smoking status: Current Every Day Smoker     Packs/day: 1.00     Years: 34.00     Pack years: 34.00     Types: Cigarettes    Smokeless tobacco: Never Used Substance and Sexual Activity    Alcohol use: No     Alcohol/week: 0.0 standard drinks    Drug use: No    Sexual activity: Not on file   Lifestyle    Physical activity     Days per week: Not on file     Minutes per session: Not on file    Stress: Not on file   Relationships    Social connections     Talks on phone: Not on file     Gets together: Not on file     Attends Christian service: Not on file     Active member of club or organization: Not on file     Attends meetings of clubs or organizations: Not on file     Relationship status: Not on file    Intimate partner violence     Fear of current or ex partner: Not on file     Emotionally abused: Not on file     Physically abused: Not on file     Forced sexual activity: Not on file   Other Topics Concern    Not on file   Social History Narrative    Not on file       Family History   Problem Relation Age of Onset    High Blood Pressure Mother     COPD Mother     COPD Father     High Blood Pressure Father     Other Father     Heart Disease Brother     High Blood Pressure Brother     Other Brother     Cancer Maternal Grandmother     Heart Attack Maternal Grandfather     Other Paternal Grandmother          SUBJECTIVE: Rylan Elizabeth presents to the office today for consult - dr Mina Greer - for cardiac evaluation. I reviwed cardiology consult records from 2017 and recent ED visit note and actual ekg in question. Hx of cath in 2015 demonstrating non obstructive CAD and normal LV function, as well as unremarkable echo in 2015, despite Ama-Danlos diagnosis   Visited ED recently for knee pain, was found to be in AF with RVR. Now on NOAC per PCP. She complains of fatigue and inability to ambulate well due to knee injury] and denies   chest pain, orthopnea and syncope. Accompanied by knowledgeable and concerned daughter today      Review of Systems:   Heart: as above   Lungs: as above   Eyes: denies changes in vision or discharge.    Ears: denies changes in hearing or pain. Nose: denies epistaxis or masses   Throat: denies sore throat or trouble swallowing. Neuro: denies numbness, tingling, tremors. Skin: denies rashes or itching. : denies hematuria, dysuria   GI: denies vomiting, diarrhea   Psych: denies mood changed, anxiety, depression. all others negative. Physical Exam   /69   Pulse 100   Resp 18   Ht 5' 5\" (1.651 m)   Wt 192 lb (87.1 kg)   LMP 03/20/2015   BMI 31.95 kg/m²   Constitutional: Oriented to person, place, and time. Sickly appearing, with Nasal cannulaNo distress. Head: Normocephalic and atraumatic. Eyes: EOM are normal. Pupils are equal, round, and reactive to light. Neck: Normal range of motion. Neck supple. No hepatojugular reflux and no JVD present. Carotid bruit is not present. No tracheal deviation present. No thyromegaly present. Cardiovascular: Normal rate, regular rhythm, normal heart sounds and intact distal pulses. Exam reveals no gallop and no friction rub. No murmur heard. Pulmonary/Chest: Effort normal and breath sounds normal. No respiratory distress. No wheezes. No rales. No tenderness. Abdominal: Soft. Bowel sounds are normal. No distension and no mass. No tenderness. No rebound and no guarding. Musculoskeletal: wheelchair, trace bilateral edema and no tenderness. Neurological: Alert and oriented to person, place, and time. Skin: Skin is warm and dry. No rash noted. Not diaphoretic. No erythema. Psychiatric: Normal mood and affect. Behavior is normal.     EKG:  normal sinus rhythm, 100, axis -74. :AFB.     ASSESSMENT AND PLAN:  Patient Active Problem List   Diagnosis    Hyperlipidemia    Depression    Ama-Danlos syndrome    MARIAA (obstructive sleep apnea)    Tobacco abuse    Carpal tunnel syndrome    Carotid stenosis    Vitamin D deficiency    Diastasis recti    Compression fracture of L4 lumbar vertebra    Compression fracture of L1 lumbar vertebra ([facility]    H/O:

## 2021-05-18 PROBLEM — Z66 DNR (DO NOT RESUSCITATE): Status: ACTIVE | Noted: 2021-01-01

## 2021-05-18 NOTE — PROGRESS NOTES
CC   Chief Complaint   Patient presents with    Diabetes    Headache     HPI:   Patient comes in today for the below issue(s). Follow-up atrial fibrillation and hypertension  Notes from cardiology reviewed and appreciated  Taken off beta-blocker, aspirin  Continued on direct oral anticoagulation  Doing well at present time. Has had no flareups of fibrillation that she is aware of  No significant bleeding or bruising events  Denies any syncope or near syncope    Follow-up of knee pain  MRI showed probable bone contusion and MCL sprain  There is also mention of a possible shadow in the femur  Patient and her family member are comfortable observing this for now  Of note is that she did not have a obvious mechanism that would cause a contusion of the bone, but they are comfortable with her improvement    Insomnia is worsening. No longer responding to trazodone. States usually can fall asleep but cannot stay asleep. Usually wake up within 2 to 3 hours due to increased diffuse whole body pain. Pain is becoming a bigger issue throughout her day. Previously has been on various neuroleptics including gabapentin and Lyrica, not effective. She is in been on narcotics which have not been effective.     Headaches  New complaint  She is describing a generalized severe headache with some light sensitivity, nausea, but no vomiting  Episodic  Not related to sleep, activity, weather change  Nothing has helped her pain    COPD  Chronic, severe, oxygen dependent  Reports over the past few days she has had a increase in shortness of breath/wheezing with a cough that is more productive of clear to yellow sputum  No fevers or chills  Some nasal congestion/sinus issues    Reports better compliance with insulin and gradually improving sugars    End-of-life discussion  Patient wanted to affirm her status is a DNR  Needs a form completed to have at home  Upon discussion, her wishes are most consistent with DNR CC arrest.  Form edema. Left lower leg: No edema. Lymphadenopathy:      Cervical: No cervical adenopathy. Skin:     General: Skin is warm and dry. Neurological:      Mental Status: She is alert. Mental status is at baseline. Psychiatric:         Attention and Perception: Attention normal.         Mood and Affect: Mood and affect normal.         Judgment: Judgment normal.           Assessment (including specific orders/meds/labs):      Anum Starr was seen today for diabetes and headache. Diagnoses and all orders for this visit:    Paroxysmal atrial fibrillation (HCC)    Medication management  -     tiZANidine (ZANAFLEX) 2 MG tablet; Take 1 tablet by mouth every 8 hours as needed (muscle spams)  -     traZODone (DESYREL) 150 MG tablet; Take 0.5 tablets by mouth nightly for 4 days TAKE 1 TABLET BY MOUTH EVERY NIGHT AT BEDTIME    Derangement, knee, right    DNR (do not resuscitate) CCA  -     DNR comfort care - arrest    Insomnia, unspecified type  -     nortriptyline (PAMELOR) 25 MG capsule; Take 1 capsule by mouth nightly    Migraine with aura and without status migrainosus, not intractable  -     butalbital-acetaminophen-caffeine (FIORICET, ESGIC) -40 MG per tablet; Take 1 tablet by mouth every 4 hours as needed for Headaches    COPD exacerbation (HCC)  -     predniSONE (DELTASONE) 10 MG tablet; 5 tab po qday x 2 d, then 4 tab po qday x 2 d, then 3 tab po qday x 2 d, then 2 tab po qday x 2 day, then resume the usual home dose.  -     doxycycline hyclate (VIBRA-TABS) 100 MG tablet; Take 1 tablet by mouth 2 times daily (with meals) for 10 days        Plan:     Clinically appears to be in sinus rhythm today. At her visit to the cardiologist she is also in sinus rhythm. Likely dealing with paroxysmal A. fib. Discussed signs and symptoms. Would consider calcium channel blocker if necessary. Continue oral anticoagulant    Right knee issues are improving.   Discussed observation versus pursuing the x-ray as mentioned above.    Insomnia is of uncertain etiology. She has been getting good relief of trazodone for some time but this benefit is diminishing. We will wean her off quickly and start nortriptyline. Alternatives discussed, could consider Remeron in the near future if not improving on a effective dose of nortriptyline    Describing a migraine clinically. We will try the above medication. I have some hesitation about using a triptan given her prior issues and other medications used. We will try this Fiorinal.  OARRS is checked, unremarkable    Mild COPD exacerbation, but given her severe COPD is warranted to treat with prednisone and an antibiotic. The prednisone taper may also help with her whole body pains. We will monitor for now. DNR discussion held as above. Forms completed and updated. Recheck as below. Sooner if new issues develop    Counseled regarding above diagnosis, including possible risks and complications,  especially if left uncontrolled. Counseled regarding the possible sideeffects, risks, benefits and alternatives to treatment; patient and/or guardian verbalizes understanding, agrees, feels comfortable with and wishes to proceed with above treatment plan. Call or go to ED immediately if symptoms worsen or persist. Advised patient to call with any new medication issues, and, as applicable, read all Rx info from pharmacy to assure aware ofall possible risks and side effects of medication before taking. As applicable, educational materials and/or home exercises printed forpatient's review and were included in patient instructions on his/her After Visit Summary and given to patient at the end of visit. Patient and/or guardian given opportunity to ask questions/raise concerns. She verbalized comfort and understanding of instructions. Follow Up:     Return in about 6 weeks (around 6/29/2021), or if symptoms worsen or fail to improve, for DM check, a-fib.  or sooner if the above issues change unexpectedly or new issues develop     This document was prepared at least partially through the use ofQualgenixice recognition software. Although effort is taken to assure the accuracy of this document, it is possible that grammatical, syntax,  or spelling errors may occur.       Electronically signed by Abel Zuniga MD [facility]

## 2021-05-27 PROBLEM — I48.91 ATRIAL FIBRILLATION WITH RVR (HCC): Status: ACTIVE | Noted: 2021-01-01

## 2021-05-27 NOTE — H&P
confirmed with the patient upon admission. Past Medical History     Past Medical History:   Diagnosis Date    Anxiety     Carotid stenosis 12/2011    50-69% on left    Carpal tunnel syndrome 12/07/2011    bilateral    Cerebral artery occlusion with cerebral infarction [facility]) 2011? left sided weakness / usually in w/c    Chronic back pain     COPD (chronic obstructive pulmonary disease) ([facility]     no pulmonologist / follows with PCP    CVA (cerebral infarction) 2011?     right sided thalamic; seen by Dr Brad Ambrose syndrome     GERD (gastroesophageal reflux disease)     History of ischemic heart disease 03/30/2017    no cardiology    Hyperlipidemia     Hypertension     Obesity     PVD (peripheral vascular disease) with claudication ([facility] 8/25/2017    Tobacco abuse     Tobacco abuse     Type II or unspecified type diabetes mellitus without mention of complication, not stated as uncontrolled     Vitamin D deficiency         Past Surgical History     Past Surgical History:   Procedure Laterality Date    ABDOMINAL EXPLORATION SURGERY  03/29/2017    with bowel resection, incarcerated ventral hernia repair    CARDIAC CATHETERIZATION  1/7/2015    Dr. Jannis Hammans  EF=55%   FFR=0.84  distal RCA bifurcation    CYST INCISION AND DRAINAGE Right 07/22/2016    Right gluteal mass incision and drainage    CYST REMOVAL  1/28/2011    excision of left arm inclusion cyst    ECHO COMPL W DOP COLOR FLOW  5/15/2013         ECHOCARDIOGRAM COMPLETE 2D W DOPPLER W COLOR  1/3/2012         ECHOCARDIOGRAM COMPLETE 2D W DOPPLER W COLOR  6/14/2012         EYE SURGERY Bilateral 2014    cataract w lens implants    HERNIA REPAIR  03/2017    abdominal    OTHER SURGICAL HISTORY  07/26/2016    re exploration left lateral wound / heel    HI DRAIN SKIN ABSCESS COMPLIC N/A 2/8/9954    EXAM UNDER ANESTHESIA  I & D BUTTOCKS performed by Yovanny Hudson DO at [address] MD [place]   insulin glargine (BASAGLAR KWIKPEN) 100 UNIT/ML injection pen Inject 80 Units into the skin 2 times daily 3/12/21   Linda Livingston MD   vitamin D (ERGOCALCIFEROL) 1.25 MG (98023 UT) CAPS capsule TAKE 1 CAPSULE PO q weekly 3/9/21   Linda Livingston MD   fenofibrate (TRICOR) 54 MG tablet Take 1 tablet by mouth daily 2/23/21   Linda Livingston MD   albuterol (PROVENTIL) (2.5 MG/3ML) 0.083% nebulizer solution USE 1 VIAL VIA NEBULIZER EVERY 4 HOURS AS NEEDED FOR WHEEZING OR SHORTNESS OF BREATH 5/7/20   Linda Livingston MD   metoprolol tartrate (LOPRESSOR) 25 MG tablet TAKE 1/2 TABLET BY MOUTH TWICE DAILY 9/9/19 4/18/21  Linda Livingston MD   OXYGEN Inhale 5 L into the lungs continuous    Historical Provider, MD        Allergies   Patient has no known allergies. Social History     Social History     Tobacco History     Smoking Status  Current Every Day Smoker Smoking Frequency  1 pack/day for 34 years (34 pk yrs) Smoking Tobacco Type  Cigarettes    Smokeless Tobacco Use  Never Used          Alcohol History     Alcohol Use Status  No          Drug Use     Drug Use Status  No          Sexual Activity     Sexually Active  Not Currently                Family History     Family History   Problem Relation Age of Onset    High Blood Pressure Mother     COPD Mother     COPD Father     High Blood Pressure Father     Other Father     Heart Disease Brother     High Blood Pressure Brother     Other Brother     Cancer Maternal Grandmother     Heart Attack Maternal Grandfather     Other Paternal Grandmother        Review of Systems   Review of Systems   Constitutional: Positive for appetite change (Decreased) and fatigue. HENT: Positive for congestion and sinus pressure (Recent sinus infection, currently on antibiotics). Respiratory: Positive for chest tightness and shortness of breath. Cardiovascular: Positive for chest pain, palpitations and leg swelling (Of left leg, chronic). She is alert and oriented to person, place, and time. Mental status is at baseline. Labs      Recent Results (from the past 24 hour(s))   Basic metabolic panel    Collection Time: 05/27/21  6:06 PM   Result Value Ref Range    Sodium 138 132 - 146 mmol/L    Potassium 4.2 3.5 - 5.0 mmol/L    Chloride 93 (L) 98 - 107 mmol/L    CO2 36 (H) 22 - 29 mmol/L    Anion Gap 9 7 - 16 mmol/L    Glucose 198 (H) 74 - 99 mg/dL    BUN 12 6 - 20 mg/dL    CREATININE 0.4 (L) 0.5 - 1.0 mg/dL    GFR Non-African American >60 >=60 mL/min/1.73    GFR African American >60     Calcium 9.3 8.6 - 10.2 mg/dL   CBC    Collection Time: 05/27/21  6:06 PM   Result Value Ref Range    WBC 14.3 (H) 4.5 - 11.5 E9/L    RBC 4.68 3.50 - 5.50 E12/L    Hemoglobin 13.7 11.5 - 15.5 g/dL    Hematocrit 44.7 34.0 - 48.0 %    MCV 95.5 80.0 - 99.9 fL    MCH 29.3 26.0 - 35.0 pg    MCHC 30.6 (L) 32.0 - 34.5 %    RDW 12.8 11.5 - 15.0 fL    Platelets 439 532 - 659 E9/L    MPV 9.1 7.0 - 12.0 fL   Troponin I    Collection Time: 05/27/21  6:06 PM   Result Value Ref Range    Troponin, High Sensitivity 16 (H) 0 - 9 ng/L   Protime-INR    Collection Time: 05/27/21  6:06 PM   Result Value Ref Range    Protime 12.2 9.3 - 12.4 sec    INR 1.1    APTT    Collection Time: 05/27/21  6:06 PM   Result Value Ref Range    aPTT 33.3 24.5 - 35.1 sec        Imaging/Diagnostics Last 24 Hours   XR CHEST (2 VW)    Result Date: 5/27/2021  EXAMINATION: TWO XRAY VIEWS OF THE CHEST 5/27/2021 5:56 pm COMPARISON: Chest series from April 18, 2021 HISTORY: ORDERING SYSTEM PROVIDED HISTORY: cough TECHNOLOGIST PROVIDED HISTORY: Reason for exam:->cough FINDINGS: Adequate and symmetric aeration of the lungs. There are no formed consolidations, pleural effusions, or pneumothoraces. Trachea and central mainstem bronchi appear clear. Minimal atherosclerotic disease in the aortic arch. Cardiomediastinal silhouette and pulmonary vascularity appear within normal limits.  Osseous and thoracic soft tissue structures demonstrate no acute findings. Post vertebroplasty changes in the lower thoracic spine and lumbar spine. Multilevel endplate spondylosis. Atherosclerotic disease. No additional evidence of active cardiopulmonary pathology. Assessment    Atrial fibrillation with rapid ventricular response  Elevated troponin  Hyperglycemia  Leukocytosis  Uncontrolled diabetes mellitus    Plan     Atrial Fibrillation with RVR  First diagnosed 1 month ago in the ED, sent home on Eliquis, beta-blocker stopped by cardiology. Patient sees Dr. Jamie Sorenson. Per patient, heart rate in to 150s at home. 150s in ED.  -Heart rate not responsive to diltiazem drip, fluid bolus; blood pressure dropped with up titration of diltiazem drip  -Digoxin 250 mcg ordered in ED  -Initial troponin 16, will repeat  -Continue home Eliquis 5 mg twice daily  -IVF at 100 mL/h  -Daily CBC, CMP  -Cardiac diet  -I&Os  -telemetry  -Cardiology consulted from ED    COPD  -On 5 L O2 at baseline  -Pulmicort, DuoNebs, nebulized saline  -Recent exacerbation, placed on doxycycline, will continue    Diabetes mellitus, uncontrolled  -Continue home Lantus 80 units twice daily  -MDSSI  -Hypoglycemia protocols  -Monitor blood glucose levels    Muscle spasms  -Continue home Zanaflex 2 mg every 8 hours as needed    Migraines  -Continue home Fioricet    Hyperlipidemia  -Continue home fenofibrate 54 mg daily    Depression and insomnia treatment  -Continue home nortriptyline 25 mg nightly    MARIAA, does not use CPAP at home    Consultations Ordered:  IP CONSULT TO FAMILY MEDICINE  IP CONSULT TO CARDIOLOGY    DVT ppx: Eliquis  Diet: Cardiac  Code: DNR-CCA    Case discussed with attending physician Dr. Bernice Soto.     Electronically signed by Betty Miller DO on 5/27/21 at 7:47 PM EDT

## 2021-05-27 NOTE — ED NOTES
Dr Kris Armstrong representatives here speaking to patient.       Cristofer Briseno RN  05/27/21 1931

## 2021-05-27 NOTE — ED PROVIDER NOTES
78-year-old female with a history of COPD on 5L NCO2 at home recently diagnosed with A. fib approximately 1 month ago presenting with chest heaviness and shortness of breath. Per patient, she was playing video games at home approximately 1 hour ago when she suddenly felt chest heaviness and had shortness of breath. She also felt lightheaded as well. At home, her heart rate was 250. She is not currently on any beta-blockers or calcium channel blockers, however she is on Eliquis. She complains of a headache and feeling \"hot. \"  She denies fever, chills, and nausea. Shortness of Breath  Severity:  Moderate  Duration:  1 hour  Timing:  Constant  Progression:  Unchanged  Relieved by:  Nothing  Worsened by:  Nothing  Associated symptoms: headaches    Associated symptoms: no abdominal pain, no cough, no fever, no rash, no sore throat and no vomiting         Review of Systems   Constitutional: Negative for chills and fever. HENT: Negative for congestion and sore throat. Eyes: Negative for photophobia. Respiratory: Positive for shortness of breath. Negative for cough. Cardiovascular: Negative for leg swelling. Chest heaviness   Gastrointestinal: Negative for abdominal pain, diarrhea, nausea and vomiting. Genitourinary: Negative for dysuria and flank pain. Musculoskeletal: Negative for back pain and myalgias. Skin: Negative for rash and wound. Neurological: Positive for light-headedness and headaches. Physical Exam  Constitutional:       Appearance: She is well-developed. She is not ill-appearing or toxic-appearing. HENT:      Head: Atraumatic. Cardiovascular:      Rate and Rhythm: Tachycardia present. Rhythm irregular. Pulmonary:      Effort: Pulmonary effort is normal.      Breath sounds: Normal breath sounds. Comments: Diminished breath sounds and expiratory wheezes diffusely  Abdominal:      Palpations: Abdomen is soft. Tenderness: There is abdominal tenderness. disease) ([facility] CVA (cerebral infarction), Depression, Ehler's-Danlos syndrome, GERD (gastroesophageal reflux disease), History of ischemic heart disease, Hyperlipidemia, Hypertension, Obesity, PVD (peripheral vascular disease) with claudication ([facility] Tobacco abuse, Tobacco abuse, Type II or unspecified type diabetes mellitus without mention of complication, not stated as uncontrolled, and Vitamin D deficiency. Past Surgical History:  has a past surgical history that includes Tubal ligation (1992); cyst removal (1/28/2011); ECHO Complete 2D W Doppler W Color (1/3/2012); ECHO Complete 2D W Doppler W Color (6/14/2012); ECHO Compl W Dop Color Flow (5/15/2013); Cardiac catheterization (1/7/2015); cyst incision and drainage (Right, 07/22/2016); other surgical history (07/26/2016); Abdominal exploration surgery (03/29/2017); pr drain skin abscess complic (N/A, 8/6/9382); hernia repair (03/2017); and eye surgery (Bilateral, 2014). Social History:  reports that she has been smoking cigarettes. She has a 34.00 pack-year smoking history. She has never used smokeless tobacco. She reports that she does not drink alcohol and does not use drugs. Family History: family history includes COPD in her father and mother; Cancer in her maternal grandmother; Heart Attack in her maternal grandfather; Heart Disease in her brother; High Blood Pressure in her brother, father, and mother; Other in her brother, father, and paternal grandmother. The patients home medications have been reviewed. Allergies: Patient has no known allergies.     -------------------------------------------------- RESULTS -------------------------------------------------    LABS:  Results for orders placed or performed during the hospital encounter of 84/95/68   Basic metabolic panel   Result Value Ref Range    Sodium 138 132 - 146 mmol/L    Potassium 4.2 3.5 - 5.0 mmol/L    Chloride 93 (L) 98 - 107 mmol/L    CO2 36 (H) 22 - 29 mmol/L    Anion Gap 9 7 - 16 mmol/L    Glucose 198 (H) 74 - 99 mg/dL    BUN 12 6 - 20 mg/dL    CREATININE 0.4 (L) 0.5 - 1.0 mg/dL    GFR Non-African American >60 >=60 mL/min/1.73    GFR African American >60     Calcium 9.3 8.6 - 10.2 mg/dL   CBC   Result Value Ref Range    WBC 14.3 (H) 4.5 - 11.5 E9/L    RBC 4.68 3.50 - 5.50 E12/L    Hemoglobin 13.7 11.5 - 15.5 g/dL    Hematocrit 44.7 34.0 - 48.0 %    MCV 95.5 80.0 - 99.9 fL    MCH 29.3 26.0 - 35.0 pg    MCHC 30.6 (L) 32.0 - 34.5 %    RDW 12.8 11.5 - 15.0 fL    Platelets 852 243 - 447 E9/L    MPV 9.1 7.0 - 12.0 fL   Troponin I   Result Value Ref Range    Troponin, High Sensitivity 16 (H) 0 - 9 ng/L   Protime-INR   Result Value Ref Range    Protime 12.2 9.3 - 12.4 sec    INR 1.1    APTT   Result Value Ref Range    aPTT 33.3 24.5 - 35.1 sec       RADIOLOGY:  XR CHEST (2 VW)   Final Result   Atherosclerotic disease. No additional evidence of active cardiopulmonary   pathology. EKG:  This EKG is signed and interpreted by me. Rate: 170  Rhythm: Atrial fibrillation and Rapid ventricular response  Interpretation: atrial fibrillation (chronic)  Comparison: changes compared to previous EKG      ------------------------- NURSING NOTES AND VITALS REVIEWED ---------------------------  Date / Time Roomed:  5/27/2021  5:31 PM  ED Bed Assignment:  22/22    The nursing notes within the ED encounter and vital signs as below have been reviewed.      Patient Vitals for the past 24 hrs:   BP Temp Temp src Pulse Resp SpO2 Height Weight   05/27/21 1849 (!) 120/57 -- -- 142 18 98 % -- --   05/27/21 1817 101/65 -- -- 161 18 96 % -- --   05/27/21 1801 (!) 153/83 -- -- 162 18 96 % 5' 5\" (1.651 m) 185 lb (83.9 kg)   05/27/21 1722 102/67 97.9 °F (36.6 °C) Oral 163 14 96 % 5' 5\" (1.651 m) 185 lb (83.9 kg)   05/27/21 1715 -- -- -- 98 -- 98 % 5' 5\" (1.651 m) 185 lb (83.9 kg)       Oxygen Saturation Interpretation: Normal    ------------------------------------------ PROGRESS NOTES ------------------------------------------    Counseling:  I have spoken with the patient and discussed todays results, in addition to providing specific details for the plan of care and counseling regarding the diagnosis and prognosis. Their questions are answered at this time and they are agreeable with the plan of admission.    --------------------------------- ADDITIONAL PROVIDER NOTES ---------------------------------  Consultations:  Spoke with Dr. Parvin Ambrose and Dr. Nadeem Nix. Discussed case. They will admit the patient. This patient's ED course included: a personal history and physicial examination, re-evaluation prior to disposition, multiple bedside re-evaluations, IV medications, cardiac monitoring and continuous pulse oximetry    This patient has remained hemodynamically stable during their ED course. Diagnosis:  1. Atrial fibrillation with rapid ventricular response (HCC)    2. Chest pain, unspecified type        Disposition:  Patient's disposition: Admit to telemetry  Patient's condition is stable.          Colleen Fry MD  Resident  05/28/21 6882

## 2021-05-27 NOTE — ED PROVIDER NOTES
negative.    --------------------------------------------- PAST HISTORY ---------------------------------------------  Past Medical History:  has a past medical history of Anxiety, Carotid stenosis, Carpal tunnel syndrome, Cerebral artery occlusion with cerebral infarction ([facility] Chronic back pain, COPD (chronic obstructive pulmonary disease) ([facility] CVA (cerebral infarction), Depression, Ehler's-Danlos syndrome, GERD (gastroesophageal reflux disease), History of ischemic heart disease, Hyperlipidemia, Hypertension, Obesity, PVD (peripheral vascular disease) with claudication ([facility] Tobacco abuse, Tobacco abuse, Type II or unspecified type diabetes mellitus without mention of complication, not stated as uncontrolled, and Vitamin D deficiency. Past Surgical History:  has a past surgical history that includes Tubal ligation (1992); cyst removal (1/28/2011); ECHO Complete 2D W Doppler W Color (1/3/2012); ECHO Complete 2D W Doppler W Color (6/14/2012); ECHO Compl W Dop Color Flow (5/15/2013); Cardiac catheterization (1/7/2015); cyst incision and drainage (Right, 07/22/2016); other surgical history (07/26/2016); Abdominal exploration surgery (03/29/2017); pr drain skin abscess complic (N/A, 9/2/9563); hernia repair (03/2017); and eye surgery (Bilateral, 2014). Social History:  reports that she has been smoking cigarettes. She has a 34.00 pack-year smoking history. She has never used smokeless tobacco. She reports that she does not drink alcohol and does not use drugs. Family History: family history includes COPD in her father and mother; Cancer in her maternal grandmother; Heart Attack in her maternal grandfather; Heart Disease in her brother; High Blood Pressure in her brother, father, and mother; Other in her brother, father, and paternal grandmother. Unless otherwise noted, family history is non contributory    The patients home medications have been reviewed.     Allergies: Patient has no known allergies. Physical Exam:  Constitutional/General: Alert and oriented x3  Head: Normocephalic and atraumatic  Eyes: PERRL, EOMI, sclera non icteric  ENT: Oropharynx clear, handling secretions  Neck: Supple, full ROM, no stridor, no meningeal signs  Respiratory: Lungs decreased breath sounds bilaterally and a few scattered wheezes bilaterally  Not in respiratory distress  Cardiovascular: Irregularly irregular, tachycardic. No murmurs, no gallops, no rubs. 2+ distal pulses. Equal extremity pulses. GI:  Abdomen Soft, Non tender, Non distended. No rebound, guarding, or rigidity. No pulsatile masses. Musculoskeletal: Moves all extremities x 4. Warm and well perfused,  no clubbing, no cyanosis, no edema. Palpable peripheral pulses  Integument: skin warm and dry. No rashes. Neurologic: GCS 15, no focal deficits  Psychiatric: Normal Affect      I directly supervised any procedures performed by the resident and was present for the procedure including all critical portions of the procedure      The cardiac monitor revealed atrial fibrillation  with a heart rate in the 160s as interpreted by me. The cardiac monitor was ordered secondary to the patient's chest pain shortness of breath and to monitor the patient for dysrhythmia. CPT [id number]      I, Dr. Tuan Bell, am the primary provider of record    My Medical Decision Making:         Presents in atrial fibrillation with rapid ventricular response, Cardizem bolus and Cardizem drip to prevent life-threatening deterioration. Family medicine consulted for admission    CRITICAL CARE:  Please note that the withdrawal or failure to initiate urgent interventions for this patient would likely result in a life threatening deterioration or permanent disability. Accordingly this patient received 30 minutes of critical care time, including coordination of care, and direct bedside care and excluding separately billable procedures.           1. Atrial fibrillation with rapid ventricular response ([facility]    2. Chest pain, unspecified type           Ariana Connors MD  05/27/21 2035

## 2021-05-28 NOTE — PROGRESS NOTES
[facility] [address]  Progress Note    Date:5/28/2021       RVDQ:[id number]  Patient Name:Deidre Manning     YOB: 1965     Age:56 y.o. Chief Complaint   Patient presents with    Shortness of Breath     hx copd. Normally 5lnc cont. Hx afib. +Chest pain. Ongoing for a couple days. Subjective     Patient states that she feels about the same as yesterday. She states her shortness of breath and wheezing are not worse than yesterday. She states she will has some chest pain. Also admits to abdominal pain around where her hernia was repaired, this is chronic for many years.     Denies nausea, vomiting, fever, chills    Review of Systems   Review of Systems as above, otherwise negative    Medications   Scheduled Meds:    budesonide  250 mcg Nebulization BID    apixaban  5 mg Oral BID    doxycycline hyclate  100 mg Oral BID    fenofibrate  54 mg Oral Daily    insulin glargine  80 Units Subcutaneous BID    ipratropium-albuterol  1 ampule Inhalation Q4H WA    nortriptyline  25 mg Oral Nightly    sodium chloride flush  5-40 mL Intravenous 2 times per day    insulin lispro  0-12 Units Subcutaneous TID WC    insulin lispro  0-6 Units Subcutaneous Nightly    traZODone  75 mg Oral Nightly    digoxin  250 mcg Intravenous Daily     Continuous Infusions:    sodium chloride      sodium chloride 100 mL/hr at 05/28/21 0104    dextrose      dilTIAZem 7.5 mg/hr (05/28/21 0625)     PRN Meds: butalbital-acetaminophen-caffeine, tiZANidine, sodium chloride flush, sodium chloride, promethazine **OR** ondansetron, polyethylene glycol, acetaminophen **OR** acetaminophen, glucose, dextrose, glucagon (rDNA), dextrose    Past History    Past Medical History:   has a past medical history of Anxiety, Carotid stenosis, Carpal tunnel syndrome, Cerebral artery occlusion with cerebral infarction [facility]), Chronic back pain, COPD (chronic obstructive pulmonary disease) ([facility] CVA (cerebral infarction), Depression, Ehler's-Danlos syndrome, GERD (gastroesophageal reflux disease), History of ischemic heart disease, Hyperlipidemia, Hypertension, Obesity, PVD (peripheral vascular disease) with claudication ([address] Tobacco abuse, Tobacco abuse, Type II or unspecified type diabetes mellitus without mention of complication, not stated as uncontrolled, and Vitamin D deficiency. Social History:   reports that she has been smoking cigarettes. She has a 34.00 pack-year smoking history. She has never used smokeless tobacco. She reports that she does not drink alcohol and does not use drugs. Family History:   Family History   Problem Relation Age of Onset    High Blood Pressure Mother     COPD Mother     COPD Father     High Blood Pressure Father     Other Father     Heart Disease Brother     High Blood Pressure Brother     Other Brother     Cancer Maternal Grandmother     Heart Attack Maternal Grandfather     Other Paternal Grandmother        Physical Examination      Vitals:  /79   Pulse 94   Temp 98.4 °F (36.9 °C) (Oral)   Resp 18   Ht 5' 5\" (1.651 m)   Wt 185 lb 1 oz (83.9 kg)   LMP 2015   SpO2 97%   BMI 30.80 kg/m²   Temp (24hrs), Av.4 °F (36.9 °C), Min:97.9 °F (36.6 °C), Max:98.8 °F (37.1 °C)      I/O (24Hr): Intake/Output Summary (Last 24 hours) at 2021 0630  Last data filed at 2021 6604  Gross per 24 hour   Intake 1600 ml   Output 100 ml   Net 1500 ml       Physical Exam  Vitals reviewed. Constitutional:       General: She is not in acute distress. Appearance: She is ill-appearing. HENT:      Nose: Nose normal. No rhinorrhea. Comments: Nasal cannula O2 in place     Mouth/Throat:      Mouth: Mucous membranes are moist.      Pharynx: Oropharynx is clear. Eyes:      General:         Right eye: No discharge. Left eye: No discharge. Cardiovascular:      Rate and Rhythm: Tachycardia present. Rhythm irregular.    Pulmonary: Effort: Pulmonary effort is normal.      Breath sounds: Wheezing (Soft, diffuse and intermittent) present. Comments: During exam patient coughs several times, wet cough  Abdominal:      General: Bowel sounds are normal.      Palpations: Abdomen is soft. Tenderness: There is abdominal tenderness (Only at hernia repair site). Musculoskeletal:      Right lower leg: Edema (Trace pitting) present. Left lower leg: Edema (1+ pitting) present. Skin:     General: Skin is warm and dry. Findings: Rash (Dry flaking skin bilateral lower extremities and feet) present. Neurological:      Mental Status: She is alert and oriented to person, place, and time. Mental status is at baseline. Labs/Imaging/Diagnostics   Labs:  CBC:  Recent Labs     05/27/21 1806 05/28/21  0325   WBC 14.3* 13.4*   RBC 4.68 5.05   HGB 13.7 14.5   HCT 44.7 49.1*   MCV 95.5 97.2   RDW 12.8 12.7    361     CHEMISTRIES:  Recent Labs     05/27/21 1806 05/28/21  0325    139   K 4.2 5.0   CL 93* 98   CO2 36* 33*   BUN 12 14   CREATININE 0.4* 0.4*   GLUCOSE 198* 185*     PT/INR:  Recent Labs     05/27/21  1806   PROTIME 12.2   INR 1.1     APTT:  Recent Labs     05/27/21 1806   APTT 33.3     LIVER PROFILE:  Recent Labs     05/28/21  0325   AST 16   ALT 10   BILITOT 0.3   ALKPHOS 71       Imaging Last 24 Hours:  XR CHEST (2 VW)    Result Date: 5/27/2021  EXAMINATION: TWO XRAY VIEWS OF THE CHEST 5/27/2021 5:56 pm COMPARISON: Chest series from April 18, 2021 HISTORY: ORDERING SYSTEM PROVIDED HISTORY: cough TECHNOLOGIST PROVIDED HISTORY: Reason for exam:->cough FINDINGS: Adequate and symmetric aeration of the lungs. There are no formed consolidations, pleural effusions, or pneumothoraces. Trachea and central mainstem bronchi appear clear. Minimal atherosclerotic disease in the aortic arch. Cardiomediastinal silhouette and pulmonary vascularity appear within normal limits.  Osseous and thoracic soft tissue structures 05/28/21 at 6:30 AM

## 2021-05-28 NOTE — PLAN OF CARE
Problem: Falls - Risk of:  Goal: Will remain free from falls  Description: Will remain free from falls  Outcome: Met This Shift     Problem: Falls - Risk of:  Goal: Absence of physical injury  Description: Absence of physical injury  Outcome: Met This Shift     Problem: Infection:  Goal: Will remain free from infection  Description: Will remain free from infection  Outcome: Met This Shift     Problem: Safety:  Goal: Free from accidental physical injury  Description: Free from accidental physical injury  Outcome: Met This Shift

## 2021-05-28 NOTE — CARE COORDINATION
CASE MANAGEMENT. Alma Ballard Chart reviewed. Met with patient and her daughters, Joelle Allison and Alli Dhaliwal at the bedside. Confirmed that patient lives with them. They informed that the patient suffers from agoraphobia and cannot be left alone or she will have panic attacks. Typically, Alli Dhaliwal is with her mother on nights and Darin Méndez is with her during the day. Uses [facility] for dme. She has bsc, showerchair, powerchair, wheelchair, walker and nebulizer. Uses [facility] for her home o2. Patient states that her baseline is 5lnc. Confirmed with Olivia Noel from [facility] [phone] that order reads 6lnc continuous-nursing notified. Ms Armando Blackburn stays in a 2 story home, with ramp access, but lives on the main floor where her bedroom and bathroom are. No [facility] history. [facility] with [facility]. Ms Armando Blackburn will return home at discharge. She is denying any needs-including HHC and is against going to [facility]. She follows with Dr Pao Ross and uses [employer] in [facility]. Med changes per cardio. Vasc sx consulted for PVD. Will cont to follow along and assist with needs accordingly. Detail Level: Zone Detail Level: Detailed

## 2021-05-28 NOTE — CONSULTS
Vascular : Pt seen,chart,lab and x rays reviewed. Assessment:1. Clinical evidence of bilateral iliac left more than the right, associated femoral-popliteal arterial occlusive disease with absent ankle pulses with dependent rubor, underlying venous stasis dermatitis, dermatophytosis, dependent rubor    Plan: Discussed the patient, patient Dr. Gela El in the room, unfortunately the lower extremity artery Doppler study that was ordered today was not done yet, will review it when done and then make additional recommendations    Full consult to follow.     Thank you    Blessing Hidalgo MD

## 2021-05-28 NOTE — CONSULTS
Inpatient Cardiology Consultation      Reason for Consult:  AF with RVR    Consulting Physician: Dr. Buster Barraza    Requesting Physician:  Dr. Bhargav Cortes    Date of Consultation: 5/28/2021    HISTORY OF PRESENT ILLNESS:   Ms. Bao Lerma is a 64year old female who is known to Dr. Rosanne Huerta (IP only 2013) --> Dr. Saroj Buckley for pre-op risk stratification (2017) and more recently evaluated by Dr. Kimmie Ho in outpatient on 4/26/2021 with Hx of PAF (on [address] [place] [address]), Cath 2015 showing nonobstructive CAD with normal LV function and unremarkable TTE 2015. During OP appointment patient showed no evidence of heart failure, recommended calcium channel blocker in future for recurrences, aspirin was stopped. TTE 4/26/2021 was ordered: Not yet completed. Patient reported that she lives at home with her daughter and she is chronically SOB (unable to take 2 steps without being \"breathless. \" She is on continuous supplemental oxygen 4 L/min and requires a breathing treatment every 4 hours. She occasionally feels short of breath at rest but has not worsened. Denies chest pain, recent fever, cough, chills, nausea or vomiting. Approximately 2 days ago, patient reported having a sudden onset of feeling her heart race and \"pounding out of my chest\" associated with shortness of breath. Her symptoms were intermittent throughout the last 2 days but on 5/27/2021 prior to her coming into the hospital her heart was racing and she took her pulse and home \"EKG machine\" which showed heart rate in the \"250's. \"  In addition, she developed chest congestion and a cough with thick phlegm. She was recently treated with a prednisone taper 5/18/2021 and then was continued on her home prednisone 5 mg daily. Denies lower extremity edema, weight gain, orthopnea, PND, nausea, vomiting, fever or chills. Due to worsening symptoms she presented to [facility] on 5/27/2021. Upon arrival to the ED: 102/62, heart rate 163, afebrile, 98% on 6 L nasal cannula.   Labs: Sodium 138, potassium 4.2>> 5.0 (today), BUN 12, creatinine 0.4, proBNP 214, hs-cTnT 16>> 22>> 32. TSH 2.530, CO2 36>> 33. WBC 14.3>> 13.4, H/H stable, platelet count 594. ER medications: 1 L IV fluid bolus and she was started on IV Cardizem drip. Chest x-ray: Atherosclerotic disease, no additional evidence of active cardiopulmonary pathology. While in the emergency department patient's blood pressure dropped on Cardizem IV drip (at 15 mg/hr) --> right was decreased to 5 mg/hr and patient was ordered digoxin 250 mcg IV x1. Patient spontaneously converted to sinus rhythm at 0442. She remains on Cardizem IV drip at 5 mg/hr with a heart rate . Blood pressure 122/79. Please note: past medical records were reviewed per electronic medical record (EMR) - see detailed reports under Past Medical/ Surgical History. Past Medical History:    1. Coronary artery disease. · 05/15/2013 Lexiscan nuclear stress test showed no evidence of reversible ischemia or infarct, study is within normal limits. Ejection fraction is 71%. ·  01/06/2015 admitted to [address] [place] [address] with chest pain and dyspnea / NSTEMI. · Cardiac catheterization, 01/07/2015, left main, no angiographically significant stenosis. LAD proximal diffuse 30% stenosis just after the takeoff of the first diagonal.  D1 no angiographically significant stenosis. Circumflex proximal diffuse 30% stenosis. OM1 no angiographically significant stenosis. Right coronary artery, large, dominant vessel with distal 50% stenosis involving the bifurcation of a large PDA and posterolateral branch. Ejection fraction 55%. 2.    05/15/2013:  TTE: normal left ventricular systolic function, ejection fraction visually estimated at 65-70%, proximal septal thickening, normal right ventricular size and function, normal left ventricular diastolic filling pattern for age, normal size left atrium by volume index. Physiologic and/or trace of mitral regurgitation. Physiologic and/or trace of tricuspid regurgitation. Unable to estimate PASP due to incomplete tricuspid regurgitation envelope. 3. 10/22/2015: TTE: shows left ventricular wall thickness,  regional wall motion and systolic function are normal.  There is Doppler  evidence of stage I diastolic dysfunction. The aortic valve was difficult to  visualize, but appears grossly normal.  Normal tricuspid valve structure and  function. Right ventricular systolic pressure is within normal limits. 3.  Ama-Danlos syndrome  4. Borderline hypotension: Hx of HTN. Previously on lisinopril and metoprolol tartrate (7/2018) ---> discontinued due to hypotension. 5. Obesity: BMI 30.8  5. CVA in 2011  6. T2DM insulin requiring. Peripheral neuropathy, with history of preulcerative lesions. 7.  Hypertension. 8.  COPD   9. Obstructive sleep apnea -- previously using CPAP up until 2 years ago; lost weight ---> repeat sleep evaluation (9/2016 per pulmonary --\" no sleep apnea\" --patient has chronic hypoxia, on continuous supplemental oxygen 4 L/min)  10. Hyperlipidemia. 11.  Current tobacco abuse. 12.  Chronic back pain. 13.  Depression and anxiety. 14.  Carpal tunnel syndrome. 15.  Left arm cyst removal.  12.  Strong family history of CAD -- father had first MI at the age of 50.  16.  Umbilical hernia. 18.  Holter monitor 11/22/2019 Baseline rhythm was normal sinus rhythm, occasional isolated PACs and rare PVCs were observed, 5 beats of SVT was observed. No VT or PAF. No significant pauses or AV block. 19: PAF: (Diagnosed 4/18/2021 --duration unknown). Noted in ER visit --left AMA. Started on Eliquis 5 mg twice daily and Toprol-XL 25 mg twice daily. 20.  Outpatient visit with Dr. Bharat Garcia on 4/26/2021: Patient had complaints of fatigue. Toprol-XL was discontinued. Aspirin was stopped. Echocardiogram was ordered but has not yet been completed. 21. DNR CCA. 22.   PAD  23.  Evaluation by vascular surgery 8/2017: bilateral iliac and femoral-popliteal artery arterial occlusive disease (with prior MARCO 0.60 on right and 0.90 on left with adequate collateral flow to both feet based on pulse volume readings of metatarsals through the pulse volume readings over the toes are diminished ---> vascular surgery recommendations conservative therapy only and to call when necessary if she is having any increased symptoms in legs. 24.  Gait instability --due to peripheral neuropathy. Past Surgical History:    Past Surgical History:   Procedure Laterality Date    ABDOMINAL EXPLORATION SURGERY  03/29/2017    with bowel resection, incarcerated ventral hernia repair    CARDIAC CATHETERIZATION  1/7/2015    Dr. Christian Bose  EF=55%   FFR=0.84  distal RCA bifurcation    CYST INCISION AND DRAINAGE Right 07/22/2016    Right gluteal mass incision and drainage    CYST REMOVAL  1/28/2011    excision of left arm inclusion cyst    ECHO COMPL W DOP COLOR FLOW  5/15/2013         ECHOCARDIOGRAM COMPLETE 2D W DOPPLER W COLOR  1/3/2012         ECHOCARDIOGRAM COMPLETE 2D W DOPPLER W COLOR  6/14/2012         EYE SURGERY Bilateral 2014    cataract w lens implants    HERNIA REPAIR  03/2017    abdominal    OTHER SURGICAL HISTORY  07/26/2016    re exploration left lateral wound / heel    [place] DRAIN SKIN ABSCESS COMPLIC N/A 4/1/9651    EXAM UNDER ANESTHESIA  I & D BUTTOCKS performed by Neil Camejo DO at [address]       Medications Prior to admit:  Prior to Admission medications    Medication Sig Start Date End Date Taking?  Authorizing Provider   tiZANidine (ZANAFLEX) 2 MG tablet Take 1 tablet by mouth every 8 hours as needed (muscle spams) 5/18/21  Yes Shireen Gardner MD   butalbital-acetaminophen-caffeine (FIORICET, ESGIC) -36 MG per tablet Take 1 tablet by mouth every 4 hours as needed for Headaches 5/18/21  Yes Shireen Gardner MD   doxycycline hyclate (VIBRA-TABS) 100 MG tablet Take 1 tablet by mouth 2 times daily (with meals) for 10 days 5/18/21 5/28/21 Yes Akiko Clement MD   nortriptyline [facility]) 25 MG capsule Take 1 capsule by mouth nightly 5/18/21  Yes Akiko Clement MD   apixaban (ELIQUIS) 5 MG TABS tablet Take 1 tablet by mouth 2 times daily Start after done with started pack 4/20/21  Yes Akiko Clement MD   budesonide (PULMICORT) 0.25 MG/2ML nebulizer suspension Take 2 mLs by nebulization 2 times daily 4/2/21  Yes Priya Wetzel MD   insulin lispro, 1 Unit Dial, ([facility]) 100 UNIT/ML SOPN 5-12 units SC before meals as per sliding scale 3/26/21  Yes Akiko Clement MD   insulin glargine [facility]) 100 UNIT/ML injection pen Inject 80 Units into the skin 2 times daily 3/12/21  Yes Akiko Clement MD   fenofibrate (TRICOR) 54 MG tablet Take 1 tablet by mouth daily 2/23/21  Yes Akiko Clement MD   albuterol (PROVENTIL) (2.5 MG/3ML) 0.083% nebulizer solution USE 1 VIAL VIA NEBULIZER EVERY 4 HOURS AS NEEDED FOR WHEEZING OR SHORTNESS OF BREATH 5/7/20  Yes Akiko Clement MD   OXYGEN Inhale 5 L into the lungs continuous   Yes Nisha Fox MD   predniSONE (DELTASONE) 10 MG tablet 5 tab po qday x 2 d, then 4 tab po qday x 2 d, then 3 tab po qday x 2 d, then 2 tab po qday x 2 day, then resume the usual home dose. 5/18/21   Akiko Clement MD   traZODone (DESYREL) 150 MG tablet Take 0.5 tablets by mouth nightly for 4 days TAKE 1 TABLET BY MOUTH EVERY NIGHT AT BEDTIME 5/18/21 5/22/21  Akiko Clement MD   ipratropium-albuterol (DUONEB) 0.5-2.5 (3) MG/3ML SOLN nebulizer solution USE 3 ML VIA NEBULIZER EVERY 4 HOURS AS NEEDED FOR SHORTNESS OF BREATH 5/3/21   Akiko Clement MD   diclofenac sodium (VOLTAREN) 1 % GEL Apply 2 g topically 3 times daily as needed for Pain    Historical Provider, MD   vitamin B-12 (CYANOCOBALAMIN) 1000 MCG tablet Take 1 tablet by mouth daily 4/20/21   Akiko Clement MD   ketoconazole (NIZORAL) 2 % cream Apply topically daily.  4/20/21   Branden Montano MD Jimmie   vitamin D (ERGOCALCIFEROL) 1.25 MG (24452 UT) CAPS capsule TAKE 1 CAPSULE PO q weekly 3/9/21   Genia Donis MD   metoprolol tartrate (LOPRESSOR) 25 MG tablet TAKE 1/2 TABLET BY MOUTH TWICE DAILY 9/9/19 4/18/21  Genia Donis MD       Current Medications:    Current Facility-Administered Medications: budesonide (PULMICORT) nebulizer suspension 250 mcg, 250 mcg, Nebulization, BID  apixaban (ELIQUIS) tablet 5 mg, 5 mg, Oral, BID  butalbital-acetaminophen-caffeine (FIORICET, ESGIC) per tablet 1 tablet, 1 tablet, Oral, Q4H PRN  doxycycline hyclate (VIBRAMYCIN) capsule 100 mg, 100 mg, Oral, BID  fenofibrate (TRICOR) tablet 54 mg, 54 mg, Oral, Daily  insulin glargine (LANTUS) injection vial 80 Units, 80 Units, Subcutaneous, BID  ipratropium-albuterol (DUONEB) nebulizer solution 1 ampule, 1 ampule, Inhalation, Q4H WA  nortriptyline (PAMELOR) capsule 25 mg, 25 mg, Oral, Nightly  tiZANidine (ZANAFLEX) tablet 2 mg, 2 mg, Oral, Q8H PRN  sodium chloride flush 0.9 % injection 5-40 mL, 5-40 mL, Intravenous, 2 times per day  sodium chloride flush 0.9 % injection 5-40 mL, 5-40 mL, Intravenous, PRN  0.9 % sodium chloride infusion, 25 mL, Intravenous, PRN  promethazine (PHENERGAN) tablet 12.5 mg, 12.5 mg, Oral, Q6H PRN **OR** ondansetron (ZOFRAN) injection 4 mg, 4 mg, Intravenous, Q6H PRN  polyethylene glycol (GLYCOLAX) packet 17 g, 17 g, Oral, Daily PRN  acetaminophen (TYLENOL) tablet 650 mg, 650 mg, Oral, Q6H PRN **OR** acetaminophen (TYLENOL) suppository 650 mg, 650 mg, Rectal, Q6H PRN  0.9 % sodium chloride infusion, , Intravenous, Continuous  insulin lispro (HUMALOG) injection vial 0-12 Units, 0-12 Units, Subcutaneous, TID WC  insulin lispro (HUMALOG) injection vial 0-6 Units, 0-6 Units, Subcutaneous, Nightly  glucose (GLUTOSE) 40 % oral gel 15 g, 15 g, Oral, PRN  dextrose 50 % IV solution, 12.5 g, Intravenous, PRN  glucagon (rDNA) injection 1 mg, 1 mg, Intramuscular, PRN  dextrose 5 % solution, 100 HEENT:   Head- Normocephalic, atraumatic   Eyes- Conjunctivae pink, anicteric  Throat- Oral mucosa pink and moist  Neck-  No stridor, trachea midline, no jugular venous distention. No carotid bruit. CHEST: Chest symmetrical and non-tender to palpation. No accessory muscle use or intercostal retractions  RESPIRATORY: Lung sounds - clear throughout fields. On RA. CARDIOVASCULAR:     Heart Inspection- shows no noted pulsations  Heart Palpation- no heaves or thrills; PMI is non-displaced   Heart Ausculation- Regular rate and rhythm, no murmur. No s3, s4 or rub   PV: No lower extremity edema, dry skin with LLE redness and tender upon light palpation. No varicosities. Doppler bilateral pulses, no clubbing or cyanosis   ABDOMEN: Soft, obese, non-tender to light palpation. Bowel sounds present. No palpable masses no organomegaly; no abdominal bruit  MS: Good muscle strength and tone. No atrophy or abnormal movements. : Deferred  SKIN: Warm and dry no statis dermatitis or ulcers   NEURO / PSYCH: Oriented to person, place and time. Speech clear and appropriate. Follows all commands. Pleasant affect     DATA:    ECG: As per Dr. Slava Morales. Tele strips: SR/ST. Diagnostic:      Intake/Output Summary (Last 24 hours) at 5/28/2021 0735  Last data filed at 5/28/2021 9051  Gross per 24 hour   Intake 1600 ml   Output 100 ml   Net 1500 ml       Labs:   CBC:   Recent Labs     05/27/21  1806 05/28/21  0325   WBC 14.3* 13.4*   HGB 13.7 14.5   HCT 44.7 49.1*    361     BMP:   Recent Labs     05/27/21  1806 05/28/21  0325    139   K 4.2 5.0   CO2 36* 33*   BUN 12 14   CREATININE 0.4* 0.4*   LABGLOM >60 >60   CALCIUM 9.3 9.2     Mag: No results for input(s): MG in the last 72 hours. Phos: No results for input(s): PHOS in the last 72 hours.   TFT:   Lab Results   Component Value Date    TSH 2.530 05/28/2021      HgA1c:   Lab Results   Component Value Date    LABA1C 9.2 04/02/2021     No results found for: EAG  proBNP: Recent Labs     05/28/21  0325   PROBNP 214*     PT/INR:   Recent Labs     05/27/21  1806   PROTIME 12.2   INR 1.1     APTT:  Recent Labs     05/27/21  1806   APTT 33.3     CARDIAC ENZYMES:No results for input(s): CKTOTAL, CKMB, CKMBINDEX, TROPONINI in the last 72 hours. FASTING LIPID PANEL:  Lab Results   Component Value Date    CHOL 161 09/23/2020    HDL 43 09/23/2020    LDLCALC 56 09/23/2020    TRIG 308 09/23/2020     LIVER PROFILE:  Recent Labs     05/28/21  0325   AST 16   ALT 10   LABALBU 4.0       TTE: 10/22/2015  Left ventricular wall thickness, regional wall motion, and systolic  function are normal.  LVEF 55%  There is doppler evidence for stage Ia diastolic dysfunction. Mild mitral annular calcification is present  The aortic valve was difficult to visualize but appears grossly normal.  Normal tricuspid valve structure and function. Right ventricular systolic  pressure is within normal limits. Cardiac catheterization, 01/07/2015, left main, no angiographically significant stenosis. LAD proximal diffuse 30% stenosis just after the takeoff of the first diagonal.  D1 no angiographically significant stenosis. Circumflex proximal diffuse 30% stenosis. OM1 no angiographically significant stenosis. Right coronary artery, large, dominant vessel with distal 50% stenosis involving the bifurcation of a large PDA and posterolateral branch. Ejection fraction 55%. CXR: 5/27/2021  Atherosclerotic disease. No additional evidence of active cardiopulmonary   pathology. Assessment:  1. PAF with RVR (diagnosed 4/2021): No prior DCCV. Spontaneous conversion to sinus rhythm/sinus tachycardia. · [id number] score at least 6.  · Currently on Cardizem IV drip  · On Eliquis 5 mg twice daily  · TSH normal  2. Normal LVEF on TTE 10/2015 and no significant VHD. 3.  Nonobstructive CAD on cardiac catheterization 1/2017. Clinically stable. Mildly elevated hs-cTnT in the setting of RVR.   Pattern not consistent with ACS. No acute EKG changes. 4.  Borderline hypotension. History of hypertension. 5.  Hyperlipidemia  6. T2DM: Insulin requiring  7. COPD on continuous home oxygen /current smoker (34 pack years)  8. History of MARIAA: Most recent sleep study negative for sleep apnea 2015. 9.  PAD with history of bilateral iliac and femoral-popliteal artery arterial occlusive disease (2017) with worsening left foot pain and discoloration. 10. DNR-CCA    Plan: as per Dr. Noel Figures. 1. Discontinue IV Cardizem gtt. 2. Start Cardizem 30 mg q 6hr (with parameters): Change to Cardizem CD prior to discharge. 3. Continue Eliquis 5 mg BID  4. Check 2D ECHO  5. Consider starting Crestor  6. CTA Aorta with runoff   7. Vascular surgery consultation for LLE (Spoke to Vascular)  8. Further recommendations pending the above clinical course. Above assessment/plan as per Dr. Noel Figures. Electronically signed by BEHZAD Lo CNP on 5/28/21 at 11:01 AM EDT    ______________________________________________________________________  Cardiology attending attestation:  I have independently interviewed and examined the patient. I personally reviewed pertinent laboratory values and diagnostic testing (including, if applicable, chest xray, electrocardiogram, telemetry, echocardiogram, stress testing, and coronary angiography). I have reviewed the above documentation completed by BEHZAD Caicedo CNP including past medical, social, and family history and agree with the findings, assessment and plan except where noted. Plan formulated under my direct supervision. I participated in all aspects of the medical decision making. Please see my additional contributions to the HPI, physical exam, and assessment / medical decision making:  _______________________________________________________________________    Patient is a poor historian. She follows with Dr. Modesto Rob.   Presenting with palpitations found to be in A. fib with RVR, first recurrence since initial diagnosis of atrial fibrillation in 2021. She converted back to sinus rhythm. She denies complaints at this time. No chest pain, shortness of breath or palpitations. Smokes a pack a day. No interest in quitting. Needs her daughter to stay in the hospital with her due to her agoraphobia. Physical Exam:  BP (!) 126/59   Pulse 102   Temp 98 °F (36.7 °C) (Oral)   Resp 18   Ht 5' 5\" (1.651 m)   Wt 185 lb 1 oz (83.9 kg)   LMP 2015   SpO2 94%   BMI 30.80 kg/m²   General appearance: Awake, alert,  on continuous oxygen  Skin: Intact, no rash  ENMT: Moist mucous membranes  Neck: Supple, no carotid bruits. Normal jugular venous pressure  Lungs: Diminished with scattered wheeze  Cardiac: Regular rhythm with a borderline tachycardic rate, normal S1&S2, no murmurs apparent  Abdomen: Soft, positive bowel sounds, nontender  Extremities: Moves all extremities x 4, trace edema, diminished pulses, shiny skin with skin thickening, erythema left leg and foot  Neurologic: No focal motor deficits apparent, normal mood and affect    Telemetry: A. fib RVR -> sinus rhythm  EK2021: A. fib  bpm.  Left axis. Poor R wave progression. 2021: Sinus rhythm 97 beats minute. Left axis. Possible prior septal infarct. No ST-T wave changes. Impression:   1. PAF, back in sinus  2. Minimal elevation in hs-cTnT: Not consistent with ACS  3. PAD with chronic lower extremity ischemia  4. Tobacco abuse  5.  Poor insight into health issues    Recommendations:     Discontinue IV digoxin   Start short acting diltiazem, eventually convert to long-acting formulation   Continue anticoagulation   Echocardiogram   CT of the abdominal aorta with runoff, vascular evaluation   Counseled on smoking cessation, no interest in quitting, and reports that counseling on smoking cessation makes her want to smoke more because she gets anxious   Will be available as needed over the weekend, please call with questions   Outpatient follow-up with her primary cardiologist Dr. Janice Stahl    Thank you for the consultation. Please do not hesitate to call with questions.     Madi Jean Baptiste MD, [facility]

## 2021-05-28 NOTE — CONSULTS
Chief Complaint: Patient seen for evaluation of vascular status of both legs      HPI: This patient, known to have peripheral vascular disease, documented by lower extremity artery Doppler study mainly on the right side, femoral-popliteal arterial occlusive disease, based upon the Doppler study done in 2017, was seen by Dr. Gwen Garcias in 2014, never followed up    Patient was hospitalized, with history of chest pain, shortness of breath, cough, on oxygen, constant 24/7 5 to 6 L/min for her chronic obstructive lung disease and obstructive sleep apnea    Because of chest pain, was seen by cardiology service of notice, that patient does have evidence of ischemic vascular changes in the legs with rubor, and vascular service is consulted, in interim the cardiology service has ordered a CTA of the aorta with runoff on the patient    When I came to see the patient, patient on supplemental oxygen, her daughter Enzo Chirinos was in the room, patient denies any history of rest pain or ulcerations of the feet, can walk short distances slowly, limited because of shortness of breath    Patient known to have coronary artery disease with history of proximal atrial fibrillation, on long-term anticoagulation with Eliquis      Patient denies any focal lateralizing neurological symptoms like loss of speech, vision or loss of function of extremity        No Known Allergies    Current Facility-Administered Medications   Medication Dose Route Frequency Provider Last Rate Last Admin    budesonide (PULMICORT) nebulizer suspension 250 mcg  250 mcg Nebulization BID Priya Wetzel MD   250 mcg at 05/28/21 3382    apixaban (ELIQUIS) tablet 5 mg  5 mg Oral BID Priya Wetzel MD   5 mg at 05/28/21 1037    butalbital-acetaminophen-caffeine (FIORICET, ESGIC) per tablet 1 tablet  1 tablet Oral Q4H PRN Priya Wetzel MD        fenofibrate (TRICOR) tablet 54 mg  54 mg Oral Daily Priya Wetzel MD   54 mg at 05/28/21 1037    insulin glargine (LANTUS) injection vial 80 Units  80 Units Subcutaneous BID Norberto Salcedo MD   80 Units at 05/28/21 1054    ipratropium-albuterol (DUONEB) nebulizer solution 1 ampule  1 ampule Inhalation Q4H WA Norberto Salcedo MD   1 ampule at 05/28/21 1654    nortriptyline (PAMELOR) capsule 25 mg  25 mg Oral Nightly Norberto Salcedo MD   25 mg at 05/28/21 0140    tiZANidine (ZANAFLEX) tablet 2 mg  2 mg Oral Q8H PRN Norberto Salcedo MD        sodium chloride flush 0.9 % injection 5-40 mL  5-40 mL Intravenous 2 times per day Norberto Salcedo MD   10 mL at 05/28/21 1037    sodium chloride flush 0.9 % injection 5-40 mL  5-40 mL Intravenous PRN Norberto Salcedo MD        0.9 % sodium chloride infusion  25 mL Intravenous PRN Norberto Salcedo MD        promethazine (PHENERGAN) tablet 12.5 mg  12.5 mg Oral Q6H PRN Norberto Salcedo MD        Or    [facility] injection 4 mg  4 mg Intravenous Q6H PRN Norberto Salcedo MD        polyethylene glycol [facility]) packet 17 g  17 g Oral Daily PRN Norberto Salcedo MD        acetaminophen (TYLENOL) tablet 650 mg  650 mg Oral Q6H PRN Norberto Salcedo MD        Or    acetaminophen (TYLENOL) suppository 650 mg  650 mg Rectal Q6H PRN Norberto Salcedo MD        0.9 % sodium chloride infusion   Intravenous Continuous Norberto Salcedo  mL/hr at 05/28/21 1049 New Bag at 05/28/21 1049    insulin lispro (HUMALOG) injection vial 0-12 Units  0-12 Units Subcutaneous TID WC Norberto Salcedo MD   4 Units at 05/28/21 1050    insulin lispro (HUMALOG) injection vial 0-6 Units  0-6 Units Subcutaneous Nightly Norberto Salcedo MD        glucose (GLUTOSE) 40 % oral gel 15 g  15 g Oral PRN Norberto Salcedo MD        dextrose 50 % IV solution  12.5 g Intravenous PRN Norberto Salcedo MD        glucagon (rDNA) injection 1 mg  1 mg Intramuscular PRN Norberto Salcedo MD        dextrose 5 % solution  100 mL/hr Intravenous PRN MD Arsenio DowDone [facility]) tablet 75 mg  75 mg Oral Nightly Rony Romero MD   75 mg at 05/28/21 0139    doxycycline hyclate (VIBRAMYCIN) capsule 100 mg  100 mg Oral BID Myla SUN Gonsalez DO   100 mg at 05/28/21 1715    perflutren lipid microspheres (DEFINITY) injection 1.65 mg  1.5 mL Intravenous ONCE PRN Merryl Pro, APRN - CNP        dilTIAZem (CARDIZEM) tablet 30 mg  30 mg Oral 4 times per day Charlie Cardona MD   30 mg at 05/28/21 1224    [START ON 5/29/2021] dilTIAZem (CARDIZEM CD) extended release capsule 120 mg  120 mg Oral Daily Charlie Cardona MD        digoxin [facility]) injection 250 mcg  250 mcg Intravenous Daily Rony Romero MD   250 mcg at 05/27/21 2014       Past Medical History:   Diagnosis Date    Anxiety     Carotid stenosis 12/2011    50-69% on left    Carpal tunnel syndrome 12/07/2011    bilateral    Cerebral artery occlusion with cerebral infarction [facility]) 2011? left sided weakness / usually in w/c    Chronic back pain     COPD (chronic obstructive pulmonary disease) ([facility]     no pulmonologist / follows with PCP    CVA (cerebral infarction) 2011?     right sided thalamic; seen by Dr William Amin syndrome     GERD (gastroesophageal reflux disease)     History of ischemic heart disease 03/30/2017    no cardiology    Hyperlipidemia     Hypertension     Obesity     PVD (peripheral vascular disease) with claudication ([facility] 8/25/2017    Tobacco abuse     Tobacco abuse     Type II or unspecified type diabetes mellitus without mention of complication, not stated as uncontrolled     Vitamin D deficiency        Past Surgical History:   Procedure Laterality Date    ABDOMINAL EXPLORATION SURGERY  03/29/2017    with bowel resection, incarcerated ventral hernia repair    CARDIAC CATHETERIZATION  1/7/2015    Dr. Rodriguez Courser  EF=55%   FFR=0.84  distal RCA bifurcation    CYST INCISION AND DRAINAGE Right 07/22/2016    Right gluteal mass incision and drainage    CYST REMOVAL  1/28/2011    excision of left arm inclusion cyst    ECHO COMPL W DOP COLOR FLOW  5/15/2013         ECHOCARDIOGRAM COMPLETE 2D W DOPPLER W COLOR  1/3/2012         ECHOCARDIOGRAM COMPLETE 2D W DOPPLER W COLOR  6/14/2012         EYE SURGERY Bilateral 2014    cataract w lens implants    HERNIA REPAIR  03/2017    abdominal    OTHER SURGICAL HISTORY  07/26/2016    re exploration left lateral wound / heel    IL DRAIN SKIN ABSCESS COMPLIC N/A 3/1/1678    EXAM UNDER ANESTHESIA  I & D BUTTOCKS performed by Anuj Mix DO at [address]       Family History   Problem Relation Age of Onset    High Blood Pressure Mother     COPD Mother     COPD Father     High Blood Pressure Father     Other Father     Heart Disease Brother     High Blood Pressure Brother     Other Brother     Cancer Maternal Grandmother     Heart Attack Maternal Grandfather     Other Paternal Grandmother        Social History     Socioeconomic History    Marital status: Single     Spouse name: Not on file    Number of children: Not on file    Years of education: Not on file    Highest education level: Not on file   Occupational History     Comment: factory work/ [employer]   Tobacco Use    Smoking status: Current Every Day Smoker     Packs/day: 1.00     Years: 34.00     Pack years: 34.00     Types: Cigarettes    Smokeless tobacco: Never Used   Vaping Use    Vaping Use: Never used   Substance and Sexual Activity    Alcohol use: No     Alcohol/week: 0.0 standard drinks    Drug use: No    Sexual activity: Not Currently   Other Topics Concern    Not on file   Social History Narrative    Not on file     Social Determinants of Health     Financial Resource Strain: Low Risk     Difficulty of Paying Living Expenses: Not hard at all   Food Insecurity: No Food Insecurity    Worried About [address] [employer] in the Last Year: Never true    [place] of Food in the Last Year: Never true Transportation Needs:     Lack of Transportation (Medical):  Lack of Transportation (Non-Medical):    Physical Activity:     Days of Exercise per Week:     Minutes of Exercise per Session:    Stress:     Feeling of Stress :    Social Connections:     Frequency of Communication with Friends and Family:     Frequency of Social Gatherings with Friends and Family:     Attends Islam Services:     Active Member of Clubs or Organizations:     Attends Club or Organization Meetings:     Marital Status:    Intimate Partner Violence:     Fear of Current or Ex-Partner:     Emotionally Abused:     Physically Abused:     Sexually Abused:        Review of Systems:  Skin:  No abnormal pigmentation or rash. Eyes:  No blurring, diplopia or vision loss. Ears/Nose/Throat:  No hearing loss or vertigo. Respiratory:  No cough, pleuritic chest pain, dyspnea, or wheezing. Chronic obstructive lung disease, obstructive sleep apnea, oxygen dependent, 65 to 6 L/min    Cardiovascular: No angina, palpitations . Coronary artery disease with a history of atrial fibrillation    Gastrointestinal:  No nausea or vomiting; no abdominal pain or rectal bleeding. Musculoskeletal:  No arthritis or weakness. Neurologic:  No paralysis, paresis, seizures or headaches. Hematologic/Lymphatic/Immunologic:  No anemia, abnormal bleeding/bruising. Endocrine:  No heat or cold intolerance. No polyphagia, polydipsia or polyuria. Physical Exam:  BP (!) 113/50   Pulse 88   Temp 98 °F (36.7 °C) (Oral)   Resp 18   Ht 5' 5\" (1.651 m)   Wt 185 lb 1 oz (83.9 kg)   LMP 03/20/2015   SpO2 97%   BMI 30.80 kg/m²   General appearance:  Alert, awake, oriented x 3. No distress. Skin:  Warm and dry. Head:  Normocephalic. No masses, lesions or tenderness. Eyes:  Conjunctivae appear normal; PERRL. Ears:  External ears normal.  Nose/Sinuses:  Septum midline, mucosa normal; no drainage.   Oropharynx:  Clear, no exudate noted.      Neck:  No jugular venous distention, lymphadenopathy or thyromegaly. No evidence of carotid bruit, patient on her left carotid stenosis based upon ultrasound testing done elsewhere 4 years ago      Lungs:  Clear to ausculation bilaterally. No rhonchi, crackles, wheezes. Heart:  Regular rate and rhythm. No rub or murmur. .    Abdomen:  Soft, non-tender. No masses, organomegaly. Musculoskeletal: No joint effusions, tenderness swelling or warmth. Neuro: Speech is intact. Moving all extremities. No focal motor or sensory deficits. Extremities:  Both feet are warm to touch. The color of both feet is normal.      Patient does have underlying dermatophytosis of both feet, no evidence of any acute ulceration cellulitis or acute ischemic changes noted, other than some dependent rubor    Pulses Right  Left    Brachial 3 3    Radial    3=normal   Femoral 2-3 2  2=diminished   Popliteal    1=barely palpable   Dorsalis pedis 0 1  0=absent   Posterior tibial    4=aneurysmal           Other pertinent information:1. The past medical records were reviewed. 2.    Lab Results   Component Value Date    WBC 13.4 (H) 05/28/2021    HGB 14.5 05/28/2021    HCT 49.1 (H) 05/28/2021    MCV 97.2 05/28/2021     05/28/2021      Lab Results   Component Value Date     05/28/2021    K 5.0 05/28/2021    CL 98 05/28/2021    CO2 33 (H) 05/28/2021    BUN 14 05/28/2021    CREATININE 0.4 (L) 05/28/2021    GLUCOSE 185 (H) 05/28/2021    CALCIUM 9.2 05/28/2021    PROT 7.4 05/28/2021    LABALBU 4.0 05/28/2021    BILITOT 0.3 05/28/2021    ALKPHOS 71 05/28/2021    AST 16 05/28/2021    ALT 10 05/28/2021    LABGLOM >60 05/28/2021    GFRAA >60 05/28/2021     Lab Results   Component Value Date    APTT 33.3 05/27/2021      Lab Results   Component Value Date    INR 1.1 05/27/2021    INR 1.0 08/28/2017    INR 1.3 03/28/2017    PROTIME 12.2 05/27/2021    PROTIME 11.3 08/28/2017    PROTIME 14.8 (H) 03/28/2017        3. XR CHEST (2 VW)   Final Result   Atherosclerotic disease. No additional evidence of active cardiopulmonary   pathology. CTA ABDOMINAL AORTA W BILAT RUNOFF W CONTRAST    (Results Pending)   VL LOWER EXTREMITY ARTERIAL SEGMENTAL PRESSURES W PPG    (Results Pending)     4. The history physical, cardiology consultation notes were reviewed    5. The last [place] artery Dopplers revealed ankle-brachial necks of 0.62 on the right and 0.90 on the left done in 2017    Assessment:      1. Peripheral vascular disease with claudication, however her activity is limited because of shortness of breath due to chronic obstructive lung disease    4. Chronic obstructive lung disease, oxygen dependent, 5 to 6 L/min    3. Coronary artery disease with history of proximal atrial fibrillation    4.   History of left carotid stenosis, 60%                Patient Active Problem List   Diagnosis    Hyperlipidemia    Depression    Ama-Danlos syndrome    MARIAA (obstructive sleep apnea)    Tobacco abuse    Carpal tunnel syndrome    Carotid stenosis    Vitamin D deficiency    Chest pain    Diastasis recti    Compression fracture of L4 lumbar vertebra    Compression fracture of L1 lumbar vertebra ([address]    H/O: stroke    Diabetic polyneuropathy associated with type 2 diabetes mellitus (HCC)    PAF (paroxysmal atrial fibrillation) ([address]    Coronary artery disease involving native coronary artery of native heart without angina pectoris    DNR (do not resuscitate)    Atrial fibrillation with rapid ventricular response ([address]            Plan:     I had a long detailed discussion with the patient and her daughter Jeffrey Echevarria who was in the room, options, risks benefits and options explained, patient was recommended lower extremity artery Dopplers for documentation of vascular status and then make additional recommendations, patient will follow up with Dr. Zenobia Dukes regarding future vascular monitoring and call as needed increased symptoms in the future explained    Also would recommend a follow-up carotid ultrasound because of history of left carotid stenosis of 50 to 70% documented in the medical records    Patient recommended not to resume tobacco use    Once the lower external artery Doppler is completed, once CT of the aorta with runoff that was already ordered by the cardiology service is reviewed, will make additional recommendations    All their questions were answered      Thank you for letting us participate in the care of your patient                Electronically signed by Mejia Duval MD on 5/28/2021 at 6:29 PM

## 2021-05-28 NOTE — ED NOTES
Per Dr. Li Dey patient is okay to go to her telemetry bed with a mews of 4.       Blaze Cooney RN  05/27/21 7137

## 2021-05-28 NOTE — ED NOTES
Sp02 reading 84%. Patient in no acute distress. Pulse ox repositioned and reading 92%.      Servando Florez RN  05/27/21 5483

## 2021-05-29 PROBLEM — Z99.81 O2 DEPENDENT: Status: ACTIVE | Noted: 2021-01-01

## 2021-05-29 PROBLEM — J44.9 COPD (CHRONIC OBSTRUCTIVE PULMONARY DISEASE) (HCC): Status: ACTIVE | Noted: 2021-01-01

## 2021-05-29 PROBLEM — I65.22 LEFT CAROTID STENOSIS: Status: ACTIVE | Noted: 2021-01-01

## 2021-05-29 PROBLEM — B35.9 DERMATOPHYTOSIS: Status: ACTIVE | Noted: 2021-01-01

## 2021-05-29 NOTE — PLAN OF CARE
Problem: Falls - Risk of:  Goal: Will remain free from falls  Description: Will remain free from falls  Outcome: Met This Shift     Problem: Falls - Risk of:  Goal: Absence of physical injury  Description: Absence of physical injury  Outcome: Met This Shift     Problem: Pain:  Goal: Pain level will decrease  Description: Pain level will decrease  Outcome: Met This Shift     Problem: Infection:  Goal: Will remain free from infection  Description: Will remain free from infection  Outcome: Met This Shift     Problem: Safety:  Goal: Free from accidental physical injury  Description: Free from accidental physical injury  Outcome: Met This Shift     Problem: Safety:  Goal: Free from intentional harm  Description: Free from intentional harm  Outcome: Met This Shift     Problem: Daily Care:  Goal: Daily care needs are met  Description: Daily care needs are met  Outcome: Met This Shift

## 2021-05-29 NOTE — DISCHARGE SUMMARY
Physician Discharge Summary         Patient ID:  Nannette Carrera  [id number]  64 y.o.  1965    Admit date: 5/27/2021    Discharge date and time:  5/29/21, around 3 PM     Admission Diagnoses:   Patient Active Problem List   Diagnosis    Hyperlipidemia    Depression    Ama-Danlos syndrome    MARIAA (obstructive sleep apnea)    Tobacco abuse    Carpal tunnel syndrome    Vitamin D deficiency    Diastasis recti    PVD (peripheral vascular disease) with claudication (HCC)    Compression fracture of L4 lumbar vertebra    Compression fracture of L1 lumbar vertebra (HCC)    H/O: stroke    Diabetic polyneuropathy associated with type 2 diabetes mellitus (HCC)    PAF (paroxysmal atrial fibrillation) ([address]    Coronary artery disease involving native coronary artery of native heart without angina pectoris    DNR (do not resuscitate)    Atrial fibrillation with rapid ventricular response ([address]    COPD (chronic obstructive pulmonary disease) ([address]    Uncontrolled type 2 diabetes mellitus with hyperglycemia ([address]    Dermatophytosis    Left carotid stenosis    O2 dependent       Discharge Diagnoses:   Hospital Problems         Last Modified POA    Hyperlipidemia 5/29/2021 Yes    Ama-Danlos syndrome 5/29/2021 Yes    Tobacco abuse 5/29/2021 Yes    PVD (peripheral vascular disease) with claudication ([address] 5/29/2021 Yes    Diabetic polyneuropathy associated with type 2 diabetes mellitus ([address] 5/29/2021 Yes    Coronary artery disease involving native coronary artery of native heart without angina pectoris 5/29/2021 Yes    Overview Signed 4/26/2021  6:48 AM by Rufino Tubbs MD     A.  CATH 2015: mild CAD, no obstructive lesions, normal EF         Atrial fibrillation with rapid ventricular response ([address] 5/29/2021 Yes    COPD (chronic obstructive pulmonary disease) ([address] 5/29/2021 Yes    Uncontrolled type 2 diabetes mellitus with hyperglycemia ([address] 5/29/2021 Yes    Dermatophytosis 5/29/2021 Yes    Left carotid stenosis 5/29/2021 Yes    O2 dependent 5/29/2021 Yes             Consults: cardiology and vascular surgery    Procedures: CTA aorta with run off    Hospital Course:   Patient is a 64 y.o. female with a recent diagnosis of atrial fibrillation 1 month ago who presented with atrial fibrillation with rapid ventricular response. She was initially started on diltiazem drip. Cardiology was consulted, who transitioned patient to oral Cardizem. They consulted Vascular surgery for the LLE pain and the chronic PVD, after the CTA of the aorta with runoff. Vascular surgery ordered an artery doppler study, but that was not done while hospitalized, so they decided to follow outpatient for further evaluation. They will also review the CTA of aorta with runoff. Patient was getting progressively better. Heart rate was controlled, but continued to be irregular. Patient does have COPD and is regularly at 5 L of [place] oxygen at home. She required 6 L in the hospital, but eventually she was able to saturate well 95-97% in 5 L baseline. Of note, during this hospitalization, started patient on Crestor as it would benefit her for her extensive atherosclerotic issues. Some adjustments were done with the insulin. Added scheduled Humalog insulin with meals. Patient discharged home in a stable and improved condition. To follow outpatient with PCP, cardiology and vascular surgery.     Discharge Exam:  See progress note from today    Disposition: home  Condition: stable    Patient Instructions:   Current Discharge Medication List      START taking these medications    Details   rosuvastatin (CRESTOR) 20 MG tablet Take 1 tablet by mouth nightly  Qty: 30 tablet, Refills: 1      dilTIAZem (CARDIZEM CD) 120 MG extended release capsule Take 1 capsule by mouth daily  Qty: 30 capsule, Refills: 1      !! insulin lispro, 1 Unit Dial, (HUMALOG KWIKPEN) 100 UNIT/ML SOPN Inject 10 Units into the skin 3 times daily (with meals)  Qty: 9 mL, Refills: 1 !! - Potential duplicate medications found. Please discuss with provider. CONTINUE these medications which have CHANGED    Details   insulin glargine (BASAGLAR KWIKPEN) 100 UNIT/ML injection pen Inject 75 Units into the skin 2 times daily  Qty: 5 pen, Refills: 5    Associated Diagnoses: Uncontrolled type 2 diabetes mellitus with circulatory disorder, with long-term current use of insulin (HCC)         CONTINUE these medications which have NOT CHANGED    Details   tiZANidine (ZANAFLEX) 2 MG tablet Take 1 tablet by mouth every 8 hours as needed (muscle spams)  Qty: 60 tablet, Refills: 2    Associated Diagnoses: Medication management      butalbital-acetaminophen-caffeine (FIORICET, ESGIC) -40 MG per tablet Take 1 tablet by mouth every 4 hours as needed for Headaches  Qty: 180 tablet, Refills: 3    Associated Diagnoses: Migraine with aura and without status migrainosus, not intractable      nortriptyline (PAMELOR) 25 MG capsule Take 1 capsule by mouth nightly  Qty: 30 capsule, Refills: 3    Associated Diagnoses: Insomnia, unspecified type      apixaban (ELIQUIS) 5 MG TABS tablet Take 1 tablet by mouth 2 times daily Start after done with started pack  Qty: 60 tablet, Refills: 5    Associated Diagnoses: Paroxysmal atrial fibrillation (HCC)      budesonide (PULMICORT) 0.25 MG/2ML nebulizer suspension Take 2 mLs by nebulization 2 times daily  Qty: 60 ampule, Refills: 3    Associated Diagnoses: COPD exacerbation (HCC)      ! ! insulin lispro, 1 Unit Dial, (HUMALOG KWIKPEN) 100 UNIT/ML SOPN 5-12 units SC before meals as per sliding scale  Qty: 3 pen, Refills: 5      fenofibrate (TRICOR) 54 MG tablet Take 1 tablet by mouth daily  Qty: 30 tablet, Refills: 5    Associated Diagnoses: Medication management      albuterol (PROVENTIL) (2.5 MG/3ML) 0.083% nebulizer solution USE 1 VIAL VIA NEBULIZER EVERY 4 HOURS AS NEEDED FOR WHEEZING OR SHORTNESS OF BREATH  Qty: 540 mL, Refills: 3    Associated Diagnoses: COPD exacerbation (HCC)      OXYGEN Inhale 5 L into the lungs continuous      predniSONE (DELTASONE) 10 MG tablet 5 tab po qday x 2 d, then 4 tab po qday x 2 d, then 3 tab po qday x 2 d, then 2 tab po qday x 2 day, then resume the usual home dose. Qty: 28 tablet, Refills: 0    Associated Diagnoses: COPD exacerbation ([facility]      traZODone (DESYREL) 150 MG tablet Take 0.5 tablets by mouth nightly for 4 days TAKE 1 TABLET BY MOUTH EVERY NIGHT AT BEDTIME  Qty: 4 tablet, Refills: 0    Associated Diagnoses: Medication management      ipratropium-albuterol (DUONEB) 0.5-2.5 (3) MG/3ML SOLN nebulizer solution USE 3 ML VIA NEBULIZER EVERY 4 HOURS AS NEEDED FOR SHORTNESS OF BREATH  Qty: 540 mL, Refills: 5    Associated Diagnoses: Chronic bronchitis, unspecified chronic bronchitis type ([facility] Medication management      diclofenac sodium (VOLTAREN) 1 % GEL Apply 2 g topically 3 times daily as needed for Pain    Associated Diagnoses: Medication management      vitamin B-12 (CYANOCOBALAMIN) 1000 MCG tablet Take 1 tablet by mouth daily  Qty: 30 tablet, Refills: 11    Associated Diagnoses: Medication management      ketoconazole (NIZORAL) 2 % cream Apply topically daily. Qty: 30 g, Refills: 1    Associated Diagnoses: Medication management      vitamin D (ERGOCALCIFEROL) 1.25 MG (86629 UT) CAPS capsule TAKE 1 CAPSULE PO q weekly  Qty: 12 capsule, Refills: 5       !! - Potential duplicate medications found. Please discuss with provider. STOP taking these medications       doxycycline hyclate (VIBRA-TABS) 100 MG tablet Comments:   Reason for Stopping:         metoprolol tartrate (LOPRESSOR) 25 MG tablet Comments:   Reason for Stopping:             Activity: activity as tolerated  Diet: diabetic diet    Demetri Dejesus MD  [facility] [address]     Call in 2 days  [address] [place] [address]/[facility].   [facility] [id number]  [phone]    Call in 3 days          Note

## 2021-05-29 NOTE — PROGRESS NOTES
[address]  Progress Note    Date:5/29/2021       GBLV:[id number]  Patient Name:Deidre Garibay     YOB: 1965     Age:56 y.o. Chief Complaint   Patient presents with    Shortness of Breath     hx copd. Normally 5lnc cont. Hx afib. +Chest pain. Ongoing for a couple days. Subjective     Patient states she feels much better today. Breathing has improved with breathing treatments. [place] Anthony to go home. Denies nausea, vomiting, fever, chills, chest pain, cough. Discussed insulin regimen. Does state that she does not eat regular meals, snacks here and there.     Review of Systems   Review of Systems as above, otherwise negative    Medications   Scheduled Meds:    budesonide  250 mcg Nebulization BID    apixaban  5 mg Oral BID    fenofibrate  54 mg Oral Daily    insulin glargine  80 Units Subcutaneous BID    ipratropium-albuterol  1 ampule Inhalation Q4H WA    nortriptyline  25 mg Oral Nightly    sodium chloride flush  5-40 mL Intravenous 2 times per day    insulin lispro  0-12 Units Subcutaneous TID WC    insulin lispro  0-6 Units Subcutaneous Nightly    traZODone  75 mg Oral Nightly    doxycycline hyclate  100 mg Oral BID    dilTIAZem  120 mg Oral Daily    miconazole nitrate   Topical BID     Continuous Infusions:    sodium chloride      sodium chloride 100 mL/hr at 05/29/21 0757    dextrose       PRN Meds: butalbital-acetaminophen-caffeine, tiZANidine, sodium chloride flush, sodium chloride, promethazine **OR** ondansetron, polyethylene glycol, acetaminophen **OR** acetaminophen, glucose, dextrose, glucagon (rDNA), dextrose, perflutren lipid microspheres    Past History    Past Medical History:   has a past medical history of Anxiety, Carotid stenosis, Carpal tunnel syndrome, Cerebral artery occlusion with cerebral infarction [facility]), Chronic back pain, COPD (chronic obstructive pulmonary disease) ([facility] [address] CVA (cerebral infarction), Depression, Ehler's-Danlos syndrome, GERD (gastroesophageal reflux disease), History of ischemic heart disease, Hyperlipidemia, Hypertension, Obesity, PVD (peripheral vascular disease) with claudication ([address] Tobacco abuse, Tobacco abuse, Type II or unspecified type diabetes mellitus without mention of complication, not stated as uncontrolled, and Vitamin D deficiency. Social History:   reports that she has been smoking cigarettes. She has a 34.00 pack-year smoking history. She has never used smokeless tobacco. She reports that she does not drink alcohol and does not use drugs. Family History:   Family History   Problem Relation Age of Onset    High Blood Pressure Mother     COPD Mother     COPD Father     High Blood Pressure Father     Other Father     Heart Disease Brother     High Blood Pressure Brother     Other Brother     Cancer Maternal Grandmother     Heart Attack Maternal Grandfather     Other Paternal Grandmother        Physical Examination      Vitals:  BP (!) 121/59   Pulse 95   Temp 97.2 °F (36.2 °C) (Temporal)   Resp 18   Ht 5' 5\" (1.651 m)   Wt 185 lb 7 oz (84.1 kg)   LMP 2015   SpO2 98%   BMI 30.86 kg/m²   Temp (24hrs), Av.8 °F (36.6 °C), Min:97.2 °F (36.2 °C), Max:98.1 °F (36.7 °C)      I/O (24Hr): Intake/Output Summary (Last 24 hours) at 2021 0803  Last data filed at 2021 1257  Gross per 24 hour   Intake 360 ml   Output --   Net 360 ml       Physical Exam  Vitals reviewed. Constitutional:       General: She is not in acute distress. Appearance: She is ill-appearing. HENT:      Nose: Nose normal. No rhinorrhea. Comments: Nasal cannula O2 in place     Mouth/Throat:      Mouth: Mucous membranes are moist.      Pharynx: Oropharynx is clear. Eyes:      General:         Right eye: No discharge. Left eye: No discharge. Cardiovascular:      Rate and Rhythm: Tachycardia present. Rhythm irregular.    Pulmonary:      Effort: Pulmonary effort is normal.      Breath sounds: Wheezing (Soft, diffuse and intermittent) present. Comments: During exam patient coughs several times, wet cough  Abdominal:      General: Bowel sounds are normal.      Palpations: Abdomen is soft. Tenderness: There is no abdominal tenderness. Musculoskeletal:      Right lower leg: Edema (Trace pitting) present. Left lower leg: Edema (1+ pitting) present. Skin:     General: Skin is warm and dry. Findings: Rash (Dry flaking skin bilateral lower extremities and feet) present. Neurological:      Mental Status: She is alert and oriented to person, place, and time. Mental status is at baseline. Labs/Imaging/Diagnostics   Labs:  CBC:  Recent Labs     05/27/21 1806 05/28/21 0325 05/29/21 0458   WBC 14.3* 13.4* 8.9   RBC 4.68 5.05 3.95   HGB 13.7 14.5 11.9   HCT 44.7 49.1* 39.0   MCV 95.5 97.2 98.7   RDW 12.8 12.7 12.5    361 286     CHEMISTRIES:  Recent Labs     05/27/21 1806 05/28/21 0325 05/29/21 0458    139 145   K 4.2 5.0 3.6   CL 93* 98 103   CO2 36* 33* 39*   BUN 12 14 6   CREATININE 0.4* 0.4* 0.4*   GLUCOSE 198* 185* 87     PT/INR:  Recent Labs     05/27/21 1806   PROTIME 12.2   INR 1.1     APTT:  Recent Labs     05/27/21 1806   APTT 33.3     LIVER PROFILE:  Recent Labs     05/28/21 0325 05/29/21  0458   AST 16 16   ALT 10 11   BILITOT 0.3 <0.2   ALKPHOS 71 59       Imaging Last 24 Hours:  XR CHEST (2 VW)    Result Date: 5/27/2021  EXAMINATION: TWO XRAY VIEWS OF THE CHEST 5/27/2021 5:56 pm COMPARISON: Chest series from April 18, 2021 HISTORY: ORDERING SYSTEM PROVIDED HISTORY: cough TECHNOLOGIST PROVIDED HISTORY: Reason for exam:->cough FINDINGS: Adequate and symmetric aeration of the lungs. There are no formed consolidations, pleural effusions, or pneumothoraces. Trachea and central mainstem bronchi appear clear. Minimal atherosclerotic disease in the aortic arch.   Cardiomediastinal silhouette and pulmonary vascularity appear within needed     Migraines  -Continue home Fioricet if brought from home  -Acetaminophen as needed     Hyperlipidemia  -Continue home fenofibrate 54 mg daily  Discussed importance to be on a statin. Patient used to be on it and agreeable to resume it. [] start rosuvastatin 20 mg      Depression and insomnia treatment  -Continue home nortriptyline 25 mg nightly     MARIAA, does not use CPAP at home     Consultations Ordered:  IP CONSULT TO FAMILY MEDICINE  IP CONSULT TO CARDIOLOGY     DVT ppx: Eliquis  Diet: Cardiac and carb control  Code: DNR-CCA    PLEASE NOTE that patient has severe agoraphobia and requires her daughter Catrachito Sterling to stay with her at all times as an emotional support.      Electronically signed by Carolee Lawrence MD PGY-2 Family Medicine Resident on 05/29/21 at 8:03 AM

## 2021-06-01 NOTE — PROGRESS NOTES
Physician Progress Note      PATIENT:               Amairani Hernandez  CSN #:                  [id number]  :                       1965  ADMIT DATE:       2021 5:31 PM  [address] DATE:        2021 4:59 PM  RESPONDING  PROVIDER #:        Selam Segura DO          QUERY TEXT:    Patient admitted with COPD. Patient noted to be on continuous O2. If   possible, please document in the progress notes and discharge summary if you   are evaluating and/or treating any of the following: The medical record reflects the following:  Risk Factors: COPD  Clinical Indicators: Per H&P \". ..COPD. Bettey Grad Bettey Grad On 5 L O2 at baseline. Bettey Grad Bettey Grad \" on arrival   patient with RR: 14 of and SpO2: 98%on 6LPM   Treatment: O2, Duonebs  Options provided:  -- Chronic respiratory failure with hypoxia  -- Chronic respiratory failure with hypercapnia  -- Other - I will add my own diagnosis  -- Disagree - Not applicable / Not valid  -- Disagree - Clinically unable to determine / Unknown  -- Refer to Clinical Documentation Reviewer    PROVIDER RESPONSE TEXT:    This patient has chronic respiratory failure with hypoxia.     Query created by: Marcus De La Paz on 2021 12:26 PM      Electronically signed by:  Selam Segura DO 2021 7:17 AM

## 2021-06-01 NOTE — TELEPHONE ENCOUNTER
I sent in the miconazole  As for the HR issues- I would ask that they contact cardiology if they want her to increase the Caredizem. If that is not possible or if her SSx worsen then she would need seen in the ER again for assessment.

## 2021-06-01 NOTE — TELEPHONE ENCOUNTER
Pt's daughter-in-law and Rosaline Bautista, called stating pt's 2nd (R) toe is purplish in color; also, checking follow up.

## 2021-06-02 NOTE — TELEPHONE ENCOUNTER
\"they are home from this \"   - are you saying she is out of town until Sat?   If so, should go to local hospital

## 2021-06-02 NOTE — TELEPHONE ENCOUNTER
Spoke to daughter and she cannot bring mother in tomorrow as she has another appt and her oxygen will not last that long. She si very conerned about her heart rate going up and would like to know if you can give her guidance on when she should take her mother to the hospital if heart rates gets to high.

## 2021-06-02 NOTE — TELEPHONE ENCOUNTER
Patients daughter called the office today and stated that they are home from this on Saturday. She is having some heart rates up to 176 and 182. It sometimes foes up to 115-120 and will last for a few hours. She is very concerned about this and wonders when and if she should seek medication attention for this again if it gets to high and stays there. Also when should she be seen with you in follow-up.   Please advise

## 2021-06-03 PROBLEM — L81.9 DISCOLORATION OF SKIN OF TOE: Status: ACTIVE | Noted: 2021-01-01

## 2021-06-03 NOTE — PROGRESS NOTES
Chief Complaint:   Chief Complaint   Patient presents with    Follow-up     complains of rt. foot discolartion         HPI: Patient came to the office, with her daughter-in-law, Tonio Ashraf, who is also the power of , for the evaluation of vascular status of the legs, concerned, because of some discoloration of the toes of the right foot compared to the left foot though bilaterally patient does have some dependent rubor, with some discomfort noted in the right foot    Patient is known to Dr. Sally Rizo, but came today, concerned because the toe discoloration      Patient has severe chronic obstructive lung disease, currently on 6 L of oxygen per minute    Patient recently underwent an lower extremity artery Doppler study, and also a CTA of the aorta with runoff that was ordered by her cardiologist while in the hospital that revealed evidence of a right femoral-popliteal arterial occlusive disease    Patient also noted left carotid stenosis, the ultrasound test is pending      Patient denies any focal lateralizing neurological symptoms like loss of speech, vision or loss of function of extremity    Patient short distances slowly because of shortness of breath, on oxygen, and denies any symptoms of rest pain, other than some discomfort in the left foot    No Known Allergies    Current Outpatient Medications   Medication Sig Dispense Refill    cilostazol (PLETAL) 100 MG tablet Take 1 tablet by mouth 2 times daily 60 tablet 11    miconazole (MICOTIN) 2 % cream Apply topically 2 times daily.  1 Tube 1    insulin glargine (BASAGLAR KWIKPEN) 100 UNIT/ML injection pen Inject 75 Units into the skin 2 times daily 5 pen 5    rosuvastatin (CRESTOR) 20 MG tablet Take 1 tablet by mouth nightly 30 tablet 1    dilTIAZem (CARDIZEM CD) 120 MG extended release capsule Take 1 capsule by mouth daily 30 capsule 1    insulin lispro, 1 Unit Dial, (HUMALOG KWIKPEN) 100 UNIT/ML SOPN Inject 10 Units into the skin 3 times daily (with meals) 9 mL 1    tiZANidine (ZANAFLEX) 2 MG tablet Take 1 tablet by mouth every 8 hours as needed (muscle spams) 60 tablet 2    butalbital-acetaminophen-caffeine (FIORICET, ESGIC) -40 MG per tablet Take 1 tablet by mouth every 4 hours as needed for Headaches 180 tablet 3    nortriptyline (PAMELOR) 25 MG capsule Take 1 capsule by mouth nightly 30 capsule 3    ipratropium-albuterol (DUONEB) 0.5-2.5 (3) MG/3ML SOLN nebulizer solution USE 3 ML VIA NEBULIZER EVERY 4 HOURS AS NEEDED FOR SHORTNESS OF BREATH 540 mL 5    diclofenac sodium (VOLTAREN) 1 % GEL Apply 2 g topically 3 times daily as needed for Pain      apixaban (ELIQUIS) 5 MG TABS tablet Take 1 tablet by mouth 2 times daily Start after done with started pack 60 tablet 5    vitamin B-12 (CYANOCOBALAMIN) 1000 MCG tablet Take 1 tablet by mouth daily 30 tablet 11    ketoconazole (NIZORAL) 2 % cream Apply topically daily. 30 g 1    budesonide (PULMICORT) 0.25 MG/2ML nebulizer suspension Take 2 mLs by nebulization 2 times daily 60 ampule 3    insulin lispro, 1 Unit Dial, (HUMALOG KWIKPEN) 100 UNIT/ML SOPN 5-12 units SC before meals as per sliding scale 3 pen 5    vitamin D (ERGOCALCIFEROL) 1.25 MG (06890 UT) CAPS capsule TAKE 1 CAPSULE PO q weekly 12 capsule 5    fenofibrate (TRICOR) 54 MG tablet Take 1 tablet by mouth daily 30 tablet 5    albuterol (PROVENTIL) (2.5 MG/3ML) 0.083% nebulizer solution USE 1 VIAL VIA NEBULIZER EVERY 4 HOURS AS NEEDED FOR WHEEZING OR SHORTNESS OF BREATH 540 mL 3    OXYGEN Inhale 5 L into the lungs continuous      predniSONE (DELTASONE) 10 MG tablet 5 tab po qday x 2 d, then 4 tab po qday x 2 d, then 3 tab po qday x 2 d, then 2 tab po qday x 2 day, then resume the usual home dose. 28 tablet 0    traZODone (DESYREL) 150 MG tablet Take 0.5 tablets by mouth nightly for 4 days TAKE 1 TABLET BY MOUTH EVERY NIGHT AT BEDTIME 4 tablet 0     No current facility-administered medications for this visit.        Past Medical Ex-Partner:     Emotionally Abused:     Physically Abused:     Sexually Abused:        Review of Systems:    Eyes:  No blurring, diplopia or vision loss. Respiratory:  No cough, pleuritic chest pain, dyspnea, or wheezing. History of tobacco use, chronic obstructive lung disease, on supplemental oxygen 6 L/min, obstructive sleep apnea  Cardiovascular: No angina, palpitations . Coronary artery disease, history of atrial fibrillation  Musculoskeletal:  No arthritis or weakness. History of Ama-Danlos syndrome  Neurologic:  No paralysis, paresis, paresthesia, seizures or headache. History of stroke  Endocrinology: Diabetes mellitus          Physical Exam:  General appearance:  Alert, awake, oriented x 3. No distress. Eyes:  Conjunctivae appear normal; PERRL  Neck:  No jugular venous distention, lymphadenopathy or thyromegaly. Carotid bruit noted on the left  Lungs:  Clear to ausculation bilaterally. No rhonchi, crackles, wheezes  Heart:  Regular rate and rhythm. No rub or murmur  Abdomen:  Soft, non-tender. No masses, organomegaly. Musculoskeletal : No joint effusions, tenderness swelling    Neuro: Speech is intact. Moving all extremities. No focal motor or sensory deficits      Extremities:  Both feet are warm to touch. The color of both feet is normal.        Pulses Right  Left    Brachial 3 3    Radial    3=normal   Femoral 2 2  2=diminished   Popliteal    1=barely palpable   Dorsalis pedis    0=absent   Posterior tibial 0 1  4=aneurysmal             Other pertinent information:1. The past medical records were reviewed. 2.  The lower extremity artery Doppler study was personally reviewed by me revealed evidence of right femoral-popliteal arterial occlusive disease with an ankle-brachial 0.51 and on the left side 0.82, with minimal worsening on the right compared to the last arterial Doppler study done 4 years ago    3.   CTA of the aorta with runoff that was done recently was personally reviewed, evidence right femoral-popliteal arterial occlusive disease with occlusion of the right superficial femoral artery    4. Last 2D echo of the heart, good ejection fraction, no history of congestive heart failure    Assessment:    1. PVD (peripheral vascular disease) with claudication ([address]    2. Dermatophytosis    3. Left carotid stenosis    4. O2 dependent    5. Discoloration of skin of toe              Plan:       Discussed needle patient, patient's daughter-in-law Arnoldo Rey, who is a power of , telephone #[phone], regarding my interpretation of the lower extremity artery Doppler study, slight worsening compared to the study that was done 4 years ago, no limb threatening ischemia at the present time, adequate collaterals of the foot based upon the pulse volume recordings of the metatarsal though does have a diminished pulse volume recordings of the toes, reassured, because of severe chronic obstructive lung disease, on oxygen 6 L/min, patient was recommended consider therapy with a trial of Pletal    There were recommended, to follow-up with Dr. Antolin Calderón in the next few months and call as needed if any increasing symptoms especially with any areas of skin breakdown or ulceration etc. and also call back a few days after the carotid ultrasound test is done to discuss the test results      Patient was instructed to continue walking program and to call if any worsening of symptoms and to call if any focal lateralizing neurological symptoms like loss of speech, vision or loss of function of extremity. All the questions were answered. Orders Placed This Encounter   Medications    cilostazol (PLETAL) 100 MG tablet     Sig: Take 1 tablet by mouth 2 times daily     Dispense:  60 tablet     Refill:  11           Indicated follow-up: Return in about 2 months (around 8/3/2021), or if symptoms worsen or fail to improve.

## 2021-06-07 PROBLEM — I48.91 ATRIAL FIBRILLATION WITH RAPID VENTRICULAR RESPONSE (HCC): Status: RESOLVED | Noted: 2021-01-01 | Resolved: 2021-01-01

## 2021-06-07 PROBLEM — L81.9 DISCOLORATION OF SKIN OF TOE: Status: RESOLVED | Noted: 2021-01-01 | Resolved: 2021-01-01

## 2021-06-07 NOTE — PROGRESS NOTES
Chief Complaint   Patient presents with    Atrial Fibrillation       Patient Active Problem List    Diagnosis Date Noted    Diastasis recti 09/25/2013     Priority: Low    Hyperlipidemia      Priority: Low    Depression      Priority: Low    Ama-Danlos syndrome      Priority: Low    MARIAA (obstructive sleep apnea)      Priority: Low    Tobacco abuse      Priority: Low    COPD (chronic obstructive pulmonary disease) ([facility] 05/29/2021    Dermatophytosis 05/29/2021    Left carotid stenosis 05/29/2021    O2 dependent 05/29/2021    Uncontrolled type 2 diabetes mellitus with [facility]     DNR (do not resuscitate) 05/18/2021    PAF (paroxysmal atrial fibrillation) ([facility] 04/26/2021     Overview Note:     A. CHADSVASC = 6      Coronary artery disease involving native coronary artery of native heart without angina pectoris 04/26/2021     Overview Note:     A. CATH 2015: mild CAD, no obstructive lesions, normal EF      Diabetic polyneuropathy associated with type 2 diabetes mellitus ([facility] 06/12/2020    Compression fracture of L4 lumbar vertebra 08/27/2017     Overview Note:     Replacing deprecated diagnoses      Compression fracture of L1 lumbar vertebra ([facility] 08/27/2017    H/O: stroke 08/27/2017    PVD (peripheral vascular disease) with claudication ([facility]) 06/19/2014    Vitamin D deficiency     Carpal tunnel syndrome 12/07/2011       Current Outpatient Medications   Medication Sig Dispense Refill    cilostazol (PLETAL) 100 MG tablet Take 1 tablet by mouth 2 times daily 60 tablet 11    miconazole (MICOTIN) 2 % cream Apply topically 2 times daily.  1 Tube 1    insulin glargine (BASAGLAR KWIKPEN) 100 UNIT/ML injection pen Inject 75 Units into the skin 2 times daily 5 pen 5    rosuvastatin (CRESTOR) 20 MG tablet Take 1 tablet by mouth nightly 30 tablet 1    dilTIAZem (CARDIZEM CD) 120 MG extended release capsule Take 1 capsule by mouth daily 30 capsule 1    insulin lispro, 1 Unit Dial, current facility-administered medications for this visit. No Known Allergies    Vitals:    06/07/21 1136   BP: (!) 141/65   Pulse: 117   Resp: 20   Weight: 188 lb (85.3 kg)   Height: 5' 5\" (1.651 m)       Social History     Socioeconomic History    Marital status: Single     Spouse name: Not on file    Number of children: Not on file    Years of education: Not on file    Highest education level: Not on file   Occupational History     Comment: factory work/ [employer]   Tobacco Use    Smoking status: Current Every Day Smoker     Packs/day: 1.00     Years: 34.00     Pack years: 34.00     Types: Cigarettes    Smokeless tobacco: Never Used   Vaping Use    Vaping Use: Never used   Substance and Sexual Activity    Alcohol use: No     Alcohol/week: 0.0 standard drinks    Drug use: No    Sexual activity: Not Currently   Other Topics Concern    Not on file   Social History Narrative    Not on file     Social Determinants of Health     Financial Resource Strain: Low Risk     Difficulty of Paying Living Expenses: Not hard at all   Food Insecurity: No Food Insecurity    Worried About Running Out of Food in the Last Year: Never true    Clarke of Food in the Last Year: Never true   Transportation Needs:     Lack of Transportation (Medical):      Lack of Transportation (Non-Medical):    Physical Activity:     Days of Exercise per Week:     Minutes of Exercise per Session:    Stress:     Feeling of Stress :    Social Connections:     Frequency of Communication with Friends and Family:     Frequency of Social Gatherings with Friends and Family:     Attends Christianity Services:     Active Member of Clubs or Organizations:     Attends Club or Organization Meetings:     Marital Status:    Intimate Partner Violence:     Fear of Current or Ex-Partner:     Emotionally Abused:     Physically Abused:     Sexually Abused:        Family History   Problem Relation Age of Onset    High Blood Pressure Mother     COPD Mother     COPD Father     High Blood Pressure Father     Other Father     Heart Disease Brother     High Blood Pressure Brother     Other Brother     Cancer Maternal Grandmother     Heart Attack Maternal Grandfather     Other Paternal Grandmother          SUBJECTIVE: Belén Sargent presents to the office today for hfu after admission for PAF. I reviewed notes. Had spontaneous conversion to sinus rhythm and placed on cardizem and NOAC. Has a home monitoring device and a family member who was an EMT - they have captured paroxysms since discharge. Patient care complicated by End stage lung disease, and is an EMT. Hx of cath in 2015 demonstrating non obstructive CAD and normal LV function, as well as unremarkable echo in 2015, despite Ama-Danlos diagnosis         Review of Systems:   Heart: as above   Lungs: as above   Eyes: denies changes in vision or discharge. Ears: denies changes in hearing or pain. Nose: denies epistaxis or masses   Throat: denies sore throat or trouble swallowing. Neuro: denies numbness, tingling, tremors. Skin: denies rashes or itching. : denies hematuria, dysuria   GI: denies vomiting, diarrhea   Psych: denies mood changed, anxiety, depression. all others negative. Physical Exam   BP (!) 141/65   Pulse 117   Resp 20   Ht 5' 5\" (1.651 m)   Wt 188 lb (85.3 kg)   LMP 03/20/2015   BMI 31.28 kg/m²   Constitutional: Oriented to person, place, and time. Sickly appearing, with Nasal cannula No distress. Head: Normocephalic and atraumatic. Eyes: EOM are normal. Pupils are equal, round, and reactive to light. Neck: Normal range of motion. Neck supple. No hepatojugular reflux and no JVD present. Carotid bruit is not present. No tracheal deviation present. No thyromegaly present. Cardiovascular: Normal rate, regular rhythm, normal heart sounds and intact distal pulses. Exam reveals no gallop and no friction rub.   No murmur

## 2021-06-08 NOTE — TELEPHONE ENCOUNTER
Darin Méndez called in stating patient needs a new battery for her oxygen concentrator. She was also prescribed miconazole and she only has enough for today. When they requested the refill they were told it was too soon. Can the dose be adjusted? Please advise.

## 2021-06-08 NOTE — TELEPHONE ENCOUNTER
Not sure how to get just a battery for the concentrator- please call her DME and see if that is orderable separately. As for the miconazole, it is not intended for an indefinite duration of time. Can continue x 1 more week. Will send in 1 tube.

## 2021-06-09 NOTE — TELEPHONE ENCOUNTER
Chris Never notified and verbalized understanding. She will call the DME with the machine information to see if the battery can be replaced or if she needs a new machine. Device is more than 3years old.

## 2021-06-10 NOTE — TELEPHONE ENCOUNTER
Christina Cedeno states patient in having increasing back pain. She is requesting something to help. She said she is more interested in being comfortable than being treated. All the pain medication she found was very outdated. Please advise.

## 2021-06-17 NOTE — PROGRESS NOTES
Post-Discharge Transitional Care Management Services or Hospital Follow Up      Swenson Rajan   YOB: 1965    Date of Office Visit:  6/17/2021  Date of Hospital Admission: 5/27/21  Date of Hospital Discharge: 5/29/21  Readmission Risk Score(high >=14%.  Medium >=10%):Readmission Risk Score: 18      Care management risk score Rising risk (score 2-5) and Complex Care (Scores >=6): 6     Non face to face  following discharge, date last encounter closed (first attempt may have been earlier): *No documented post hospital discharge outreach found in the last 14 days *No documented post hospital discharge outreach found in the last 14 days    Call initiated 2 business days of discharge: *No response recorded in the last 14 days     Patient Active Problem List   Diagnosis    Hyperlipidemia    Depression    Ama-Danlos syndrome    MARIAA (obstructive sleep apnea)    Tobacco abuse    Carpal tunnel syndrome    Vitamin D deficiency    Diastasis recti    PVD (peripheral vascular disease) with claudication ([address]    Compression fracture of L4 lumbar vertebra    Compression fracture of L1 lumbar vertebra ([address]    H/O: stroke    Diabetic polyneuropathy associated with type 2 diabetes mellitus ([address]    PAF (paroxysmal atrial fibrillation) ([address]    Coronary artery disease involving native coronary artery of native heart without angina pectoris    DNR (do not resuscitate)    COPD (chronic obstructive pulmonary disease) ([address]    Uncontrolled type 2 diabetes mellitus with hyperglycemia ([address]    Dermatophytosis    Left carotid stenosis    O2 dependent       No Known Allergies    Medications listed as ordered at the time of discharge from hospital   Hortencia Begin A   Home Medication Instructions ZORAIDA:    Printed on:06/17/21 2483   Medication Information                      albuterol (PROVENTIL) (2.5 MG/3ML) 0.083% nebulizer solution  USE 1 VIAL VIA NEBULIZER EVERY 4 HOURS AS NEEDED FOR WHEEZING OR SHORTNESS OF BREATH             apixaban (ELIQUIS) 5 MG TABS tablet  Take 1 tablet by mouth 2 times daily Start after done with started pack             budesonide (PULMICORT) 0.25 MG/2ML nebulizer suspension  Take 2 mLs by nebulization 2 times daily             butalbital-acetaminophen-caffeine (FIORICET, ESGIC) -40 MG per tablet  Take 1 tablet by mouth every 4 hours as needed for Headaches             cilostazol (PLETAL) 100 MG tablet  Take 1 tablet by mouth 2 times daily             diclofenac sodium (VOLTAREN) 1 % GEL  Apply 2 g topically 3 times daily as needed for Pain             dilTIAZem (CARDIZEM CD) 120 MG extended release capsule  Take 2 capsules by mouth daily             dilTIAZem (CARDIZEM CD) 240 MG extended release capsule  Take 1 capsule by mouth daily             dronedarone hcl (MULTAQ) 400 MG TABS  Take 1 tablet by mouth 2 times daily (with meals)             fenofibrate (TRICOR) 54 MG tablet  Take 1 tablet by mouth daily             insulin glargine (BASAGLAR KWIKPEN) 100 UNIT/ML injection pen  Inject 75 Units into the skin 2 times daily             insulin lispro, 1 Unit Dial, (HUMALOG KWIKPEN) 100 UNIT/ML SOPN  5-12 units SC before meals as per sliding scale             insulin lispro, 1 Unit Dial, (HUMALOG KWIKPEN) 100 UNIT/ML SOPN  Inject 10 Units into the skin 3 times daily (with meals)             ipratropium-albuterol (DUONEB) 0.5-2.5 (3) MG/3ML SOLN nebulizer solution  USE 3 ML VIA NEBULIZER EVERY 4 HOURS AS NEEDED FOR SHORTNESS OF BREATH             ketoconazole (NIZORAL) 2 % cream  Apply topically daily.              lidocaine (LIDODERM) 5 %  Place 1 patch onto the skin daily 12 hours on, 12 hours off.             nortriptyline (PAMELOR) 25 MG capsule  Take 1 capsule by mouth nightly             OXYGEN  Inhale 5 L into the lungs continuous             predniSONE (DELTASONE) 10 MG tablet  5 tab po qday x 2 d, then 4 tab po qday x 2 d, then 3 tab po qday x 2 d, then 2 tab po qday x 2 day, then resume the usual home dose. rosuvastatin (CRESTOR) 20 MG tablet  Take 1 tablet by mouth nightly             tiZANidine (ZANAFLEX) 2 MG tablet  Take 1 tablet by mouth every 8 hours as needed (muscle spams)             traZODone (DESYREL) 150 MG tablet  Take 0.5 tablets by mouth nightly for 4 days TAKE 1 TABLET BY MOUTH EVERY NIGHT AT BEDTIME             vitamin B-12 (CYANOCOBALAMIN) 1000 MCG tablet  Take 1 tablet by mouth daily             vitamin D (ERGOCALCIFEROL) 1.25 MG (14324 UT) CAPS capsule  TAKE 1 CAPSULE PO q weekly                   Medications marked \"taking\" at this time  Outpatient Medications Marked as Taking for the 6/17/21 encounter (Office Visit) with Sherrell Valera MD   Medication Sig Dispense Refill    dilTIAZem (CARDIZEM CD) 240 MG extended release capsule Take 1 capsule by mouth daily 30 capsule 5    dronedarone hcl (MULTAQ) 400 MG TABS Take 1 tablet by mouth 2 times daily (with meals) 60 tablet 5    lidocaine (LIDODERM) 5 % Place 1 patch onto the skin daily 12 hours on, 12 hours off.  30 patch 0    dilTIAZem (CARDIZEM CD) 120 MG extended release capsule Take 2 capsules by mouth daily 30 capsule 1    cilostazol (PLETAL) 100 MG tablet Take 1 tablet by mouth 2 times daily 60 tablet 11    insulin glargine (BASAGLAR KWIKPEN) 100 UNIT/ML injection pen Inject 75 Units into the skin 2 times daily 5 pen 5    rosuvastatin (CRESTOR) 20 MG tablet Take 1 tablet by mouth nightly 30 tablet 1    insulin lispro, 1 Unit Dial, (HUMALOG KWIKPEN) 100 UNIT/ML SOPN Inject 10 Units into the skin 3 times daily (with meals) 9 mL 1    tiZANidine (ZANAFLEX) 2 MG tablet Take 1 tablet by mouth every 8 hours as needed (muscle spams) 60 tablet 2    butalbital-acetaminophen-caffeine (FIORICET, ESGIC) -40 MG per tablet Take 1 tablet by mouth every 4 hours as needed for Headaches 180 tablet 3    predniSONE (DELTASONE) 10 MG tablet 5 tab po qday x 2 d, then 4 tab po qday x 2 d, then 3 tab onset of chest tightness and palpitations. Will last minutes or even longer  Has not had any syncope  Denies any shortness of breath  Denies severino chest pain    COPD  Follow-up  Chronic issue that is severe and persistent  She is on chronic oxygen therapy. During flares of A. fib she will need to up titrate her oxygen requirements  No new cough  Continues to smoke  Reports compliance with meds  [place] valve has been identified by the patient is something she is interested in pursuing. Understandably, she knows she will need to travel out of the area to be assessed for this. I do not know enough about this as she is a candidate or not, but that she will need to stop smoking    Chronic lower back pain  Chronic issue  She is had multiple compression fractures  Pain is becoming increasingly difficult issue for her  It is localized primarily to the lower spine  She denies fall or reinjury. She does not want to have additional imaging at this time but does want to go see pain management  She has failed epidurals and narcotics and neuroleptics in the past.  She continues to experience either radiculopathy or neuropathy in her lower extremities. Insulin-dependent diabetes type 2  Overall think she is doing better  Has had some hypoglycemia in the family typically will reduce or hold her insulin  She states that she has been more compliant with her diet but actually is feeling less hungry at times as well  Has chronic neuropathy as below  Lab Results   Component Value Date    LABA1C 7.0 06/17/2021     No results found for: EAG      Review of Systems   Constitutional: Positive for fatigue. Negative for chills and fever. HENT: Positive for congestion. Negative for trouble swallowing. Respiratory: Positive for cough (Baseline), shortness of breath and wheezing. Cardiovascular: Positive for palpitations. Negative for chest pain and leg swelling. Gastrointestinal: Negative for abdominal pain.    Genitourinary: Negative for difficulty urinating (Increasing incontinence. Urge type. Asking if she can have a prescription for adult diapers. States she is not always able to transfer to her bedside commode or get to the toilet in time). Musculoskeletal: Positive for arthralgias, back pain and gait problem. Vitals:    06/17/21 1131   BP: 118/82   Pulse: 69   Resp: 22   Temp: 97.8 °F (36.6 °C)   TempSrc: Temporal   SpO2: 98%   Weight: 188 lb (85.3 kg)   Height: 5' 5\" (1.651 m)     Body mass index is 31.28 kg/m². Wt Readings from Last 3 Encounters:   06/17/21 188 lb (85.3 kg)   06/07/21 188 lb (85.3 kg)   06/03/21 185 lb (83.9 kg)     BP Readings from Last 3 Encounters:   06/17/21 118/82   06/07/21 (!) 141/65   05/29/21 (!) 106/52       Physical Exam  Vitals reviewed. Constitutional:       Appearance: She is ill-appearing (Chronically, unchanged). HENT:      Head: Normocephalic and atraumatic. Nose: Congestion present. Mouth/Throat:      Mouth: Mucous membranes are moist.      Pharynx: Oropharynx is clear. Cardiovascular:      Rate and Rhythm: Normal rate and regular rhythm. No extrasystoles are present. Heart sounds: No murmur heard. Pulmonary:      Effort: No accessory muscle usage. Breath sounds: Decreased air movement present. Decreased breath sounds present. No wheezing, rhonchi or rales. Abdominal:      General: Bowel sounds are normal. There is no distension. Musculoskeletal:      Cervical back: Neck supple. No muscular tenderness. Lumbar back: Bony tenderness present. No deformity. Decreased range of motion. Right lower leg: No edema. Left lower leg: No edema. Lymphadenopathy:      Cervical: No cervical adenopathy. Skin:     General: Skin is warm and dry. Neurological:      Mental Status: She is alert. Mental status is at baseline.    Psychiatric:         Attention and Perception: Attention normal.         Mood and Affect: Mood and affect normal. Judgment: Judgment normal.             Conchita was seen today for care management. Diagnoses and all orders for this visit:    Paroxysmal atrial fibrillation ([address]  -     VT DISCHARGE MEDS RECONCILED W/ CURRENT OUTPATIENT MED LIST    Uncontrolled type 2 diabetes mellitus with circulatory disorder, with long-term current use of insulin (HCC)  -     POCT glycosylated hemoglobin (Hb A1C)    Pulmonary emphysema, unspecified emphysema type (HCC)    Urge incontinence of urine  -     Incontinence Supply Disposable MISC; Change 4 times daily/prn    Compression fracture of L1 vertebra with routine healing, subsequent encounter  -     External Referral To Pain Clinic      Plan    Defer management of A. fib to cardiology. She is anticoagulated and we will continue to monitor for now. Return to the ER if tachycardia persists or is unable to break  Hopefully getting on an antiarrhythmic will help    Diabetes is doing much better. I am okay with her current management plan that will include holding or reducing dose of insulin on an as-needed basis. If hypoglycemia persists I need to be notified. Emphysema is probably the biggest issue that limits the patient's mobility and function. She wants to pursue the [place] valve, that is okay with me, but I do want her to work on smoking cessation. She wants to try and do this on her own. She is considering hypnosis and does not want prescription medication assistance    Incontinence supplies ordered per her request    Referral to pain management. This is reasonable because she has failed most conservative measures and her mobility is also limited by her back pain. Opportunity for questions provided. Patient and caregiver verbalized comfort and understanding of plan  Medical Decision Making: high complexity    Return in about 3 months (around 9/17/2021), or if symptoms worsen or fail to improve, for COPD, DM check.

## 2021-06-17 NOTE — TELEPHONE ENCOUNTER
Spoke to patient in office. Does not want this ordered. Is working with insurance company on getting another concentrator.

## 2021-06-21 NOTE — TELEPHONE ENCOUNTER
Received call from patient's daughter, Darin Méndez. They found a company that the patient can get portable oxygen from. They need a script, recent testing proving need for oxygen, demo & insurance faxed to Mike Blood at [phone]    If agreeable please place order and let me know if recent \"testing\" was done and I will send it over. Thank you.

## 2021-06-24 NOTE — TELEPHONE ENCOUNTER
Offered appointment for 6/25 with Dr Sylvia Wyman at 10:15. Jayne Alicia declined saying it was too early. Advised walk in care.

## 2021-06-24 NOTE — TELEPHONE ENCOUNTER
E-visit completed and advised to contact office. She said she is having some SOB due to her COPD. Please advise.

## 2021-06-24 NOTE — TELEPHONE ENCOUNTER
[facility] notified and verbalized understanding. Patient will complete e-visit.

## 2021-06-26 NOTE — TELEPHONE ENCOUNTER
Message left for patient's daughter Veronica Cheng telephone #[phone] on 23 June, spoke to her personally today and 26 June regarding the arterial Doppler study as well as a carotid ultrasound reassured, instructed them to keep the appointment see Dr. Jai Starks in August and call in the interim for any increasing symptoms, all the questions were answered

## 2021-07-02 PROBLEM — S32.040A COMPRESSION FRACTURE OF L4 VERTEBRA (HCC): Status: RESOLVED | Noted: 2017-08-27 | Resolved: 2021-01-01

## 2021-07-02 NOTE — PROGRESS NOTES
Chief Complaint   Patient presents with    Atrial Fibrillation    Coronary Artery Disease       Patient Active Problem List    Diagnosis Date Noted    Diastasis recti 09/25/2013     Priority: Low    Hyperlipidemia      Priority: Low    Depression      Priority: Low    Ama-Danlos syndrome      Priority: Low    MARIAA (obstructive sleep apnea)      Priority: Low    Tobacco abuse      Priority: Low    COPD (chronic obstructive pulmonary disease) ([facility] 05/29/2021    Dermatophytosis 05/29/2021    Left carotid stenosis 05/29/2021    O2 dependent 05/29/2021    Uncontrolled type 2 diabetes mellitus with hyperglycemia ([facility]     DNR (do not resuscitate) 05/18/2021    PAF (paroxysmal atrial fibrillation) ([facility] 04/26/2021     Overview Note:     A. CHADSVASC = 6      Coronary artery disease involving native coronary artery of native heart without angina pectoris 04/26/2021     Overview Note:     A. CATH 2015: mild CAD, no obstructive lesions, normal EF      Diabetic polyneuropathy associated with type 2 diabetes mellitus ([facility] 06/12/2020    Compression fracture of L1 lumbar vertebra ([facility] 08/27/2017    H/O: stroke 08/27/2017    PVD (peripheral vascular disease) with claudication [facility] 06/19/2014    Vitamin D deficiency     Carpal tunnel syndrome 12/07/2011       Current Outpatient Medications   Medication Sig Dispense Refill    dilTIAZem (CARDIZEM CD) 240 MG extended release capsule Take 1 capsule by mouth daily 30 capsule 5    dronedarone hcl (MULTAQ) 400 MG TABS Take 1 tablet by mouth 2 times daily (with meals) 60 tablet 5    lidocaine (LIDODERM) 5 % Place 1 patch onto the skin daily 12 hours on, 12 hours off.  30 patch 0    cilostazol (PLETAL) 100 MG tablet Take 1 tablet by mouth 2 times daily 60 tablet 11    insulin glargine (BASAGLAR KWIKPEN) 100 UNIT/ML injection pen Inject 75 Units into the skin 2 times daily 5 pen 5    rosuvastatin (CRESTOR) 20 MG tablet Take 1 tablet by mouth nightly 30 tablet 1    insulin lispro, 1 Unit Dial, (HUMALOG KWIKPEN) 100 UNIT/ML SOPN Inject 10 Units into the skin 3 times daily (with meals) 9 mL 1    tiZANidine (ZANAFLEX) 2 MG tablet Take 1 tablet by mouth every 8 hours as needed (muscle spams) 60 tablet 2    butalbital-acetaminophen-caffeine (FIORICET, ESGIC) -40 MG per tablet Take 1 tablet by mouth every 4 hours as needed for Headaches 180 tablet 3    predniSONE (DELTASONE) 10 MG tablet 5 tab po qday x 2 d, then 4 tab po qday x 2 d, then 3 tab po qday x 2 d, then 2 tab po qday x 2 day, then resume the usual home dose. 28 tablet 0    nortriptyline (PAMELOR) 25 MG capsule Take 1 capsule by mouth nightly 30 capsule 3    ipratropium-albuterol (DUONEB) 0.5-2.5 (3) MG/3ML SOLN nebulizer solution USE 3 ML VIA NEBULIZER EVERY 4 HOURS AS NEEDED FOR SHORTNESS OF BREATH 540 mL 5    diclofenac sodium (VOLTAREN) 1 % GEL Apply 2 g topically 3 times daily as needed for Pain      apixaban (ELIQUIS) 5 MG TABS tablet Take 1 tablet by mouth 2 times daily Start after done with started pack 60 tablet 5    vitamin B-12 (CYANOCOBALAMIN) 1000 MCG tablet Take 1 tablet by mouth daily 30 tablet 11    ketoconazole (NIZORAL) 2 % cream Apply topically daily.  30 g 1    budesonide (PULMICORT) 0.25 MG/2ML nebulizer suspension Take 2 mLs by nebulization 2 times daily 60 ampule 3    insulin lispro, 1 Unit Dial, (HUMALOG KWIKPEN) 100 UNIT/ML SOPN 5-12 units SC before meals as per sliding scale 3 pen 5    vitamin D (ERGOCALCIFEROL) 1.25 MG (23828 UT) CAPS capsule TAKE 1 CAPSULE PO q weekly 12 capsule 5    fenofibrate (TRICOR) 54 MG tablet Take 1 tablet by mouth daily 30 tablet 5    albuterol (PROVENTIL) (2.5 MG/3ML) 0.083% nebulizer solution USE 1 VIAL VIA NEBULIZER EVERY 4 HOURS AS NEEDED FOR WHEEZING OR SHORTNESS OF BREATH 540 mL 3    OXYGEN Inhale 5 L into the lungs continuous      traZODone (DESYREL) 150 MG tablet Take 0.5 tablets by mouth nightly for 4 days TAKE 1 TABLET BY MOUTH EVERY NIGHT AT BEDTIME 4 tablet 0     No current facility-administered medications for this visit. No Known Allergies    Vitals:    07/02/21 1314   BP: (!) 118/59   Pulse: 109   Resp: 20   Weight: 188 lb 12.8 oz (85.6 kg)   Height: 5' 5\" (1.651 m)       Social History     Socioeconomic History    Marital status: Single     Spouse name: Not on file    Number of children: Not on file    Years of education: Not on file    Highest education level: Not on file   Occupational History     Comment: factory work/ [employer]   Tobacco Use    Smoking status: Current Every Day Smoker     Packs/day: 1.00     Years: 34.00     Pack years: 34.00     Types: Cigarettes    Smokeless tobacco: Never Used   Vaping Use    Vaping Use: Never used   Substance and Sexual Activity    Alcohol use: No     Alcohol/week: 0.0 standard drinks    Drug use: No    Sexual activity: Not Currently   Other Topics Concern    Not on file   Social History Narrative    Not on file     Social Determinants of Health     Financial Resource Strain: Low Risk     Difficulty of Paying Living Expenses: Not hard at all   Food Insecurity: No Food Insecurity    Worried About Running Out of Food in the Last Year: Never true    Clarke of Food in the Last Year: Never true   Transportation Needs:     Lack of Transportation (Medical):      Lack of Transportation (Non-Medical):    Physical Activity:     Days of Exercise per Week:     Minutes of Exercise per Session:    Stress:     Feeling of Stress :    Social Connections:     Frequency of Communication with Friends and Family:     Frequency of Social Gatherings with Friends and Family:     Attends Church Services:     Active Member of Clubs or Organizations:     Attends Club or Organization Meetings:     Marital Status:    Intimate Partner Violence:     Fear of Current or Ex-Partner:     Emotionally Abused:     Physically Abused:     Sexually Abused:        Family History   Problem normal. No respiratory distress. No wheezes. No rales. No tenderness. Abdominal: Soft. Bowel sounds are normal. No distension and no mass. No tenderness. No rebound and no guarding. Musculoskeletal: wheelchair, trace bilateral edema and no tenderness. Neurological: Alert and oriented to person, place, and time. Skin: Skin is warm and dry. No rash noted. Not diaphoretic. No erythema. Psychiatric: Normal mood and affect. Behavior is normal.     EKG:  Sinus tachycardia, rate 109, axis -92, LAFB.     ASSESSMENT AND PLAN:  Patient Active Problem List   Diagnosis    Hyperlipidemia    Depression    Ama-Danlos syndrome    MARIAA (obstructive sleep apnea)    Tobacco abuse    Carpal tunnel syndrome    Vitamin D deficiency    Diastasis recti    PVD [facility]ipheral [facility]audication [facility]    Compression fracture of L1 lumbar vertebra ([address]    H/O: stroke    Diabetic polyneuropathy associated with type 2 diabetes mellitus ([facility])    PAF (paroxysmal atrial fibrillation) ([address]    Coronary artery disease involving native coronary artery of native heart without angina pectoris    DNR (do not resuscitate)    COPD (chronic obstructive pulmonary disease) ([address]    Uncontrolled type 2 diabetes mellitus with hyperglycemia ([address]    Dermatophytosis    Left carotid stenosis    O2 dependent     Patient with PAF, non valvular with CHADS-VASC = 6, with no evidence of heart failure   continue diltiazem  240 mg and continue to attempt obtaining dronedarone to suppress symptomatic PAF  OV 3 months      Clara Fink M.D  [facility]

## 2021-07-14 NOTE — TELEPHONE ENCOUNTER
Agree needs to go to the ER ASAP
The patients daughter called and states that her oxygen keeps dropping, she has increased to 8L and it still isn't going up. She also states that she tries to get her up and moving, she responses to her but won't get up and is kind of out of it. \"The pt is also talking about steeling bananas\".      I advised her that she needs to go to the ED to be evaluated
no

## 2021-07-29 NOTE — TELEPHONE ENCOUNTER
Jori Funez called and would like to know if there is anything else she can take since she cannot take the Multaq due to interactions with medications.   Please advise

## 2021-07-29 NOTE — TELEPHONE ENCOUNTER
With her lung disease amiodarone would not be wise  The other medications would have to be used only after a normal stress test, and be started in the hospital unfortunately

## 2021-08-05 NOTE — TELEPHONE ENCOUNTER
Pt's caregiver Don De Jesus called and states that the lidocain patches are not working. Pt is in a lot of pain more than normal.  Needs help to do everything. What would be the next step? ED? Bring her here to be seen?      Please advise

## 2021-08-05 NOTE — TELEPHONE ENCOUNTER
Pt needs ov- may need pain mgmt referral but would have her seen here first to make sure nothing new is going on

## 2021-08-06 NOTE — PROGRESS NOTES
extended release capsule Take 1 capsule by mouth daily 30 capsule 5    cilostazol (PLETAL) 100 MG tablet Take 1 tablet by mouth 2 times daily 60 tablet 11    insulin glargine (BASAGLAR KWIKPEN) 100 UNIT/ML injection pen Inject 75 Units into the skin 2 times daily 5 pen 5    tiZANidine (ZANAFLEX) 2 MG tablet Take 1 tablet by mouth every 8 hours as needed (muscle spams) 60 tablet 2    butalbital-acetaminophen-caffeine (FIORICET, ESGIC) -40 MG per tablet Take 1 tablet by mouth every 4 hours as needed for Headaches 180 tablet 3    nortriptyline (PAMELOR) 25 MG capsule Take 1 capsule by mouth nightly 30 capsule 3    traZODone (DESYREL) 150 MG tablet Take 0.5 tablets by mouth nightly for 4 days TAKE 1 TABLET BY MOUTH EVERY NIGHT AT BEDTIME 4 tablet 0    ipratropium-albuterol (DUONEB) 0.5-2.5 (3) MG/3ML SOLN nebulizer solution USE 3 ML VIA NEBULIZER EVERY 4 HOURS AS NEEDED FOR SHORTNESS OF BREATH 540 mL 5    apixaban (ELIQUIS) 5 MG TABS tablet Take 1 tablet by mouth 2 times daily Start after done with started pack 60 tablet 5    vitamin B-12 (CYANOCOBALAMIN) 1000 MCG tablet Take 1 tablet by mouth daily 30 tablet 11    ketoconazole (NIZORAL) 2 % cream Apply topically daily. 30 g 1    vitamin D (ERGOCALCIFEROL) 1.25 MG (97025 UT) CAPS capsule TAKE 1 CAPSULE PO q weekly 12 capsule 5    albuterol (PROVENTIL) (2.5 MG/3ML) 0.083% nebulizer solution USE 1 VIAL VIA NEBULIZER EVERY 4 HOURS AS NEEDED FOR WHEEZING OR SHORTNESS OF BREATH 540 mL 3    OXYGEN Inhale 5 L into the lungs continuous       No current facility-administered medications for this visit. No Known Allergies    Past Medical & Surgical History:      Diagnosis Date    Anxiety     Carotid stenosis 12/2011    50-69% on left    Carpal tunnel syndrome 12/07/2011    bilateral    Cerebral artery occlusion with cerebral infarction [facility]) 2011?     left sided weakness / usually in w/c    Chronic back pain     COPD (chronic obstructive pulmonary disease) ([facility] [address]     no pulmonologist / follows with PCP    CVA (cerebral infarction) 2011?     right sided thalamic; seen by Dr Perla Chandler    Depression     Dermatophytosis 5/29/2021    Discoloration of skin of toe 6/3/2021    Ehler's-Danlos syndrome     GERD (gastroesophageal reflux disease)     History of ischemic heart disease 03/30/2017    no cardiology    Hyperlipidemia     Hypertension     Left carotid stenosis 5/29/2021    O2 dependent 5/29/2021    Obesity     PVD (peripheral vascular disease) with claudication ([facility] [address] 8/25/2017    Tobacco abuse     Tobacco abuse     Type II or unspecified type diabetes mellitus without mention of complication, not stated as uncontrolled     Vitamin D deficiency      Past Surgical History:   Procedure Laterality Date    ABDOMINAL EXPLORATION SURGERY  03/29/2017    with bowel resection, incarcerated ventral hernia repair    CARDIAC CATHETERIZATION  1/7/2015    Dr. Loan Bertrand  EF=55%   FFR=0.84  distal RCA bifurcation    CYST INCISION AND DRAINAGE Right 07/22/2016    Right gluteal mass incision and drainage    CYST REMOVAL  1/28/2011    excision of left arm inclusion cyst    ECHO COMPL W DOP COLOR FLOW  5/15/2013         ECHOCARDIOGRAM COMPLETE 2D W DOPPLER W COLOR  1/3/2012         ECHOCARDIOGRAM COMPLETE 2D W DOPPLER W COLOR  6/14/2012         EYE SURGERY Bilateral 2014    cataract w lens implants    HERNIA REPAIR  03/2017    abdominal    OTHER SURGICAL HISTORY  07/26/2016    re exploration left lateral wound / heel    IA DRAIN SKIN ABSCESS COMPLIC N/A 1/4/4704    EXAM UNDER ANESTHESIA  I & D BUTTOCKS performed by Stephanie Rios, DO at [address]       Family History:      Problem Relation Age of Onset    High Blood Pressure Mother     COPD Mother     COPD Father     High Blood Pressure Father     Other Father     Heart Disease Brother     High Blood Pressure Brother     Other Brother     Cancer Maternal Grandmother     Heart Attack Maternal Grandfather     Other Paternal Grandmother        Social History:  Social History     Tobacco Use    Smoking status: Current Every Day Smoker     Packs/day: 1.00     Years: 34.00     Pack years: 34.00     Types: Cigarettes    Smokeless tobacco: Never Used   Substance Use Topics    Alcohol use: No     Alcohol/week: 0.0 standard drinks       Immunization History   Administered Date(s) Administered    COVID-19, Moderna, PF, 100mcg/0.5mL 04/02/2021, 04/30/2021    Influenza 11/11/2010, 12/06/2011, 10/16/2012, 10/08/2013    Influenza Vaccine, unspecified formulation 12/06/2011, 10/16/2012, 10/08/2013, 10/17/2016, 09/26/2018    Influenza Virus Vaccine 09/09/2015    Influenza, High Dose (Fluzone 65 yrs and older) 11/20/2017    Influenza, Dulcy Cocks, IM, PF (6 mo and older Fluzone, Flulaval, Fluarix, and 3 yrs and older Afluria) 10/17/2016, 09/26/2018, 11/13/2019, 09/23/2020    Pneumococcal Polysaccharide ([id number]) 11/03/2013       Review of Systems   Constitutional: Positive for activity change and fatigue. Negative for chills and fever. HENT: Negative for congestion, sore throat and trouble swallowing. Eyes: Negative for photophobia, pain and visual disturbance. Respiratory: Negative for cough and shortness of breath. Cardiovascular: Negative for chest pain, palpitations and leg swelling. Gastrointestinal: Negative for abdominal distention, abdominal pain, constipation, diarrhea, nausea and vomiting. Endocrine: Negative for heat intolerance and polydipsia. Genitourinary: Negative for difficulty urinating, dysuria, frequency and hematuria. Musculoskeletal: Positive for arthralgias, back pain (acute lower ), gait problem and myalgias. Negative for joint swelling, neck pain and neck stiffness. Skin: Negative for rash and wound. Neurological: Positive for weakness and light-headedness. Negative for dizziness, syncope, numbness and headaches.    Psychiatric/Behavioral: Positive for sleep disturbance. Negative for agitation, behavioral problems and confusion. The patient is nervous/anxious. VS:  BP (!) 112/56   Pulse 110   Temp 97.8 °F (36.6 °C)   Resp 20   Ht 5' 5\" (1.651 m)   LMP 03/20/2015   SpO2 95%   BMI 31.42 kg/m²     Physical Exam  Constitutional:       General: She is not in acute distress. Appearance: Normal appearance. She is obese. She is not ill-appearing. HENT:      Head: Normocephalic and atraumatic. Right Ear: External ear normal.      Left Ear: External ear normal.      Nose: Nose normal. No congestion. Mouth/Throat:      Mouth: Mucous membranes are moist.      Pharynx: Oropharynx is clear. Eyes:      General:         Right eye: No discharge. Left eye: No discharge. Extraocular Movements: Extraocular movements intact. Conjunctiva/sclera: Conjunctivae normal.   Cardiovascular:      Rate and Rhythm: Normal rate and regular rhythm. Pulses: Normal pulses. Heart sounds: Normal heart sounds. No gallop. Pulmonary:      Effort: Pulmonary effort is normal. No respiratory distress. Breath sounds: No stridor. Wheezing present. No rhonchi. Abdominal:      General: Bowel sounds are normal. There is no distension. Palpations: Abdomen is soft. There is no mass. Tenderness: There is no abdominal tenderness. There is no guarding or rebound. Musculoskeletal:         General: No swelling. Cervical back: Normal range of motion and neck supple. Lumbar back: Tenderness (lower lumbar and sacrum) present. No swelling, edema, deformity, signs of trauma or bony tenderness. Decreased range of motion. Right lower leg: No edema. Left lower leg: No edema. Comments: No paravertebral tenderness. Unable to assess SLR test   Skin:     General: Skin is warm. Capillary Refill: Capillary refill takes less than 2 seconds. Coloration: Skin is not jaundiced.    Neurological:      General: No focal deficit present. Mental Status: She is alert and oriented to person, place, and time. Mental status is at baseline. Cranial Nerves: Cranial nerves are intact. No dysarthria or facial asymmetry. Sensory: Sensation is intact. Motor: Motor function is intact. Gait: Gait abnormal.      Comments: Wheelchair bound  LE strength symmetrical b/l 4/5   Psychiatric:         Attention and Perception: Attention normal.         Mood and Affect: Mood is anxious. Affect is flat. Speech: Speech normal.         Behavior: Behavior is withdrawn. Behavior is cooperative. Thought Content: Thought content normal.         Cognition and Memory: Cognition normal.         Judgment: Judgment normal.         Assessment/Plan:  1. Acute bilateral low back pain, unspecified whether sciatica present  - hx of lumbar compression fracture s/p kyphoplasty in 2017, followed then by Dr. Ady Palacios.  - acute pain for past couple days similar to previous one. Denies any trauma or injury. - will get CT lumbar imaging. Also will give Norco short term for pain. Pain encouraged to do to voltaren gel with continuation of lidocaine patch, as well as ICE or heat packs. Will also restart pregabalin today, pt aware and discussed in clinic.  - no red alarm signs mentioned in clinic. Less likely for cauda inquina syndrome. Pt also to call and f/u with Dr. Ady Palacios.  - pt refusing PT/OT for now as she is agoraphobiic.  - HYDROcodone-acetaminophen ([address]) 5-325 MG per tablet; Take 1 tablet by mouth every 6 hours as needed for Pain for up to 3 days. Intended supply: 3 days. Take lowest dose possible to manage pain  Dispense: 12 tablet; Refill: 0  - CT LUMBAR SPINE WO CONTRAST; Future  - diclofenac sodium (VOLTAREN) 1 % GEL; Apply 2 g topically 3 times daily as needed for Pain  Dispense: 50 g; Refill: 0  - pregabalin (LYRICA) 150 MG capsule; Take 1 capsule by mouth 2 times daily for 30 days. Dispense: 60 capsule; Refill: 0    2.

## 2021-08-06 NOTE — PROGRESS NOTES
[facility] [address]  Precepting Note    Subjective:  Back pain x 3 days  Sudden onset  Sharp pain in lower spine, felt similar to prior fx  No bowel or bladder changes  No saddle anesthesia  Family is noting some increased weakness  idooderm is not helping     ROS otherwise negative     Past medical, surgical, family and social history were reviewed, non-contributory, and unchanged unless otherwise stated. Objective:    BP (!) 112/56   Pulse 110   Temp 97.8 °F (36.6 °C)   Resp 20   Ht 5' 5\" (1.651 m)   LMP 03/20/2015   SpO2 95%   BMI 31.42 kg/m²     Exam is as noted by resident  TTP lower lumbar spine and sacrum without obvious deformity/step off   Strength is intact, symmetrical      Assessment/Plan:  Lumbar back pain, acute   Check CT lumbar spine to r/o addt'l fx    Ok for # 3 days of [place] 5/325, OARRS checked, unremarkable         Attending Physician Statement  I have reviewed the chart, including any radiology or labs. I have discussed the case, including pertinent history and exam findings with the resident. I agree with the assessment, plan and orders as documented by the resident. Please refer to the resident note for additional information.       Electronically signed by Evelina Chris MD on 8/6/2021 at 4:26 PM

## 2021-08-10 NOTE — TELEPHONE ENCOUNTER
Noted; perhaps ER is best- maybe can get the needed CT done sooner so I can better tell if new pathology is present

## 2021-08-10 NOTE — TELEPHONE ENCOUNTER
Isiah Bennett notified and verbalized understanding. She said the patient is still complaining of lower back pain. She has been sleeping a lot and they can't seem to get the pain under control. Contemplating taking her to the ED after she wakes up. Please advise if there is anything else they can try.

## 2021-08-11 NOTE — ED NOTES
FIRST PROVIDER CONTACT ASSESSMENT NOTE                                                                                                Department of Emergency Medicine                                                      First Provider Note  21  10:50 AM EDT  NAME: Leticia Wilson  : 1965  MRN: [id number]    Chief Complaint: Back Pain (lower back pain, hx of lumbar fx, denies recent injury)      History of Present Illness:   Leticia Wilson is a 64 y.o. female who presents to the ED for back pain starting a few days ago. History of A.fib and on eliquis. Today started with abdominal pain. Focused Physical Exam:  VS:    ED Triage Vitals [21 0954]   BP Temp Temp src Pulse Resp SpO2 Height Weight   -- -- -- 105 20 95 % -- --        General: Alert and in no apparent distress. Medical History:  has a past medical history of Anxiety, Carotid stenosis, Carpal tunnel syndrome, Cerebral artery occlusion with cerebral infarction [facility]), Chronic back pain, COPD (chronic obstructive pulmonary disease) ([facility] [address] CVA (cerebral infarction), Depression, Dermatophytosis, Discoloration of skin of toe, Ehler's-Danlos syndrome, GERD (gastroesophageal reflux disease), History of ischemic heart disease, Hyperlipidemia, Hypertension, Left carotid stenosis, O2 dependent, Obesity, PVD (peripheral vascular disease) with claudication ([facility] [address] Tobacco abuse, Tobacco abuse, Type II or unspecified type diabetes mellitus without mention of complication, not stated as uncontrolled, and Vitamin D deficiency. Surgical History:  has a past surgical history that includes Tubal ligation (); cyst removal (2011); ECHO Complete 2D W Doppler W Color (1/3/2012); ECHO Complete 2D W Doppler W Color (2012); ECHO Compl W Dop Color Flow (5/15/2013); Cardiac catheterization (2015); cyst incision and drainage (Right, 2016); other surgical history (2016);  Abdominal exploration surgery (2017); pr drain skin abscess complic (N/A, 2/6/8778); hernia repair (03/2017); and eye surgery (Bilateral, 2014). Social History:  reports that she has been smoking cigarettes. She has a 34.00 pack-year smoking history. She has never used smokeless tobacco. She reports that she does not drink alcohol and does not use drugs. Family History: family history includes COPD in her father and mother; Cancer in her maternal grandmother; Heart Attack in her maternal grandfather; Heart Disease in her brother; High Blood Pressure in her brother, father, and mother; Other in her brother, father, and paternal grandmother. Allergies: Patient has no known allergies.      Initial Plan of Care:  Initiate Treatment-Testing, Proceed toTreatment Area When Bed Available for ED Attending/MLP to Continue Care    -------------------------------------------------END OF FIRST PROVIDER CONTACT ASSESSMENT NOTE--------------------------------------------------------  Electronically signed by GONZÁLEZ Freed   DD: 8/11/21       GONZÁLEZ Anlgin  08/11/21 1051

## 2021-08-11 NOTE — ED PROVIDER NOTES
The history is provided by the patient and medical records. 19-year-old female present emergency department complaint of lower back pain. Patient states been going on for 3 to 4 days. States that it happened when she stood up out of her bed to go to the bathroom and also when she started having back pain to lower back. Nothing makes it better or worse. She does have a hard time walking due to the pain. She did see her primary care doctor who evaluated her and ordered a CT scan of lumbar spine advised her if pain was not improving to come into the emergency department. Patient otherwise denies any cauda equina symptoms, denies any inability to control her bowel or bladder no saddle anesthesia no numbness tingling to lower extremities. She does elicit having some generalized abdominal pain that started today. She does elicit having her history of a hernia surgery 3 years ago. She states the main reason she is is here is because of back pain. She otherwise denies any other complaints at this time. The patient presents with back pain that has been going on for 3-4 days. These symptoms are moderate in severity. Symptoms are made better by nothing. Symptoms are made worse by nothing. Associated symptoms include abd pain. Review of Systems   Constitutional: Negative for chills and fever. HENT: Negative for ear pain, sinus pressure and sore throat. Eyes: Negative for pain, discharge and redness. Respiratory: Negative for cough, shortness of breath and wheezing. Cardiovascular: Negative for chest pain. Gastrointestinal: Positive for abdominal pain. Negative for abdominal distention, diarrhea, nausea and vomiting. Genitourinary: Negative for dysuria and frequency. Musculoskeletal: Positive for back pain. Negative for arthralgias. Skin: Negative for rash and wound. Neurological: Negative for weakness and headaches. Hematological: Negative for adenopathy.    Psychiatric/Behavioral: Negative for agitation and behavioral problems. All other systems reviewed and are negative. Physical Exam  Vitals and nursing note reviewed. Constitutional:       Appearance: Normal appearance. She is normal weight. HENT:      Head: Normocephalic and atraumatic. Eyes:      Conjunctiva/sclera: Conjunctivae normal.   Cardiovascular:      Rate and Rhythm: Normal rate and regular rhythm. Pulses: Normal pulses. Heart sounds: Normal heart sounds. No murmur heard. No gallop. Pulmonary:      Effort: Pulmonary effort is normal. No respiratory distress. Breath sounds: Normal breath sounds. No wheezing or rales. Abdominal:      General: Abdomen is flat. Bowel sounds are normal. There is no distension. Palpations: Abdomen is soft. Tenderness: There is generalized abdominal tenderness. There is no guarding. Musculoskeletal:         General: No swelling, tenderness or deformity. Comments: Pain to palpation of the lumbar spine, no paravertebral tenderness. No weakness noted in 1 leg compared to the other. Patient is unable to lay back in bed to get a full examination and unable to do a straight leg raise test due to this. Skin:     General: Skin is warm and dry. Capillary Refill: Capillary refill takes less than 2 seconds. Neurological:      General: No focal deficit present. Mental Status: She is alert and oriented to person, place, and time. Psychiatric:         Mood and Affect: Mood normal.         Thought Content: Thought content normal.          Procedures     MDM   51-year-old female present emerged department complaint of lower back pain feels like she had fractured her lower back again. She does have a history of compression fractures with kyphoplasty done in the past.  Her CT scan of the lower back did show that the patient to have an L5 compression fracture with 50% loss of height.   CT scan pelvis did also reveal possible concern for cholecystitis however on right upper quadrant ultrasound this was not shown, she did have gallstones however no concern for cholecystitis on the imaging. Her remaining laboratory work-up was unremarkable for any acute pathology. I did talk with the patient being transferred downtown due to her nontraumatic compression fracture, she states she not want to go downtown and wanted to be discharged home and she would call Dr. Tod Araya her neurosurgeon to schedule an outpatient follow-up appointment. Advised on the risk of this. She is willing to take on these risks. She was hemodynamically stable. She was given pain medication for the compression fraction at home. Patient was provided return precautions ER.             --------------------------------------------- PAST HISTORY ---------------------------------------------  Past Medical History:  has a past medical history of Anxiety, Carotid stenosis, Carpal tunnel syndrome, Cerebral artery occlusion with cerebral infarction ([address] Chronic back pain, COPD (chronic obstructive pulmonary disease) ([address] CVA (cerebral infarction), Depression, Dermatophytosis, Discoloration of skin of toe, Ehler's-Danlos syndrome, GERD (gastroesophageal reflux disease), History of ischemic heart disease, Hyperlipidemia, Hypertension, Left carotid stenosis, O2 dependent, Obesity, PVD (peripheral vascular disease) with claudication ([address] Tobacco abuse, Tobacco abuse, Type II or unspecified type diabetes mellitus without mention of complication, not stated as uncontrolled, and Vitamin D deficiency. Past Surgical History:  has a past surgical history that includes Tubal ligation (1992); cyst removal (1/28/2011); ECHO Complete 2D W Doppler W Color (1/3/2012); ECHO Complete 2D W Doppler W Color (6/14/2012); ECHO Compl W Dop Color Flow (5/15/2013); Cardiac catheterization (1/7/2015); cyst incision and drainage (Right, 07/22/2016); other surgical history (07/26/2016);  Abdominal exploration surgery (03/29/2017); pr drain skin abscess complic (N/A, 1/6/7307); hernia repair (03/2017); and eye surgery (Bilateral, 2014). Social History:  reports that she has been smoking cigarettes. She has a 34.00 pack-year smoking history. She has never used smokeless tobacco. She reports that she does not drink alcohol and does not use drugs. Family History: family history includes COPD in her father and mother; Cancer in her maternal grandmother; Heart Attack in her maternal grandfather; Heart Disease in her brother; High Blood Pressure in her brother, father, and mother; Other in her brother, father, and paternal grandmother. The patients home medications have been reviewed. Allergies: Patient has no known allergies.     -------------------------------------------------- RESULTS -------------------------------------------------  Labs:  Results for orders placed or performed during the hospital encounter of 08/11/21   CBC Auto Differential   Result Value Ref Range    WBC 10.3 4.5 - 11.5 E9/L    RBC 4.49 3.50 - 5.50 E12/L    Hemoglobin 12.9 11.5 - 15.5 g/dL    Hematocrit 42.3 34.0 - 48.0 %    MCV 94.2 80.0 - 99.9 fL    MCH 28.7 26.0 - 35.0 pg    MCHC 30.5 (L) 32.0 - 34.5 %    RDW 12.1 11.5 - 15.0 fL    Platelets 207 373 - 465 E9/L    MPV 9.5 7.0 - 12.0 fL    Neutrophils % 71.7 43.0 - 80.0 %    Immature Granulocytes % 0.5 0.0 - 5.0 %    Lymphocytes % 20.2 20.0 - 42.0 %    Monocytes % 5.3 2.0 - 12.0 %    Eosinophils % 1.8 0.0 - 6.0 %    Basophils % 0.5 0.0 - 2.0 %    Neutrophils Absolute 7.38 (H) 1.80 - 7.30 E9/L    Immature Granulocytes # 0.05 E9/L    Lymphocytes Absolute 2.08 1.50 - 4.00 E9/L    Monocytes Absolute 0.55 0.10 - 0.95 E9/L    Eosinophils Absolute 0.19 0.05 - 0.50 E9/L    Basophils Absolute 0.05 0.00 - 0.20 N6/F   Basic Metabolic Panel w/ Reflex to MG   Result Value Ref Range    Sodium 141 132 - 146 mmol/L    Potassium reflex Magnesium 4.2 3.5 - 5.0 mmol/L    Chloride 95 (L) 98 - 107 mmol/L    CO2 40 (H) 22 - 29 mmol/L    Anion Gap 6 (L) 7 - 16 mmol/L    Glucose 186 (H) 74 - 99 mg/dL    BUN 7 6 - 20 mg/dL    CREATININE 0.3 (L) 0.5 - 1.0 mg/dL    GFR Non-African American >60 >=60 mL/min/1.73    GFR African American >60     Calcium 9.3 8.6 - 10.2 mg/dL   Hepatic Function Panel   Result Value Ref Range    Total Protein 7.0 6.4 - 8.3 g/dL    Albumin 3.8 3.5 - 5.2 g/dL    Alkaline Phosphatase 80 35 - 104 U/L    ALT 7 0 - 32 U/L    AST 8 0 - 31 U/L    Total Bilirubin <0.2 0.0 - 1.2 mg/dL    Bilirubin, Direct <0.2 0.0 - 0.3 mg/dL    Bilirubin, Indirect see below 0.0 - 1.0 mg/dL   Lipase   Result Value Ref Range    Lipase 8 (L) 13 - 60 U/L   Urinalysis, reflex to microscopic   Result Value Ref Range    Color, UA Yellow Straw/Yellow    Clarity, UA Clear Clear    Glucose, Ur 100 (A) Negative mg/dL    Bilirubin Urine Negative Negative    Ketones, Urine Negative Negative mg/dL    Specific Gravity, UA 1.010 1.005 - 1.030    Blood, Urine LARGE (A) Negative    pH, UA 7.5 5.0 - 9.0    Protein, UA Negative Negative mg/dL    Urobilinogen, Urine 0.2 <2.0 E.U./dL    Nitrite, Urine Negative Negative    Leukocyte Esterase, Urine Negative Negative   Microscopic Urinalysis   Result Value Ref Range    WBC, UA 0-1 0 - 5 /HPF    RBC, UA 2-5 0 - 2 /HPF    Epithelial Cells, UA RARE /HPF    Bacteria, UA RARE (A) None Seen /HPF       Radiology:  CT Lumbar Spine WO Contrast    Result Date: 8/11/2021  EXAMINATION: CT OF THE LUMBAR SPINE WITHOUT CONTRAST  8/11/2021 TECHNIQUE: CT of the lumbar spine was performed without the administration of intravenous contrast. Multiplanar reformatted images are provided for review. Dose modulation, iterative reconstruction, and/or weight based adjustment of the mA/kV was utilized to reduce the radiation dose to as low as reasonably achievable.  COMPARISON: Correlation made to radiographs from August 28, 2017 HISTORY: ORDERING SYSTEM PROVIDED HISTORY: PAIN TECHNOLOGIST PROVIDED HISTORY: Reason for exam:->low back pain, history of lumbar fractures with kyphoplasty, pain for the past 3 to 4 days Decision Support Exception - unselect if not a suspected or confirmed emergency medical condition->Emergency Medical Condition (MA) FINDINGS: Chronic fractures with kyphoplasty at T12, L1, and L4. New compression fracture at level of L5 of approximately 50%. Generalized osteopenia. Normal alignment of the lumbar spine. 1. New compression fracture of L5 vertebral body of approximately 50%. MRI could be helpful for further evaluation. 2. Chronic compression fractures with kyphoplasty at level of T12, L1, and L4.     CT ABDOMEN PELVIS W IV CONTRAST Additional Contrast? None    Result Date: 8/11/2021  EXAMINATION: CT OF THE ABDOMEN AND PELVIS WITH CONTRAST 8/11/2021 2:05 pm TECHNIQUE: CT of the abdomen and pelvis was performed with the administration of intravenous contrast. Multiplanar reformatted images are provided for review. Dose modulation, iterative reconstruction, and/or weight based adjustment of the mA/kV was utilized to reduce the radiation dose to as low as reasonably achievable. COMPARISON: None. HISTORY: ORDERING SYSTEM PROVIDED HISTORY: Generalized abdominal pain started today, also having low back pain TECHNOLOGIST PROVIDED HISTORY: Additional Contrast?->None Reason for exam:->Generalized abdominal pain started today, also having low back pain Decision Support Exception - unselect if not a suspected or confirmed emergency medical condition->Emergency Medical Condition (MA) FINDINGS: Lung Bases: Bilateral compressive atelectasis. Liver: No hepatic lesions identified. Bile ducts:  Gallstones layering within the gallbladder. No pericholecystic fat stranding. There is small amount of pericholecystic fluid. No evidence of intrahepatic or extrahepatic biliary dilatation. Pancreas: No pancreatic lesions. The pancreatic duct is not dilated. Adrenal glands: Normal in appearance. Kidneys: No evidence of hydronephrosis. No abnormal renal lesions. Subcentimeter nonobstructive renal calculi seen the right mid to lower pole measuring up to 4 mm. Spleen: No enhancing lesions. Bowel: No evidence of bowel obstruction. Anastomotic sutures identified in the ascending colon. The appendix is not directly visualized, no secondary signs of acute appendicitis. Peritoneum/Retroperitoneum: No abnormal pelvic lymphadenopathy. Pelvis:   Bladder is unremarkable. Uterus is normal for patient's age. Bones/Soft tissues:   Multilevel compression deformities T12, L1, L4 with bone cement. Cholelithiasis with pericholecystic fluid may indicate cholecystitis in the proper setting. Further evaluation with right upper quadrant ultrasound and/or HIDA scan may be obtained if clinically warranted. Nonobstructive right calculi. US GALLBLADDER RUQ    Result Date: 8/11/2021  EXAMINATION: RIGHT UPPER QUADRANT ULTRASOUND 8/11/2021 3:34 pm COMPARISON: None. HISTORY: ORDERING SYSTEM PROVIDED HISTORY: Concern for cholecystitis TECHNOLOGIST PROVIDED HISTORY: Reason for exam:->Concern for cholecystitis What reading provider will be dictating this exam?->CRC FINDINGS: LIVER:  The liver demonstrates increased echogenicity without evidence of intrahepatic biliary ductal dilatation. BILIARY SYSTEM:  Gallbladder demonstrates a small gallstone with questionable minimal pericholecystic fluid collection. The gallbladder wall measures up to 2.5 mm. Common bile duct is within normal limits measuring 2.9 mm. RIGHT KIDNEY: The right kidney demonstrates 9 and 8 mm nonobstructing stones. PANCREAS:  Visualized portions of the pancreas are unremarkable. OTHER: No evidence of right upper quadrant ascites. Fatty liver. Nonobstructing right renal stones.  Gallstone with minimal questionable pericholecystic fluid collection, possibly related to chronic type of inflammation.       ------------------------- NURSING NOTES AND VITALS REVIEWED ---------------------------  Date / Time Roomed:  8/11/2021 10:29 AM  ED Bed Assignment:  [id number]    The nursing notes within the ED encounter and vital signs as below have been reviewed. /61   Pulse 105   Temp 97.5 °F (36.4 °C) (Axillary)   Resp 20   LMP 03/20/2015   SpO2 95%   Oxygen Saturation Interpretation: Normal      ------------------------------------------ PROGRESS NOTES ------------------------------------------  Time: 1650  Re-evaluation. Patients symptoms show no change  Repeat physical examination is not changed      I have spoken with the patient and discussed todays availabe results to this point, in addition to providing specific details for the plan of care and counseling regarding the diagnosis and prognosis. Their questions are answered, however they are not agreeable to admission.     --------------------------------- ADDITIONAL PROVIDER NOTES ---------------------------------  This patient has chosen to leave against medical advice. I have personally explained to them that choosing to do so may result in permanent bodily harm or death. I discussed at length that without further evaluation and monitoring there may be unforeseen circumstances and deterioration resulting in permanent bodily harm or death as a result of their choice. They are alert, oriented, and competent at this time. They state that they are aware of the serious risks as explained, but they continue to wish to leave against medical   advice. In light of their decision to leave against medical advice, follow-up has been arranged and they are aware of the importance of following up as instructed. They have been advised that they should return to the ED immediately if they change their mind at any time, or if their condition begins to change or worsen. The follow up plan has been discussed in detail and they are aware of the specific conditions for emergent return, as well as the importance of follow-up.       Discharge Medication List as of 8/11/2021  4:55 PM      START taking these medications    Details   HYDROcodone-acetaminophen (NORCO) 5-325 MG per tablet Take 1 tablet by mouth every 8 hours as needed for Pain for up to 5 days. Intended supply: 5 days. Take lowest dose possible to manage pain, Disp-10 tablet, R-[id number]             Diagnosis:  1. Closed compression fracture of L5 lumbar vertebra, initial encounter ([address]    2. Gallstones        Disposition:  Patient's disposition: Left against medical advice. Patient's condition is stable.     The patient was seen and evaluated by myself and Em Frances MD PGY-2  8/11/2021 6:11 PM         Maritza Olivarez MD  Resident  08/11/21 [id number]

## 2021-08-12 NOTE — TELEPHONE ENCOUNTER
There are no neurosurgeons to come to [facility]. I can refer her to the spine center at [facility], and her procedure would likely occur there.   Let me know how she would like to proceed

## 2021-08-12 NOTE — TELEPHONE ENCOUNTER
Ivet To called stating the pt needs surgery for her compression fracture. She does not want surgery downtown. She would like to know what other options she has. Please advise.

## 2021-08-12 NOTE — TELEPHONE ENCOUNTER
Lora Landis notified and verbalized understanding. She would like to proceed with Spine Center at [facility].

## 2021-08-16 NOTE — TELEPHONE ENCOUNTER
Daughter called for refill    Referral faxed on Friday to [phone] [facility] - advised patient's daughter to call and see about scheduling appt

## 2021-08-18 NOTE — TELEPHONE ENCOUNTER
Pt daughter called to state her mother is being seen by Dr Tete Meyer at Spine center for an L-5 fracture on Tuesday 8/24/21 and was wondering how to handle pain management and asked for a call back

## 2021-08-18 NOTE — TELEPHONE ENCOUNTER
I had refilled the hydrocodone earlier. I suppose I can refill until she sees them. I would advise discussion with spine surgeon about need for long term Rx meds.

## 2021-08-19 NOTE — TELEPHONE ENCOUNTER
Rebecca Parker notified and verbalizes understanding. Morgan Walker said they are able to manage the pain with the [address] and would like enough sent in to get to her appointment.

## 2021-09-08 NOTE — PROGRESS NOTES
CC   Chief Complaint   Patient presents with    Ear Fullness    Back Pain     HPI:   Patient comes in today for the below issue(s). Ear pain  Reports a general feeling of ear fullness and discomfort bilaterally  No otorrhea  Has not tried to clean out her ears  Does have associated nasal congestion and postnasal drip but this does not appear out of proportion for her    Compression fracture of L5  Follow-up  Still has severe lower back pain  Saw orthopedic spine surgeon, was advised not a surgical candidate due to her comorbidities. No documentation is available to verify  Continues to have severe pain in the lower spine, does radiate down towards her extremities bilaterally  Has been on Percocet through spine surgeon but states it is not effective  Lyrica caused her to have hallucinations and she does not want to take that  Asking for other pain management options. Does not want to be hospitalized for pain control    She when she was in the ER for her lumbar fracture, she had CAT scan done that showed worsening gallstones but also a nonobstructing kidney stone    She is continuing to have abdominal pain, epigastric and right upper quadrant  Worse after eating  Nauseated, poor appetite  Weight has come down  Stools are unchanged    From a kidney stone perspective, she states she does not think she passed it  States her urine output has been less  No dysuria or hematuria  Denies specifically flank pain but has chronic back pain and says it is hard to differentiate anything      Review of Systems  Review of Systems   Constitutional: Positive for fatigue. Negative for chills, diaphoresis and fever. HENT: Negative for trouble swallowing. Respiratory: Positive for cough (Baseline), shortness of breath (Baseline) and wheezing (Baseline). Cardiovascular: Negative for chest pain and leg swelling. Gastrointestinal: Positive for abdominal pain, nausea and vomiting.  Negative for abdominal distention and blood in stool. Genitourinary: Positive for decreased urine volume. Negative for dysuria, hematuria and urgency. Musculoskeletal: Positive for arthralgias, back pain and gait problem. Neurological: Positive for numbness (Bilateral lower extremities). Negative for dizziness. Past Medical History:   Diagnosis Date    Anxiety     Carotid stenosis 12/2011    50-69% on left    Carpal tunnel syndrome 12/07/2011    bilateral    Cerebral artery occlusion with cerebral infarction [facility]) 2011? left sided weakness / usually in w/c    Chronic back pain     COPD (chronic obstructive pulmonary disease) ([facility] [address]     no pulmonologist / follows with PCP    CVA (cerebral infarction) 2011? right sided thalamic; seen by Dr Dali Colin Depression     Dermatophytosis 5/29/2021    Discoloration of skin of toe 6/3/2021    Ehler's-Danlos syndrome     GERD (gastroesophageal reflux disease)     History of ischemic heart disease 03/30/2017    no cardiology    Hyperlipidemia     Hypertension     Left carotid stenosis 5/29/2021    O2 dependent 5/29/2021    Obesity     PVD (peripheral vascular disease) with claudication ([facility] [address] 8/25/2017    Tobacco abuse     Tobacco abuse     Type II or unspecified type diabetes mellitus without mention of complication, not stated as uncontrolled     Vitamin D deficiency          PE:  VS:  /64   Pulse 104   Temp 98.1 °F (36.7 °C) (Temporal)   Resp 18   Ht 5' 5\" (1.651 m)   Wt 185 lb (83.9 kg) Comment: self-reported  LMP 03/20/2015   SpO2 96%   BMI 30.79 kg/m²   Physical Exam  Vitals reviewed. Constitutional:       Appearance: She is ill-appearing (Chronically, unchanged). HENT:      Head: Normocephalic and atraumatic. Right Ear: There is impacted cerumen. Left Ear: There is impacted cerumen. Nose: Congestion present. Mouth/Throat:      Mouth: Mucous membranes are moist.      Pharynx: Oropharynx is clear.    Cardiovascular:      Rate and Rhythm: Normal rate and regular rhythm. No extrasystoles are present. Heart sounds: No murmur heard. Pulmonary:      Effort: No accessory muscle usage. Breath sounds: Decreased air movement present. Decreased breath sounds present. No wheezing, rhonchi or rales. Abdominal:      General: Bowel sounds are normal. There is no distension. Tenderness: There is abdominal tenderness in the right upper quadrant and epigastric area. There is right CVA tenderness and guarding. There is no left CVA tenderness or rebound. Musculoskeletal:      Cervical back: Neck supple. No muscular tenderness. Lumbar back: Bony tenderness present. No deformity. Decreased range of motion. Right lower leg: No edema. Left lower leg: No edema. Comments: Decreased range of motion bilateral lower extremities due to pain in her   Lymphadenopathy:      Cervical: No cervical adenopathy. Skin:     General: Skin is warm and dry. Neurological:      Mental Status: She is alert. Mental status is at baseline. Psychiatric:         Attention and Perception: Attention normal.         Mood and Affect: Mood and affect normal.         Judgment: Judgment normal.           Assessment (including specific orders/meds/labs):      Leonarda Gallegos was seen today for ear fullness and back pain. Diagnoses and all orders for this visit:    Closed compression fracture of L5 lumbar vertebra, initial encounter ([facility] [address]  -     gabapentin (NEURONTIN) 300 MG capsule; Take 1 capsule by mouth 3 times daily for 90 days.  -     External Referral To Pain Clinic    Nephrolithiasis  -     tamsulosin (FLOMAX) 0.4 MG capsule; Take 1 capsule by mouth daily for 7 days    Calculus of gallbladder with acute cholecystitis without obstruction  -     ursodiol (ACTIGALL) 300 MG capsule;  Take 1 capsule by mouth 3 times daily (with meals)  -     Bernardino Cooper MD, General Surgery, [place]    Bilateral impacted cerumen        Plan:     Severe ongoing pain from compression fracture. She is already on Percocet. I do not feel comfortable with adjusting her medications upward any further. I will refer her to pain management for further assessment. In the interim we will add gabapentin. Discussed gradual tapering up of the dose. Can consider discontinuation abruptly if hallucinations occur on this medicine as well    Trial of Flomax for the nephrolithiasis. See if this helps with her perception of oliguria and pelvic/flank discomfort. If not helping, would reimage and/or send to urology    Discussed standard of care for her cholelithiasis is generally cholecystectomy. However given her comorbidities I think she may not be the most ideal surgical candidate. We will send her to general surgery but will start Actigall as above. Watch for progression of symptoms. If worsens, may require a cholecystectomy on an emergent basis. Impacted cerumen was disimpacted with irrigation by the medical assistant. She tolerated the procedure well      Counseled regarding above diagnosis, including possible risks and complications,  especially if left uncontrolled. Counseled regarding the possible sideeffects, risks, benefits and alternatives to treatment; patient and/or guardian verbalizes understanding, agrees, feels comfortable with and wishes to proceed with above treatment plan. Call or go to ED immediately if symptoms worsen or persist. Advised patient to call with any new medication issues, and, as applicable, read all Rx info from pharmacy to assure aware ofall possible risks and side effects of medication before taking. As applicable, educational materials and/or home exercises printed forpatient's review and were included in patient instructions on his/her After Visit Summary and given to patient at the end of visit. Patient and/or guardian given opportunity to ask questions/raise concerns.   She and her family member Omar Both verbalized comfort and understanding of instructions. Follow Up:     Return in about 1 month (around 10/8/2021), or if symptoms worsen or fail to improve, for check on recent med changes/start. or sooner if the above issues change unexpectedly or new issues develop     This document was prepared at least partially through the use ofQikice recognition software. Although effort is taken to assure the accuracy of this document, it is possible that grammatical, syntax,  or spelling errors may occur.       Electronically signed by Nena Friend MD [facility]

## 2021-09-27 NOTE — TELEPHONE ENCOUNTER
Spoke with daughter, Fermín Rosen. Blood in urine has been ongoing, has appointment with surgeon on Wednesday. She is noticing increased weakness and fatigue. They are using 2 people to assist to bathroom and daily living to prevent falls. She just wanted doctor to be aware. She is going to discuss this with Dr Fred Dangelo on Wednesday. I informed her if symptoms worsen she is to take to ED immediately. Fermínmike Rosen understands and agrees.

## 2021-09-27 NOTE — TELEPHONE ENCOUNTER
Received call from Job Cords  at [facility] with Red Flag Complaint. Brief description of triage: blood in urine, weakness, sleeping much more the last few days, normally can stand, now has weakness when she tries to stand. Has end stage COPD. Caller is not with the patient,  Calling PCP office, spoke with Shari who took over the call. Care advice provided, patient verbalizes understanding; denies any other questions or concerns; instructed to call back for any new or worsening symptoms. Attention Provider: Thank you for allowing me to participate in the care of your patient. The patient was connected to triage in response to information provided to the ECC/PSC. Please do not respond through this encounter as the response is not directed to a shared pool.           Reason for Disposition   [1] Caller is not with the adult (patient) AND [2] probable NON-URGENT symptoms    Protocols used: INFORMATION ONLY CALL - NO TRIAGE-[facility]

## 2021-09-28 NOTE — TELEPHONE ENCOUNTER
Spoke with Jabari Morris; Declined an appointment tomorrow afternoon, she wants to wait and talk to Dr Keiko Ferguson tomorrow. She said depending on what he says, she will call to schedule Thursday or Friday. However, she is extremely hesitant on scheduling with a resident.

## 2021-10-01 NOTE — PROGRESS NOTES
Consult Note    Dear Nadya Alves MD, thank you for referring Suleiman Ha for evaluation. Reason for Consult: Gallstones    HISTORY OF PRESENT ILLNESS:    The patient is a 64 y.o. female who presents with complaints of nausea. She reports her nausea is frequent, however she is able to tolerate different types of foods such as eating heavy meals or [employer] as well as ham and cheese. She has history of recent back fractures. She is not presently taking any medication for her stomach. She was found to have a small gallstone. Gallbladder wall thickness was 2.5 mm. Past Medical History:   Diagnosis Date    Anxiety     Carotid stenosis 12/2011    50-69% on left    Carpal tunnel syndrome 12/07/2011    bilateral    Cerebral artery occlusion with cerebral infarction [facility]) 2011? left sided weakness / usually in w/c    Chronic back pain     COPD (chronic obstructive pulmonary disease) ([facility] [address]     no pulmonologist / follows with PCP    CVA (cerebral infarction) 2011?     right sided thalamic; seen by Dr Willett Magnolia    Depression     Dermatophytosis 5/29/2021    Discoloration of skin of toe 6/3/2021    Ehler's-Danlos syndrome     GERD (gastroesophageal reflux disease)     History of ischemic heart disease 03/30/2017    no cardiology    Hyperlipidemia     Hypertension     Left carotid stenosis 5/29/2021    O2 dependent 5/29/2021    Obesity     PVD (peripheral vascular disease) with claudication ([facility] [address] 8/25/2017    Tobacco abuse     Tobacco abuse     Type II or unspecified type diabetes mellitus without mention of complication, not stated as uncontrolled     Vitamin D deficiency        Past Surgical History:   Procedure Laterality Date    ABDOMINAL EXPLORATION SURGERY  03/29/2017    with bowel resection, incarcerated ventral hernia repair    CARDIAC CATHETERIZATION  1/7/2015    Dr. Sherrie Pascal  EF=55%   FFR=0.84  distal RCA bifurcation    CYST INCISION AND DRAINAGE Right 07/22/2016 Right gluteal mass incision and drainage    CYST REMOVAL  1/28/2011    excision of left arm inclusion cyst    ECHO COMPL W DOP COLOR FLOW  5/15/2013         ECHOCARDIOGRAM COMPLETE 2D W DOPPLER W COLOR  1/3/2012         ECHOCARDIOGRAM COMPLETE 2D W DOPPLER W COLOR  6/14/2012         EYE SURGERY Bilateral 2014    cataract w lens implants    HERNIA REPAIR  03/2017    abdominal    OTHER SURGICAL HISTORY  07/26/2016    re exploration left lateral wound / heel    CT DRAIN SKIN ABSCESS COMPLIC N/A 5/5/6539    EXAM UNDER ANESTHESIA  I & D BUTTOCKS performed by Pedro Chandler DO at [address]       Prior to Admission medications    Medication Sig Start Date End Date Taking? Authorizing Provider   omeprazole (PRILOSEC) 40 MG delayed release capsule Take 1 capsule by mouth every morning (before breakfast) 9/29/21  Yes Pedro Chandler DO   fenofibrate (TRICOR) 54 MG tablet take 1 tablet by mouth once daily 9/20/21   Dandre Vallecillo MD   tamsulosin [facility]) 0.4 MG capsule Take 1 capsule by mouth daily for 7 days 9/10/21 9/17/21  Dandre Vallecillo MD   oxyCODONE-acetaminophen (PERCOCET) 5-325 MG per tablet  9/7/21   Historical Provider, MD   gabapentin (NEURONTIN) 300 MG capsule Take 1 capsule by mouth 3 times daily for 90 days.  9/8/21 12/7/21  Dandre Vallecillo MD   ursodiol (ACTIGALL) 300 MG capsule Take 1 capsule by mouth 3 times daily (with meals) 9/8/21 9/8/22  Dandre Vallecillo MD   tiZANidine (ZANAFLEX) 2 MG tablet Take 1 tablet by mouth every 8 hours as needed (muscle spams) 9/7/21   Dandre Vallecillo MD   nortriptyline [facility]) 25 MG capsule Take 1 capsule by mouth nightly 9/3/21   Dandre Vallecillo MD   rosuvastatin (CRESTOR) 20 MG tablet take 1 tablet by mouth nightly 8/17/21   Dandre Vallecillo MD   Continuous Blood Gluc  (FREESTYLE SHREYA 2 READER) HAI 1 each by Does not apply route 4 times daily 8/6/21   Marlen Alcantara MD   diclofenac sodium (VOLTAREN) 1 % GEL Apply 2 g topically 3 times daily as needed for Pain 8/6/21   Daniel Jones MD   budesonide (PULMICORT) 0.25 MG/2ML nebulizer suspension Take 2 mLs by nebulization 2 times daily 8/2/21   Lenin Velázquez MD   insulin lispro, 1 Unit Dial, 100 UNIT/ML SOPN inject 10 units subcutaneously three times a day with meals 7/27/21   Lenin Velázquez MD   rosuvastatin (CRESTOR) 20 MG tablet take 1 tablet by mouth NIGHTLY 7/19/21   MD FELIPA Meyer-D ULTRAFINE III SHORT PEN 31G X 8 MM MISC use as directed with LANTUS 7/6/21   Lenin Velázquez MD   dilTIAZem (CARDIZEM CD) 240 MG extended release capsule Take 1 capsule by mouth daily 6/14/21   Kayy Goncalves MD   cilostazol (PLETAL) 100 MG tablet Take 1 tablet by mouth 2 times daily 6/3/21 7/3/21  Oc Celeste MD   insulin glargine (BASAGLAR KWIKPEN) 100 UNIT/ML injection pen Inject 75 Units into the skin 2 times daily 5/29/21   Kashif Ross MD   butalbital-acetaminophen-caffeine (FIORICET, ESGIC) -40 MG per tablet Take 1 tablet by mouth every 4 hours as needed for Headaches 5/18/21   Lenin Velázquez MD   traZODone (DESYREL) 150 MG tablet Take 0.5 tablets by mouth nightly for 4 days TAKE 1 TABLET BY MOUTH EVERY NIGHT AT BEDTIME 5/18/21 6/7/21  Lenin Velázquez MD   ipratropium-albuterol (DUONEB) 0.5-2.5 (3) MG/3ML SOLN nebulizer solution USE 3 ML VIA NEBULIZER EVERY 4 HOURS AS NEEDED FOR SHORTNESS OF BREATH 5/3/21   Lenin Velázquez MD   apixaban (ELIQUIS) 5 MG TABS tablet Take 1 tablet by mouth 2 times daily Start after done with started pack 4/20/21   Lenin Velázquez MD   vitamin B-12 (CYANOCOBALAMIN) 1000 MCG tablet Take 1 tablet by mouth daily 4/20/21   Lenin Velázquez MD   ketoconazole (NIZORAL) 2 % cream Apply topically daily.  4/20/21   Lenin Velázquez MD   vitamin D (ERGOCALCIFEROL) 1.25 MG (64061 UT) CAPS capsule TAKE 1 CAPSULE PO q weekly 3/9/21   Lenin Velázquez MD   albuterol (PROVENTIL) (2.5 MG/3ML) 0.083% nebulizer solution USE 1 VIAL VIA NEBULIZER EVERY 4 HOURS AS NEEDED FOR WHEEZING OR SHORTNESS OF BREATH 5/7/20   Erin Sr MD   metoprolol tartrate (LOPRESSOR) 25 MG tablet TAKE 1/2 TABLET BY MOUTH TWICE DAILY 9/9/19 4/18/21  Erin Sr MD   OXYGEN Inhale 5 L into the lungs continuous    Historical Provider, MD       Scheduled Meds:  ContinuousInfusions:  PRN Meds:    No Known Allergies    Social History     Socioeconomic History    Marital status: Single     Spouse name: Not on file    Number of children: Not on file    Years of education: Not on file    Highest education level: Not on file   Occupational History     Comment: [employer] work/ [employer]   Tobacco Use    Smoking status: Current Every Day Smoker     Packs/day: 1.00     Years: 34.00     Pack years: 34.00     Types: Cigarettes    Smokeless tobacco: Never Used   Vaping Use    Vaping Use: Never used   Substance and Sexual Activity    Alcohol use: No     Alcohol/week: 0.0 standard drinks    Drug use: No    Sexual activity: Not Currently   Other Topics Concern    Not on file   Social History Narrative    Not on file     Social Determinants of Health     Financial Resource Strain: Low Risk     Difficulty of Paying Living Expenses: Not hard at all   Food Insecurity: No Food Insecurity    Worried About Running Out of Food in the Last Year: Never true    Clarke of Food in the Last Year: Never true   Transportation Needs:     Lack of Transportation (Medical):      Lack of Transportation (Non-Medical):    Physical Activity:     Days of Exercise per Week:     Minutes of Exercise per Session:    Stress:     Feeling of Stress :    Social Connections:     Frequency of Communication with Friends and Family:     Frequency of Social Gatherings with Friends and Family:     Attends Zoroastrianism Services:     Active Member of Clubs or Organizations:     Attends Club or Organization Meetings:     Marital Status:    Intimate Partner Violence:     Fear of Current or Ex-Partner:     Emotionally Abused:     Physically Abused:     Sexually Abused:        Family History   Problem Relation Age of Onset    High Blood Pressure Mother     COPD Mother     COPD Father     High Blood Pressure Father     Other Father     Heart Disease Brother     High Blood Pressure Brother     Other Brother     Cancer Maternal Grandmother     Heart Attack Maternal Grandfather     Other Paternal Grandmother        Review Of Systems:   Review of Systems   Constitutional: Negative for chills and fever. HENT: Negative for ear pain, nosebleeds, sore throat and tinnitus. Eyes: Negative for photophobia and redness. Respiratory: Negative for cough, shortness of breath and wheezing. Cardiovascular: Negative for chest pain and palpitations. Gastrointestinal: Positive for abdominal pain. Negative for blood in stool, constipation, diarrhea, nausea and vomiting. Endocrine: Negative for polydipsia. Genitourinary: Negative for dysuria, hematuria and urgency. Musculoskeletal: Positive for back pain. Negative for neck pain. Skin: Negative for rash. Neurological: Negative for dizziness, tremors and seizures. Hematological: Does not bruise/bleed easily. All other systems reviewed and are negative.        Physical Exam:  Vitals:    09/29/21 1335   BP: 108/71   Site: Right Upper Arm   Pulse: 56   Temp: 98.1 °F (36.7 °C)   TempSrc: Temporal   Weight: 185 lb (83.9 kg)   Height: 5' 5\" (1.651 m)       General: Well-nourished, in wheelchair, oxygen dependent, short of breath  Eyes:  PERRL   Conjunctiva unremarkable   ENT:  TM's intact bilaterally, no effusion   Nasal:  No mucosal edema     No nasal drainage   Oral:  mucosa moist and pink   Neck:  Supple   No palpable cervical lymphoadenopathy   Thyroid without mass or enlargement  Resp: Lungs CTAB   Equal and adequate air exchange without accessory muscle use   No rales, rhonchi or wheeze  CV: S1S2 RRR   No murmur   Intact distal pulses   No edema  GI: Abdomen Soft, non tender, non distended   Normoactive bowel sounds   No palpable hepatosplenomegaly  MS: Physiologic ROM of all extremities    Intact distal pulses   No clubbing or cyanosis   Skin Warm and dry; no rash or lesion  Neuro: Alert and oriented; normal and intact dtr's   Psych: Euthymic mood, congruent affect      No results found. Assessment/Plan:  1. It is actually a female with complaint of upper abdominal pain, small gallstones on ultrasound, recent back fractures. I do not think her symptoms are attributed to gallstone. Also, she is a very poor surgical risk. She is able to tolerate fatty greasy foods without symptoms. I will give her a trial of omeprazole. Recheck her in 3 to 4 weeks.         Electronically signed by Cari Ramirez DO on 10/1/21 at 2:02 PM EDT

## 2021-10-14 NOTE — PROGRESS NOTES
Chief Complaint   Patient presents with    Atrial Fibrillation    Coronary Artery Disease       Patient Active Problem List    Diagnosis Date Noted    Diastasis recti 09/25/2013     Priority: Low    Hyperlipidemia      Priority: Low    Depression      Priority: Low    Ama-Danlos syndrome      Priority: Low    MARIAA (obstructive sleep apnea)      Priority: Low    Tobacco abuse      Priority: Low    COPD (chronic obstructive pulmonary disease) ([facility] 05/29/2021    Dermatophytosis 05/29/2021    Left carotid stenosis 05/29/2021    O2 dependent 05/29/2021    Uncontrolled type 2 diabetes mellitus with hyperglycemia ([facility]     DNR (do not resuscitate) 05/18/2021    PAF (paroxysmal atrial fibrillation) ([facility] 04/26/2021     Overview Note:     A. CHADSVASC = 6      Coronary artery disease involving native coronary artery of native heart without angina pectoris 04/26/2021     Overview Note:     A. CATH 2015: mild CAD, no obstructive lesions, normal EF      Diabetic polyneuropathy associated with type 2 diabetes mellitus ([facility] 06/12/2020    Compression fracture of L1 lumbar vertebra ([facility] 08/27/2017    H/O: stroke 08/27/2017    PVD (peripheral vascular disease) [facility] claudication ([facility] 06/19/2014    Vitamin D deficiency     Carpal tunnel syndrome 12/07/2011       Current Outpatient Medications   Medication Sig Dispense Refill    diazePAM (VALIUM) 5 MG tablet take 1 tablet by mouth every 8 hours if needed for SPASM(S)      omeprazole (PRILOSEC) 40 MG delayed release capsule Take 1 capsule by mouth every morning (before breakfast) 30 capsule 5    fenofibrate (TRICOR) 54 MG tablet take 1 tablet by mouth once daily 30 tablet 5    oxyCODONE-acetaminophen (PERCOCET) 5-325 MG per tablet       gabapentin (NEURONTIN) 300 MG capsule Take 1 capsule by mouth 3 times daily for 90 days.  90 capsule 2    ursodiol (ACTIGALL) 300 MG capsule Take 1 capsule by mouth 3 times daily (with meals) 90 capsule 2    tiZANidine (ZANAFLEX) 2 MG tablet Take 1 tablet by mouth every 8 hours as needed (muscle spams) (Patient taking differently: Take 2 mg by mouth daily ) 60 tablet 2    nortriptyline (PAMELOR) 25 MG capsule Take 1 capsule by mouth nightly 30 capsule 3    rosuvastatin (CRESTOR) 20 MG tablet take 1 tablet by mouth nightly 30 tablet 5    Continuous Blood Gluc  (FREESTYLE SHREYA 2 READER) HAI 1 each by Does not apply route 4 times daily 1 each 0    budesonide (PULMICORT) 0.25 MG/2ML nebulizer suspension Take 2 mLs by nebulization 2 times daily 60 ampule 3    insulin lispro, 1 Unit Dial, 100 UNIT/ML SOPN inject 10 units subcutaneously three times a day with meals 9 mL 1    B-D ULTRAFINE III SHORT PEN 31G X 8 MM MISC use as directed with LANTUS 100 each 5    dilTIAZem (CARDIZEM CD) 240 MG extended release capsule Take 1 capsule by mouth daily 30 capsule 5    insulin glargine (BASAGLAR KWIKPEN) 100 UNIT/ML injection pen Inject 75 Units into the skin 2 times daily 5 pen 5    butalbital-acetaminophen-caffeine (FIORICET, ESGIC) -40 MG per tablet Take 1 tablet by mouth every 4 hours as needed for Headaches 180 tablet 3    traZODone (DESYREL) 150 MG tablet Take 0.5 tablets by mouth nightly for 4 days TAKE 1 TABLET BY MOUTH EVERY NIGHT AT BEDTIME 4 tablet 0    ipratropium-albuterol (DUONEB) 0.5-2.5 (3) MG/3ML SOLN nebulizer solution USE 3 ML VIA NEBULIZER EVERY 4 HOURS AS NEEDED FOR SHORTNESS OF BREATH 540 mL 5    apixaban (ELIQUIS) 5 MG TABS tablet Take 1 tablet by mouth 2 times daily Start after done with started pack 60 tablet 5    vitamin B-12 (CYANOCOBALAMIN) 1000 MCG tablet Take 1 tablet by mouth daily 30 tablet 11    vitamin D (ERGOCALCIFEROL) 1.25 MG (84555 UT) CAPS capsule TAKE 1 CAPSULE PO q weekly 12 capsule 5    albuterol (PROVENTIL) (2.5 MG/3ML) 0.083% nebulizer solution USE 1 VIAL VIA NEBULIZER EVERY 4 HOURS AS NEEDED FOR WHEEZING OR SHORTNESS OF BREATH 540 mL 3    OXYGEN Inhale 5 L into the lungs continuous      tamsulosin (FLOMAX) 0.4 MG capsule Take 1 capsule by mouth daily for 7 days 7 capsule 0     No current facility-administered medications for this visit. No Known Allergies    Vitals:    10/14/21 1134   BP: 109/63   Resp: 20   Weight: 185 lb (83.9 kg)   Height: 5' 5\" (1.651 m)       Social History     Socioeconomic History    Marital status: Single     Spouse name: Not on file    Number of children: Not on file    Years of education: Not on file    Highest education level: Not on file   Occupational History     Comment: [employer]/ [employer]   Tobacco Use    Smoking status: Current Every Day Smoker     Packs/day: 1.00     Years: 34.00     Pack years: 34.00     Types: Cigarettes    Smokeless tobacco: Never Used   Vaping Use    Vaping Use: Never used   Substance and Sexual Activity    Alcohol use: No     Alcohol/week: 0.0 standard drinks    Drug use: No    Sexual activity: Not Currently   Other Topics Concern    Not on file   Social History Narrative    Not on file     Social Determinants of Health     Financial Resource Strain: Low Risk     Difficulty of Paying Living Expenses: Not hard at all   Food Insecurity: No Food Insecurity    Worried About Running Out of Food in the Last Year: Never true    Clarke of Food in the Last Year: Never true   Transportation Needs:     Lack of Transportation (Medical):      Lack of Transportation (Non-Medical):    Physical Activity:     Days of Exercise per Week:     Minutes of Exercise per Session:    Stress:     Feeling of Stress :    Social Connections:     Frequency of Communication with Friends and Family:     Frequency of Social Gatherings with Friends and Family:     Attends [employer] Services:     Active Member of Clubs or Organizations:     Attends Club or Organization Meetings:     Marital Status:    Intimate Partner Violence:     Fear of Current or Ex-Partner:     Emotionally Abused:     Physically Abused:     Sexually Abused:        Family History   Problem Relation Age of Onset    High Blood Pressure Mother     COPD Mother     COPD Father     High Blood Pressure Father     Other Father     Heart Disease Brother     High Blood Pressure Brother     Other Brother     Cancer Maternal Grandmother     Heart Attack Maternal Grandfather     Other Paternal Grandmother          SUBJECTIVE: Mikel Cho presents to the office today for followup . Patient care complicated by End stage lung disease. Since our last visit not having paroxysmal tachycardia  Compliant with NOAC. Hx of cath in 2015 demonstrating non obstructive CAD and normal LV function, as well as unremarkable echo in 2015, despite Ama-Danlos diagnosis         Review of Systems:   Heart: as above   Lungs: as above   Eyes: denies changes in vision or discharge. Ears: denies changes in hearing or pain. Nose: denies epistaxis or masses   Throat: denies sore throat or trouble swallowing. Neuro: denies numbness, tingling, tremors. Skin: denies rashes or itching. : denies hematuria, dysuria   GI: denies vomiting, diarrhea   Psych: denies mood changed, anxiety, depression. all others negative. Physical Exam   /63   Resp 20   Ht 5' 5\" (1.651 m)   Wt 185 lb (83.9 kg)   LMP 03/20/2015   BMI 30.79 kg/m²   Constitutional: Oriented to person, place, and time. Sickly appearing, with Nasal cannula No distress. Head: Normocephalic and atraumatic. Eyes: EOM are normal. Pupils are equal, round, and reactive to light. Neck: Normal range of motion. Neck supple. No hepatojugular reflux and no JVD present. Carotid bruit is not present. No tracheal deviation present. No thyromegaly present. Cardiovascular: rapid rate, regular rhythm, normal heart sounds and intact distal pulses. Exam reveals no gallop and no friction rub. No murmur heard. Pulmonary/Chest: Effort normal and breath sounds normal. No respiratory distress. No wheezes.

## 2021-10-17 PROBLEM — J96.02 ACUTE RESPIRATORY FAILURE WITH HYPERCAPNIA (HCC): Status: ACTIVE | Noted: 2021-01-01

## 2021-10-17 PROBLEM — R40.0 SOMNOLENCE: Status: ACTIVE | Noted: 2021-01-01

## 2021-10-17 NOTE — ED NOTES
Respiratory therapist states they did not receive ABGs. New ABGs drawn at this time and sent.      [facility] Moshe Brewer RN  10/17/21 1937

## 2021-10-17 NOTE — ED PROVIDER NOTES
Department of Emergency Medicine   ED  Provider Note  Admit Date/RoomTime: 10/17/2021  4:29 PM  ED Room: [address] [place] [address]A          History of Present Illness:  10/17/21, Time: 4:42 PM EDT  Chief Complaint   Patient presents with    Altered Mental Status          Edenilson Goodrich is a 64 y.o. female presenting to the ED for altered mental status, beginning today. The complaint has been persistent, moderate in severity, and worsened by nothing. The patient is a 59-year-old female with history of atrial fibrillation anticoagulated on Eliquis, CAD, COPD chronically on 5 L nasal cannula. Who presents to the emergency department for altered mental status. Patient symptoms were sudden onset earlier today, has been persistent, moderate in severity, nothing makes it better or worse. She had tingling for symptoms prior to arrival.  History is limited due to the patient's mentation. Apparently patient was just more confused today. Daughter later arrived to help provide further history. She states that the patient did fall and hit her head several days ago. She has been increasingly more lethargic as well. Daughter reports she has been requiring more nasal cannula oxygen at home.     Review of Systems:   Limited due to altered mental status.        --------------------------------------------- PAST HISTORY ---------------------------------------------  Past Medical History:  has a past medical history of Anxiety, Carotid stenosis, Carpal tunnel syndrome, Cerebral artery occlusion with cerebral infarction ([address] Chronic back pain, COPD (chronic obstructive pulmonary disease) ([address] CVA (cerebral infarction), Depression, Dermatophytosis, Discoloration of skin of toe, Ehler's-Danlos syndrome, GERD (gastroesophageal reflux disease), History of ischemic heart disease, Hyperlipidemia, Hypertension, Left carotid stenosis, O2 dependent, Obesity, PVD (peripheral vascular disease) with claudication ([address] Tobacco abuse, Tobacco abuse, Type II or unspecified type diabetes mellitus without mention of complication, not stated as uncontrolled, and Vitamin D deficiency. Past Surgical History:  has a past surgical history that includes Tubal ligation (1992); cyst removal (1/28/2011); ECHO Complete 2D W Doppler W Color (1/3/2012); ECHO Complete 2D W Doppler W Color (6/14/2012); ECHO Compl W Dop Color Flow (5/15/2013); Cardiac catheterization (1/7/2015); cyst incision and drainage (Right, 07/22/2016); other surgical history (07/26/2016); Abdominal exploration surgery (03/29/2017); pr drain skin abscess complic (N/A, 3/8/2918); hernia repair (03/2017); and eye surgery (Bilateral, 2014). Social History:  reports that she has been smoking cigarettes. She has a 34.00 pack-year smoking history. She has never used smokeless tobacco. She reports that she does not drink alcohol and does not use drugs. Family History: family history includes COPD in her father and mother; Cancer in her maternal grandmother; Heart Attack in her maternal grandfather; Heart Disease in her brother; High Blood Pressure in her brother, father, and mother; Other in her brother, father, and paternal grandmother. . Unless otherwise noted, family history is non contributory    The patients home medications have been reviewed. Allergies: Patient has no known allergies.     I have reviewed the past medical history, past surgical history, social history, and family history    ---------------------------------------------------PHYSICAL EXAM--------------------------------------    Constitutional/General: Alert and disoriented, disheveled, chronically ill-appearing, no acute distress  Head: Normocephalic and atraumatic  Eyes:  EOMI, sclera non icteric  ENT: Oropharynx clear, handling secretions, no trismus, no asymmetry of the posterior oropharynx or uvular edema  Neck: Supple, full ROM, no stridor, no meningeal signs  Respiratory: Lungs clear to auscultation bilaterally, no wheezes, rales, or rhonchi. Not in respiratory distress  Cardiovascular:  Regular rate. Regular rhythm. No murmurs, no gallops, no rubs. 2+ distal pulses. Equal extremity pulses. Gastrointestinal:  Abdomen Soft, Non tender, Non distended. No rebound, guarding, or rigidity. No pulsatile masses. Musculoskeletal: Moves all extremities x 4. Warm and well perfused, no clubbing, no cyanosis, no edema. Capillary refill <3 seconds  Skin: skin warm and dry. No rashes. Neurologic: GCS 14 confusion, no focal deficits, symmetric strength 5/5 in the upper and lower extremities bilaterally, spontaneously moving the bilateral upper and lower extremities,  Psychiatric: Normal Affect          -------------------------------------------------- RESULTS -------------------------------------------------  I have personally reviewed all laboratory and imaging results for this patient. Results are listed below. LABS: (Lab results interpreted by me)  Results for orders placed or performed during the hospital encounter of 10/17/21   Culture, Blood 1    Specimen: Blood   Result Value Ref Range    Blood Culture, Routine 5 Days no growth    Culture, Blood 2    Specimen: Blood   Result Value Ref Range    Organism Staphylococcus coagulase-negative (A)     Culture, Blood 2       This organism was isolated in one set. Susceptibility testing is not routinely done as this  organism frequently represents skin contamination. Additional testing can be ordered by calling the  Microbiology Department.      Culture, Urine    Specimen: Urine, clean catch   Result Value Ref Range    Urine Culture, Routine Growth not present    COVID-19, Rapid    Specimen: Nasopharyngeal Swab   Result Value Ref Range    SARS-CoV-2, NAAT Not Detected Not Detected   Respiratory Panel, Molecular, with COVID-19 (Restricted: peds pts or suitable admitted adults)    Specimen: Nasopharyngeal; Nasal   Result Value Ref Range    Adenovirus by PCR Not Detected Not Detected    Bordetella parapertussis by PCR Not Detected Not Detected    Bordetella pertussis by PCR Not Detected Not Detected    Chlamydophilia pneumoniae by PCR Not Detected Not Detected    Coronavirus 229E by PCR Not Detected Not Detected    Coronavirus HKU1 by PCR Not Detected Not Detected    Coronavirus NL63 by PCR Not Detected Not Detected    Coronavirus OC43 by PCR Not Detected Not Detected    SARS-CoV-2, PCR Not Detected Not Detected    Human Metapneumovirus by PCR Not Detected Not Detected    Human Rhinovirus/Enterovirus by PCR Not Detected Not Detected    Influenza A by PCR Not Detected Not Detected    Influenza B by PCR Not Detected Not Detected    Mycoplasma pneumoniae by PCR Not Detected Not Detected    Parainfluenza Virus 1 by PCR Not Detected Not Detected    Parainfluenza Virus 2 by PCR Not Detected Not Detected    Parainfluenza Virus 3 by PCR Not Detected Not Detected    Parainfluenza Virus 4 by PCR Not Detected Not Detected    Respiratory Syncytial Virus by PCR Not Detected Not Detected   Culture, Blood 1    Specimen: Blood   Result Value Ref Range    Blood Culture, Routine 24 Hours no growth    Culture, Blood 2    Specimen: Blood   Result Value Ref Range    Culture, Blood 2 24 Hours no growth    CBC auto differential   Result Value Ref Range    WBC 9.7 4.5 - 11.5 E9/L    RBC 4.40 3.50 - 5.50 E12/L    Hemoglobin 12.7 11.5 - 15.5 g/dL    Hematocrit 42.4 34.0 - 48.0 %    MCV 96.4 80.0 - 99.9 fL    MCH 28.9 26.0 - 35.0 pg    MCHC 30.0 (L) 32.0 - 34.5 %    RDW 13.2 11.5 - 15.0 fL    Platelets 099 036 - 077 E9/L    MPV 9.6 7.0 - 12.0 fL    Neutrophils % 66.6 43.0 - 80.0 %    Immature Granulocytes % 0.3 0.0 - 5.0 %    Lymphocytes % 25.7 20.0 - 42.0 %    Monocytes % 5.5 2.0 - 12.0 %    Eosinophils % 1.3 0.0 - 6.0 %    Basophils % 0.6 0.0 - 2.0 %    Neutrophils Absolute 6.45 1.80 - 7.30 E9/L    Immature Granulocytes # 0.03 E9/L    Lymphocytes Absolute 2.49 1.50 - 4.00 E9/L    Monocytes Absolute 0.53 0.10 - 0.95 E9/L    Eosinophils Absolute 0.13 0.05 - 0.50 E9/L    Basophils Absolute 0.06 0.00 - 0.20 E9/L   Comprehensive Metabolic Panel w/ Reflex to MG   Result Value Ref Range    Sodium 142 132 - 146 mmol/L    Potassium reflex Magnesium 4.0 3.5 - 5.0 mmol/L    Chloride 93 (L) 98 - 107 mmol/L    CO2 42 (HH) 22 - 29 mmol/L    Anion Gap 7 7 - 16 mmol/L    Glucose 153 (H) 74 - 99 mg/dL    BUN 8 6 - 20 mg/dL    CREATININE 0.3 (L) 0.5 - 1.0 mg/dL    GFR Non-African American >60 >=60 mL/min/1.73    GFR African American >60     Calcium 8.9 8.6 - 10.2 mg/dL    Total Protein 6.4 6.4 - 8.3 g/dL    Albumin 3.1 (L) 3.5 - 5.2 g/dL    Total Bilirubin 0.3 0.0 - 1.2 mg/dL    Alkaline Phosphatase 103 35 - 104 U/L    ALT 10 0 - 32 U/L    AST 19 0 - 31 U/L   Troponin   Result Value Ref Range    Troponin, High Sensitivity 24 (H) 0 - 9 ng/L   Lactate, Sepsis   Result Value Ref Range    Lactic Acid, Sepsis 1.2 0.5 - 1.9 mmol/L   CK   Result Value Ref Range    Total  (H) 20 - 180 U/L   Urinalysis with Microscopic   Result Value Ref Range    Color, UA Yellow Straw/Yellow    Clarity, UA Clear Clear    Glucose, Ur Negative Negative mg/dL    Bilirubin Urine MODERATE (A) Negative    Ketones, Urine 15 (A) Negative mg/dL    Specific Gravity, UA 1.025 1.005 - 1.030    Blood, Urine LARGE (A) Negative    pH, UA 6.0 5.0 - 9.0    Protein, UA Negative Negative mg/dL    Urobilinogen, Urine 0.2 <2.0 E.U./dL    Nitrite, Urine Negative Negative    Leukocyte Esterase, Urine Negative Negative    Mucus, UA Present (A) None Seen /LPF    WBC, UA 1-3 0 - 5 /HPF    RBC, UA 10-20 (A) 0 - 2 /HPF    Bacteria, UA NONE SEEN None Seen /HPF   Troponin   Result Value Ref Range    Troponin, High Sensitivity 20 (H) 0 - 9 ng/L   Troponin   Result Value Ref Range    Troponin, High Sensitivity 19 (H) 0 - 9 ng/L   Blood Gas, Arterial   Result Value Ref Range    Date Analyzed [id number]     Time Analyzed 1947     Source: Blood Arterial     pH, Blood Gas 7.257 (L) 7.350 - 7.450    PCO2 115.2 (HH) 35.0 - 45.0 mmHg    PO2 152.3 (H) 75.0 - 100.0 mmHg    HCO3 50.2 (H) 22.0 - 26.0 mmol/L    B.E. 17.9 (H) -3.0 - 3.0 mmol/L    O2 Sat 99.3 (H) 92.0 - 98.5 %    O2Hb 97.3 (H) 94.0 - 97.0 %    COHb 1.7 (H) 0.0 - 1.5 %    MetHb 0.3 0.0 - 1.5 %    O2 Content 17.4 mL/dL    HHb 0.7 0.0 - 5.0 %    tHb (est) 12.5 11.5 - 16.5 g/dL    Mode NRB 15L     Date Of Collection      Time Collected      Pt Temp 37.0 C     ID 0516     Lab [id number]     Critical(s) Notified Handed report to Dr/RN    Blood Gas, Arterial   Result Value Ref Range    Date Analyzed [id number]     Time Analyzed 2219     Source: Blood Arterial     pH, Blood Gas 7.299 (L) 7.350 - 7.450    PCO2 87.8 (HH) 35.0 - 45.0 mmHg    PO2 111.7 (H) 75.0 - 100.0 mmHg    HCO3 42.1 (H) 22.0 - 26.0 mmol/L    B.E. 12.3 (H) -3.0 - 3.0 mmol/L    O2 Sat 98.5 92.0 - 98.5 %    PO2/FIO2 1.60 mmHg/%    AaDO2 275.5 mmHg    RI(T) 247 %    O2Hb 97.3 (H) 94.0 - 97.0 %    COHb 0.9 0.0 - 1.5 %    MetHb 0.3 0.0 - 1.5 %    O2 Content 16.6 mL/dL    HHb 1.5 0.0 - 5.0 %    tHb (est) 12.0 11.5 - 16.5 g/dL    Mode AVAPS     FIO2 70.0 %    Rr Mechanical 20.0 b/min    Vt Mechanical 450.0 mL    Peep/Cpap 8.0 cmH2O    Date Of Collection      Time Collected      Pt Temp 37.0 C     ID [id number]     Lab [id number]     Critical(s) Notified Handed report to Dr/RN    Procalcitonin   Result Value Ref Range    Procalcitonin 0.04 0.00 - 0.08 ng/mL   Comprehensive Metabolic Panel w/ Reflex to MG   Result Value Ref Range    Sodium 142 132 - 146 mmol/L    Potassium reflex Magnesium 4.1 3.5 - 5.0 mmol/L    Chloride 96 (L) 98 - 107 mmol/L    CO2 38 (H) 22 - 29 mmol/L    Anion Gap 8 7 - 16 mmol/L    Glucose 202 (H) 74 - 99 mg/dL    BUN 7 6 - 20 mg/dL    CREATININE 0.2 (L) 0.5 - 1.0 mg/dL    GFR Non-African American >60 >=60 mL/min/1.73    GFR African American >60     Calcium 8.1 (L) 8.6 - 10.2 mg/dL    Total Protein 5.1 (L) 6.4 - 8.3 g/dL    Albumin 2.7 (L) 3.5 - 5.2 g/dL    Total Bilirubin 0.3 0.0 - 1.2 mg/dL    Alkaline Phosphatase 85 35 - 104 U/L    ALT 8 0 - 32 U/L    AST 18 0 - 31 U/L   CBC auto differential   Result Value Ref Range    WBC 9.5 4.5 - 11.5 E9/L    RBC 3.53 3.50 - 5.50 E12/L    Hemoglobin 10.3 (L) 11.5 - 15.5 g/dL    Hematocrit 34.6 34.0 - 48.0 %    MCV 98.0 80.0 - 99.9 fL    MCH 29.2 26.0 - 35.0 pg    MCHC 29.8 (L) 32.0 - 34.5 %    RDW 13.3 11.5 - 15.0 fL    Platelets 504 933 - 346 E9/L    MPV 9.9 7.0 - 12.0 fL    Neutrophils % 77.6 43.0 - 80.0 %    Immature Granulocytes % 0.3 0.0 - 5.0 %    Lymphocytes % 14.9 (L) 20.0 - 42.0 %    Monocytes % 5.6 2.0 - 12.0 %    Eosinophils % 1.1 0.0 - 6.0 %    Basophils % 0.5 0.0 - 2.0 %    Neutrophils Absolute 7.37 (H) 1.80 - 7.30 E9/L    Immature Granulocytes # 0.03 E9/L    Lymphocytes Absolute 1.41 (L) 1.50 - 4.00 E9/L    Monocytes Absolute 0.53 0.10 - 0.95 E9/L    Eosinophils Absolute 0.10 0.05 - 0.50 E9/L    Basophils Absolute 0.05 0.00 - 0.20 E9/L   Blood Gas, Arterial   Result Value Ref Range    Date Analyzed [id number]     Time Analyzed 0540     Source: Blood Arterial     pH, Blood Gas 7.362 7.350 - 7.450    PCO2 73.9 (HH) 35.0 - 45.0 mmHg    PO2 125.2 (H) 75.0 - 100.0 mmHg    HCO3 41.0 (H) 22.0 - 26.0 mmol/L    B.E. 12.8 (H) -3.0 - 3.0 mmol/L    O2 Sat 98.7 (H) 92.0 - 98.5 %    PO2/FIO2 2.09 mmHg/%    AaDO2 206.3 mmHg    RI(T) 165 %    O2Hb 97.3 (H) 94.0 - 97.0 %    COHb 1.1 0.0 - 1.5 %    MetHb 0.3 0.0 - 1.5 %    O2 Content 16.1 mL/dL    HHb 1.3 0.0 - 5.0 %    tHb (est) 11.6 11.5 - 16.5 g/dL    Mode avaps     FIO2 60.0 %    Rr Mechanical 20.0 b/min    Vt Mechanical 450.0 mL    Peep/Cpap 8.0 cmH2O    Date Of Collection      Time Collected      Pt Temp 37.0 C     ID [id number]     Lab [id number]     Critical(s) Notified Handed report to Dr/RN    CK   Result Value Ref Range    Total  (H) 20 - 180 U/L   Comprehensive Metabolic Panel w/ Reflex to MG   Result Value Ref Range    Sodium 136 132 - 146 mmol/L    Potassium reflex Magnesium 3.7 Proteus species by PCR Not Detected Not Detected    Pseudomonas aeruginosa by PCR Not Detected Not Detected    Streptococcus agalactiae by PCR Not Detected Not Detected    Staphylococcus aureus by PCR Not Detected Not Detected    Staphylococcus species by PCR Not Detected Not Detected    Serratia marcescens by PCR Not Detected Not Detected    Streptococcus pneumoniae by PCR Not Detected Not Detected    Streptococcus pyogenes  by PCR Not Detected Not Detected    Streptococcus species by PCR Not Detected Not Detected    Enterococcus by PCR Not Detected Not Detected    Candida albicans by PCR Not Detected Not Detected    Candida glabrata by PCR Not Detected Not Detected    Candida krusei by PCR Not Detected Not Detected    Candida parapsilosis by PCR Not Detected Not Detected    Candida tropicalis by PCR Not Detected Not Detected   Comprehensive Metabolic Panel w/ Reflex to MG   Result Value Ref Range    Sodium 138 132 - 146 mmol/L    Potassium reflex Magnesium 4.1 3.5 - 5.0 mmol/L    Chloride 93 (L) 98 - 107 mmol/L    CO2 36 (H) 22 - 29 mmol/L    Anion Gap 9 7 - 16 mmol/L    Glucose 316 (H) 74 - 99 mg/dL    BUN 7 6 - 20 mg/dL    CREATININE 0.3 (L) 0.5 - 1.0 mg/dL    GFR Non-African American >60 >=60 mL/min/1.73    GFR African American >60     Calcium 8.7 8.6 - 10.2 mg/dL    Total Protein 5.9 (L) 6.4 - 8.3 g/dL    Albumin 3.2 (L) 3.5 - 5.2 g/dL    Total Bilirubin 0.4 0.0 - 1.2 mg/dL    Alkaline Phosphatase 83 35 - 104 U/L    ALT 9 0 - 32 U/L    AST 13 0 - 31 U/L   CBC auto differential   Result Value Ref Range    WBC 4.6 4.5 - 11.5 E9/L    RBC 3.87 3.50 - 5.50 E12/L    Hemoglobin 11.2 (L) 11.5 - 15.5 g/dL    Hematocrit 35.2 34.0 - 48.0 %    MCV 91.0 80.0 - 99.9 fL    MCH 28.9 26.0 - 35.0 pg    MCHC 31.8 (L) 32.0 - 34.5 %    RDW 13.8 11.5 - 15.0 fL    Platelets 849 205 - 747 E9/L    MPV 9.6 7.0 - 12.0 fL    Neutrophils % 76.9 43.0 - 80.0 %    Immature Granulocytes % 0.9 0.0 - 5.0 %    Lymphocytes % 17.9 (L) 20.0 - 42.0 %    Monocytes % 4.3 2.0 - 12.0 %    Eosinophils % 0.0 0.0 - 6.0 %    Basophils % 0.0 0.0 - 2.0 %    Neutrophils Absolute 3.57 1.80 - 7.30 E9/L    Immature Granulocytes # 0.04 E9/L    Lymphocytes Absolute 0.83 (L) 1.50 - 4.00 E9/L    Monocytes Absolute 0.20 0.10 - 0.95 E9/L    Eosinophils Absolute 0.00 (L) 0.05 - 0.50 E9/L    Basophils Absolute 0.00 0.00 - 0.20 E9/L   Comprehensive Metabolic Panel w/ Reflex to MG   Result Value Ref Range    Sodium 135 132 - 146 mmol/L    Potassium reflex Magnesium 4.1 3.5 - 5.0 mmol/L    Chloride 94 (L) 98 - 107 mmol/L    CO2 33 (H) 22 - 29 mmol/L    Anion Gap 8 7 - 16 mmol/L    Glucose 335 (H) 74 - 99 mg/dL    BUN 10 6 - 20 mg/dL    CREATININE 0.3 (L) 0.5 - 1.0 mg/dL    GFR Non-African American >60 >=60 mL/min/1.73    GFR African American >60     Calcium 8.6 8.6 - 10.2 mg/dL    Total Protein 6.0 (L) 6.4 - 8.3 g/dL    Albumin 3.1 (L) 3.5 - 5.2 g/dL    Total Bilirubin 0.3 0.0 - 1.2 mg/dL    Alkaline Phosphatase 74 35 - 104 U/L    ALT 10 0 - 32 U/L    AST 11 0 - 31 U/L   CBC auto differential   Result Value Ref Range    WBC 4.6 4.5 - 11.5 E9/L    RBC 3.93 3.50 - 5.50 E12/L    Hemoglobin 11.5 11.5 - 15.5 g/dL    Hematocrit 35.7 34.0 - 48.0 %    MCV 90.8 80.0 - 99.9 fL    MCH 29.3 26.0 - 35.0 pg    MCHC 32.2 32.0 - 34.5 %    RDW 13.8 11.5 - 15.0 fL    Platelets 235 684 - 798 E9/L    MPV 9.6 7.0 - 12.0 fL    Neutrophils % 78.4 43.0 - 80.0 %    Immature Granulocytes % 1.1 0.0 - 5.0 %    Lymphocytes % 17.0 (L) 20.0 - 42.0 %    Monocytes % 3.3 2.0 - 12.0 %    Eosinophils % 0.0 0.0 - 6.0 %    Basophils % 0.2 0.0 - 2.0 %    Neutrophils Absolute 3.59 1.80 - 7.30 E9/L    Immature Granulocytes # 0.05 E9/L    Lymphocytes Absolute 0.78 (L) 1.50 - 4.00 E9/L    Monocytes Absolute 0.15 0.10 - 0.95 E9/L    Eosinophils Absolute 0.00 (L) 0.05 - 0.50 E9/L    Basophils Absolute 0.01 0.00 - 0.20 E9/L   Comprehensive Metabolic Panel w/ Reflex to MG   Result Value Ref Range    Sodium 141 132 - 146 mmol/L Potassium reflex Magnesium 3.5 3.5 - 5.0 mmol/L    Chloride 96 (L) 98 - 107 mmol/L    CO2 35 (H) 22 - 29 mmol/L    Anion Gap 10 7 - 16 mmol/L    Glucose 130 (H) 74 - 99 mg/dL    BUN 9 6 - 20 mg/dL    CREATININE 0.3 (L) 0.5 - 1.0 mg/dL    GFR Non-African American >60 >=60 mL/min/1.73    GFR African American >60     Calcium 8.9 8.6 - 10.2 mg/dL    Total Protein 6.2 (L) 6.4 - 8.3 g/dL    Albumin 3.2 (L) 3.5 - 5.2 g/dL    Total Bilirubin 0.4 0.0 - 1.2 mg/dL    Alkaline Phosphatase 74 35 - 104 U/L    ALT 10 0 - 32 U/L    AST 14 0 - 31 U/L   CBC auto differential   Result Value Ref Range    WBC 8.7 4.5 - 11.5 E9/L    RBC 4.18 3.50 - 5.50 E12/L    Hemoglobin 12.2 11.5 - 15.5 g/dL    Hematocrit 38.1 34.0 - 48.0 %    MCV 91.1 80.0 - 99.9 fL    MCH 29.2 26.0 - 35.0 pg    MCHC 32.0 32.0 - 34.5 %    RDW 13.8 11.5 - 15.0 fL    Platelets 269 136 - 966 E9/L    MPV 9.5 7.0 - 12.0 fL    Neutrophils % 50.4 43.0 - 80.0 %    Immature Granulocytes % 0.8 0.0 - 5.0 %    Lymphocytes % 41.6 20.0 - 42.0 %    Monocytes % 6.6 2.0 - 12.0 %    Eosinophils % 0.5 0.0 - 6.0 %    Basophils % 0.1 0.0 - 2.0 %    Neutrophils Absolute 4.40 1.80 - 7.30 E9/L    Immature Granulocytes # 0.07 E9/L    Lymphocytes Absolute 3.64 1.50 - 4.00 E9/L    Monocytes Absolute 0.58 0.10 - 0.95 E9/L    Eosinophils Absolute 0.04 (L) 0.05 - 0.50 E9/L    Basophils Absolute 0.01 0.00 - 0.20 E9/L   Magnesium   Result Value Ref Range    Magnesium 1.6 1.6 - 2.6 mg/dL   POCT Glucose   Result Value Ref Range    Meter Glucose 140 (H) 74 - 99 mg/dL   POCT Glucose   Result Value Ref Range    Meter Glucose 173 (H) 74 - 99 mg/dL   POCT Glucose   Result Value Ref Range    Meter Glucose 145 (H) 74 - 99 mg/dL   POCT Glucose   Result Value Ref Range    Meter Glucose 199 (H) 74 - 99 mg/dL   POCT Glucose   Result Value Ref Range    Meter Glucose 197 (H) 74 - 99 mg/dL   POCT Glucose   Result Value Ref Range    Meter Glucose 184 (H) 74 - 99 mg/dL   POCT Glucose   Result Value Ref Range Meter Glucose 254 (H) 74 - 99 mg/dL   POCT Glucose   Result Value Ref Range    Meter Glucose 235 (H) 74 - 99 mg/dL   POCT Glucose   Result Value Ref Range    Meter Glucose 265 (H) 74 - 99 mg/dL   POCT Glucose   Result Value Ref Range    Meter Glucose 313 (H) 74 - 99 mg/dL   POCT Glucose   Result Value Ref Range    Meter Glucose 457 (H) 74 - 99 mg/dL   POCT Glucose   Result Value Ref Range    Meter Glucose 125 (H) 74 - 99 mg/dL   POCT Glucose   Result Value Ref Range    Meter Glucose 301 (H) 74 - 99 mg/dL   POCT Glucose   Result Value Ref Range    Meter Glucose 354 (H) 74 - 99 mg/dL   POCT Glucose   Result Value Ref Range    Meter Glucose 190 (H) 74 - 99 mg/dL   POCT Glucose   Result Value Ref Range    Meter Glucose 280 (H) 74 - 99 mg/dL   POCT Glucose   Result Value Ref Range    Meter Glucose 234 (H) 74 - 99 mg/dL   POCT Glucose   Result Value Ref Range    Meter Glucose 164 (H) 74 - 99 mg/dL   POCT Glucose   Result Value Ref Range    Meter Glucose 296 (H) 74 - 99 mg/dL   EKG 12 Lead   Result Value Ref Range    Ventricular Rate 103 BPM    Atrial Rate 103 BPM    P-R Interval 152 ms    QRS Duration 80 ms    Q-T Interval 332 ms    QTc Calculation (Bazett) 434 ms    P Axis 82 degrees    R Axis 23 degrees    T Axis 76 degrees   ,       RADIOLOGY:  Interpreted by Radiologist unless otherwise specified  XR CHEST 1 VIEW   Final Result   No acute process. XR HIP BILATERAL W AP PELVIS (2 VIEWS)   Final Result   No acute abnormality of the pelvis. Mild sclerotic changes in the bilateral femoral heads, suspicious for   avascular necrosis. Consider non emergent pelvic MR if clinically   appropriate. XR FEMUR LEFT (MIN 2 VIEWS)   Final Result   No acute osseous abnormality. CT HEAD WO CONTRAST   Final Result   No acute intracranial abnormality. Chronic right parietal infarction with   encephalomalacia. Chronic right basal ganglia lacunar infarction.       Opacification of right middle ear and bilateral mastoid air cells which may   represent sequela from maria isabel mastoiditis. CT CERVICAL SPINE WO CONTRAST   Final Result   No acute cervical spine fracture or malalignment. Mild degenerative changes of the cervical spine. EKG Interpretation  Interpreted by emergency department physician, Dr. Hellen Christianson  EKG: This EKG is signed and interpreted by me. Rate: 103  Rhythm: Sinus  Interpretation: Sinus tachycardia, no acute ST elevations or depressions, intervals within normal limits, QTC is 434  Comparison: stable as compared to patient's most recent EKG       ------------------------- NURSING NOTES AND VITALS REVIEWED ---------------------------   The nursing notes within the ED encounter and vital signs as below have been reviewed by myself  BP (!) 150/65   Pulse 109   Temp 98.5 °F (36.9 °C) (Oral)   Resp 18   Ht 5' 5\" (1.651 m)   Wt 161 lb 4.8 oz (73.2 kg)   LMP 03/20/2015   SpO2 98%   BMI 26.84 kg/m²     Oxygen Saturation Interpretation: Abnormal - but at baseline    The patients available past medical records and past encounters were reviewed. ------------------------------ ED COURSE/MEDICAL DECISION MAKING----------------------  Medications   0.9 % sodium chloride bolus (0 mLs IntraVENous Stopped 10/17/21 1932)   0.9 % sodium chloride bolus (0 mLs IntraVENous Stopped 10/17/21 2208)   azithromycin (ZITHROMAX) 500 mg in D5W 250ml addavial (0 mg IntraVENous Stopped 10/18/21 0350)   azithromycin (ZITHROMAX) 250 mg in dextrose 5 % 250 mL IVPB (0 mg IntraVENous Stopped 10/21/21 2325)   insulin glargine (LANTUS) injection vial 10 Units (10 Units SubCUTAneous Given 10/20/21 0922)   sterile water injection (10 mLs  Given 10/20/21 0909)           The cardiac monitor revealed NSR with a heart rate in the 100s as interpreted by me. The cardiac monitor was ordered secondary to the patient's shortntess of breath and to monitor the patient for dysrhythmia.    CPT [id number]       Dr. TREY Parish [place] am the primary provider of record    Medical Decision Making:   The patient is a 71-year-old female presents to the emergency department for altered mental status. She was tachycardic on arrival and altered but there were no focal neurological deficits on examination. Imaging was negative for intracranial hemorrhage, acute ischemia, or other abnormalities. She was taken directly to CAT scan. Point-of-care blood sugar was normal.  EKG did not show evidence of acute ischemic changes was reassuring. Labs were reassuring besides hyperglycemia. ABG was initially drawn on arrival but was not run by respiratory so a repeat ABG was drawn and showed significant hypercapnia. Patient was awake and talking despite the CO2 over 100. Respiratory was called to the bedside and she was placed on AVAPS. Repeat ABG did improve. Consulted with family practice who accepted patient for admission. Discussed results and plan with patient she verbalized understanding agreement to treatment plan and admission. Her mentation did improve. Oxygen Saturation Interpretation: 98 % on nonrebreather. Re-Evaluations:  ED Course as of Oct 23 2336   Christina Smith Oct 17, 2021   2010 Consulted with family medicine resident who will come and see the patient. [KG]      ED Course User Index  [KG] Elsi Patterson DO       Reevaluated the patient she was improving prior to admission. This patient's ED course included: a personal history and physicial examination, re-evaluation prior to disposition, multiple bedside re-evaluations, IV medications, cardiac monitoring, continuous pulse oximetry and complex medical decision making and emergency management    This patient has remained hemodynamically stable during their ED course. Consultations:  Spoke with Jillian Squires (Medicine). Discussed case. They will admit this patient. Counseling:    The emergency provider has spoken with the patient and discussed todays results, in addition to providing specific details for the plan of care and counseling regarding the diagnosis and prognosis. Questions are answered at this time and they are agreeable with the plan.       --------------------------------- IMPRESSION AND DISPOSITION ---------------------------------    IMPRESSION  1. Acute respiratory failure with hypercapnia (HCC)    2. 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 Every effort was made to ensure accuracy; however, inadvertent computerized transcription errors may be present    Please note that the withdrawal or failure to initiate urgent interventions for this patient would likely result in a life threatening deterioration or permanent disability. Systems at risk for deterioration include: respiratory    Accordingly this patient received 30 minutes of critical care time, excluding separately billable procedures.        Rodolfo Medrano DO  10/23/21 [id number]

## 2021-10-18 NOTE — PROGRESS NOTES
[address]  Progress Note      Chief complaint :  Chief Complaint   Patient presents with    Altered Mental Status       Subjective:  Patient on AVAPS. She is oriented to self. Is somnolent this morning. States that she is having trouble breathing. Denies abdominal pain, chest pain. No acute overnight events. Past medical, surgical, family and social history were reviewed, non-contributory, and unchanged unless otherwise stated. ROS negative except as stated above. Objective:  /78   Pulse 110   Temp 98.7 °F (37.1 °C) (Axillary)   Resp 21   Ht 5' 5\" (1.651 m)   Wt 157 lb (71.2 kg)   LMP 03/20/2015   SpO2 98%   BMI 26.13 kg/m²     Physical Exam  Constitutional:       Appearance: Normal appearance. HENT:      Head: Normocephalic and atraumatic. Mouth/Throat:      Mouth: Mucous membranes are moist.   Eyes:      Extraocular Movements: Extraocular movements intact. Conjunctiva/sclera: Conjunctivae normal.   Cardiovascular:      Rate and Rhythm: Regular rhythm. Tachycardia present. Pulses: Normal pulses. Heart sounds: Normal heart sounds. No murmur heard. Pulmonary:      Effort: Respiratory distress present. Breath sounds: No stridor. Wheezing (diffuse) present. Comments: AVAPS in place, decreased breath sounds  Abdominal:      General: Abdomen is flat. Bowel sounds are normal.      Palpations: Abdomen is soft. Musculoskeletal:         General: No swelling. Normal range of motion. Cervical back: Normal range of motion and neck supple. Skin:     General: Skin is warm and dry. Neurological:      General: No focal deficit present. Mental Status: She is alert and oriented to person, place, and time.    Psychiatric:         Mood and Affect: Mood normal.         Behavior: Behavior normal.         Labs:  Recent Results (from the past 24 hour(s))   POCT Glucose    Collection Time: 10/17/21  4:39 PM   Result Value Ref Range    Meter Glucose 140 (H) 74 - 99 mg/dL   CBC auto differential    Collection Time: 10/17/21  5:03 PM   Result Value Ref Range    WBC 9.7 4.5 - 11.5 E9/L    RBC 4.40 3.50 - 5.50 E12/L    Hemoglobin 12.7 11.5 - 15.5 g/dL    Hematocrit 42.4 34.0 - 48.0 %    MCV 96.4 80.0 - 99.9 fL    MCH 28.9 26.0 - 35.0 pg    MCHC 30.0 (L) 32.0 - 34.5 %    RDW 13.2 11.5 - 15.0 fL    Platelets 880 762 - 381 E9/L    MPV 9.6 7.0 - 12.0 fL    Neutrophils % 66.6 43.0 - 80.0 %    Immature Granulocytes % 0.3 0.0 - 5.0 %    Lymphocytes % 25.7 20.0 - 42.0 %    Monocytes % 5.5 2.0 - 12.0 %    Eosinophils % 1.3 0.0 - 6.0 %    Basophils % 0.6 0.0 - 2.0 %    Neutrophils Absolute 6.45 1.80 - 7.30 E9/L    Immature Granulocytes # 0.03 E9/L    Lymphocytes Absolute 2.49 1.50 - 4.00 E9/L    Monocytes Absolute 0.53 0.10 - 0.95 E9/L    Eosinophils Absolute 0.13 0.05 - 0.50 E9/L    Basophils Absolute 0.06 0.00 - 0.20 E9/L   Comprehensive Metabolic Panel w/ Reflex to MG    Collection Time: 10/17/21  5:03 PM   Result Value Ref Range    Sodium 142 132 - 146 mmol/L    Potassium reflex Magnesium 4.0 3.5 - 5.0 mmol/L    Chloride 93 (L) 98 - 107 mmol/L    CO2 42 (HH) 22 - 29 mmol/L    Anion Gap 7 7 - 16 mmol/L    Glucose 153 (H) 74 - 99 mg/dL    BUN 8 6 - 20 mg/dL    CREATININE 0.3 (L) 0.5 - 1.0 mg/dL    GFR Non-African American >60 >=60 mL/min/1.73    GFR African American >60     Calcium 8.9 8.6 - 10.2 mg/dL    Total Protein 6.4 6.4 - 8.3 g/dL    Albumin 3.1 (L) 3.5 - 5.2 g/dL    Total Bilirubin 0.3 0.0 - 1.2 mg/dL    Alkaline Phosphatase 103 35 - 104 U/L    ALT 10 0 - 32 U/L    AST 19 0 - 31 U/L   Troponin    Collection Time: 10/17/21  5:03 PM   Result Value Ref Range    Troponin, High Sensitivity 24 (H) 0 - 9 ng/L   Lactate, Sepsis    Collection Time: 10/17/21  5:03 PM   Result Value Ref Range    Lactic Acid, Sepsis 1.2 0.5 - 1.9 mmol/L   CK    Collection Time: 10/17/21  5:03 PM   Result Value Ref Range    Total  (H) 20 - 180 U/L   Urinalysis with Microscopic    Collection Time: 10/17/21  5:03 PM   Result Value Ref Range    Color, UA Yellow Straw/Yellow    Clarity, UA Clear Clear    Glucose, Ur Negative Negative mg/dL    Bilirubin Urine MODERATE (A) Negative    Ketones, Urine 15 (A) Negative mg/dL    Specific Gravity, UA 1.025 1.005 - 1.030    Blood, Urine LARGE (A) Negative    pH, UA 6.0 5.0 - 9.0    Protein, UA Negative Negative mg/dL    Urobilinogen, Urine 0.2 <2.0 E.U./dL    Nitrite, Urine Negative Negative    Leukocyte Esterase, Urine Negative Negative    Mucus, UA Present (A) None Seen /LPF    WBC, UA 1-3 0 - 5 /HPF    RBC, UA 10-20 (A) 0 - 2 /HPF    Bacteria, UA NONE SEEN None Seen /HPF   COVID-19, Rapid    Collection Time: 10/17/21  5:03 PM    Specimen: Nasopharyngeal Swab   Result Value Ref Range    SARS-CoV-2, NAAT Not Detected Not Detected   EKG 12 Lead    Collection Time: 10/17/21  5:27 PM   Result Value Ref Range    Ventricular Rate 103 BPM    Atrial Rate 103 BPM    P-R Interval 152 ms    QRS Duration 80 ms    Q-T Interval 332 ms    QTc Calculation (Bazett) 434 ms    P Axis 82 degrees    R Axis 23 degrees    T Axis 76 degrees   Troponin    Collection Time: 10/17/21  6:45 PM   Result Value Ref Range    Troponin, High Sensitivity 20 (H) 0 - 9 ng/L   Troponin    Collection Time: 10/17/21  7:45 PM   Result Value Ref Range    Troponin, High Sensitivity 19 (H) 0 - 9 ng/L   Blood Gas, Arterial    Collection Time: 10/17/21  7:47 PM   Result Value Ref Range    Date Analyzed [id number]     Time Analyzed 1947     Source: Blood Arterial     pH, Blood Gas 7.257 (L) 7.350 - 7.450    PCO2 115.2 (HH) 35.0 - 45.0 mmHg    PO2 152.3 (H) 75.0 - 100.0 mmHg    HCO3 50.2 (H) 22.0 - 26.0 mmol/L    B.E. 17.9 (H) -3.0 - 3.0 mmol/L    O2 Sat 99.3 (H) 92.0 - 98.5 %    O2Hb 97.3 (H) 94.0 - 97.0 %    COHb 1.7 (H) 0.0 - 1.5 %    MetHb 0.3 0.0 - 1.5 %    O2 Content 17.4 mL/dL    HHb 0.7 0.0 - 5.0 %    tHb (est) 12.5 11.5 - 16.5 g/dL    Mode NRB 15L     Date Of Collection Time Collected      Pt Temp 37.0 C     ID [id number]     Lab [id number]     Critical(s) Notified Handed report to /RN    Blood Gas, Arterial    Collection Time: 10/17/21 10:19 PM   Result Value Ref Range    Date Analyzed [id number]     Time Analyzed 2219     Source: Blood Arterial     pH, Blood Gas 7.299 (L) 7.350 - 7.450    PCO2 87.8 (HH) 35.0 - 45.0 mmHg    PO2 111.7 (H) 75.0 - 100.0 mmHg    HCO3 42.1 (H) 22.0 - 26.0 mmol/L    B.E. 12.3 (H) -3.0 - 3.0 mmol/L    O2 Sat 98.5 92.0 - 98.5 %    PO2/FIO2 1.60 mmHg/%    AaDO2 275.5 mmHg    RI(T) 247 %    O2Hb 97.3 (H) 94.0 - 97.0 %    COHb 0.9 0.0 - 1.5 %    MetHb 0.3 0.0 - 1.5 %    O2 Content 16.6 mL/dL    HHb 1.5 0.0 - 5.0 %    tHb (est) 12.0 11.5 - 16.5 g/dL    Mode AVAPS     FIO2 70.0 %    Rr Mechanical 20.0 b/min    Vt Mechanical 450.0 mL    Peep/Cpap 8.0 cmH2O    Date Of Collection      Time Collected      Pt Temp 37.0 C     ID [id number]     Lab [id number]     Critical(s) Notified Handed report to /RN    Respiratory Panel, Molecular, with COVID-19 (Restricted: peds pts or suitable admitted adults)    Collection Time: 10/18/21  2:13 AM    Specimen: Nasopharyngeal; Nasal   Result Value Ref Range    Adenovirus by PCR Not Detected Not Detected    Bordetella parapertussis by PCR Not Detected Not Detected    Bordetella pertussis by PCR Not Detected Not Detected    Chlamydophilia pneumoniae by PCR Not Detected Not Detected    Coronavirus 229E by PCR Not Detected Not Detected    Coronavirus HKU1 by PCR Not Detected Not Detected    Coronavirus NL63 by PCR Not Detected Not Detected    Coronavirus OC43 by PCR Not Detected Not Detected    SARS-CoV-2, PCR Not Detected Not Detected    Human Metapneumovirus by PCR Not Detected Not Detected    Human Rhinovirus/Enterovirus by PCR Not Detected Not Detected    Influenza A by PCR Not Detected Not Detected    Influenza B by PCR Not Detected Not Detected    Mycoplasma pneumoniae by PCR Not Detected Not Detected    Parainfluenza Virus 1 by PCR Not Detected Not Detected    Parainfluenza Virus 2 by PCR Not Detected Not Detected    Parainfluenza Virus 3 by PCR Not Detected Not Detected    Parainfluenza Virus 4 by PCR Not Detected Not Detected    Respiratory Syncytial Virus by PCR Not Detected Not Detected   Comprehensive Metabolic Panel w/ Reflex to MG    Collection Time: 10/18/21  4:08 AM   Result Value Ref Range    Sodium 142 132 - 146 mmol/L    Potassium reflex Magnesium 4.1 3.5 - 5.0 mmol/L    Chloride 96 (L) 98 - 107 mmol/L    CO2 38 (H) 22 - 29 mmol/L    Anion Gap 8 7 - 16 mmol/L    Glucose 202 (H) 74 - 99 mg/dL    BUN 7 6 - 20 mg/dL    CREATININE 0.2 (L) 0.5 - 1.0 mg/dL    GFR Non-African American >60 >=60 mL/min/1.73    GFR African American >60     Calcium 8.1 (L) 8.6 - 10.2 mg/dL    Total Protein 5.1 (L) 6.4 - 8.3 g/dL    Albumin 2.7 (L) 3.5 - 5.2 g/dL    Total Bilirubin 0.3 0.0 - 1.2 mg/dL    Alkaline Phosphatase 85 35 - 104 U/L    ALT 8 0 - 32 U/L    AST 18 0 - 31 U/L   CBC auto differential    Collection Time: 10/18/21  4:08 AM   Result Value Ref Range    WBC 9.5 4.5 - 11.5 E9/L    RBC 3.53 3.50 - 5.50 E12/L    Hemoglobin 10.3 (L) 11.5 - 15.5 g/dL    Hematocrit 34.6 34.0 - 48.0 %    MCV 98.0 80.0 - 99.9 fL    MCH 29.2 26.0 - 35.0 pg    MCHC 29.8 (L) 32.0 - 34.5 %    RDW 13.3 11.5 - 15.0 fL    Platelets 475 311 - 649 E9/L    MPV 9.9 7.0 - 12.0 fL    Neutrophils % 77.6 43.0 - 80.0 %    Immature Granulocytes % 0.3 0.0 - 5.0 %    Lymphocytes % 14.9 (L) 20.0 - 42.0 %    Monocytes % 5.6 2.0 - 12.0 %    Eosinophils % 1.1 0.0 - 6.0 %    Basophils % 0.5 0.0 - 2.0 %    Neutrophils Absolute 7.37 (H) 1.80 - 7.30 E9/L    Immature Granulocytes # 0.03 E9/L    Lymphocytes Absolute 1.41 (L) 1.50 - 4.00 E9/L    Monocytes Absolute 0.53 0.10 - 0.95 E9/L    Eosinophils Absolute 0.10 0.05 - 0.50 E9/L    Basophils Absolute 0.05 0.00 - 0.20 E9/L   Blood Gas, Arterial    Collection Time: 10/18/21  5:40 AM   Result Value Ref Range    Date Analyzed [id number]     Time Analyzed 0540     Source: Blood Arterial     pH, Blood Gas 7.362 7.350 - 7.450    PCO2 73.9 (HH) 35.0 - 45.0 mmHg    PO2 125.2 (H) 75.0 - 100.0 mmHg    HCO3 41.0 (H) 22.0 - 26.0 mmol/L    B.E. 12.8 (H) -3.0 - 3.0 mmol/L    O2 Sat 98.7 (H) 92.0 - 98.5 %    PO2/FIO2 2.09 mmHg/%    AaDO2 206.3 mmHg    RI(T) 165 %    O2Hb 97.3 (H) 94.0 - 97.0 %    COHb 1.1 0.0 - 1.5 %    MetHb 0.3 0.0 - 1.5 %    O2 Content 16.1 mL/dL    HHb 1.3 0.0 - 5.0 %    tHb (est) 11.6 11.5 - 16.5 g/dL    Mode avaps     FIO2 60.0 %    Rr Mechanical 20.0 b/min    Vt Mechanical 450.0 mL    Peep/Cpap 8.0 cmH2O    Date Of Collection      Time Collected      Pt Temp 37.0 C     ID [id number]     Lab [id number]     Critical(s) Notified Handed report to Dr/RN        Radiology and other tests reviewed:  XR CHEST 1 VIEW   Final Result   No acute process. XR HIP BILATERAL W AP PELVIS (2 VIEWS)   Final Result   No acute abnormality of the pelvis. Mild sclerotic changes in the bilateral femoral heads, suspicious for   avascular necrosis. Consider non emergent pelvic MR if clinically   appropriate. XR FEMUR LEFT (MIN 2 VIEWS)   Final Result   No acute osseous abnormality. CT HEAD WO CONTRAST   Final Result   No acute intracranial abnormality. Chronic right parietal infarction with   encephalomalacia. Chronic right basal ganglia lacunar infarction. Opacification of right middle ear and bilateral mastoid air cells which may   represent sequela from maria isabel mastoiditis. CT CERVICAL SPINE WO CONTRAST   Final Result   No acute cervical spine fracture or malalignment. Mild degenerative changes of the cervical spine.              Assessment:  Active Hospital Problems    Diagnosis Date Noted    Acute respiratory failure with hypercapnia (HCC) [J96.02] 10/17/2021    Somnolence [R40.0] 10/17/2021    [facility] [J44.9] 05/29/2021    Uncontrolled type 2 diabetes mellitus with hyperglycemia ([facility] [address] [E11.65]     PAF (paroxysmal atrial fibrillation) ([facility] [I48.0] 04/26/2021       Plan:  Acute respiratory failure with hypercapnia  She has had a 2-day history of worsening lethargy, confusion, and falls with a past medical history of COPD dependent on 5L O2. Likely secondary to COPD exacerbation.  -Chest x-ray negative for acute process  -ABG showed pH of 7.257 and PCO2 of 115, with repeat ABG showing improvement of pH 7.299 and pCO2 87.8  - 10/18 ABG pH 7.362 pCO2 73.9  -Taking home Pulmicort and albuterol as needed  -Previously following with pulmonology but last seen 2016  -Continue home Pulmicort BID and scheduled duonebs q4 hours  -Continue AVAPS  -Azithromycin ordered 500mg once then 250mg daily for 4 days  -Respiratory panel- negative  - Procalcitonin pending  - Monitor CBC and CMP  -Pulmonology consulted     Altered mental status  Worsening for the last 2 days with no increased weakness from baseline or slurred speech. The patient does require assistance with any ambulation and is mainly in a wheelchair.   -CT head 10/17- negative for acute stroke  -Patient alert and oriented x3 in the ED but slow to respond  -Likely secondary to hypercapnia  -Continue AVAPS  -Continue to monitor mental status     Abdominal tenderness  -Patient tender to palpation in the left upper and left lower quadrant  -Endorses constipation with unknown date of last bowel movement  -MiraLAX daily     COPD  -Continue home Pulmicort and scheduled duonebs  -Continue AVAPS  -Pulmonology consulted     Atrial fibrillation  -EKG in the ED showed Sinus tachycardia  -Mildly tachycardic in the ED  -Continue home Cardizem and Eliquis  -Telemetry     Diabetes  -Last A1c 7.0  -Medium dose sliding scale  -POCT glucose ordered  -Hypoglycemia protocol ordered     Compression fracture  -L1 compression fracture on 8/11/2021  -Following with Neurosurgery as an outpatient  -Continue home Percocet, Daughter-in-law Sumaya Kwon states taking it q6 PRN     Continue home medications: Crestor, nortriptyline, gabapentin, ursodiol, and fenofibrate     DVT ppx: Eliquis  GI ppx: Protonix  Code Status: DNR-CCA      Geovanni Pinto MD   Family Medicine Resident PGY-1  10/18/21

## 2021-10-18 NOTE — CARE COORDINATION
Introduced my self and provided explanation of CM role to patient's daughter John Jorge at bedside. Patient is somnolent and on positive airway pressure. Unable to participate in conversation/screening/  John Patel reveals that patient resides at home. John Patel, her brother and his wife reside at home and collectively provide patient 24 hour care including assistance with bathing, toileting etc.  Patient has home O2 at 5 L from [id number] [facility]. She has a nebulizer, a glucometer, a bsc and a rollator walker. She has a history of falls recently with increased confusion noted as well. She has no hx of SNF and no recent [facility] utilization. John Patel describes her mother as \"agorophobic\". At this time, John Jorge explains that is is the goal of family to have patient return to home. There is no current consideration for any type of rehab stay. Kanika touts the assistance of Dr. Faheem Phelan and his team for keeping patient alive. She goes on to add that patient has all necessary prescriptions and has no issue with cost/coverage. Pulm consult is ordered. Family may lack insight as to capacity to provide necessary care for patient at time of discharge. Will need to assess what patient's exact needs will be closer to discharge. At this time, s/t clinical instability, those needs lack definition. John Patel shares that patient would likely resist/decline HHC if she was able to decide. With patient's AMS may need to rely upon POA assistance with decision making. Will follow along with  and assist with discharge planning as necessary. Wen Freeman, MSN, RN  [facility] Case Management  [phone]

## 2021-10-18 NOTE — PLAN OF CARE
Problem: Falls - Risk of:  Goal: Will remain free from falls  Description: Will remain free from falls  10/18/2021 0937 by Guillaume Martel  Outcome: Met This Shift  10/18/2021 0142 by Heath Mart RN  Outcome: Met This Shift  Goal: Absence of physical injury  Description: Absence of physical injury  10/18/2021 0937 by Guillaume Martel  Outcome: Met This Shift  10/18/2021 0142 by Heath Mart RN  Outcome: Met This Shift     Problem: Skin Integrity:  Goal: Will show no infection signs and symptoms  Description: Will show no infection signs and symptoms  10/18/2021 0937 by Guillaume Martel  Outcome: Met This Shift  10/18/2021 0142 by Heath Mart RN  Outcome: Met This Shift  Goal: Absence of new skin breakdown  Description: Absence of new skin breakdown  10/18/2021 0937 by Guillaume aMrtel  Outcome: Met This Shift  10/18/2021 0142 by Heath Mart RN  Outcome: Met This Shift     Problem: Pain:  Goal: Pain level will decrease  Description: Pain level will decrease  Outcome: Met This Shift  Goal: Control of acute pain  Description: Control of acute pain  Outcome: Met This Shift  Goal: Control of chronic pain  Description: Control of chronic pain  Outcome: Met This Shift

## 2021-10-18 NOTE — PROGRESS NOTES
Date: 10/18/2021    Time: 4:55 PM    Patient Placed On BIPAP/CPAP/ Non-Invasive Ventilation? Yes    If no must comment. Facial area red/color change? No           If YES are Blister/Lesion present? No   If yes must notify nursing staff  BIPAP/CPAP skin barrier?   Yes    Skin barrier type:mepilexlite       Comments:        Emily Hi RCP

## 2021-10-18 NOTE — CONSULTS
Pulmonary Consultation    Admit Date: 10/17/2021  Requesting Physician: Kalie Vaca MD    Reason for consultation:  · Respiratory failure, hypercapnic. HPI:  · The patient is a 79-year-old female admitted to this institution with hypercapnic respiratory failure. In the past, she had been followed by another pulmonary group and was dismissed for noncompliance. History is obtained through the patient's daughter and the chart. Another daughter, Freedom, was unavailable at her #[phone]. · According to the chart, the patient was last seen by [facility] cardiology as an outpatient on October 14. According to that note, the patient was awake alert and oriented. She was noted to have atrial fibrillation and was continued on an anticoagulant as well as Cardizem. Review of previous records shows the patient has significant lung disease. Per the daughter at the bedside, she continues to smoke and has no ability to quit. · Arterial blood gases done on admission showed significant hypercapnia. Placed on noninvasive ventilation, her ventilation did improve though her PCO2 is still critically high. Her pH came up somewhat. At this point, she still receiving too much oxygen. PMH:    Past Medical History:   Diagnosis Date    Anxiety     Carotid stenosis 12/2011    50-69% on left    Carpal tunnel syndrome 12/07/2011    bilateral    Cerebral artery occlusion with cerebral infarction [facility]) 2011? left sided weakness / usually in w/c    Chronic back pain     COPD (chronic obstructive pulmonary disease) ([facility] [address]     no pulmonologist / follows with PCP    CVA (cerebral infarction) 2011?     right sided thalamic; seen by Dr Shana Diaz Depression     Dermatophytosis 5/29/2021    Discoloration of skin of toe 6/3/2021    Ehler's-Danlos syndrome     GERD (gastroesophageal reflux disease)     History of ischemic heart disease 03/30/2017    no cardiology    Hyperlipidemia     Hypertension  Left carotid stenosis 5/29/2021    O2 dependent 5/29/2021    Obesity     PVD (peripheral vascular disease) with claudication ([facility] [address] 8/25/2017    Tobacco abuse     Tobacco abuse     Type II or unspecified type diabetes mellitus without mention of complication, not stated as uncontrolled     Vitamin D deficiency      PSH:   Past Surgical History:   Procedure Laterality Date    ABDOMINAL EXPLORATION SURGERY  03/29/2017    with bowel resection, incarcerated ventral hernia repair    CARDIAC CATHETERIZATION  1/7/2015    Dr. Stephanie Forte  EF=55%   FFR=0.84  distal RCA bifurcation    CYST INCISION AND DRAINAGE Right 07/22/2016    Right gluteal mass incision and drainage    CYST REMOVAL  1/28/2011    excision of left arm inclusion cyst    ECHO COMPL W DOP COLOR FLOW  5/15/2013         ECHOCARDIOGRAM COMPLETE 2D W DOPPLER W COLOR  1/3/2012         ECHOCARDIOGRAM COMPLETE 2D W DOPPLER W COLOR  6/14/2012         EYE SURGERY Bilateral 2014    cataract w lens implants    HERNIA REPAIR  03/2017    abdominal    OTHER SURGICAL HISTORY  07/26/2016    re exploration left lateral wound / heel    NH DRAIN SKIN ABSCESS COMPLIC N/A 1/4/9487    EXAM UNDER ANESTHESIA  I & D BUTTOCKS performed by Mohini Shea DO at [address]       Review of Systems:    Unobtainable due to patient factors.       Social History:  · Alcohol: Unknown  · Tobacco:   Ongoing  · Employment:  no silica or asbestos exposure  · Family:  No family history of lung disease    Medications:   sodium chloride      dextrose        apixaban  5 mg Oral BID    budesonide  250 mcg Nebulization BID    dilTIAZem  240 mg Oral Daily    fenofibrate  54 mg Oral Daily    gabapentin  300 mg Oral TID    insulin lispro  0-12 Units SubCUTAneous TID WC    insulin lispro  0-6 Units SubCUTAneous Nightly    nortriptyline  25 mg Oral Nightly    pantoprazole  40 mg Oral QAM AC    rosuvastatin  20 mg Oral Nightly    ursodiol  300 mg Oral TID WC  sodium chloride flush  5-40 mL IntraVENous 2 times per day    polyethylene glycol  17 g Oral Daily    ipratropium-albuterol  1 ampule Inhalation Q4H    azithromycin  250 mg IntraVENous Q24H    methylPREDNISolone  40 mg IntraVENous Q12H       Vitals:  Tmax:  VITALS:  /78   Pulse 105   Temp 98.8 °F (37.1 °C)   Resp 24   Ht 5' 5\" (1.651 m)   Wt 157 lb (71.2 kg)   LMP 2015   SpO2 96%   BMI 26.13 kg/m²   24HR INTAKE/OUTPUT:  No intake or output data in the 24 hours ending 10/18/21 1631  CURRENT PULSE OXIMETRY:  SpO2: 96 %  24HR PULSE OXIMETRY RANGE:  SpO2  Av.3 %  Min: 62 %  Max: 100 %    EXAM:  General: No distress. Alert. Eyes: PERRL. No sclera icterus. No conjunctival injection. ENT: No discharge. Pharynx clear. Neck: Trachea midline. Normal thyroid. No jvd, no hjr. Resp: No wheezing. No accessory muscle use. No rales. No rhonchi. Decreased diffusely  CV: Regular rate. Regular rhythm. No murmur No rub. Abd: Non-tender. Non-distended. No masses. No organmegaly. Normal bowel sounds. Skin: Warm and dry. No nodule on exposed extremities. No rash on exposed extremities. Lymph: No cervical LAD. No supraclavicular LAD. Ext: No joint deformity. No clubbing. No cyanosis. No edema. Significant nicotine staining is noted of all nails with some clubbing  Neuro: Awake. Follows commands. Positive pupils/gag/corneals. Normal pain response. Lab Results:  CBC:   Recent Labs     10/17/21  1703 10/18/21  0408   WBC 9.7 9.5   HGB 12.7 10.3*   HCT 42.4 34.6   MCV 96.4 98.0    231       BMP:  Recent Labs     10/17/21  1703 10/18/21  0408    142   K 4.0 4.1   CL 93* 96*   CO2 42* 38*   BUN 8 7   CREATININE 0.3* 0.2*    ALB:3,BILIDIR:3,BILITOT:3,ALKPHOS:3)@    PT/INR: No results for input(s): PROTIME, INR in the last 72 hours.     Cultures:  Sputum: not available  Blood: not available    ABG:   Recent Labs     10/18/21  0540   PH 7.362   PO2 125.2*   PCO2 73.9*   HCO3 41.0* BE 12.8*   O2SAT 98.7*   METHB 0.3   O2HB 97.3*   COHB 1.1   O2CON 16.1   HHB 1.3   THB 11.6     FiO2 : 50 %       Films:     XR CHEST 1 VIEW   Final Result   No acute process. XR HIP BILATERAL W AP PELVIS (2 VIEWS)   Final Result   No acute abnormality of the pelvis. Mild sclerotic changes in the bilateral femoral heads, suspicious for   avascular necrosis. Consider non emergent pelvic MR if clinically   appropriate. XR FEMUR LEFT (MIN 2 VIEWS)   Final Result   No acute osseous abnormality. CT HEAD WO CONTRAST   Final Result   No acute intracranial abnormality. Chronic right parietal infarction with   encephalomalacia. Chronic right basal ganglia lacunar infarction. Opacification of right middle ear and bilateral mastoid air cells which may   represent sequela from maria isabel mastoiditis. CT CERVICAL SPINE WO CONTRAST   Final Result   No acute cervical spine fracture or malalignment. Mild degenerative changes of the cervical spine. .        Assessment:  1. Acute on chronic hypercapnic respiratory failure  2. Underlying severe and end-stage chronic obstructive pulmonary disease  3. Ongoing tobacco abuse      Plan:  1. Maximize treatment for GOLD class 4 COPD  2. The patient would benefit from noninvasive ventilation at home  3. Await further history from the family. Thanks for letting us see this patient in consultation. Total time in reviewing the previous admissions and records, reviewing the current x-rays, labs, and discussing with clinical staff including nursing and physicians, exceeded 50 minutes. Please contact us with any questions. Office [phone] or after hours through [employer], x [phone]. Please note that voice recognition technology was used (while wearing a Covid mandated mask) in the preparation of this note and make therefore it may contain inadvertent transcription errors.   If the patient is a COVID 19 isolation patient, the above physical exam reflects that of the examining physician for the day. Nicole Butler MD,  MMK., F.C.C.P.     [facility], [address] [place] [address], [address] [place] [address]

## 2021-10-18 NOTE — H&P
[place] [id number]  Resident History and Physical      CHIEF COMPLAINT:    Chief Complaint   Patient presents with    Altered Mental Status        History of Present Illness:   Iona Queen  is a 64 y.o. female patient of oDnya Morris MD  with a pertinent PMHx of MARIAA, tobacco abuse, peripheral vascular disease, Ama-Danlos syndrome, previous stroke, uncontrolled diabetes, COPD, O2 dependence who presented to the ER with chief complaint of altered mental status. The history was gathered from both the patient and her daughter Serene Moctezuma. On Saturday, the patient had a fall and hit her head. At that time, EMS was called but she did not go to the emergency room. Since, the patient has been getting progressively lethargic. The daughter states that she is sometimes unresponsive until asked a question multiple times. This has been getting progressively worse since Saturday and nothing has improve it. Per the daughter, the patient was alert and oriented x3 throughout this time and did not demonstrate any new or focal weakness or slurred speech. She was asked about her addressed by EMS yesterday and was able to recall it. Of note, she has been occasionally requiring more than her baseline of 5 L of oxygen at home. This has been going on for months. This is monitored by both the daughter and daughter-in-law. Once her pulse ox gets above 90%, they decrease her back to her 5 L of oxygen. She has not had any increase in oxygen requirement coinciding with the increased confusion starting Saturday. She has required increased assistance from family with breathing treatments and medication administration. She denies any fever, chills, chest pain, increased shortness of breath, cough, focal weakness, trouble speaking. She does endorse a decreased appetite, and episode of vomiting yesterday, constipation (unknown when last BM was), hip pain.  She follows with Dr. Lino Polanco for pulmonology but has not seen her since 2016. Spoke with daughter-in-law Kyara Lyons. She states that they have not been giving the patient her insulin regularly and have been using it as needed. She has not been getting her long-acting or short acting as noted in the chart due to issues with decreased appetite. She states that the patient has had increased confusion and lethargy and has been having issues with falls recently. In the ED, patient presented to the ED via EMS on non-rebreather. At that time, she was saturating at 100%, was mildly tachycardic at 105, otherwise vitals were within normal limits. EKG showed sinus tachycardia. Lab results were significant for elevated bicarb of 42, elevated troponin at 24 with repeat at 20 and 19, normal lactic acid, elevated CK of 339, UA with moderate bili and large blood. ABG showed pH of 7.257 and PCO2 of 115, with repeat ABG showing improvement of pH 7.299 and pCO2 87.5. Imaging included a CT head which showed no acute intracranial abnormality but did show chronic right parietal infarct with encephalomalacia and chronic right basal ganglia lacunar infarct. CT of the cervical spine showed no acute cervical spine fractures or misalignments chest x-ray showed no acute process bilateral hip x-ray showed mild sclerotic changes in bilateral femoral heads suspicious for avascular necrosis. X-ray of the femur showed no acute osseous abnormalities. She was given two 1 L saline bolus and placed on AVAPS. ROS:   Review of Systems   Constitutional: Negative for chills, fatigue and fever. HENT: Negative for congestion, rhinorrhea and sore throat. Respiratory: Negative for cough, chest tightness, shortness of breath and wheezing. Cardiovascular: Negative for chest pain and palpitations. Gastrointestinal: Positive for constipation. Negative for abdominal pain, diarrhea, nausea and vomiting. Genitourinary: Negative for decreased urine volume, dysuria and frequency. Musculoskeletal: Positive for arthralgias (back pain, left hip pain). Neurological: Positive for weakness (at baseline). Negative for dizziness, light-headedness and headaches. All other systems reviewed and are negative. PMHx:  Past Medical History:   Diagnosis Date    Anxiety     Carotid stenosis 12/2011    50-69% on left    Carpal tunnel syndrome 12/07/2011    bilateral    Cerebral artery occlusion with cerebral infarction [facility]) 2011? left sided weakness / usually in w/c    Chronic back pain     COPD (chronic obstructive pulmonary disease) ([facility] [address]     no pulmonologist / follows with PCP    CVA (cerebral infarction) 2011?     right sided thalamic; seen by Dr Grisel Patton    Depression     Dermatophytosis 5/29/2021    Discoloration of skin of toe 6/3/2021    Ehler's-Danlos syndrome     GERD (gastroesophageal reflux disease)     History of ischemic heart disease 03/30/2017    no cardiology    Hyperlipidemia     Hypertension     Left carotid stenosis 5/29/2021    O2 dependent 5/29/2021    Obesity     PVD (peripheral vascular disease) with claudication ([facility] [address] 8/25/2017    Tobacco abuse     Tobacco abuse     Type II or unspecified type diabetes mellitus without mention of complication, not stated as uncontrolled     Vitamin D deficiency        PSHx  Past Surgical History:   Procedure Laterality Date    ABDOMINAL EXPLORATION SURGERY  03/29/2017    with bowel resection, incarcerated ventral hernia repair    CARDIAC CATHETERIZATION  1/7/2015    Dr. Leroy Baugh  EF=55%   FFR=0.84  distal RCA bifurcation    CYST INCISION AND DRAINAGE Right 07/22/2016    Right gluteal mass incision and drainage    CYST REMOVAL  1/28/2011    excision of left arm inclusion cyst    ECHO COMPL W DOP COLOR FLOW  5/15/2013         ECHOCARDIOGRAM COMPLETE 2D W DOPPLER W COLOR  1/3/2012         ECHOCARDIOGRAM COMPLETE 2D W DOPPLER W COLOR  6/14/2012         EYE SURGERY Bilateral 2014    cataract w lens implants    HERNIA REPAIR  03/2017    abdominal    OTHER SURGICAL HISTORY  07/26/2016    re exploration left lateral wound / heel    OR DRAIN SKIN ABSCESS COMPLIC N/A 1/8/3141    EXAM UNDER ANESTHESIA  I & D BUTTOCKS performed by Jose Luis Farley DO at [address]       Medications Prior to Admission:    Prior to Admission medications    Medication Sig Start Date End Date Taking? Authorizing Provider   diazePAM (VALIUM) 5 MG tablet take 1 tablet by mouth every 8 hours if needed for SPASM(S) 10/5/21   Historical Provider, MD   omeprazole (PRILOSEC) 40 MG delayed release capsule Take 1 capsule by mouth every morning (before breakfast) 9/29/21   Jose Luis Farley DO   fenofibrate (TRICOR) 54 MG tablet take 1 tablet by mouth once daily 9/20/21   Winifred Kingsley MD   tamsulosin [facility]) 0.4 MG capsule Take 1 capsule by mouth daily for 7 days 9/10/21 9/17/21  Winifred Kingsley MD   oxyCODONE-acetaminophen (PERCOCET) 5-325 MG per tablet  9/7/21   Historical Provider, MD   gabapentin (NEURONTIN) 300 MG capsule Take 1 capsule by mouth 3 times daily for 90 days.  9/8/21 12/7/21  Winifred Kingsley MD   ursodiol (ACTIGALL) 300 MG capsule Take 1 capsule by mouth 3 times daily (with meals) 9/8/21 9/8/22  Winifred Kingsley MD   tiZANidine (ZANAFLEX) 2 MG tablet Take 1 tablet by mouth every 8 hours as needed (muscle spams)  Patient taking differently: Take 2 mg by mouth daily  9/7/21   Winifred Kingsley MD   nortriptyline (PAMELOR) 25 MG capsule Take 1 capsule by mouth nightly 9/3/21   Winifred Kingsley MD   rosuvastatin (CRESTOR) 20 MG tablet take 1 tablet by mouth nightly 8/17/21   Winifred Kingsley MD   Continuous Blood Gluc  (FREESTYLE SHREYA 2 READER) HAI 1 each by Does not apply route 4 times daily 8/6/21   Mark Hurley MD   budesonide (PULMICORT) 0.25 MG/2ML nebulizer suspension Take 2 mLs by nebulization 2 times daily 8/2/21   Winifred Kingsley MD   insulin lispro, 1 Unit Dial, 100 UNIT/ML SOPN inject 10 units subcutaneously three times a day with meals 7/27/21   Donya Morris MD   rosuvastatin (CRESTOR) 20 MG tablet take 1 tablet by mouth NIGHTLY 7/19/21   Mike Arvizu MD   B-D ULTRAFINE III SHORT PEN 31G X 8 MM MISC use as directed with LANTUS 7/6/21   Donya Morris MD   dilTIAZem (CARDIZEM CD) 240 MG extended release capsule Take 1 capsule by mouth daily 6/14/21   Ranjana Kwon MD   insulin glargine [facility]) 100 UNIT/ML injection pen Inject 75 Units into the skin 2 times daily 5/29/21   Ariadna Peres MD   butalbital-acetaminophen-caffeine (FIORICET, ESGIC) -40 MG per tablet Take 1 tablet by mouth every 4 hours as needed for Headaches 5/18/21   Donya Morris MD   traZODone (DESYREL) 150 MG tablet Take 0.5 tablets by mouth nightly for 4 days TAKE 1 TABLET BY MOUTH EVERY NIGHT AT BEDTIME 5/18/21 10/14/21  Donya Morris MD   ipratropium-albuterol (DUONEB) 0.5-2.5 (3) MG/3ML SOLN nebulizer solution USE 3 ML VIA NEBULIZER EVERY 4 HOURS AS NEEDED FOR SHORTNESS OF BREATH 5/3/21   Donya Morris MD   apixaban (ELIQUIS) 5 MG TABS tablet Take 1 tablet by mouth 2 times daily Start after done with started pack 4/20/21   Donya Morris MD   vitamin B-12 (CYANOCOBALAMIN) 1000 MCG tablet Take 1 tablet by mouth daily 4/20/21   Donya Morris MD   vitamin D (ERGOCALCIFEROL) 1.25 MG (75871 UT) CAPS capsule TAKE 1 CAPSULE PO q weekly 3/9/21   Donya Morris MD   albuterol (PROVENTIL) (2.5 MG/3ML) 0.083% nebulizer solution USE 1 VIAL VIA NEBULIZER EVERY 4 HOURS AS NEEDED FOR WHEEZING OR SHORTNESS OF BREATH 5/7/20   Donya Morris MD   metoprolol tartrate (LOPRESSOR) 25 MG tablet TAKE 1/2 TABLET BY MOUTH TWICE DAILY 9/9/19 4/18/21  Donya Morris MD   OXYGEN Inhale 5 L into the lungs continuous    Historical Provider, MD        Allergies:   Patient has no known allergies. Social History:    reports that she has been smoking cigarettes.  She has a 34.00 pack-year smoking Weakness (generalized, no focal weakness) present.       Comments: Slow to respond to questions but answers appropriately   Psychiatric:         Attention and Perception: Attention normal.         Mood and Affect: Mood normal.         LABS:  Recent Results (from the past 24 hour(s))   POCT Glucose    Collection Time: 10/17/21  4:39 PM   Result Value Ref Range    Meter Glucose 140 (H) 74 - 99 mg/dL   CBC auto differential    Collection Time: 10/17/21  5:03 PM   Result Value Ref Range    WBC 9.7 4.5 - 11.5 E9/L    RBC 4.40 3.50 - 5.50 E12/L    Hemoglobin 12.7 11.5 - 15.5 g/dL    Hematocrit 42.4 34.0 - 48.0 %    MCV 96.4 80.0 - 99.9 fL    MCH 28.9 26.0 - 35.0 pg    MCHC 30.0 (L) 32.0 - 34.5 %    RDW 13.2 11.5 - 15.0 fL    Platelets 712 602 - 039 E9/L    MPV 9.6 7.0 - 12.0 fL    Neutrophils % 66.6 43.0 - 80.0 %    Immature Granulocytes % 0.3 0.0 - 5.0 %    Lymphocytes % 25.7 20.0 - 42.0 %    Monocytes % 5.5 2.0 - 12.0 %    Eosinophils % 1.3 0.0 - 6.0 %    Basophils % 0.6 0.0 - 2.0 %    Neutrophils Absolute 6.45 1.80 - 7.30 E9/L    Immature Granulocytes # 0.03 E9/L    Lymphocytes Absolute 2.49 1.50 - 4.00 E9/L    Monocytes Absolute 0.53 0.10 - 0.95 E9/L    Eosinophils Absolute 0.13 0.05 - 0.50 E9/L    Basophils Absolute 0.06 0.00 - 0.20 E9/L   Comprehensive Metabolic Panel w/ Reflex to MG    Collection Time: 10/17/21  5:03 PM   Result Value Ref Range    Sodium 142 132 - 146 mmol/L    Potassium reflex Magnesium 4.0 3.5 - 5.0 mmol/L    Chloride 93 (L) 98 - 107 mmol/L    CO2 42 (HH) 22 - 29 mmol/L    Anion Gap 7 7 - 16 mmol/L    Glucose 153 (H) 74 - 99 mg/dL    BUN 8 6 - 20 mg/dL    CREATININE 0.3 (L) 0.5 - 1.0 mg/dL    GFR Non-African American >60 >=60 mL/min/1.73    GFR African American >60     Calcium 8.9 8.6 - 10.2 mg/dL    Total Protein 6.4 6.4 - 8.3 g/dL    Albumin 3.1 (L) 3.5 - 5.2 g/dL    Total Bilirubin 0.3 0.0 - 1.2 mg/dL    Alkaline Phosphatase 103 35 - 104 U/L    ALT 10 0 - 32 U/L    AST 19 0 - 31 U/L   Troponin Collection Time: 10/17/21  5:03 PM   Result Value Ref Range    Troponin, High Sensitivity 24 (H) 0 - 9 ng/L   Lactate, Sepsis    Collection Time: 10/17/21  5:03 PM   Result Value Ref Range    Lactic Acid, Sepsis 1.2 0.5 - 1.9 mmol/L   CK    Collection Time: 10/17/21  5:03 PM   Result Value Ref Range    Total  (H) 20 - 180 U/L   Urinalysis with Microscopic    Collection Time: 10/17/21  5:03 PM   Result Value Ref Range    Color, UA Yellow Straw/Yellow    Clarity, UA Clear Clear    Glucose, Ur Negative Negative mg/dL    Bilirubin Urine MODERATE (A) Negative    Ketones, Urine 15 (A) Negative mg/dL    Specific Gravity, UA 1.025 1.005 - 1.030    Blood, Urine LARGE (A) Negative    pH, UA 6.0 5.0 - 9.0    Protein, UA Negative Negative mg/dL    Urobilinogen, Urine 0.2 <2.0 E.U./dL    Nitrite, Urine Negative Negative    Leukocyte Esterase, Urine Negative Negative    Mucus, UA Present (A) None Seen /LPF    WBC, UA 1-3 0 - 5 /HPF    RBC, UA 10-20 (A) 0 - 2 /HPF    Bacteria, UA NONE SEEN None Seen /HPF   COVID-19, Rapid    Collection Time: 10/17/21  5:03 PM    Specimen: Nasopharyngeal Swab   Result Value Ref Range    SARS-CoV-2, NAAT Not Detected Not Detected   EKG 12 Lead    Collection Time: 10/17/21  5:27 PM   Result Value Ref Range    Ventricular Rate 103 BPM    Atrial Rate 103 BPM    P-R Interval 152 ms    QRS Duration 80 ms    Q-T Interval 332 ms    QTc Calculation (Bazett) 434 ms    P Axis 82 degrees    R Axis 23 degrees    T Axis 76 degrees   Troponin    Collection Time: 10/17/21  6:45 PM   Result Value Ref Range    Troponin, High Sensitivity 20 (H) 0 - 9 ng/L   Troponin    Collection Time: 10/17/21  7:45 PM   Result Value Ref Range    Troponin, High Sensitivity 19 (H) 0 - 9 ng/L   Blood Gas, Arterial    Collection Time: 10/17/21  7:47 PM   Result Value Ref Range    Date Analyzed [id number]     Time Analyzed 1947     Source: Blood Arterial     pH, Blood Gas 7.257 (L) 7.350 - 7.450    PCO2 115.2 (HH) 35.0 - 45.0 mmHg    PO2 152.3 (H) 75.0 - 100.0 mmHg    HCO3 50.2 (H) 22.0 - 26.0 mmol/L    B.E. 17.9 (H) -3.0 - 3.0 mmol/L    O2 Sat 99.3 (H) 92.0 - 98.5 %    O2Hb 97.3 (H) 94.0 - 97.0 %    COHb 1.7 (H) 0.0 - 1.5 %    MetHb 0.3 0.0 - 1.5 %    O2 Content 17.4 mL/dL    HHb 0.7 0.0 - 5.0 %    tHb (est) 12.5 11.5 - 16.5 g/dL    Mode NRB 15L     Date Of Collection      Time Collected      Pt Temp 37.0 C     ID [id number]     Lab [id number]     Critical(s) Notified Handed report to Dr/RN    Blood Gas, Arterial    Collection Time: 10/17/21 10:19 PM   Result Value Ref Range    Date Analyzed [id number]     Time Analyzed 2219     Source: Blood Arterial     pH, Blood Gas 7.299 (L) 7.350 - 7.450    PCO2 87.8 (HH) 35.0 - 45.0 mmHg    PO2 111.7 (H) 75.0 - 100.0 mmHg    HCO3 42.1 (H) 22.0 - 26.0 mmol/L    B.E. 12.3 (H) -3.0 - 3.0 mmol/L    O2 Sat 98.5 92.0 - 98.5 %    PO2/FIO2 1.60 mmHg/%    AaDO2 275.5 mmHg    RI(T) 247 %    O2Hb 97.3 (H) 94.0 - 97.0 %    COHb 0.9 0.0 - 1.5 %    MetHb 0.3 0.0 - 1.5 %    O2 Content 16.6 mL/dL    HHb 1.5 0.0 - 5.0 %    tHb (est) 12.0 11.5 - 16.5 g/dL    Mode AVAPS     FIO2 70.0 %    Rr Mechanical 20.0 b/min    Vt Mechanical 450.0 mL    Peep/Cpap 8.0 cmH2O    Date Of Collection      Time Collected      Pt Temp 37.0 C     ID [id number]     Lab [id number]     Critical(s) Notified Handed report to Dr/RN        XR CHEST 1 VIEW   Final Result   No acute process. XR HIP BILATERAL W AP PELVIS (2 VIEWS)   Final Result   No acute abnormality of the pelvis. Mild sclerotic changes in the bilateral femoral heads, suspicious for   avascular necrosis. Consider non emergent pelvic MR if clinically   appropriate. XR FEMUR LEFT (MIN 2 VIEWS)   Final Result   No acute osseous abnormality. CT HEAD WO CONTRAST   Final Result   No acute intracranial abnormality. Chronic right parietal infarction with   encephalomalacia. Chronic right basal ganglia lacunar infarction.       Opacification of right middle ear and bilateral mastoid air cells which may   represent sequela from maria isabel mastoiditis. CT CERVICAL SPINE WO CONTRAST   Final Result   No acute cervical spine fracture or malalignment. Mild degenerative changes of the cervical spine. ASSESSMENT/PLAN:      Active Hospital Problems    Diagnosis Date Noted    Acute respiratory failure with hypercapnia (HCC) [J96.02] 10/17/2021    Somnolence [R40.0] 10/17/2021    COPD (chronic obstructive pulmonary disease) ([facility] [J44.9] 05/29/2021    Uncontrolled type 2 diabetes mellitus with hyperglycemia (HCC) [E11.65]     PAF (paroxysmal atrial fibrillation) ([facility] [I48.0] 04/26/2021     Acute respiratory failure with hypercapnia  She has had a 2-day history of worsening lethargy, confusion, and falls with a past medical history of COPD dependent on 5L O2. Likely secondary to COPD exacerbation.  -Chest x-ray negative for acute process  -ABG showed pH of 7.257 and PCO2 of 115, with repeat ABG showing improvement of pH 7.299 and pCO2 87.5  -Taking home Pulmicort and albuterol as needed  -Previously following with pulmonology but last seen 2016  -Continue home Pulmicort BID and scheduled duonebs q4 hours  -Continue AVAPS  -Azithromycin ordered 500mg once then 250mg daily for 4 days  -Respiratory panel pending  - Repeat ABG in AM  - Procalcitonin in AM  - Monitor CBC and CMP  -Pulmonology consulted    Altered mental status  Worsening for the last 2 days with no increased weakness from baseline or slurred speech. The patient does require assistance with any ambulation and is mainly in a wheelchair.   -CT of the head negative for acute stroke  -Patient alert and oriented x3 in the ED but slow to respond  -Likely secondary to hypercapnia  -Continue AVAPS  -Continue monitor mental status    Abdominal tenderness  -Patient tender to palpation in the left upper and left lower quadrant  -Endorses constipation with unknown date of last bowel movement  -MiraLAX daily ordered    COPD  -Continue home Pulmicort and scheduled duonebs  -Continue AVAPS  -Pulmonology consulted    Atrial fibrillation  -EKG in the ED showed Sinus tachycardia  -Mildly tachycardic in the ED  -Continue home Cardizem and Eliquis  -Telemetry    Diabetes  -Last A1c 7.0  -Medium dose sliding scale  -POCT glucose ordered  -Hypoglycemia protocol ordered    Compression fracture  -L1 compression fracture on 8/11/2021  -Following with Neurosurgery as an outpatient  -Continue home Percocet, Daughter-in-law Samantha Carmona states taking it q6 PRN    Continue home medications: Crestor, nortriptyline, gabapentin, ursodiol, and fenofibrate    DVT ppx: Eliquis  GI ppx: Protonix  Code Status: DNR-CCA    Case discussed with attending on call Dr. Nicole Machado.     Edu Coon DO  Family Medicine Resident PGY-2  10/17/2021

## 2021-10-18 NOTE — PROGRESS NOTES
[facility]  [facility]    Chief complaint :  Chief Complaint   Patient presents with    Altered Mental Status       Subjective:    No overnight problems. Patient was woozy, sleepy and unable to answer questions. Per patient's daughter, she has moments where she seems better, and then moments where she seems to worsen with more AMS. Per daughter she is unsure of the year or her home address still but does know her name and location. She has been denying PO, has not yet had a bowel movement, and has made 600 units of dark, reddish urine overnight. Past medical, surgical, family and social history were reviewed, non-contributory, and unchanged unless otherwise stated. Review of Systems   Constitutional: Positive for activity change and appetite change. Negative for chills, diaphoresis and fever. Respiratory: Positive for cough. Gastrointestinal: Positive for abdominal distention and constipation. Negative for diarrhea and vomiting. Genitourinary: Positive for hematuria. Skin: Negative for color change, pallor and rash. Neurological: Negative for facial asymmetry. Psychiatric/Behavioral: Positive for confusion and decreased concentration. Objective:  BP (!) 140/71   Pulse 113   Temp 98.8 °F (37.1 °C) (Axillary)   Resp 20   Ht 5' 5\" (1.651 m)   Wt 157 lb (71.2 kg)   LMP 03/20/2015   SpO2 95%   BMI 26.13 kg/m²     Physical Exam  Constitutional:       Appearance: She is ill-appearing. She is not diaphoretic. HENT:      Head: Normocephalic. Right Ear: External ear normal.      Left Ear: External ear normal.      Nose: Nose normal.   Cardiovascular:      Rate and Rhythm: Regular rhythm. Tachycardia present. Pulses: Normal pulses. Heart sounds: Normal heart sounds. Abdominal:      General: Bowel sounds are normal. There is distension. Tenderness: There is abdominal tenderness. Skin:     General: Skin is warm and dry. Capillary Refill: Capillary refill takes 2 to 3 seconds. Neurological:      Mental Status: She is disoriented.    Psychiatric:         Mood and Affect: Mood normal.     Pulmonary: PAP sounds appreciated, diminished breath sounds     Labs:  LAST LABS OF NOTE 10/18/21 0540   PCO2 73.9  PO2 125.2  HCO3 41.0  BASE EXCESS 12.8  02 SAT 98.7%    Recent Results (from the past 24 hour(s))   POCT Glucose    Collection Time: 10/17/21  4:39 PM   Result Value Ref Range    Meter Glucose 140 (H) 74 - 99 mg/dL   CBC auto differential    Collection Time: 10/17/21  5:03 PM   Result Value Ref Range    WBC 9.7 4.5 - 11.5 E9/L    RBC 4.40 3.50 - 5.50 E12/L    Hemoglobin 12.7 11.5 - 15.5 g/dL    Hematocrit 42.4 34.0 - 48.0 %    MCV 96.4 80.0 - 99.9 fL    MCH 28.9 26.0 - 35.0 pg    MCHC 30.0 (L) 32.0 - 34.5 %    RDW 13.2 11.5 - 15.0 fL    Platelets 094 464 - 083 E9/L    MPV 9.6 7.0 - 12.0 fL    Neutrophils % 66.6 43.0 - 80.0 %    Immature Granulocytes % 0.3 0.0 - 5.0 %    Lymphocytes % 25.7 20.0 - 42.0 %    Monocytes % 5.5 2.0 - 12.0 %    Eosinophils % 1.3 0.0 - 6.0 %    Basophils % 0.6 0.0 - 2.0 %    Neutrophils Absolute 6.45 1.80 - 7.30 E9/L    Immature Granulocytes # 0.03 E9/L    Lymphocytes Absolute 2.49 1.50 - 4.00 E9/L    Monocytes Absolute 0.53 0.10 - 0.95 E9/L    Eosinophils Absolute 0.13 0.05 - 0.50 E9/L    Basophils Absolute 0.06 0.00 - 0.20 E9/L   Comprehensive Metabolic Panel w/ Reflex to MG    Collection Time: 10/17/21  5:03 PM   Result Value Ref Range    Sodium 142 132 - 146 mmol/L    Potassium reflex Magnesium 4.0 3.5 - 5.0 mmol/L    Chloride 93 (L) 98 - 107 mmol/L    CO2 42 (HH) 22 - 29 mmol/L    Anion Gap 7 7 - 16 mmol/L    Glucose 153 (H) 74 - 99 mg/dL    BUN 8 6 - 20 mg/dL    CREATININE 0.3 (L) 0.5 - 1.0 mg/dL    GFR Non-African American >60 >=60 mL/min/1.73    GFR African American >60     Calcium 8.9 8.6 - 10.2 mg/dL    Total Protein 6.4 6.4 - 8.3 g/dL    Albumin 3.1 (L) 3.5 - 5.2 g/dL    Total Bilirubin 0.3 0.0 - 1.2 mg/dL    Alkaline Phosphatase 103 35 - 104 U/L    ALT 10 0 - 32 U/L    AST 19 0 - 31 U/L   Troponin    Collection Time: 10/17/21  5:03 PM   Result Value Ref Range    Troponin, High Sensitivity 24 (H) 0 - 9 ng/L   Lactate, Sepsis    Collection Time: 10/17/21  5:03 PM   Result Value Ref Range    Lactic Acid, Sepsis 1.2 0.5 - 1.9 mmol/L   CK    Collection Time: 10/17/21  5:03 PM   Result Value Ref Range    Total  (H) 20 - 180 U/L   Urinalysis with Microscopic    Collection Time: 10/17/21  5:03 PM   Result Value Ref Range    Color, UA Yellow Straw/Yellow    Clarity, UA Clear Clear    Glucose, Ur Negative Negative mg/dL    Bilirubin Urine MODERATE (A) Negative    Ketones, Urine 15 (A) Negative mg/dL    Specific Gravity, UA 1.025 1.005 - 1.030    Blood, Urine LARGE (A) Negative    pH, UA 6.0 5.0 - 9.0    Protein, UA Negative Negative mg/dL    Urobilinogen, Urine 0.2 <2.0 E.U./dL    Nitrite, Urine Negative Negative    Leukocyte Esterase, Urine Negative Negative    Mucus, UA Present (A) None Seen /LPF    WBC, UA 1-3 0 - 5 /HPF    RBC, UA 10-20 (A) 0 - 2 /HPF    Bacteria, UA NONE SEEN None Seen /HPF   COVID-19, Rapid    Collection Time: 10/17/21  5:03 PM    Specimen: Nasopharyngeal Swab   Result Value Ref Range    SARS-CoV-2, NAAT Not Detected Not Detected   EKG 12 Lead    Collection Time: 10/17/21  5:27 PM   Result Value Ref Range    Ventricular Rate 103 BPM    Atrial Rate 103 BPM    P-R Interval 152 ms    QRS Duration 80 ms    Q-T Interval 332 ms    QTc Calculation (Bazett) 434 ms    P Axis 82 degrees    R Axis 23 degrees    T Axis 76 degrees   Troponin    Collection Time: 10/17/21  6:45 PM   Result Value Ref Range    Troponin, High Sensitivity 20 (H) 0 - 9 ng/L   Troponin    Collection Time: 10/17/21  7:45 PM   Result Value Ref Range    Troponin, High Sensitivity 19 (H) 0 - 9 ng/L   Blood Gas, Arterial    Collection Time: 10/17/21  7:47 PM   Result Value Ref Range    Date Analyzed [id number]     Time Analyzed 1947 Coronavirus NL63 by PCR Not Detected Not Detected    Coronavirus OC43 by PCR Not Detected Not Detected    SARS-CoV-2, PCR Not Detected Not Detected    Human Metapneumovirus by PCR Not Detected Not Detected    Human Rhinovirus/Enterovirus by PCR Not Detected Not Detected    Influenza A by PCR Not Detected Not Detected    Influenza B by PCR Not Detected Not Detected    Mycoplasma pneumoniae by PCR Not Detected Not Detected    Parainfluenza Virus 1 by PCR Not Detected Not Detected    Parainfluenza Virus 2 by PCR Not Detected Not Detected    Parainfluenza Virus 3 by PCR Not Detected Not Detected    Parainfluenza Virus 4 by PCR Not Detected Not Detected    Respiratory Syncytial Virus by PCR Not Detected Not Detected   Comprehensive Metabolic Panel w/ Reflex to MG    Collection Time: 10/18/21  4:08 AM   Result Value Ref Range    Sodium 142 132 - 146 mmol/L    Potassium reflex Magnesium 4.1 3.5 - 5.0 mmol/L    Chloride 96 (L) 98 - 107 mmol/L    CO2 38 (H) 22 - 29 mmol/L    Anion Gap 8 7 - 16 mmol/L    Glucose 202 (H) 74 - 99 mg/dL    BUN 7 6 - 20 mg/dL    CREATININE 0.2 (L) 0.5 - 1.0 mg/dL    GFR Non-African American >60 >=60 mL/min/1.73    GFR African American >60     Calcium 8.1 (L) 8.6 - 10.2 mg/dL    Total Protein 5.1 (L) 6.4 - 8.3 g/dL    Albumin 2.7 (L) 3.5 - 5.2 g/dL    Total Bilirubin 0.3 0.0 - 1.2 mg/dL    Alkaline Phosphatase 85 35 - 104 U/L    ALT 8 0 - 32 U/L    AST 18 0 - 31 U/L   CBC auto differential    Collection Time: 10/18/21  4:08 AM   Result Value Ref Range    WBC 9.5 4.5 - 11.5 E9/L    RBC 3.53 3.50 - 5.50 E12/L    Hemoglobin 10.3 (L) 11.5 - 15.5 g/dL    Hematocrit 34.6 34.0 - 48.0 %    MCV 98.0 80.0 - 99.9 fL    MCH 29.2 26.0 - 35.0 pg    MCHC 29.8 (L) 32.0 - 34.5 %    RDW 13.3 11.5 - 15.0 fL    Platelets 746 293 - 354 E9/L    MPV 9.9 7.0 - 12.0 fL    Neutrophils % 77.6 43.0 - 80.0 %    Immature Granulocytes % 0.3 0.0 - 5.0 %    Lymphocytes % 14.9 (L) 20.0 - 42.0 %    Monocytes % 5.6 2.0 - 12.0 % Eosinophils % 1.1 0.0 - 6.0 %    Basophils % 0.5 0.0 - 2.0 %    Neutrophils Absolute 7.37 (H) 1.80 - 7.30 E9/L    Immature Granulocytes # 0.03 E9/L    Lymphocytes Absolute 1.41 (L) 1.50 - 4.00 E9/L    Monocytes Absolute 0.53 0.10 - 0.95 E9/L    Eosinophils Absolute 0.10 0.05 - 0.50 E9/L    Basophils Absolute 0.05 0.00 - 0.20 E9/L   Blood Gas, Arterial    Collection Time: 10/18/21  5:40 AM   Result Value Ref Range    Date Analyzed [id number]     Time Analyzed 0540     Source: Blood Arterial     pH, Blood Gas 7.362 7.350 - 7.450    PCO2 73.9 (HH) 35.0 - 45.0 mmHg    PO2 125.2 (H) 75.0 - 100.0 mmHg    HCO3 41.0 (H) 22.0 - 26.0 mmol/L    B.E. 12.8 (H) -3.0 - 3.0 mmol/L    O2 Sat 98.7 (H) 92.0 - 98.5 %    PO2/FIO2 2.09 mmHg/%    AaDO2 206.3 mmHg    RI(T) 165 %    O2Hb 97.3 (H) 94.0 - 97.0 %    COHb 1.1 0.0 - 1.5 %    MetHb 0.3 0.0 - 1.5 %    O2 Content 16.1 mL/dL    HHb 1.3 0.0 - 5.0 %    tHb (est) 11.6 11.5 - 16.5 g/dL    Mode avaps     FIO2 60.0 %    Rr Mechanical 20.0 b/min    Vt Mechanical 450.0 mL    Peep/Cpap 8.0 cmH2O    Date Of Collection      Time Collected      Pt Temp 37.0 C     ID [id number]     Lab [id number]     Critical(s) Notified Handed report to Dr/RN    POCT Glucose    Collection Time: 10/18/21  6:08 AM   Result Value Ref Range    Meter Glucose 173 (H) 74 - 99 mg/dL       Radiology and other tests reviewed:  XR CHEST 1 VIEW   Final Result   No acute process. XR HIP BILATERAL W AP PELVIS (2 VIEWS)   Final Result   No acute abnormality of the pelvis. Mild sclerotic changes in the bilateral femoral heads, suspicious for   avascular necrosis. Consider non emergent pelvic MR if clinically   appropriate. XR FEMUR LEFT (MIN 2 VIEWS)   Final Result   No acute osseous abnormality. CT HEAD WO CONTRAST   Final Result   No acute intracranial abnormality. Chronic right parietal infarction with   encephalomalacia. Chronic right basal ganglia lacunar infarction.       Opacification of right middle ear and bilateral mastoid air cells which may   represent sequela from maria isabel mastoiditis. CT CERVICAL SPINE WO CONTRAST   Final Result   No acute cervical spine fracture or malalignment. Mild degenerative changes of the cervical spine. Assessment:  Active Hospital Problems    Diagnosis Date Noted    Acute respiratory failure with hypercapnia (HCC) [J96.02] 10/17/2021    Somnolence [R40.0] 10/17/2021    COPD (chronic obstructive pulmonary disease) ([facility] [J44.9] 05/29/2021    Uncontrolled type 2 diabetes mellitus with hyperglycemia (HCC) [E11.65]     PAF (paroxysmal atrial fibrillation) ([facility] [address] [I48.0] 04/26/2021       Plan:  Acute respiratory failure with hypercapnia  She has had a 2-day history of worsening lethargy, confusion, and falls with a past medical history of COPD dependent on 5L O2. Likely secondary to COPD exacerbation.  -Chest x-ray negative for acute process  -ABG showed pH of 7.257 and PCO2 of 115, with repeat ABG showing improvement of pH 7.299 and pCO2 87.8  - 10/18 ABG pH 7.362 pCO2 73.9  -Patient still elevated but getting better  -Taking home Pulmicort and albuterol as needed  -Previously following with pulmonology but last seen 2016  -Continue home Pulmicort BID and scheduled duonebs q4 hours  -Continue AVAPS  -Azithromycin ordered 500mg once then 250mg daily for 4 days  -Respiratory panel- negative  - Procalcitonin pending  - Monitor CBC and CMP  -Pulmonology consulted     Altered mental status  Worsening for the last 2 days with no increased weakness from baseline or slurred speech.  The patient does require assistance with any ambulation and is mainly in a wheelchair.   -CT head 10/17- negative for acute stroke  -Patient alert and oriented x3 in the ED but slow to respond  -Likely secondary to hypercapnia  -Continue AVAPS  -Continue monitor mental status     Abdominal tenderness  -Patient tender to palpation in the left upper and left lower quadrant  -Endorses constipation with unknown date of last bowel movement  -MiraLAX daily     COPD  -Continue home Pulmicort and scheduled duonebs  -Continue AVAPS  -Pulmonology consulted     Atrial fibrillation  -EKG in the ED showed Sinus tachycardia  -Mildly tachycardic in the ED  -Continue home Cardizem and Eliquis  -Telemetry     Diabetes  -Last A1c 7.0  -Medium dose sliding scale  -POCT glucose ordered  -Hypoglycemia protocol ordered     Compression fracture  -L1 compression fracture on 8/11/2021  -Following with Neurosurgery as an outpatient  -Continue home Percocet, Daughter-in-law Kia Peres states taking it q6 PRN     Continue home medications: Crestor, nortriptyline, gabapentin, ursodiol, and fenofibrate      DVT ppx: Eliquis 5 mg BID  GI ppx: Protonix 40 mg BID  Code Status: DNR-CCA          Versie Freud   MS3 Medical Student  10/18/21   7:20 AM     THIS NOTE WAS WRITTEN BY Bindu

## 2021-10-18 NOTE — PROGRESS NOTES
Date: 10/17/2021    Time: 8:07 PM    Patient Placed On BIPAP/CPAP/ Non-Invasive Ventilation? Yes    If no must comment. Facial area red/color change? No           If YES are Blister/Lesion present? No   If yes must notify nursing staff  BIPAP/CPAP skin barrier? Yes    Skin barrier type:mepilexlite       Comments: Pt placed on AVAPS post ABG results. Mepilex in place. FiO2 started at 80%, will wean as tolerated.         Sunday Lockhart RCP      10/17/21 2005   NIV Type   Mode AVAPS   Mask Type Full face mask   Mask Size Small   Settings/Measurements   CPAP/EPAP 8 cmH2O   IPAP Min 20 cmH2O   IPAP Max 30 cmH2O   Vt Ordered 450 mL   Rate Ordered 20   Resp 23   FiO2  80 %   Vt Exhaled 392 mL   Minute Volume 10 Liters   Mask Leak (lpm) 42 lpm   Comfort Level Good   Using Accessory Muscles No

## 2021-10-18 NOTE — ED NOTES
[facility] faxed and confirmed with Gisele Mir. Patient ready for transport. Respiratory therapist notified.       Rose Brewer RN  10/18/21 0001

## 2021-10-18 NOTE — CONSULTS
Patient was last seen by us in the hospital in 2017. Did not show up for follow up appointments as scheduled. Non compliant. Please consult a different pulmonary group.     Thank you  Armando Henderson MD

## 2021-10-19 NOTE — PROGRESS NOTES
Date: 10/18/2021     Time: 8:21 PM    Patient Placed On BIPAP/CPAP/ Non-Invasive Ventilation? No, patient remains on AVAPS    If no must comment. Facial area red/color change? No           If YES are Blister/Lesion present? No   If yes must notify nursing staff  BIPAP/CPAP skin barrier?   Yes    Skin barrier type:mepilexlite       Comments:        Carly Gilliland RCP

## 2021-10-19 NOTE — PROGRESS NOTES
Pulmonary Progress Note    Admit Date: 10/17/2021  Hospital day                               PCP: Sharan West MD    Chief Complaint (s):  Patient Active Problem List   Diagnosis    Hyperlipidemia    Depression    Ama-Danlos syndrome    MARIAA (obstructive sleep apnea)    Tobacco abuse    Carpal tunnel syndrome    Vitamin D deficiency    Diastasis recti    PVD (peripheral vascular disease) with claudication ([address]    Compression fracture of L1 lumbar vertebra ([address]    H/O: stroke    Diabetic polyneuropathy associated with type 2 diabetes mellitus ([address]    PAF (paroxysmal atrial fibrillation) ([address]    Coronary artery disease involving native coronary artery of native heart without angina pectoris    DNR (do not resuscitate)    COPD (chronic obstructive pulmonary disease) ([address]    Uncontrolled type 2 diabetes mellitus with hyperglycemia ([address]    Dermatophytosis    Left carotid stenosis    O2 dependent    Acute respiratory failure with hypercapnia (HCC)    Somnolence       Subjective:  · Sitting up, awake and alert. Discussion was held regarding utility of noninvasive ventilation in this patient with end-stage lung disease and chronic hypercapnic respiratory failure. At this point, she notes that she will not use it despite it helping her to live longer. She notes that \"everybody says that. \"      Vitals:  VITALS:  BP (!) 145/78   Pulse 108   Temp 98.1 °F (36.7 °C) (Oral)   Resp 18   Ht 5' 5\" (1.651 m)   Wt 157 lb (71.2 kg)   LMP 2015   SpO2 95%   BMI 26.13 kg/m²     24HR INTAKE/OUTPUT:      Intake/Output Summary (Last 24 hours) at 10/19/2021 1743  Last data filed at 10/19/2021 1715  Gross per 24 hour   Intake 180 ml   Output 2500 ml   Net -2320 ml       24HR PULSE OXIMETRY RANGE:    SpO2  Av %  Min: 95 %  Max: 97 %    Medications:  IV:   sodium chloride      dextrose         Scheduled Meds:   apixaban  5 mg Oral BID    dilTIAZem  240 mg Oral Daily  fenofibrate  54 mg Oral Daily    gabapentin  300 mg Oral TID    insulin lispro  0-12 Units SubCUTAneous TID WC    insulin lispro  0-6 Units SubCUTAneous Nightly    nortriptyline  25 mg Oral Nightly    pantoprazole  40 mg Oral QAM AC    rosuvastatin  20 mg Oral Nightly    ursodiol  300 mg Oral TID WC    sodium chloride flush  5-40 mL IntraVENous 2 times per day    polyethylene glycol  17 g Oral Daily    ipratropium-albuterol  1 ampule Inhalation Q4H    azithromycin  250 mg IntraVENous Q24H    methylPREDNISolone  40 mg IntraVENous Q12H    budesonide  1,000 mcg Nebulization BID       Diet:   ADULT DIET; Regular; 3 carb choices (45 gm/meal)     EXAM:  General: No distress. Alert. Eyes: PERRL. No sclera icterus. No conjunctival injection. ENT: No discharge. Pharynx clear. Neck: Trachea midline. Normal thyroid. Resp: No accessory muscle use. No rales. No wheezing. No rhonchi. CV: Regular rate. Regular rhythm. No murmur or rub. Abd: Non-tender. Non-distended. No masses. No organomegaly. Normal bowel sounds. Skin: Warm and dry. No nodule on exposed extremities. No rash on exposed extremities. Ext: No cyanosis, clubbing, edema  Lymph: No cervical LAD. No supraclavicular LAD. M/S: No cyanosis. No joint deformity. No clubbing. Neuro: Awake. Follows commands. Positive pupils/gag/corneals. Normal pain response. Results:  CBC:   Recent Labs     10/17/21  1703 10/18/21  0408 10/19/21  1152   WBC 9.7 9.5 8.1   HGB 12.7 10.3* 11.0*   HCT 42.4 34.6 34.6   MCV 96.4 98.0 92.5    231 258     BMP:   Recent Labs     10/17/21  1703 10/18/21  0408 10/19/21  1152    142 136   K 4.0 4.1 3.7   CL 93* 96* 90*   CO2 42* 38* 36*   BUN 8 7 8   CREATININE 0.3* 0.2* 0.3*     LIVER PROFILE:   Recent Labs     10/17/21  1703 10/18/21  0408 10/19/21  1152   AST 19 18 17   ALT 10 8 9   BILITOT 0.3 0.3 0.4   ALKPHOS 103 85 86     PT/INR: No results for input(s): PROTIME, INR in the last 72 hours.   APTT: No results for input(s): APTT in the last 72 hours. Pathology:  1. N/A      Microbiology:  1. None    Recent ABG:   Recent Labs     10/18/21  0540   PH 7.362   PO2 125.2*   PCO2 73.9*   HCO3 41.0*   BE 12.8*   O2SAT 98.7*   METHB 0.3   O2HB 97.3*   COHB 1.1   O2CON 16.1   HHB 1.3   THB 11.6     FiO2 : 35 %       Recent Films:  XR CHEST 1 VIEW   Final Result   No acute process. XR HIP BILATERAL W AP PELVIS (2 VIEWS)   Final Result   No acute abnormality of the pelvis. Mild sclerotic changes in the bilateral femoral heads, suspicious for   avascular necrosis. Consider non emergent pelvic MR if clinically   appropriate. XR FEMUR LEFT (MIN 2 VIEWS)   Final Result   No acute osseous abnormality. CT HEAD WO CONTRAST   Final Result   No acute intracranial abnormality. Chronic right parietal infarction with   encephalomalacia. Chronic right basal ganglia lacunar infarction. Opacification of right middle ear and bilateral mastoid air cells which may   represent sequela from maria isabel mastoiditis. CT CERVICAL SPINE WO CONTRAST   Final Result   No acute cervical spine fracture or malalignment. Mild degenerative changes of the cervical spine. Assessment:  1. Acute on chronic hypercapnic respiratory failure  2. Underlying severe and end-stage chronic obstructive pulmonary disease  3. Ongoing tobacco abuse        Plan:  1. Maximize treatment for GOLD class 4 COPD  2. The patient would benefit from noninvasive ventilation at home. At this point, refuses to consider it. In the past, the patient was dismissed from another practice for noncompliance. It appears where we will be headed down that same road. Time at the bedside, reviewing labs and radiographs, reviewing updated notes and consultations, discussing with staff and family was more than 35 minutes.     Please note that voice recognition technology was used in the preparation of this note and make therefore it may contain inadvertent transcription errors. If the patient is a COVID 19 isolation patient, the above physical exam reflects that of the examining physician for the day. Mirna Cadena MD,  MMK., F.C.C.P.     [facility], [address] [place] [address], [address] [place] [address]

## 2021-10-19 NOTE — PROGRESS NOTES
Occupational Therapy  OCCUPATIONAL THERAPY INITIAL EVALUATION  [facility]  [address], [facility], [place]    Date: 10/19/2021     Patient Name: Mikel Cho  MRN: [id number]  : 1965  Room: [address] [place] [address]    Evaluating OT: Reid Alford - [phone]    Referring Provider: Ruben Cole MD  Specific Provider Orders/Date: \"OT eval and treat\" - 10/19/2021    Diagnosis: Acute respiratory failure with hypercapnia ([facility] [J96.02]      Pertinent Medical History: CVA, COPD, chronic back pain, anxiety, HTN, obesity, DM, PVD    Precautions: fall risk, bed alarm, 5L O2 via nasal cannula    Assessment of Current Deficits:    [x] Functional mobility   [x]ADLs  [x] Strength               [x]Cognition   [x] Functional transfers   [x] IADLs         [x] Safety Awareness   [x]Endurance   [] Fine Coordination              [x] Balance      [] Vision/perception   [x]Sensation    []Gross Motor Coordination  [] ROM  [] Delirium                   [] Motor Control     OT PLAN OF CARE   OT POC is based on physician orders, patient diagnosis, and results of clinical assessment.   Frequency/Duration 2-5 days/week for 2 weeks PRN   Specific OT Treatment Interventions to Include:   * Instruction/training on adapted ADL techniques and AE recommendations to increase functional independence within precautions       * Training on energy conservation strategies, correct breathing pattern and techniques to improve independence/tolerance for self-care routine  * Functional transfer/mobility training/DME recommendations for increased independence, safety, and fall prevention  * Patient/Family education to increase follow through with safety techniques and functional independence  * Recommendation of environmental modifications for increased safety with functional transfers/mobility and ADLs  * Therapeutic exercise to improve motor endurance, ROM, and functional strength for device)  Fair+ dynamic standing balance during completion of ADLs/IADLs and other functional tasks. Activity Tolerance Fair  Patient will demonstrate Good understanding and consistent implementation of energy conservation techniques and work simplification techniques into ADL routines. Visual/  Perceptual WFL grossly     N/A   B UE Strength 3+/5  Patient will demonstrate 4/5 B UE strength in order to maximize independence with ADLs and functional transfers. Additional Long-Term Goal: Patient will increase functional independence to PLOF in order to allow patient to live in least restrictive environment. ROM: Additional Information:    R UE  WFL grossly    L UE WFL grossly      Hearing: WFL  Sensation: No complaints of numbness/tingling in B UEs. Tone: WFL  Edema: No    Comments: RN approved patient's participation in [address] [place] [address] activities. Upon arrival, patient supine in bed. At end of session, patient seated in bedside chair with call light and phone within reach and all lines and tubes intact. No chair alarms available; nursing aware. Patient would benefit from continued skilled OT to increase safety and independence with completion of ADL/IADL tasks for functional independence and quality of life. Rehab Potential: Good for established goals. Patient / Family Goal: No goal stated. Patient and/or family were instructed on functional diagnosis, prognosis/goals, and OT plan of care. Demonstrated Fair+ understanding.     Eval Complexity: Low    Time In: 1320  Time Out: 1336  Total Treatment Time: 0 minutes      Minutes Units   OT Eval Low 82230 16 1   OT Eval Medium 44264     OT Eval High 70480     OT Re-Eval [id number]     Therapeutic Ex 00619     Therapeutic Activities 72542     ADL/Self Care 64571     Orthotic Management 40550     Neuro Re-Ed 18764     Non-Billable Time N/A ---     Evaluation time includes thorough review of current medical information, gathering information on past medical history/social history and prior level of function, completion of standardized testing/informal observation of tasks, assessment of data, and education on plan of care and goals. Courtney Becerra, OTR/L  License Number: [id number]

## 2021-10-19 NOTE — PROGRESS NOTES
Physical Therapy    Facility/Department: [facility] MED SURG  Initial Assessment    NAME: Yuly Tam  : 1965  MRN: [id number]    Date of Service: 10/19/2021         REQUIRES PT FOLLOW UP: Yes       Patient Diagnosis(es): The encounter diagnosis was Acute respiratory failure with hypercapnia ([facility] [address] has a past medical history of Anxiety, Carotid stenosis, Carpal tunnel syndrome, Cerebral artery occlusion with cerebral infarction [facility]), Chronic back pain, COPD (chronic obstructive pulmonary disease) ([facility] [address] CVA (cerebral infarction), Depression, Dermatophytosis, Discoloration of skin of toe, Ehler's-Danlos syndrome, GERD (gastroesophageal reflux disease), History of ischemic heart disease, Hyperlipidemia, Hypertension, Left carotid stenosis, O2 dependent, Obesity, PVD (peripheral vascular disease) with claudication ([facility] Tobacco abuse, Tobacco abuse, Type II or unspecified type diabetes mellitus without mention of complication, not stated as uncontrolled, and Vitamin D deficiency. has a past surgical history that includes Tubal ligation (); cyst removal (2011); ECHO Complete 2D W Doppler W Color (1/3/2012); ECHO Complete 2D W Doppler W Color (2012); ECHO Compl W Dop Color Flow (5/15/2013); Cardiac catheterization (2015); cyst incision and drainage (Right, 2016); other surgical history (2016); Abdominal exploration surgery (2017); pr drain skin abscess complic (N/A, 4343); hernia repair (2017); and eye surgery (Bilateral, ). Evaluating Therapist: Shane Layne, PT     Referring Provider:  Cara Garcia MD    PT order : PT eval and treat     Room #:  522  DIAGNOSIS: acute resp failure     PRECAUTIONS:  Falls, O2     Social:  Pt lives with  Family  in a  1  floor plan with ramp   to enter.   Prior to admission pt walked with no AD short distances per pt report      Initial Evaluation  Date:  10/19/2021 Treatment      Short Term/ Long Term   Goals   Was pt agreeable to Eval/treatment? Yes      Does pt have pain?  chronic back pain      Bed Mobility  Rolling:  NT   Supine to sit:  Mod assist   Sit to supine:  NT   Scooting: min assist in sit    SBA    Transfers Sit to stand:  Min assist   Stand to sit: min assist   Stand pivot: min assist    SBA    Ambulation   4 steps with no AD min assist    25  feet with  No AD/AAD with SBA    LE ROM  WFL      LE strength  4-/ 5     AM- PAC RAW score   14/ 24            Pt is alert and Oriented x  3      Balance:  Min assist, fall risk due to LE weakness and decreased activity tolerance     Endurance: decreased   Bed/Chair alarm:no chair alarms  available      ASSESSMENT  Pt displays functional ability as noted in the objective portion of this evaluation. Conditions Requiring Skilled Therapeutic Intervention:    [x]Decreased strength     []Decreased ROM  [x]Decreased functional mobility  [x]Decreased balance   [x]Decreased endurance   [x]Decreased posture  []Decreased sensation  []Decreased coordination   []Decreased vision  [x]Decreased safety awareness   [x]Increased pain       Treatment/Education:    Pt in bed upon arrival; agreeable to PT. O2@ 5 lNC. Pulse ox at rest 95%, decreased to 91% with minimal activity,, and recovered to 94% . Cues given for proper breathing technique. Mobility as above. Pt transferred bed to chair; did not feel able to walk further. Pt educated on fall risk,  Safety with mobility        Patient response to education:   Pt verbalized understanding Pt demonstrated skill Pt requires further education in this area   x  with cues   x       Comments:  Pt left  In chair after session, with call light in reach. Waffle cushion placed in chair       Rehab potential is Good for reaching above PT goals. Pts/ family goals   1. None stated     Patient and or family understand(s) diagnosis, prognosis, and plan of care.  - yes     PLAN  PT care will be provided in accordance with the objectives noted above.  Whenever appropriate, clear delegation orders will be provided for nursing staff. Exercises and functional mobility practice will be used as well as appropriate assistive devices or modalities to obtain goals. Patient and family education will also be administered as needed. PLAN OF CARE:    Current Treatment Recommendations     [x] Strengthening to improve independence with functional mobility   [] ROM to improve independence with functional mobility   [x] Balance Training to improve static/dynamic balance and to reduce fall risk  [x] Endurance Training to improve activity tolerance during functional mobility   [x] Transfer Training to improve safety and independence with all functional transfers   [x] Gait Training to improve gait mechanics, endurance and assess need for appropriate assistive device  [] Stair Training in preparation for safe discharge home and/or into the community   [x] Positioning to prevent skin breakdown and contractures  [x] Safety and Education Training   [x] Patient/Caregiver Education   [] HEP  [] Other     Frequency of treatments will be 2-5x/week x  7-14 days. Time in: 1324   Time out:  1336       Evaluation Time includes thorough review of current medical information, gathering information on past medical history/social history and prior level of function, completion of standardized testing/informal observation of tasks, assessment of data and education on plan of care and goals.     CPT codes:  [x] Low Complexity PT evaluation 61585  [] Moderate Complexity PT evaluation 90831  [] High Complexity PT evaluation 50432  [] PT Re-evaluation 05619  [] Gait training 05972  minutes  [] Therapeutic activities 17112  minutes  [] Therapeutic exercises 88907  minutes  [] Neuromuscular reeducation 49576  minutes       Hernandez Thapa number:  PT [phone]

## 2021-10-19 NOTE — PROGRESS NOTES
[facility]  Progress Note      Chief complaint :  Chief Complaint   Patient presents with    Altered Mental Status       Subjective:  Patient on AVAPS. She is AAO x3. Is not somnolent this morning. States that she is not having SOB. Is uncomfortable and frustrated with AVAPS in place. Denies fever, chills, abdominal pain, chest pain. No acute overnight events. Past medical, surgical, family and social history were reviewed, non-contributory, and unchanged unless otherwise stated. ROS negative except as stated above. Objective:  BP (!) 117/56   Pulse 100   Temp 96.7 °F (35.9 °C)   Resp 21   Ht 5' 5\" (1.651 m)   Wt 157 lb (71.2 kg)   LMP 03/20/2015   SpO2 96%   BMI 26.13 kg/m²     Physical Exam  Constitutional:       Appearance: Normal appearance. HENT:      Head: Normocephalic and atraumatic. Mouth/Throat:      Mouth: Mucous membranes are moist.   Eyes:      Extraocular Movements: Extraocular movements intact. Conjunctiva/sclera: Conjunctivae normal.   Cardiovascular:      Rate and Rhythm: Regular rhythm. Tachycardia present. Pulses: Normal pulses. Heart sounds: Normal heart sounds. No murmur heard. Pulmonary:      Breath sounds: No stridor. Wheezing (mild, diffuse) present. Comments: AVAPS in place, decreased breath sounds  Abdominal:      General: Abdomen is flat. Bowel sounds are normal.      Palpations: Abdomen is soft. Musculoskeletal:         General: No swelling. Normal range of motion. Cervical back: Normal range of motion and neck supple. Skin:     General: Skin is warm and dry. Neurological:      General: No focal deficit present. Mental Status: She is alert and oriented to person, place, and time.    Psychiatric:         Mood and Affect: Mood normal.         Behavior: Behavior normal.         Labs:  Recent Results (from the past 24 hour(s))   Blood Gas, Arterial    Collection Time: 10/17/21 10:19 PM Result Value Ref Range    Date Analyzed [id number]     Time Analyzed 2219     Source: Blood Arterial     pH, Blood Gas 7.299 (L) 7.350 - 7.450    PCO2 87.8 (HH) 35.0 - 45.0 mmHg    PO2 111.7 (H) 75.0 - 100.0 mmHg    HCO3 42.1 (H) 22.0 - 26.0 mmol/L    B.E. 12.3 (H) -3.0 - 3.0 mmol/L    O2 Sat 98.5 92.0 - 98.5 %    PO2/FIO2 1.60 mmHg/%    AaDO2 275.5 mmHg    RI(T) 247 %    O2Hb 97.3 (H) 94.0 - 97.0 %    COHb 0.9 0.0 - 1.5 %    MetHb 0.3 0.0 - 1.5 %    O2 Content 16.6 mL/dL    HHb 1.5 0.0 - 5.0 %    tHb (est) 12.0 11.5 - 16.5 g/dL    Mode AVAPS     FIO2 70.0 %    Rr Mechanical 20.0 b/min    Vt Mechanical 450.0 mL    Peep/Cpap 8.0 cmH2O    Date Of Collection      Time Collected      Pt Temp 37.0 C     ID [id number]     Lab [id number]     Critical(s) Notified Handed report to Dr/RN    Respiratory Panel, Molecular, with COVID-19 (Restricted: peds pts or suitable admitted adults)    Collection Time: 10/18/21  2:13 AM    Specimen: Nasopharyngeal; Nasal   Result Value Ref Range    Adenovirus by PCR Not Detected Not Detected    Bordetella parapertussis by PCR Not Detected Not Detected    Bordetella pertussis by PCR Not Detected Not Detected    Chlamydophilia pneumoniae by PCR Not Detected Not Detected    Coronavirus 229E by PCR Not Detected Not Detected    Coronavirus HKU1 by PCR Not Detected Not Detected    Coronavirus NL63 by PCR Not Detected Not Detected    Coronavirus OC43 by PCR Not Detected Not Detected    SARS-CoV-2, PCR Not Detected Not Detected    Human Metapneumovirus by PCR Not Detected Not Detected    Human Rhinovirus/Enterovirus by PCR Not Detected Not Detected    Influenza A by PCR Not Detected Not Detected    Influenza B by PCR Not Detected Not Detected    Mycoplasma pneumoniae by PCR Not Detected Not Detected    Parainfluenza Virus 1 by PCR Not Detected Not Detected    Parainfluenza Virus 2 by PCR Not Detected Not Detected    Parainfluenza Virus 3 by PCR Not Detected Not Detected    Parainfluenza Virus 4 by PCR 7.362 7.350 - 7.450    PCO2 73.9 (HH) 35.0 - 45.0 mmHg    PO2 125.2 (H) 75.0 - 100.0 mmHg    HCO3 41.0 (H) 22.0 - 26.0 mmol/L    B.E. 12.8 (H) -3.0 - 3.0 mmol/L    O2 Sat 98.7 (H) 92.0 - 98.5 %    PO2/FIO2 2.09 mmHg/%    AaDO2 206.3 mmHg    RI(T) 165 %    O2Hb 97.3 (H) 94.0 - 97.0 %    COHb 1.1 0.0 - 1.5 %    MetHb 0.3 0.0 - 1.5 %    O2 Content 16.1 mL/dL    HHb 1.3 0.0 - 5.0 %    tHb (est) 11.6 11.5 - 16.5 g/dL    Mode avaps     FIO2 60.0 %    Rr Mechanical 20.0 b/min    Vt Mechanical 450.0 mL    Peep/Cpap 8.0 cmH2O    Date Of Collection      Time Collected      Pt Temp 37.0 C     ID [id number]     Lab [id number]     Critical(s) Notified Handed report to Dr/RN    POCT Glucose    Collection Time: 10/18/21  6:08 AM   Result Value Ref Range    Meter Glucose 173 (H) 74 - 99 mg/dL   POCT Glucose    Collection Time: 10/18/21 11:31 AM   Result Value Ref Range    Meter Glucose 145 (H) 74 - 99 mg/dL   CK    Collection Time: 10/18/21  1:00 PM   Result Value Ref Range    Total  (H) 20 - 180 U/L   POCT Glucose    Collection Time: 10/18/21  4:08 PM   Result Value Ref Range    Meter Glucose 199 (H) 74 - 99 mg/dL       Radiology and other tests reviewed:  XR CHEST 1 VIEW   Final Result   No acute process. XR HIP BILATERAL W AP PELVIS (2 VIEWS)   Final Result   No acute abnormality of the pelvis. Mild sclerotic changes in the bilateral femoral heads, suspicious for   avascular necrosis. Consider non emergent pelvic MR if clinically   appropriate. XR FEMUR LEFT (MIN 2 VIEWS)   Final Result   No acute osseous abnormality. CT HEAD WO CONTRAST   Final Result   No acute intracranial abnormality. Chronic right parietal infarction with   encephalomalacia. Chronic right basal ganglia lacunar infarction. Opacification of right middle ear and bilateral mastoid air cells which may   represent sequela from maria isabel mastoiditis.          CT CERVICAL SPINE WO CONTRAST   Final Result   No acute cervical spine fracture or malalignment. Mild degenerative changes of the cervical spine. Assessment:  Active Hospital Problems    Diagnosis Date Noted    Acute respiratory failure with hypercapnia (HCC) [J96.02] 10/17/2021    Somnolence [R40.0] 10/17/2021    COPD (chronic obstructive pulmonary disease) ([facility] [J44.9] 05/29/2021    Uncontrolled type 2 diabetes mellitus with hyperglycemia (HCC) [E11.65]     PAF (paroxysmal atrial fibrillation) ([facility] [I48.0] 04/26/2021       Plan:  Acute Respiratory Failure with Hypercapnia due to COPD Exacerbation  She has had a 2-day history of worsening lethargy, confusion, and falls with a past medical history of COPD dependent on 5L O2. Likely secondary to COPD exacerbation. ABG showed pH of 7.257 and PCO2 of 115  -Chest x-ray negative for acute process  - 10/18 ABG pH 7.362 pCO2 73.9  -Taking home Pulmicort and albuterol as needed  -Previously following with pulmonology but last seen 2016  -Continue home Pulmicort BID and scheduled duonebs q4 hours  -Continue AVAPS  - Solu-medrol 40mg IV BID  - Azithromycin 250mg dose 2  - Respiratory panel- negative  - Procalcitonin 0.04  - Monitor CBC and CMP  -Pulmonology following     Altered mental status  Worsening for the last 2 days with no increased weakness from baseline or slurred speech. The patient does require assistance with any ambulation and is mainly in a wheelchair.  Patient alert and oriented x3 in the ED but slow to respond  -CT head 10/17- negative for acute stroke  -Likely secondary to hypercapnia  -Continue AVAPS  -Continue to monitor mental status     Abdominal tenderness  Patient tender to palpation in the left upper and left lower quadrant  -Endorses constipation with unknown date of last bowel movement  -MiraLAX daily     Atrial fibrillation  -EKG in the ED showed Sinus tachycardia  -Mildly tachycardic in the ED  -Continue home Cardizem and Eliquis  -Telemetry     Diabetes  -Last A1c 7.0  -Medium dose sliding scale  -POCT glucose ordered  -Hypoglycemia protocol ordered     Compression fracture  -L1 compression fracture on 8/11/2021  -Following with Neurosurgery as an outpatient  -Continue home Percocet, Daughter-in-law Flora Pierce states taking it q6 PRN     Continue home medications: Crestor, nortriptyline, gabapentin, ursodiol, and fenofibrate     DVT ppx: Eliquis  GI ppx: Protonix  Code Status: DNR-CCA      Jhonny Olson MD   Family Medicine Resident PGY-1

## 2021-10-20 NOTE — PROGRESS NOTES
Physical Therapy  Facility/Department: [facility] MED SURG  Daily Treatment Note  NAME: Iona Queen  : 1965  MRN: [id number]    Date of Service: 10/20/2021      Patient Diagnosis(es): The encounter diagnosis was Acute respiratory failure with hypercapnia ([facility] [address] has a past medical history of Anxiety, Carotid stenosis, Carpal tunnel syndrome, Cerebral artery occlusion with cerebral infarction [facility]), Chronic back pain, COPD (chronic obstructive pulmonary disease) ([facility] [address] CVA (cerebral infarction), Depression, Dermatophytosis, Discoloration of skin of toe, Ehler's-Danlos syndrome, GERD (gastroesophageal reflux disease), History of ischemic heart disease, Hyperlipidemia, Hypertension, Left carotid stenosis, O2 dependent, Obesity, PVD (peripheral vascular disease) with claudication ([facility] [address] Tobacco abuse, Tobacco abuse, Type II or unspecified type diabetes mellitus without mention of complication, not stated as uncontrolled, and Vitamin D deficiency. has a past surgical history that includes Tubal ligation (); cyst removal (2011); ECHO Complete 2D W Doppler W Color (1/3/2012); ECHO Complete 2D W Doppler W Color (2012); ECHO Compl W Dop Color Flow (5/15/2013); Cardiac catheterization (2015); cyst incision and drainage (Right, 2016); other surgical history (2016); Abdominal exploration surgery (2017); pr drain skin abscess complic (N/A, 7842); hernia repair (2017); and eye surgery (Bilateral, ). Evaluating Therapist: Veronika Patel PT      Referring Provider:  Mike Arvizu MD     PT order : PT eval and treat      Room #:  358  DIAGNOSIS: acute resp failure      PRECAUTIONS:  Falls, O2      Social:  Pt lives with  Family  in a  1  floor plan with ramp   to enter.   Prior to admission pt walked with no AD short distances per pt report        Initial Evaluation  Date:  10/19/2021 Treatment     10/20 Short Term/ Long Term   Goals   Was pt agreeable to Eval/treatment? Yes   yes     Does pt have pain?  chronic back pain   back pain with mobility      Bed Mobility  Rolling:  NT   Supine to sit:  Mod assist   Sit to supine:  NT   Scooting: min assist in sit  Rolling : min assist  Supine to sit : mod assist   SBA    Transfers Sit to stand:  Min assist   Stand to sit: min assist   Stand pivot: min assist   sit<> stand : min assist   SBA    Ambulation   4 steps with no AD min assist  5 feet forward and backward with ww CGA/min assist   25  feet with  No AD/AAD with SBA    LE ROM  WFL        LE strength  4-/ 5       AM- PAC RAW score   14/ 24   15/ 24               Pt is alert and Oriented x  3       Balance:  Min assist, fall risk due to LE weakness and decreased activity tolerance      Endurance: decreased   Bed/Chair alarm: yes     ASSESSMENT    Pt displays functional ability as noted in the objective portion of this treatment         Conditions Requiring Skilled Therapeutic Intervention:     [x]? Decreased strength                                      []?Decreased ROM  [x]? Decreased functional mobility  [x]? Decreased balance   [x]? Decreased endurance   [x]? Decreased posture  []? Decreased sensation  []? Decreased coordination   []? Decreased vision  [x]? Decreased safety awareness   [x]? Increased pain             Treatment/Education:    Pt in bed upon arrival; agreeable to PT. Pt's daughter present . Removed bipap and placed pt on O2@ 5 lNC. Pulse ox at rest 94%, decreased to 92% with minimal activity, and recovered to 99% . Cues given for proper breathing technique. Mobility as above. Instructed on spinal neutrality due back pain and recent compression fx. Pt transferred bed to chair with min assist and use of ww.  After resting in chair , able to take a few steps forward and backward and march in place.      Pt educated on fall risk,  Safety with mobility, LE seated exercises         Patient response to education:   Pt verbalized understanding Pt demonstrated skill Pt requires further education in this area   x  with cues   x         Comments:  Pt left  In chair after session, with call light in reach.                 PLAN    PT care will be provided in accordance with the objectives noted above. Whenever appropriate, clear delegation orders will be provided for nursing staff. Exercises and functional mobility practice will be used as well as appropriate assistive devices or modalities to obtain goals. Patient and family education will also be administered as needed.           PLAN OF CARE:     Current Treatment Recommendations      [x]? Strengthening to improve independence with functional mobility   []? ROM to improve independence with functional mobility   [x]? Balance Training to improve static/dynamic balance and to reduce fall risk  [x]? Endurance Training to improve activity tolerance during functional mobility   [x]? Transfer Training to improve safety and independence with all functional transfers   [x]? Gait Training to improve gait mechanics, endurance and assess need for appropriate assistive device  []? Stair Training in preparation for safe discharge home and/or into the community   [x]? Positioning to prevent skin breakdown and contractures  [x]? Safety and Education Training   [x]? Patient/Caregiver Education   []? HEP  []? Other      Frequency of treatments will be 2-5x/week x  7-14 days.     Time in: 1004  Time out:  1021     Con't with PT POC with concentration on improved mobility and endurance      CPT codes:  []? Low Complexity PT evaluation [id number]  []? Moderate Complexity PT evaluation 96553  []? High Complexity PT evaluation [id number]  []? PT Re-evaluation [id number]  []? Gait training 53870  minutes  [x]? Therapeutic activities 25202 15  minutes  []? Therapeutic exercises 02741  minutes  []?  Neuromuscular reeducation 58269  minutes         Hernandez Thapa number:  PT [phone]

## 2021-10-20 NOTE — CARE COORDINATION
Social work / Discharge Planning:       Message left for liaison for [facility] [employer] for referral to get NIV for discharge. This company is the only option for patient's insurance. Awaiting return call. Electronically signed by RAEANN Pichardo on 10/20/2021 at 10:18 AM            Referral completed to [facility] [employer] for NIV. Awaiting response. Electronically signed by RAEANN Pichardo on 10/20/2021 at 11:16 AM          UPDATE:       [facility] [employer] needs more documentation regarding PFT's if done. Also documentation if bipap has been tried and failed or if it was ineffective in order to qualify for home NIV for discharge.    Electronically signed by RAEANN Pichardo on 10/20/2021 at 3:31 PM

## 2021-10-20 NOTE — PROGRESS NOTES
Pulmonary Progress Note    Admit Date: 10/17/2021  Hospital day                               PCP: Milton Garcia MD    Chief Complaint (s):  Patient Active Problem List   Diagnosis    Hyperlipidemia    Depression    Ama-Danlos syndrome    MARIAA (obstructive sleep apnea)    Tobacco abuse    Carpal tunnel syndrome    Vitamin D deficiency    Diastasis recti    PVD (peripheral vascular disease) with claudication ([address]    Compression fracture of L1 lumbar vertebra ([address]    H/O: stroke    Diabetic polyneuropathy associated with type 2 diabetes mellitus ([address]    PAF (paroxysmal atrial fibrillation) ([address]    Coronary artery disease involving native coronary artery of native heart without angina pectoris    DNR (do not resuscitate)    COPD (chronic obstructive pulmonary disease) ([address]    Uncontrolled type 2 diabetes mellitus with hyperglycemia ([address]    Dermatophytosis    Left carotid stenosis    O2 dependent    Acute respiratory failure with hypercapnia (HCC)    Somnolence       Subjective:  · According to the patient's daughter, the patient did use noninvasive ventilation last night. Today, she is awake alert and eating fried chicken.       Vitals:  VITALS:  BP (!) 144/83   Pulse 103   Temp 98.5 °F (36.9 °C) (Oral)   Resp 20   Ht 5' 5\" (1.651 m)   Wt 158 lb 4.8 oz (71.8 kg)   LMP 2015   SpO2 96%   BMI 26.34 kg/m²     24HR INTAKE/OUTPUT:      Intake/Output Summary (Last 24 hours) at 10/20/2021 1843  Last data filed at 10/20/2021 1633  Gross per 24 hour   Intake --   Output 2100 ml   Net -2100 ml       24HR PULSE OXIMETRY RANGE:    SpO2  Av %  Min: 96 %  Max: 96 %    Medications:  IV:   sodium chloride      dextrose         Scheduled Meds:   insulin lispro  0-18 Units SubCUTAneous TID WC    insulin lispro  0-9 Units SubCUTAneous Nightly    insulin glargine  10 Units SubCUTAneous Nightly    apixaban  5 mg Oral BID    dilTIAZem  240 mg Oral Daily    fenofibrate  54 mg Oral Daily    gabapentin  300 mg Oral TID    nortriptyline  25 mg Oral Nightly    pantoprazole  40 mg Oral QAM AC    rosuvastatin  20 mg Oral Nightly    ursodiol  300 mg Oral TID WC    sodium chloride flush  5-40 mL IntraVENous 2 times per day    polyethylene glycol  17 g Oral Daily    ipratropium-albuterol  1 ampule Inhalation Q4H    azithromycin  250 mg IntraVENous Q24H    methylPREDNISolone  40 mg IntraVENous Q12H    budesonide  1,000 mcg Nebulization BID       Diet:   ADULT DIET; Regular; 3 carb choices (45 gm/meal)     EXAM:  General: No distress. Alert. Eyes: PERRL. No sclera icterus. No conjunctival injection. ENT: No discharge. Pharynx clear. Neck: Trachea midline. Normal thyroid. Resp: No accessory muscle use. No rales. No wheezing. No rhonchi. CV: Regular rate. Regular rhythm. No murmur or rub. Abd: Non-tender. Non-distended. No masses. No organomegaly. Normal bowel sounds. Skin: Warm and dry. No nodule on exposed extremities. No rash on exposed extremities. Ext: No cyanosis, clubbing, edema  Lymph: No cervical LAD. No supraclavicular LAD. M/S: No cyanosis. No joint deformity. No clubbing. Neuro: Awake. Follows commands. Positive pupils/gag/corneals. Normal pain response. Results:  CBC:   Recent Labs     10/18/21  0408 10/19/21  1152 10/20/21  0340   WBC 9.5 8.1 4.6   HGB 10.3* 11.0* 11.2*   HCT 34.6 34.6 35.2   MCV 98.0 92.5 91.0    258 272     BMP:   Recent Labs     10/18/21  0408 10/19/21  1152 10/20/21  0340    136 138   K 4.1 3.7 4.1   CL 96* 90* 93*   CO2 38* 36* 36*   BUN 7 8 7   CREATININE 0.2* 0.3* 0.3*     LIVER PROFILE:   Recent Labs     10/18/21  0408 10/19/21  1152 10/20/21  0340   AST 18 17 13   ALT 8 9 9   BILITOT 0.3 0.4 0.4   ALKPHOS 85 86 83     PT/INR: No results for input(s): PROTIME, INR in the last 72 hours. APTT: No results for input(s):  APTT in the last 72 hours. Pathology:  1. N/A      Microbiology:  1. None    Recent ABG:   Recent Labs     10/18/21  0540   PH 7.362   PO2 125.2*   PCO2 73.9*   HCO3 41.0*   BE 12.8*   O2SAT 98.7*   METHB 0.3   O2HB 97.3*   COHB 1.1   O2CON 16.1   HHB 1.3   THB 11.6     FiO2 : 35 %       Recent Films:  XR CHEST 1 VIEW   Final Result   No acute process. XR HIP BILATERAL W AP PELVIS (2 VIEWS)   Final Result   No acute abnormality of the pelvis. Mild sclerotic changes in the bilateral femoral heads, suspicious for   avascular necrosis. Consider non emergent pelvic MR if clinically   appropriate. XR FEMUR LEFT (MIN 2 VIEWS)   Final Result   No acute osseous abnormality. CT HEAD WO CONTRAST   Final Result   No acute intracranial abnormality. Chronic right parietal infarction with   encephalomalacia. Chronic right basal ganglia lacunar infarction. Opacification of right middle ear and bilateral mastoid air cells which may   represent sequela from maria isabel mastoiditis. CT CERVICAL SPINE WO CONTRAST   Final Result   No acute cervical spine fracture or malalignment. Mild degenerative changes of the cervical spine. Assessment:  1. Acute on chronic hypercapnic respiratory failure  2. Underlying severe and end-stage chronic obstructive pulmonary disease  3. Ongoing tobacco abuse        Plan:  1. Maximize treatment for GOLD class 4 COPD  2. The patient would benefit from noninvasive ventilation at home. At this point, refuses to consider it. In the past, the patient was dismissed from another practice for noncompliance. It appears where we will be headed down that same road. If she chooses to become compliant, arrangements should be made for noninvasive ventilation at home as it will prolong the patient's life and what is clearly a life-threatening disease.       Time at the bedside, reviewing labs and radiographs, reviewing updated notes and consultations, discussing with staff and family was more than 35 minutes. Please note that voice recognition technology was used in the preparation of this note and make therefore it may contain inadvertent transcription errors. If the patient is a COVID 19 isolation patient, the above physical exam reflects that of the examining physician for the day. Yonis Garduno MD,  MMK., F.C.C.P.     [facility], [address] [place] [address], [address] [place] [address] Include J-Code In Bill: No

## 2021-10-20 NOTE — PROGRESS NOTES
[address]  Progress Note      Chief complaint :  Chief Complaint   Patient presents with    Altered Mental Status       Subjective:  She is AAO x3. States that she is feeling much better, has improved breathing. She states that she's been having some right sided abdominal pain since admission. Is tolerating her diet well without N/V. Has had multiple BMs. Denies fever, chills, chest pain, SOB. No acute overnight events. Past medical, surgical, family and social history were reviewed, non-contributory, and unchanged unless otherwise stated. ROS negative except as stated above. Objective:  BP (!) 157/81   Pulse 98   Temp 97.9 °F (36.6 °C) (Oral)   Resp 25   Ht 5' 5\" (1.651 m)   Wt 158 lb 4.8 oz (71.8 kg)   LMP 03/20/2015   SpO2 96%   BMI 26.34 kg/m²     Physical Exam  Constitutional:       Appearance: Normal appearance. HENT:      Head: Normocephalic and atraumatic. Mouth/Throat:      Mouth: Mucous membranes are moist.   Eyes:      Extraocular Movements: Extraocular movements intact. Conjunctiva/sclera: Conjunctivae normal.   Cardiovascular:      Rate and Rhythm: Regular rhythm. Tachycardia present. Pulses: Normal pulses. Heart sounds: Normal heart sounds. No murmur heard. Pulmonary:      Breath sounds: No stridor. Wheezing (mild, diffuse) present. Comments: Nasal cannula, decreased breath sounds  Abdominal:      General: Abdomen is flat. Bowel sounds are normal.      Palpations: Abdomen is soft. Musculoskeletal:         General: No swelling. Normal range of motion. Cervical back: Normal range of motion and neck supple. Skin:     General: Skin is warm and dry. Neurological:      General: No focal deficit present. Mental Status: She is alert and oriented to person, place, and time.    Psychiatric:         Mood and Affect: Mood normal.         Behavior: Behavior normal.         Labs:  Recent Results (from the past 24 hour(s))   POCT Glucose    Collection Time: 10/19/21  6:29 AM   Result Value Ref Range    Meter Glucose 197 (H) 74 - 99 mg/dL   POCT Glucose    Collection Time: 10/19/21 10:58 AM   Result Value Ref Range    Meter Glucose 184 (H) 74 - 99 mg/dL   Comprehensive Metabolic Panel w/ Reflex to MG    Collection Time: 10/19/21 11:52 AM   Result Value Ref Range    Sodium 136 132 - 146 mmol/L    Potassium reflex Magnesium 3.7 3.5 - 5.0 mmol/L    Chloride 90 (L) 98 - 107 mmol/L    CO2 36 (H) 22 - 29 mmol/L    Anion Gap 10 7 - 16 mmol/L    Glucose 244 (H) 74 - 99 mg/dL    BUN 8 6 - 20 mg/dL    CREATININE 0.3 (L) 0.5 - 1.0 mg/dL    GFR Non-African American >60 >=60 mL/min/1.73    GFR African American >60     Calcium 8.9 8.6 - 10.2 mg/dL    Total Protein 6.4 6.4 - 8.3 g/dL    Albumin 3.1 (L) 3.5 - 5.2 g/dL    Total Bilirubin 0.4 0.0 - 1.2 mg/dL    Alkaline Phosphatase 86 35 - 104 U/L    ALT 9 0 - 32 U/L    AST 17 0 - 31 U/L   CBC auto differential    Collection Time: 10/19/21 11:52 AM   Result Value Ref Range    WBC 8.1 4.5 - 11.5 E9/L    RBC 3.74 3.50 - 5.50 E12/L    Hemoglobin 11.0 (L) 11.5 - 15.5 g/dL    Hematocrit 34.6 34.0 - 48.0 %    MCV 92.5 80.0 - 99.9 fL    MCH 29.4 26.0 - 35.0 pg    MCHC 31.8 (L) 32.0 - 34.5 %    RDW 13.6 11.5 - 15.0 fL    Platelets 507 628 - 086 E9/L    MPV 9.7 7.0 - 12.0 fL    Neutrophils % 75.9 43.0 - 80.0 %    Immature Granulocytes % 0.5 0.0 - 5.0 %    Lymphocytes % 15.5 (L) 20.0 - 42.0 %    Monocytes % 8.0 2.0 - 12.0 %    Eosinophils % 0.0 0.0 - 6.0 %    Basophils % 0.1 0.0 - 2.0 %    Neutrophils Absolute 6.17 1.80 - 7.30 E9/L    Immature Granulocytes # 0.04 E9/L    Lymphocytes Absolute 1.26 (L) 1.50 - 4.00 E9/L    Monocytes Absolute 0.65 0.10 - 0.95 E9/L    Eosinophils Absolute 0.00 (L) 0.05 - 0.50 E9/L    Basophils Absolute 0.01 0.00 - 0.20 E9/L   POCT Glucose    Collection Time: 10/19/21  3:42 PM   Result Value Ref Range    Meter Glucose 254 (H) 74 - 99 mg/dL   POCT Glucose    Collection Time: 10/19/21  7:54 PM   Result Value Ref Range    Meter Glucose 235 (H) 74 - 99 mg/dL   Comprehensive Metabolic Panel w/ Reflex to MG    Collection Time: 10/20/21  3:40 AM   Result Value Ref Range    Sodium 138 132 - 146 mmol/L    Potassium reflex Magnesium 4.1 3.5 - 5.0 mmol/L    Chloride 93 (L) 98 - 107 mmol/L    CO2 36 (H) 22 - 29 mmol/L    Anion Gap 9 7 - 16 mmol/L    Glucose 316 (H) 74 - 99 mg/dL    BUN 7 6 - 20 mg/dL    CREATININE 0.3 (L) 0.5 - 1.0 mg/dL    GFR Non-African American >60 >=60 mL/min/1.73    GFR African American >60     Calcium 8.7 8.6 - 10.2 mg/dL    Total Protein 5.9 (L) 6.4 - 8.3 g/dL    Albumin 3.2 (L) 3.5 - 5.2 g/dL    Total Bilirubin 0.4 0.0 - 1.2 mg/dL    Alkaline Phosphatase 83 35 - 104 U/L    ALT 9 0 - 32 U/L    AST 13 0 - 31 U/L   CBC auto differential    Collection Time: 10/20/21  3:40 AM   Result Value Ref Range    WBC 4.6 4.5 - 11.5 E9/L    RBC 3.87 3.50 - 5.50 E12/L    Hemoglobin 11.2 (L) 11.5 - 15.5 g/dL    Hematocrit 35.2 34.0 - 48.0 %    MCV 91.0 80.0 - 99.9 fL    MCH 28.9 26.0 - 35.0 pg    MCHC 31.8 (L) 32.0 - 34.5 %    RDW 13.8 11.5 - 15.0 fL    Platelets 763 850 - 894 E9/L    MPV 9.6 7.0 - 12.0 fL    Neutrophils % 76.9 43.0 - 80.0 %    Immature Granulocytes % 0.9 0.0 - 5.0 %    Lymphocytes % 17.9 (L) 20.0 - 42.0 %    Monocytes % 4.3 2.0 - 12.0 %    Eosinophils % 0.0 0.0 - 6.0 %    Basophils % 0.0 0.0 - 2.0 %    Neutrophils Absolute 3.57 1.80 - 7.30 E9/L    Immature Granulocytes # 0.04 E9/L    Lymphocytes Absolute 0.83 (L) 1.50 - 4.00 E9/L    Monocytes Absolute 0.20 0.10 - 0.95 E9/L    Eosinophils Absolute 0.00 (L) 0.05 - 0.50 E9/L    Basophils Absolute 0.00 0.00 - 0.20 E9/L       Radiology and other tests reviewed:  XR CHEST 1 VIEW   Final Result   No acute process. XR HIP BILATERAL W AP PELVIS (2 VIEWS)   Final Result   No acute abnormality of the pelvis. Mild sclerotic changes in the bilateral femoral heads, suspicious for   avascular necrosis.   Consider non emergent pelvic MR if clinically   appropriate. XR FEMUR LEFT (MIN 2 VIEWS)   Final Result   No acute osseous abnormality. CT HEAD WO CONTRAST   Final Result   No acute intracranial abnormality. Chronic right parietal infarction with   encephalomalacia. Chronic right basal ganglia lacunar infarction. Opacification of right middle ear and bilateral mastoid air cells which may   represent sequela from maria isabel mastoiditis. CT CERVICAL SPINE WO CONTRAST   Final Result   No acute cervical spine fracture or malalignment. Mild degenerative changes of the cervical spine. Assessment:  Active Hospital Problems    Diagnosis Date Noted    Acute respiratory failure with hypercapnia ([facility]) [J96.02] 10/17/2021    Somnolence [R40.0] 10/17/2021    COPD (chronic obstructive pulmonary disease) ([facility] [J44.9] 05/29/2021    Uncontrolled type 2 diabetes mellitus with hyperglycemia ([facility]) [E11.65]     PAF (paroxysmal atrial fibrillation) ([facility] [I48.0] 04/26/2021       Plan:  Acute Respiratory Failure with Hypercapnia due to COPD Exacerbation  She has had a 2-day history of worsening lethargy, confusion, and falls with a past medical history of COPD dependent on 5L O2. Likely secondary to COPD exacerbation. ABG showed pH of 7.257 and PCO2 of 115  -Chest x-ray negative for acute process  - 10/18 ABG pH 7.362 pCO2 73.9  - Continue home Pulmicort BID and scheduled Duonebs Q4H  - 5L O2, SpO2 96 nasal cannula  - Solu-medrol 40mg IV BID  - Azithromycin 250mg dose 3  - Repeat blood cultures pending  - Respiratory panel- negative  - Procalcitonin 0.04  - Monitor CBC and CMP  -Pulmonology following     Altered mental status, resolved  Worsening for the last 2 days with no increased weakness from baseline or slurred speech. The patient does require assistance with any ambulation and is mainly in a wheelchair.  Patient alert and oriented x3 in the ED but slow to respond  -CT head 10/17- negative for acute stroke  -Likely secondary to hypercapnia     Atrial fibrillation  -EKG in the ED showed Sinus tachycardia  -Mildly tachycardic in the ED  -Continue home Cardizem and Eliquis  -Telemetry     Diabetes  -Last A1c 7.0  -Medium dose sliding scale  -POCT glucose ordered  -Hypoglycemia protocol ordered     Compression fracture  -L1 compression fracture on 8/11/2021  -Following with Neurosurgery as an outpatient  -Continue home Percocet, Daughter-in-law Lannon states taking it q6 PRN     Continue home medications: Crestor, nortriptyline, gabapentin, ursodiol, and fenofibrate     DVT ppx: Eliquis 5mg BID  GI ppx: Protonix  Code Status: DNR-CCA      Alexandria Whitlock MD   Family Medicine Resident PGY-1

## 2021-10-21 PROBLEM — R40.0 SOMNOLENCE: Status: RESOLVED | Noted: 2021-01-01 | Resolved: 2021-01-01

## 2021-10-21 PROBLEM — J96.02 ACUTE RESPIRATORY FAILURE WITH HYPERCAPNIA (HCC): Status: RESOLVED | Noted: 2021-01-01 | Resolved: 2021-01-01

## 2021-10-21 NOTE — PROGRESS NOTES
Occupational Therapy  OT BEDSIDE TREATMENT NOTE      Date:10/21/2021  Patient Name: Sherly Reyna  MRN: [id number]  : 1965  Room: [address] [place] [address]     Evaluating OT: [address] [facility],[address] 3[address] SANGITA Berry, OTR/L - [id number]     Referring Provider: Maria G Pittman MD  Specific Provider Orders/Date: \"OT eval and treat\" - 10/19/2021     Diagnosis: Acute respiratory failure with hypercapnia ([facility] [address] [J96.02]       Pertinent Medical History: CVA, COPD, chronic back pain, anxiety, HTN, obesity, DM, PVD     Precautions: fall risk, O2      Assessment of Current Deficits:    [x]? Functional mobility             [x]?ADLs           [x]? Strength                  [x]? Cognition   [x]? Functional transfers           [x]? IADLs         [x]? Safety Awareness   [x]? Endurance   []? Fine Coordination              [x]? Balance      []? Vision/perception   [x]? Sensation     []? Gross Motor Coordination  []? ROM           []? Delirium                   []? Motor Control      OT PLAN OF CARE   OT POC is based on physician orders, patient diagnosis, and results of clinical assessment.   Frequency/Duration 2-5 days/week for 2 weeks PRN   Specific OT Treatment Interventions to Include:   * Instruction/training on adapted ADL techniques and AE recommendations to increase functional independence within precautions       * Training on energy conservation strategies, correct breathing pattern and techniques to improve independence/tolerance for self-care routine  * Functional transfer/mobility training/DME recommendations for increased independence, safety, and fall prevention  * Patient/Family education to increase follow through with safety techniques and functional independence  * Recommendation of environmental modifications for increased safety with functional transfers/mobility and ADLs  * Therapeutic exercise to improve motor endurance, ROM, and functional strength for ADLs/functional transfers  * Therapeutic activities to facilitate/challenge dynamic balance, stand tolerance for increased safety and independence with ADLs  * Neuro-muscular re-education: facilitation of righting/equilibrium reactions, midline orientation, scapular stability/mobility, normalization of muscle tone, and facilitation of volitional active controled movement     Recommended Adaptive Equipment: TBD      Home Living: Patient lives with family in a one-floor home (ramp entry). Bathroom Setup: Patient sponge bathes and uses a BSC, as needed.     Prior Level of Function (PLOF): Patient reported that she had needed assistance with most ADLs; family completes IADLs. Patient had been independent with functional mobility (without a device - short distances only) at home, per patient report.     Pain Level: Pt reports chronic back pain. Cognition: Pt awake and alert. Functional Assessment:                  AM-PAC Daily Activity Raw Score: 16/24    Initial Eval Status  Date: 10/19/2021 Treatment Status  Date: 10/21/21 Short Term Goals = Long Term Goals   Feeding Setup   Independent   Grooming Min A Setup seated   Setup  (seated/standing at sink)   UB Dressing Min A SBA  Setup   LB Dressing Max A Mod A   Min A - with use of AE, as needed/appropriate   Bathing Max A Pt reports she sponge bathes at home.   Min A - with use of AE/DME, as needed/appropriate   Toileting Max A Mod A   Min A   Bed Mobility  Supine-to-Sit: Mod A  Pt sitting on the side of the bed.       Functional Transfers Sit-to-Stand: Min A   from EOB SBA sit to stand  Supervision   Functional Mobility Min A   (without device) for few side steps from EOB to bedside chair.  SBA using w/w to and from bathroom and around hospital room. Occasional assist to maneuver around objects in the room. Mod I / Independent with functional mobility (with device, as needed/appropriate) in order to maximize independence with ADLs/IADLs and other functional tasks.    Balance Sitting: Fair+  (at EOB)  Standing: Fair-  (without device) Stand balance SBA.  Fair+ dynamic standing balance during completion of ADLs/IADLs and other functional tasks. Activity Tolerance Fair Fair   Patient will demonstrate Good understanding and consistent implementation of energy conservation techniques and work simplification techniques into ADL routines. B UE Strength 3+/5   Patient will demonstrate 4/5 B UE strength in order to maximize independence with ADLs and functional transfers       Comments:  Daughters present during this session. Pt uses BSC commode at home due to not being able to walk distance to the bathroom. She was able to walk in room today. Min A to manage O2 line. O2 saturation 98% at rest and 97% after completing activity. Pt and family declined use of adaptive equipment for LB dressing. Pt reports use of AE in the past not successful. Family present reports willingness to continue to assist pt with LB ADL needs. Education/treatment:  ADL retraining with facilitation of movement to increase self care skills. Therapeutic activity to address balance and endurance for ADL and transfers. Pt education of walker safety, transfer safety, and home safety. · Pt has made  progress towards set goals.    ·     Time In: 1:13  Time Out: 1:37     Min Units   Therapeutic Ex 35309     Therapeutic Activities 48083 10 1   ADL/Self Care 63903 14 1   Orthotic Management 69013     Neuro Re-Ed 45183     Non-Billable Time     TOTAL TIMED TREATMENT 24 [facility] [place]/L [address]24

## 2021-10-21 NOTE — PROGRESS NOTES
[address]  Progress Note      Chief complaint :  Chief Complaint   Patient presents with    Altered Mental Status       Subjective:  She is AAO x3. States that she is feeling much better, denies trouble breathing. She said that her right sided abdominal pain has resolved. Is tolerating her diet well without N/V. Has had multiple BMs. Denies fever, chills, chest pain, SOB. No acute overnight events. Past medical, surgical, family and social history were reviewed, non-contributory, and unchanged unless otherwise stated. ROS negative except as stated above. Objective:  BP (!) 139/50   Pulse 90   Temp 98 °F (36.7 °C) (Oral)   Resp 21   Ht 5' 5\" (1.651 m)   Wt 162 lb 4.8 oz (73.6 kg)   LMP 03/20/2015   SpO2 99%   BMI 27.01 kg/m²     Physical Exam  Constitutional:       Appearance: Normal appearance. HENT:      Head: Normocephalic and atraumatic. Mouth/Throat:      Mouth: Mucous membranes are moist.   Eyes:      Extraocular Movements: Extraocular movements intact. Conjunctiva/sclera: Conjunctivae normal.   Cardiovascular:      Rate and Rhythm: Regular rhythm. Tachycardia present. Pulses: Normal pulses. Heart sounds: Normal heart sounds. No murmur heard. Pulmonary:      Breath sounds: No stridor. Comments: Nasal cannula, decreased breath sounds  Abdominal:      General: Abdomen is flat. Bowel sounds are normal.      Palpations: Abdomen is soft. Musculoskeletal:         General: No swelling. Normal range of motion. Cervical back: Normal range of motion and neck supple. Skin:     General: Skin is warm and dry. Neurological:      General: No focal deficit present. Mental Status: She is alert and oriented to person, place, and time.    Psychiatric:         Mood and Affect: Mood normal.         Behavior: Behavior normal.         Labs:  Recent Results (from the past 24 hour(s))   POCT Glucose    Collection Time: 10/20/21 11:35 AM   Result Value Ref Range    Meter Glucose 313 (H) 74 - 99 mg/dL   POCT Glucose    Collection Time: 10/20/21  5:04 PM   Result Value Ref Range    Meter Glucose 457 (H) 74 - 99 mg/dL   POCT Glucose    Collection Time: 10/20/21  8:40 PM   Result Value Ref Range    Meter Glucose 125 (H) 74 - 99 mg/dL   Comprehensive Metabolic Panel w/ Reflex to MG    Collection Time: 10/21/21  4:53 AM   Result Value Ref Range    Sodium 135 132 - 146 mmol/L    Potassium reflex Magnesium 4.1 3.5 - 5.0 mmol/L    Chloride 94 (L) 98 - 107 mmol/L    CO2 33 (H) 22 - 29 mmol/L    Anion Gap 8 7 - 16 mmol/L    Glucose 335 (H) 74 - 99 mg/dL    BUN 10 6 - 20 mg/dL    CREATININE 0.3 (L) 0.5 - 1.0 mg/dL    GFR Non-African American >60 >=60 mL/min/1.73    GFR African American >60     Calcium 8.6 8.6 - 10.2 mg/dL    Total Protein 6.0 (L) 6.4 - 8.3 g/dL    Albumin 3.1 (L) 3.5 - 5.2 g/dL    Total Bilirubin 0.3 0.0 - 1.2 mg/dL    Alkaline Phosphatase 74 35 - 104 U/L    ALT 10 0 - 32 U/L    AST 11 0 - 31 U/L   CBC auto differential    Collection Time: 10/21/21  4:53 AM   Result Value Ref Range    WBC 4.6 4.5 - 11.5 E9/L    RBC 3.93 3.50 - 5.50 E12/L    Hemoglobin 11.5 11.5 - 15.5 g/dL    Hematocrit 35.7 34.0 - 48.0 %    MCV 90.8 80.0 - 99.9 fL    MCH 29.3 26.0 - 35.0 pg    MCHC 32.2 32.0 - 34.5 %    RDW 13.8 11.5 - 15.0 fL    Platelets 689 883 - 366 E9/L    MPV 9.6 7.0 - 12.0 fL    Neutrophils % 78.4 43.0 - 80.0 %    Immature Granulocytes % 1.1 0.0 - 5.0 %    Lymphocytes % 17.0 (L) 20.0 - 42.0 %    Monocytes % 3.3 2.0 - 12.0 %    Eosinophils % 0.0 0.0 - 6.0 %    Basophils % 0.2 0.0 - 2.0 %    Neutrophils Absolute 3.59 1.80 - 7.30 E9/L    Immature Granulocytes # 0.05 E9/L    Lymphocytes Absolute 0.78 (L) 1.50 - 4.00 E9/L    Monocytes Absolute 0.15 0.10 - 0.95 E9/L    Eosinophils Absolute 0.00 (L) 0.05 - 0.50 E9/L    Basophils Absolute 0.01 0.00 - 0.20 E9/L   POCT Glucose    Collection Time: 10/21/21  5:42 AM   Result Value Ref Range    Meter Glucose 301 (H) 74 - 99 mg/dL       Radiology and other tests reviewed:  XR CHEST 1 VIEW   Final Result   No acute process. XR HIP BILATERAL W AP PELVIS (2 VIEWS)   Final Result   No acute abnormality of the pelvis. Mild sclerotic changes in the bilateral femoral heads, suspicious for   avascular necrosis. Consider non emergent pelvic MR if clinically   appropriate. XR FEMUR LEFT (MIN 2 VIEWS)   Final Result   No acute osseous abnormality. CT HEAD WO CONTRAST   Final Result   No acute intracranial abnormality. Chronic right parietal infarction with   encephalomalacia. Chronic right basal ganglia lacunar infarction. Opacification of right middle ear and bilateral mastoid air cells which may   represent sequela from maria isabel mastoiditis. CT CERVICAL SPINE WO CONTRAST   Final Result   No acute cervical spine fracture or malalignment. Mild degenerative changes of the cervical spine. Assessment:  Active Hospital Problems    Diagnosis Date Noted    Acute respiratory failure with hypercapnia (HCC) [J96.02] 10/17/2021    Somnolence [R40.0] 10/17/2021    COPD (chronic obstructive pulmonary disease) ([facility] [address] [J44.9] 05/29/2021    Uncontrolled type 2 diabetes mellitus with hyperglycemia (HCC) [E11.65]     PAF (paroxysmal atrial fibrillation) ([facility] [address] [I48.0] 04/26/2021       Plan:  Acute Respiratory Failure with Hypercapnia due to COPD Exacerbation  She has had a 2-day history of worsening lethargy, confusion, and falls with a past medical history of COPD dependent on 5L O2. Likely secondary to COPD exacerbation.  ABG showed pH of 7.257 and PCO2 of 115  -Chest x-ray negative for acute process  - 10/18 ABG pH 7.362 pCO2 73.9  - Continue home Pulmicort BID and scheduled Duonebs Q4H  - 5L O2, SpO2 99 nasal cannula  - Solu-medrol 40mg IV BID  - Azithromycin 250mg dose 4 today  - Repeat blood cultures pending  - Respiratory panel- negative  - Procalcitonin 0.04  - Monitor CBC and CMP  -Pulmonology following  - Agreeable for Trilogy use at home. - For SW: Patient requires noninvasive ventilation for prevention of hypercapnia. Bipap has been tried but was unsuccessful. Patient needs Trilogy to prevent life-threatening disease of GOLD class 4 COPD. PFT 4/25/2016 reveals      Pre bronch      Post-bronch   actual pred %pred actual %pred %change   FVC 1.63 3.64 44 1.75 48 +7   FEV1 0.94 288 32 1.07 37 +13      165.6  +6   TLC 4.36 5.20 83      DLCO 8.72 25.6 34          Altered mental status, resolved  Worsening for the last 2 days with no increased weakness from baseline or slurred speech. The patient does require assistance with any ambulation and is mainly in a wheelchair.  Patient alert and oriented x3 in the ED but slow to respond  -CT head 10/17- negative for acute stroke  -Likely secondary to hypercapnia     Atrial fibrillation  -EKG in the ED showed Sinus tachycardia  -Mildly tachycardic in the ED  -Continue home Cardizem and Eliquis  -Telemetry     Diabetes  -Last A1c 7.0  -Medium dose sliding scale  -POCT glucose ordered  -Hypoglycemia protocol ordered     Compression fracture  -L1 compression fracture on 8/11/2021  -Following with Neurosurgery as an outpatient  -Continue home Percocet, Daughter-in-law Neva Asher states taking it q6 PRN     Continue home medications: Crestor, nortriptyline, gabapentin, ursodiol, and fenofibrate     DVT ppx: Eliquis 5mg BID  GI ppx: Protonix  Code Status: DNR-CCA      Hudson Greco MD   Family Medicine Resident PGY-1

## 2021-10-21 NOTE — PROGRESS NOTES
Date: 10/20/2021    Time: 10:07 PM    Patient Placed On BIPAP/CPAP/ Non-Invasive Ventilation? No, patient already placed on avaps    If no must comment. Facial area red/color change? No           If YES are Blister/Lesion present? No   If yes must notify nursing staff  BIPAP/CPAP skin barrier?   Yes    Skin barrier type:mepilexlite       Comments:        Lizette Stockton RCP

## 2021-10-21 NOTE — PROGRESS NOTES
[facility] and [address] [facility]  [address], [address]  [facility], [facility]      Pulmonary Progress Note      SUBJECTIVE:  On 5 se BALDERRAMA, claims doing ok, DME company [facility]) here with astral device to teach pt and family how to set up and use.  Claims used NIV 16-8, XC=223, Fio2=35% last night    OBJECTIVE    Medications    Continuous Infusions:   sodium chloride      dextrose         Scheduled Meds:   insulin glargine  20 Units SubCUTAneous Daily    [START ON 10/22/2021] predniSONE  40 mg Oral Daily    insulin lispro  0-18 Units SubCUTAneous TID WC    insulin lispro  0-9 Units SubCUTAneous Nightly    insulin glargine  10 Units SubCUTAneous Nightly    apixaban  5 mg Oral BID    dilTIAZem  240 mg Oral Daily    fenofibrate  54 mg Oral Daily    gabapentin  300 mg Oral TID    nortriptyline  25 mg Oral Nightly    pantoprazole  40 mg Oral QAM AC    rosuvastatin  20 mg Oral Nightly    ursodiol  300 mg Oral TID WC    sodium chloride flush  5-40 mL IntraVENous 2 times per day    polyethylene glycol  17 g Oral Daily    ipratropium-albuterol  1 ampule Inhalation Q4H    azithromycin  250 mg IntraVENous Q24H    budesonide  1,000 mcg Nebulization BID       PRN Meds:oxyCODONE-acetaminophen **AND** oxyCODONE, diazePAM, sodium chloride flush, sodium chloride, ondansetron **OR** ondansetron, acetaminophen **OR** acetaminophen, glucose, dextrose, glucagon (rDNA), dextrose    Physical    VITALS:  BP (!) 142/71   Pulse 92   Temp 98.9 °F (37.2 °C) (Oral)   Resp 20   Ht 5' 5\" (1.651 m)   Wt 162 lb 4.8 oz (73.6 kg)   LMP 2015   SpO2 100%   BMI 27.01 kg/m²     24HR INTAKE/OUTPUT:      Intake/Output Summary (Last 24 hours) at 10/21/2021 6752  Last data filed at 10/21/2021 0511  Gross per 24 hour   Intake --   Output 700 ml   Net -700 ml       24HR PULSE OXIMETRY RANGE:    SpO2  Av.5 %  Min: 99 %  Max: 100 %    General appearance: alert, appears stated age and cooperative  Lungs: clear to auscultation bilaterally  Heart: regular rate and rhythm, S1, S2 normal, no murmur, click, rub or gallop  Abdomen: soft, non-tender; bowel sounds normal; no masses,  no organomegaly  Extremities: extremities normal, atraumatic, no cyanosis or edema  Neurologic: Mental status: Alert, oriented, thought content appropriate    Data    CBC:   Recent Labs     10/19/21  1152 10/20/21  0340 10/21/21  0453   WBC 8.1 4.6 4.6   HGB 11.0* 11.2* 11.5   HCT 34.6 35.2 35.7   MCV 92.5 91.0 90.8    272 290       BMP:  Recent Labs     10/19/21  1152 10/20/21  0340 10/21/21  0453    138 135   K 3.7 4.1 4.1   CL 90* 93* 94*   CO2 36* 36* 33*   BUN 8 7 10   CREATININE 0.3* 0.3* 0.3*    ALB:3,BILIDIR:3,BILITOT:3,ALKPHOS:3)@    PT/INR: No results for input(s): PROTIME, INR in the last 72 hours. ABG:   No results for input(s): PH, PO2, PCO2, HCO3, BE, O2SAT, METHB, O2HB, COHB, O2CON, HHB, THB in the last 72 hours. FiO2 : 35 %       Radiology/Other tests reviewed: none    Assessment:     Active Problems:    PAF (paroxysmal atrial fibrillation) ([facility])    COPD (chronic obstructive pulmonary disease) ([facility])    Uncontrolled type 2 diabetes mellitus with hyperglycemia ([facility])  Resolved Problems:    Acute respiratory failure with hypercapnia ([facility])    Somnolence      Plan:       1. Cont with NIV at night, encouraged use  2. Cont with oxygen daytime  3. Cont with steroids, taper as tolerated  4. Cont with nebs, observe respiratory function      Time at the bedside, reviewing labs and radiographs, reviewing notes and consultations, discussing with staff and family was more than 35 minutes. Thanks for letting us see this patient in consultation. Please contact us with any questions. Office [phone] or after hours through [employer], x [phone].

## 2021-10-21 NOTE — CARE COORDINATION
Social Work/Discharge Planning:  Received call from Physician regarding status of NIV. Called Maxime Schwartz with [address] [place] [address] and informed her of Physician progress note. Maxime Schwartz states there is a recall for the Trilogjarrod brand, and they will provide the Resmed NIV Astral.  Maxime Schwartz confirmed documentation is sufficient, but will require Physician to sign Astral form. Maxime Schwartz faxed form and this worker placed in soft chart. Maxime Schwartz stated they are waiting to hear back from patient insurance to coordinate delivery of Arvie Anjelica will notify this worker by 5:00pm today, IF Astral is approved, but it may not happen until tomorrow. Notified Physician of need to sign form and need a home health care order to indicate okay to start on Tuesday October 26th. Explained difficulty is obtaining an accepting [facility] agency due to insurance and availability. Provided update to patient, her daughter Yissel Galindo and chart nurse. Will continue to follow. Electronically signed by RAEANN Artis on 10/21/2021 at 3:03 PM    Addendum:  Astral form signed by Physician and faxed to [address] [place] [address]. Notified Maxime Schwartz with [address] [place] [address]. Will continue to follow. Electronically signed by RAEANN Artis on 10/21/2021 at 3:26 PM    Addendum:  Maxime Schwartz with [address] [place] [address] called with Astral approval and will contact family to coordinate delivery. Notified charge nurse and requested for him to notify Physician.   Electronically signed by RAEANN Artis on 10/21/2021 at 4:00 PM

## 2021-10-21 NOTE — PROGRESS NOTES
Physician Progress Note      Daniela Fuller  CSN #:                  [id number]  :                       1965  ADMIT DATE:       10/17/2021 4:29 PM  [address] [place] DATE:  RESPONDING  PROVIDER #:        Erica Valenzuela MD          QUERY TEXT:    Dr. Alisson Malik,    Patient admitted with acute respiratory failure with hypercapnia and noted to   have worsening lethargy and confusion. If possible, please document in the   progress notes and discharge summary if you are evaluating and / or treating   any of the following: The medical record reflects the following:  Risk Factors: Hx of COPD - dependent on 5L home O2  Clinical Indicators: per the H&P \"Altered mental status Worsening for the last   2 days. Kindra Gallery Kindra Gallery Likely secondary to hypercapnia\", ABGs show PCO2 of 115.2, patient   placed on AVAPS with improvement of PCO2  Treatment: AVAPs, duonebs, pulmicort, pulmonology consult    Thank you,  Evan Perkins RN  Clinical Documentation Improvement  [phone]  Options provided:  -- Metabolic encephalopathy  -- Toxic encephalopathy  -- Delirium due to, Please specify cause. -- Delirium  -- Other - I will add my own diagnosis  -- Disagree - Not applicable / Not valid  -- Disagree - Clinically unable to determine / Unknown  -- Refer to Clinical Documentation Reviewer    PROVIDER RESPONSE TEXT:    Provider disagreed with this query.   see my notes or ask a different question - I do not understand what you are   asking that I have not already documented    Query created by: Sangeetha Mcbride on 10/18/2021 10:09 AM      Electronically signed by:  Erica Valenzuela MD 10/21/2021 8:33 AM

## 2021-10-21 NOTE — PROGRESS NOTES
[address] [address]  Progress Note    Chief complaint :  Chief Complaint   Patient presents with    Altered Mental Status       Subjective:   No overnight events. Patient was awake sitting on the side of her bed eating breakfast and states she feels much better. Patient wants to get up and walk on own but was explained why it was best to get up and walk with therapy in room. Patient states she slept fine with AVAP but seemed disinterested in using noninvasive ventilation at home. Her abdominal pain has subsided after having multiple bowel movements yesterday that were \"a little runny\" and one this morning. Patient has no complaints with her cathter and has 400 units of reddish urine voided this morning. Past medical, surgical, family and social history were reviewed, non-contributory, and unchanged unless otherwise stated. Review of Systems   Constitutional: Negative for chills and fever. HENT: Negative for congestion and postnasal drip. Respiratory: Negative for chest tightness and shortness of breath. Cardiovascular: Negative for chest pain. Gastrointestinal: Positive for diarrhea. Negative for abdominal pain, blood in stool, nausea and vomiting. Endocrine: Negative for polyuria. Genitourinary: Negative for dysuria, frequency, pelvic pain and urgency. Musculoskeletal: Negative. Skin: Negative. Neurological: Negative. Psychiatric/Behavioral: Negative. Objective:  BP (!) 139/50   Pulse 90   Temp 98 °F (36.7 °C) (Oral)   Resp 21   Ht 5' 5\" (1.651 m)   Wt 162 lb 4.8 oz (73.6 kg)   LMP 03/20/2015   SpO2 99%   BMI 27.01 kg/m²     Physical Exam  Vitals reviewed. Constitutional:       Appearance: Normal appearance. She is not ill-appearing. HENT:      Head: Normocephalic and atraumatic. Cardiovascular:      Rate and Rhythm: Normal rate and regular rhythm.    Pulmonary:      Effort: Pulmonary effort is normal.      Breath sounds: Wheezing present. Comments: Patient off AVAP during PE  Slightly diminished breath sounds bilaterally   Abdominal:      General: Abdomen is flat. Bowel sounds are normal.      Palpations: Abdomen is soft. Skin:     General: Skin is warm and dry. Neurological:      General: No focal deficit present. Mental Status: She is alert and oriented to person, place, and time. Psychiatric:         Mood and Affect: Mood normal.         Behavior: Behavior normal.         Thought Content:  Thought content normal.         Judgment: Judgment normal.       Labs:  RECENT LABS OF NOTE 10/21 0453  Glucose 335, CO2 33, Cr 0.3, CL 94  Recent Results (from the past 24 hour(s))   POCT Glucose    Collection Time: 10/20/21 11:35 AM   Result Value Ref Range    Meter Glucose 313 (H) 74 - 99 mg/dL   POCT Glucose    Collection Time: 10/20/21  5:04 PM   Result Value Ref Range    Meter Glucose 457 (H) 74 - 99 mg/dL   POCT Glucose    Collection Time: 10/20/21  8:40 PM   Result Value Ref Range    Meter Glucose 125 (H) 74 - 99 mg/dL   Comprehensive Metabolic Panel w/ Reflex to MG    Collection Time: 10/21/21  4:53 AM   Result Value Ref Range    Sodium 135 132 - 146 mmol/L    Potassium reflex Magnesium 4.1 3.5 - 5.0 mmol/L    Chloride 94 (L) 98 - 107 mmol/L    CO2 33 (H) 22 - 29 mmol/L    Anion Gap 8 7 - 16 mmol/L    Glucose 335 (H) 74 - 99 mg/dL    BUN 10 6 - 20 mg/dL    CREATININE 0.3 (L) 0.5 - 1.0 mg/dL    GFR Non-African American >60 >=60 mL/min/1.73    GFR African American >60     Calcium 8.6 8.6 - 10.2 mg/dL    Total Protein 6.0 (L) 6.4 - 8.3 g/dL    Albumin 3.1 (L) 3.5 - 5.2 g/dL    Total Bilirubin 0.3 0.0 - 1.2 mg/dL    Alkaline Phosphatase 74 35 - 104 U/L    ALT 10 0 - 32 U/L    AST 11 0 - 31 U/L   CBC auto differential    Collection Time: 10/21/21  4:53 AM   Result Value Ref Range    WBC 4.6 4.5 - 11.5 E9/L    RBC 3.93 3.50 - 5.50 E12/L    Hemoglobin 11.5 11.5 - 15.5 g/dL    Hematocrit 35.7 34.0 - 48.0 %    MCV 90.8 80.0 - 99.9 fL MCH 29.3 26.0 - 35.0 pg    MCHC 32.2 32.0 - 34.5 %    RDW 13.8 11.5 - 15.0 fL    Platelets 758 136 - 757 E9/L    MPV 9.6 7.0 - 12.0 fL    Neutrophils % 78.4 43.0 - 80.0 %    Immature Granulocytes % 1.1 0.0 - 5.0 %    Lymphocytes % 17.0 (L) 20.0 - 42.0 %    Monocytes % 3.3 2.0 - 12.0 %    Eosinophils % 0.0 0.0 - 6.0 %    Basophils % 0.2 0.0 - 2.0 %    Neutrophils Absolute 3.59 1.80 - 7.30 E9/L    Immature Granulocytes # 0.05 E9/L    Lymphocytes Absolute 0.78 (L) 1.50 - 4.00 E9/L    Monocytes Absolute 0.15 0.10 - 0.95 E9/L    Eosinophils Absolute 0.00 (L) 0.05 - 0.50 E9/L    Basophils Absolute 0.01 0.00 - 0.20 E9/L   POCT Glucose    Collection Time: 10/21/21  5:42 AM   Result Value Ref Range    Meter Glucose 301 (H) 74 - 99 mg/dL       Radiology and other tests reviewed:  XR CHEST 1 VIEW   Final Result   No acute process. XR HIP BILATERAL W AP PELVIS (2 VIEWS)   Final Result   No acute abnormality of the pelvis. Mild sclerotic changes in the bilateral femoral heads, suspicious for   avascular necrosis. Consider non emergent pelvic MR if clinically   appropriate. XR FEMUR LEFT (MIN 2 VIEWS)   Final Result   No acute osseous abnormality. CT HEAD WO CONTRAST   Final Result   No acute intracranial abnormality. Chronic right parietal infarction with   encephalomalacia. Chronic right basal ganglia lacunar infarction. Opacification of right middle ear and bilateral mastoid air cells which may   represent sequela from maria isabel mastoiditis. CT CERVICAL SPINE WO CONTRAST   Final Result   No acute cervical spine fracture or malalignment. Mild degenerative changes of the cervical spine.              Assessment:  Active Hospital Problems    Diagnosis Date Noted    Acute respiratory failure with hypercapnia (HCC) [J96.02] 10/17/2021    Somnolence [R40.0] 10/17/2021    [facility] [J44.9] 05/29/2021    Uncontrolled type 2 diabetes mellitus with

## 2021-10-21 NOTE — PROCEDURES
Date: 10/21/2021    Time: 0904  Patient Placed On BIPAP/CPAP/ Non-Invasive Ventilation? Yes    If no must comment. Facial area red/color change? No           If YES are Blister/Lesion present? No   If yes must notify nursing staff  BIPAP/CPAP skin barrier?   Yes    Skin barrier type:mepilexlite       Comments:Placed on AVAPS per patient request        Betzaida Yuan RCP

## 2021-10-22 NOTE — CARE COORDINATION
Social work / Discharge planning:       Social work updated liaison from [facility] and liaison from [employer] that patient is being discharged.    Electronically signed by RAEANN Pink on 10/22/2021 at 12:55 PM

## 2021-10-22 NOTE — PROGRESS NOTES
[address]  Progress Note      Chief complaint :  Chief Complaint   Patient presents with    Altered Mental Status       Subjective:  Patient states she feels much better, never has felt this well for at least a year. Daughter also thinks the same. Denies any increased shortness of breath. No abdominal pain, no urinary issues. Is tolerating her diet well without N/V. No bowel movement issues    Denies fever, chills, chest pain, SOB. No acute overnight events. Past medical, surgical, family and social history were reviewed, non-contributory, and unchanged unless otherwise stated. ROS negative except as stated above. Objective:  BP (!) 150/65   Pulse 109   Temp 98.5 °F (36.9 °C) (Oral)   Resp 18   Ht 5' 5\" (1.651 m)   Wt 161 lb 4.8 oz (73.2 kg)   LMP 03/20/2015   SpO2 98%   BMI 26.84 kg/m²     Physical Exam  Constitutional:       Appearance: Normal appearance. HENT:      Head: Normocephalic and atraumatic. Mouth/Throat:      Mouth: Mucous membranes are moist.   Eyes:      Extraocular Movements: Extraocular movements intact. Conjunctiva/sclera: Conjunctivae normal.   Cardiovascular:      Rate and Rhythm: Regular rhythm. Tachycardia present. Pulses: Normal pulses. Heart sounds: Normal heart sounds. No murmur heard. Pulmonary:      Breath sounds: No stridor. Comments: Nasal cannula, decreased breath sounds  Abdominal:      General: Abdomen is flat. Bowel sounds are normal.      Palpations: Abdomen is soft. Musculoskeletal:         General: No swelling. Normal range of motion. Cervical back: Normal range of motion and neck supple. Skin:     General: Skin is warm and dry. Neurological:      General: No focal deficit present. Mental Status: She is alert and oriented to person, place, and time.    Psychiatric:         Mood and Affect: Mood normal.         Behavior: Behavior normal.         Labs:  Recent Results (from the past 24 hour(s))   POCT Glucose    Collection Time: 10/21/21  8:10 AM   Result Value Ref Range    Meter Glucose 354 (H) 74 - 99 mg/dL   POCT Glucose    Collection Time: 10/21/21 11:09 AM   Result Value Ref Range    Meter Glucose 190 (H) 74 - 99 mg/dL   POCT Glucose    Collection Time: 10/21/21  5:25 PM   Result Value Ref Range    Meter Glucose 280 (H) 74 - 99 mg/dL   POCT Glucose    Collection Time: 10/21/21  9:47 PM   Result Value Ref Range    Meter Glucose 234 (H) 74 - 99 mg/dL   Comprehensive Metabolic Panel w/ Reflex to MG    Collection Time: 10/22/21  4:58 AM   Result Value Ref Range    Sodium 141 132 - 146 mmol/L    Potassium reflex Magnesium 3.5 3.5 - 5.0 mmol/L    Chloride 96 (L) 98 - 107 mmol/L    CO2 35 (H) 22 - 29 mmol/L    Anion Gap 10 7 - 16 mmol/L    Glucose 130 (H) 74 - 99 mg/dL    BUN 9 6 - 20 mg/dL    CREATININE 0.3 (L) 0.5 - 1.0 mg/dL    GFR Non-African American >60 >=60 mL/min/1.73    GFR African American >60     Calcium 8.9 8.6 - 10.2 mg/dL    Total Protein 6.2 (L) 6.4 - 8.3 g/dL    Albumin 3.2 (L) 3.5 - 5.2 g/dL    Total Bilirubin 0.4 0.0 - 1.2 mg/dL    Alkaline Phosphatase 74 35 - 104 U/L    ALT 10 0 - 32 U/L    AST 14 0 - 31 U/L   CBC auto differential    Collection Time: 10/22/21  4:58 AM   Result Value Ref Range    WBC 8.7 4.5 - 11.5 E9/L    RBC 4.18 3.50 - 5.50 E12/L    Hemoglobin 12.2 11.5 - 15.5 g/dL    Hematocrit 38.1 34.0 - 48.0 %    MCV 91.1 80.0 - 99.9 fL    MCH 29.2 26.0 - 35.0 pg    MCHC 32.0 32.0 - 34.5 %    RDW 13.8 11.5 - 15.0 fL    Platelets 484 876 - 141 E9/L    MPV 9.5 7.0 - 12.0 fL    Neutrophils % 50.4 43.0 - 80.0 %    Immature Granulocytes % 0.8 0.0 - 5.0 %    Lymphocytes % 41.6 20.0 - 42.0 %    Monocytes % 6.6 2.0 - 12.0 %    Eosinophils % 0.5 0.0 - 6.0 %    Basophils % 0.1 0.0 - 2.0 %    Neutrophils Absolute 4.40 1.80 - 7.30 E9/L    Immature Granulocytes # 0.07 E9/L    Lymphocytes Absolute 3.64 1.50 - 4.00 E9/L    Monocytes Absolute 0.58 0.10 - 0.95 E9/L Eosinophils Absolute 0.04 (L) 0.05 - 0.50 E9/L    Basophils Absolute 0.01 0.00 - 0.20 E9/L   Magnesium    Collection Time: 10/22/21  4:58 AM   Result Value Ref Range    Magnesium 1.6 1.6 - 2.6 mg/dL       Radiology and other tests reviewed:  XR CHEST 1 VIEW   Final Result   No acute process. XR HIP BILATERAL W AP PELVIS (2 VIEWS)   Final Result   No acute abnormality of the pelvis. Mild sclerotic changes in the bilateral femoral heads, suspicious for   avascular necrosis. Consider non emergent pelvic MR if clinically   appropriate. XR FEMUR LEFT (MIN 2 VIEWS)   Final Result   No acute osseous abnormality. CT HEAD WO CONTRAST   Final Result   No acute intracranial abnormality. Chronic right parietal infarction with   encephalomalacia. Chronic right basal ganglia lacunar infarction. Opacification of right middle ear and bilateral mastoid air cells which may   represent sequela from maria isabel mastoiditis. CT CERVICAL SPINE WO CONTRAST   Final Result   No acute cervical spine fracture or malalignment. Mild degenerative changes of the cervical spine. Assessment:  Active Hospital Problems    Diagnosis Date Noted    COPD (chronic obstructive pulmonary disease) ([facility] [J44.9] 05/29/2021    Uncontrolled type 2 diabetes mellitus with hyperglycemia (HCC) [E11.65]     PAF (paroxysmal atrial fibrillation) ([facility] [I48.0] 04/26/2021       Plan:  Acute Respiratory Failure with Hypercapnia due to COPD Exacerbation  -Improving  She has had a 2-day history of worsening lethargy, confusion, and falls with a past medical history of COPD dependent on 5L O2. Likely secondary to COPD exacerbation.  ABG showed pH of 7.257 and PCO2 of 115  -Chest x-ray negative for acute process  - Continue home Pulmicort BID and scheduled Duonebs Q4H  - 5L O2, SpO2 99 nasal cannula-baseline  - Switch steroids to oral prednisone 40 mg daily  - Azithromycin 250mg completed   - Respiratory panel- negative  - Procalcitonin 0.04  - Monitor CBC and CMP  -Pulmonology following  - Agreeable for Trilogy use at home. [] Trilogy delivered  - For SW: Patient requires noninvasive ventilation for prevention of hypercapnia. Bipap has been tried but was unsuccessful. Patient needs Trilogy to prevent life-threatening disease of GOLD class 4 COPD. PFT 4/25/2016:     Pre bronch      Post-bronch   actual pred %pred actual %pred %change   FVC 1.63 3.64 44 1.75 48 +7   FEV1 0.94 288 32 1.07 37 +13      165.6  +6   TLC 4.36 5.20 83      DLCO 8.72 25.6 34          Altered mental status, resolved  Worsening for the last 2 days with no increased weakness from baseline or slurred speech. The patient does require assistance with any ambulation and is mainly in a wheelchair. Patient alert and oriented x3 in the ED but slow to respond  -CT head 10/17- negative for acute stroke  -Likely secondary to hypercapnia  -1 blood cultures was positive, but likely contaminant. Repeat blood cultures x2 were negative     Atrial fibrillation  -Continue home Cardizem and Eliquis  -Telemetry     Diabetes  -Last A1c 7.0  Lantus 20 units in the morning and likely will increase to 20 units at night as well.   Switch to high-dose sliding scale of insulin  -POCT glucose  -Hypoglycemia protocol ordered     Compression fracture  -L1 compression fracture on 8/11/2021  -Following with Neurosurgery as an outpatient  -Continue home Percocet, Daughter-in-law Atkins states taking it q6 PRN     Continue home medications: Crestor, nortriptyline, gabapentin, ursodiol, and fenofibrate     DVT ppx: Eliquis 5mg BID  GI ppx: Protonix  Code Status: DNR-CCA      Kathe Mcfarland MD   Family Medicine Resident PGY-3

## 2021-10-22 NOTE — PROGRESS NOTES
Ayoub pulled at 0820 in anticipation of possible discharge. DTV between 1420 and 1620. Electronically signed by Wyatt Christopher RN on 10/22/2021 at 9:44 AM      Patient voided 100 cc dakota colored urine.     Electronically signed by Wyatt Christopher RN on 10/22/2021 at 11:10 AM

## 2021-10-22 NOTE — PROGRESS NOTES
[address]  Progress Note    Chief complaint :  Chief Complaint   Patient presents with    Altered Mental Status       Subjective:    No overnight problems. Patient describes feeling much better, even saying she has felt the best she has in years, daughter agrees. Patient was able to ambulate yesterday with minimal desaturation, and slept well with the new breathing machine. Patient looks forward to the future and wants to get better. She is ready to go home, has home healthcare set up along with therapy. Only concern per daughter was some slight bright red blood in stool that patient attributes to likely hemorrhoids. Daughter also asked about the reddish urine. Patient has no UTI symptoms and is making normal urine with abnormal, blood tinged color. Patient was slightly tachycardic on examination. Past medical, surgical, family and social history were reviewed, non-contributory, and unchanged unless otherwise stated. Review of Systems   Constitutional: Negative for chills, fatigue and fever. HENT: Negative. Eyes: Negative. Respiratory: Positive for cough. Negative for choking, chest tightness, shortness of breath and wheezing. Cardiovascular: Negative for chest pain and palpitations. Gastrointestinal: Positive for anal bleeding and blood in stool. Negative for abdominal distention, abdominal pain, constipation, diarrhea, nausea, rectal pain and vomiting. Genitourinary: Positive for hematuria. Negative for difficulty urinating, dysuria, flank pain, frequency, pelvic pain and urgency. Musculoskeletal: Negative. Skin: Negative for wound. Rash related to likely PVD noted bilateral feet    Neurological: Negative. Psychiatric/Behavioral: Negative.       Objective:  BP (!) 150/65   Pulse 109   Temp 98.5 °F (36.9 °C) (Oral)   Resp 18   Ht 5' 5\" (1.651 m)   Wt 161 lb 4.8 oz (73.2 kg)   LMP 03/20/2015   SpO2 98%   BMI 26.84 kg/m²     Physical Exam  Constitutional:       Appearance: Normal appearance. HENT:      Head: Normocephalic and atraumatic. Cardiovascular:      Rate and Rhythm: Regular rhythm. Tachycardia present. Pulses: Normal pulses. Heart sounds: Normal heart sounds. Pulmonary:      Effort: Pulmonary effort is normal. No respiratory distress. Breath sounds: No stridor. No wheezing or rhonchi. Comments: Decreased breath sounds bilaterally   Abdominal:      General: Abdomen is flat. Bowel sounds are normal.      Palpations: Abdomen is soft. Neurological:      General: No focal deficit present. Mental Status: She is alert and oriented to person, place, and time.    Psychiatric:         Mood and Affect: Mood normal.         Behavior: Behavior normal.       Labs:  Recent labs of note 10/22/21 0458  CO2 35, Cr 0.3, Cl 96, Glucose 130    Recent Results (from the past 24 hour(s))   POCT Glucose    Collection Time: 10/21/21  8:10 AM   Result Value Ref Range    Meter Glucose 354 (H) 74 - 99 mg/dL   POCT Glucose    Collection Time: 10/21/21 11:09 AM   Result Value Ref Range    Meter Glucose 190 (H) 74 - 99 mg/dL   POCT Glucose    Collection Time: 10/21/21  5:25 PM   Result Value Ref Range    Meter Glucose 280 (H) 74 - 99 mg/dL   POCT Glucose    Collection Time: 10/21/21  9:47 PM   Result Value Ref Range    Meter Glucose 234 (H) 74 - 99 mg/dL   Comprehensive Metabolic Panel w/ Reflex to MG    Collection Time: 10/22/21  4:58 AM   Result Value Ref Range    Sodium 141 132 - 146 mmol/L    Potassium reflex Magnesium 3.5 3.5 - 5.0 mmol/L    Chloride 96 (L) 98 - 107 mmol/L    CO2 35 (H) 22 - 29 mmol/L    Anion Gap 10 7 - 16 mmol/L    Glucose 130 (H) 74 - 99 mg/dL    BUN 9 6 - 20 mg/dL    CREATININE 0.3 (L) 0.5 - 1.0 mg/dL    GFR Non-African American >60 >=60 mL/min/1.73    GFR African American >60     Calcium 8.9 8.6 - 10.2 mg/dL    Total Protein 6.2 (L) 6.4 - 8.3 g/dL    Albumin 3.2 (L) 3.5 - 5.2 g/dL    Total Bilirubin 0.4 0.0 - cervical spine.              Assessment:  Active Hospital Problems    Diagnosis Date Noted    [facility] [J44.9] 05/29/2021    Uncontrolled type 2 diabetes mellitus with hyperglycemia (HCC) [E11.65]     PAF (paroxysmal atrial fibrillation) ([facility] [I48.0] 04/26/2021       Plan:  Acute Respiratory Failure with Hypercapnia due to COPD Exacerbation  -Patient aware of risks of going home vs outpatient therapy  -Patient will receive home healthcare  -Trilogy brand has recall, social work discussed, send home with [facility] NIV Astral  -Patient likes new machine, was told by PULM to use at least an hour a day, preferably sleep with it on  -Make sure patient receives home machine before DC  -Continue home medications  -Advise follow-up with PCP and Pulmonology    Altered Mental Status  -Since resolved, likely due to hypercapnia   -Continue current regimen    Hematuria, possible UTI or Kidney Stone  -Catheter still in place  -Urinalysis  -Treat appropriately (Antibiotics UTI, Increase Fluid intake kidney stone)    Atrial Fibrillation  -Patient has borderline tachycardia on floor, no symptoms  -Continue home medications    Diabetes  -Continue to monitor glucose at home  -Continue current medication  -Follow up with PCP    Compression fracture  -L1 compression fracture on 8/11/2021  -Continue following with Neurosurgery as an outpatient  -Continue home medication Percocet q6 PRN    Continue home medications: Crestor, nortriptyline, gabapentin, ursodiol, and fenofibrate    DVT ppx: Eliquis 5 mg BID  GI ppx: None  Code Status: DNR-CCA        Disposition: Discharge to Home with [address] Student  10/22/21   7:07 AM     THIS NOTE WAS [address]

## 2021-10-22 NOTE — DISCHARGE SUMMARY
Physician Discharge Summary  [facility]     Patient ID:  Yuly Tam  [id number]  61 y.o.  1965    Admit date: 10/17/2021    Discharge date:  10/22/2021    Admission Diagnoses:   Acute respiratory failure with hypercapnia [facility]) [J96.02]    Discharge Diagnoses: Active Problems:    PAF (paroxysmal atrial fibrillation) ([facility])    COPD (chronic obstructive pulmonary disease) ([address]    Uncontrolled type 2 diabetes mellitus with hyperglycemia ([facility])  Resolved Problems:    Acute respiratory failure with hypercapnia ([facility])    Somnolence      Consults: Pulmonology  Procedures: None    Hospital Course: Anushka Miles is a 64year old female who was admitted for altered mental status found to have acute respiratory failure with hypercapnia due to COPD exacerbation. ABG revealed pH of 7.257 and PCO2 of 115. CT of the head negative for acute stroke. CXR revealed no acute process. Patient given boluses of saline and started on AVAPS, revealing improvement in ABG with pH 7.299 and pCO2 87.5. Also treated with Pulmicort, Albuterol, Duonebs, Solu-medrol 40mg IV BID and Azithromycin. Respiratory panel was negative. With treatment patient began to need 5L O2 via nasal cannula with 96% saturation. She is baseline dependant on 5LO2 at home. Repeat ABG revealed stabilization with pH 7.362. Her altered mental status was resolved with resolution of hypercapnia. Bipap use has been unsucessful. Patient was educated on her disease and how it was important for her to continue treatment at home with non-invasive ventilation. She was agreeable and discharged with [address] was continued as inpatient for pain management of compression fracture. Patient was discharged in a stable and improved condition. Post Discharge Follow up: PCP, Pulmonology    Medication changes: Please see below    Discharge Exam:   Constitutional:       Appearance: Normal appearance.    HENT:      Head: Normocephalic and atraumatic. Mouth/Throat:      Mouth: Mucous membranes are moist.   Eyes:      Extraocular Movements: Extraocular movements intact. Conjunctiva/sclera: Conjunctivae normal.   Cardiovascular:      Rate and Rhythm: Regular rhythm. Tachycardia present. Pulses: Normal pulses. Heart sounds: Normal heart sounds. No murmur heard. Pulmonary:      Breath sounds: No stridor. Comments: Nasal cannula, decreased breath sounds  Abdominal:      General: Abdomen is flat. Bowel sounds are normal.      Palpations: Abdomen is soft. Musculoskeletal:         General: No swelling. Normal range of motion. Cervical back: Normal range of motion and neck supple. Skin:     General: Skin is warm and dry. Neurological:      General: No focal deficit present. Mental Status: She is alert and oriented to person, place, and time. Psychiatric:         Mood and Affect: Mood normal.         Behavior: Behavior normal.    Disposition: home  Patient condition: good    Patient Instructions: Activity: activity as tolerated  Diet: regular diet    Discharge Medication List:     Medication List      START taking these medications    predniSONE 10 MG tablet  Commonly known as: DELTASONE  4 tabs daily x3 days, 3 tabs daily x3 days, 2 tabs daily x3 days, 1 tab daily x3 days        CHANGE how you take these medications    Basaglar KwikPen 100 UNIT/ML injection pen  Generic drug: insulin glargine  Inject 20 Units into the skin 2 times daily  What changed: how much to take     fenofibrate 54 MG tablet  Commonly known as: TRICOR  take 1 tablet by mouth once daily  What changed: See the new instructions.         CONTINUE taking these medications    apixaban 5 MG Tabs tablet  Commonly known as: Eliquis  Take 1 tablet by mouth 2 times daily Start after done with started pack     B-D ULTRAFINE III SHORT PEN 31G X 8 MM Misc  Generic drug: Insulin Pen Needle  use as directed with LANTUS     budesonide 0.25 MG/2ML nebulizer suspension  Commonly known as: Pulmicort  Take 2 mLs by nebulization 2 times daily     butalbital-acetaminophen-caffeine -40 MG per tablet  Commonly known as: FIORICET, ESGIC  Take 1 tablet by mouth every 4 hours as needed for Headaches     cilostazol 100 MG tablet  Commonly known as: PLETAL     diazePAM 5 MG tablet  Commonly known as: VALIUM     dilTIAZem 240 MG extended release capsule  Commonly known as: CARDIZEM CD  Take 1 capsule by mouth daily     FreeStyle Flaquita 2 [place] Josie  1 each by Does not apply route 4 times daily     gabapentin 300 MG capsule  Commonly known as: NEURONTIN  Take 1 capsule by mouth 3 times daily for 90 days. insulin lispro (1 Unit Dial) 100 UNIT/ML Sopn  inject 10 units subcutaneously three times a day with meals     ipratropium-albuterol 0.5-2.5 (3) MG/3ML Soln nebulizer solution  Commonly known as: DUONEB  USE 3 ML VIA NEBULIZER EVERY 4 HOURS AS NEEDED FOR SHORTNESS OF BREATH     nortriptyline 25 MG capsule  Commonly known as: Pamelor  Take 1 capsule by mouth nightly     omeprazole 40 MG delayed release capsule  Commonly known as: PRILOSEC  Take 1 capsule by mouth every morning (before breakfast)     oxyCODONE-acetaminophen 5-325 MG per tablet  Commonly known as: PERCOCET     OXYGEN     rosuvastatin 20 MG tablet  Commonly known as: CRESTOR  take 1 tablet by mouth nightly     traZODone 150 MG tablet  Commonly known as: DESYREL  Take 0.5 tablets by mouth nightly for 4 days TAKE 1 TABLET BY MOUTH EVERY NIGHT AT BEDTIME     ursodiol 300 MG capsule  Commonly known as:  Actigall  Take 1 capsule by mouth 3 times daily (with meals)     vitamin B-12 1000 MCG tablet  Commonly known as: CYANOCOBALAMIN  Take 1 tablet by mouth daily     vitamin D 1.25 MG (38512 UT) Caps capsule  Commonly known as: ERGOCALCIFEROL  TAKE 1 CAPSULE PO q weekly        STOP taking these medications    albuterol (2.5 MG/3ML) 0.083% nebulizer solution  Commonly known as: PROVENTIL     metoprolol tartrate 25 MG tablet  Commonly known as: LOPRESSOR           Where to Get Your Medications      These medications were sent to [facility], [address] [facility]  [address], [address] [address] [facility]    Phone: [phone]   · predniSONE 10 MG tablet     Information about where to get these medications is not yet available    Ask your nurse or doctor about these medications  · Basaglar KwikPen 100 UNIT/ML injection pen           Follow up:  [address]   [address]        Rashawn [address] [address]  [phone]    Go on 10/27/2021  [address] [place] [address], MD  [facility], [address]  [address]  [phone]    Schedule an appointment as soon as possible for a visit in 3 week          Karen Mojica MD  Family Medicine PGY-1

## 2021-10-25 NOTE — TELEPHONE ENCOUNTER
[facility] Trans[facility] Initial Follow Up Call    Outreach made within 2 business days of discharge: Yes    Patient: Suleiman Ha Patient : 1965   MRN: [id number]  Reason for Admission: COPD (chronic obstructive pulmonary disease)    Discharge Date: 10/22/21       Spoke with: TORI Albright    Discharge department/facility: [address] [place] [address] Interactive Patient Contact:  Was patient able to fill all prescriptions: Yes  Was patient instructed to bring all medications to the follow-up visit: Yes  Is patient taking all medications as directed in the discharge summary?  Yes  Does patient understand their discharge instructions: Yes  Does patient have questions or concerns that need addressed prior to 7-14 day follow up office visit: no    Scheduled appointment with PCP within 7-14 days    Follow Up  Future Appointments   Date Time Provider Lori Kinsey   10/27/2021  1:45 PM Albert Chacon MD [facility]   11/3/2021  2:00 PM Pauly Mazariegos DO [facility] SUR[facility]   2021  1:00 PM Ang Williamson MD VAS/MED [facility]   2021  3:30 PM Maureen De La Rosa MD [facility]   2022 12:30 PM Eduardo Veloz MD [address] [place] [address]

## 2021-11-01 NOTE — Clinical Note
This was a hospital follow up. You really needed to do the documentation for that and charge for a hospital follow up visit.  Can you make those changes and send back?  ho

## 2021-11-01 NOTE — Clinical Note
I apologize. I was counting days from day of admission. She does qualify for transitional of care- hospital follow up. Made the changes.   Thanks

## 2021-11-01 NOTE — PROGRESS NOTES
pen 1    predniSONE (DELTASONE) 10 MG tablet 4 tabs daily x3 days, 3 tabs daily x3 days, 2 tabs daily x3 days, 1 tab daily x3 days 30 tablet 0    cilostazol (PLETAL) 100 MG tablet Take 100 mg by mouth 2 times daily      diazePAM (VALIUM) 5 MG tablet take 1 tablet by mouth every 8 hours if needed for SPASM(S)      omeprazole (PRILOSEC) 40 MG delayed release capsule Take 1 capsule by mouth every morning (before breakfast) 30 capsule 5    fenofibrate (TRICOR) 54 MG tablet take 1 tablet by mouth once daily (Patient taking differently: Take 54 mg by mouth nightly ) 30 tablet 5    oxyCODONE-acetaminophen (PERCOCET) 5-325 MG per tablet 1 tablet every 4 hours as needed for Pain.  gabapentin (NEURONTIN) 300 MG capsule Take 1 capsule by mouth 3 times daily for 90 days.  90 capsule 2    ursodiol (ACTIGALL) 300 MG capsule Take 1 capsule by mouth 3 times daily (with meals) 90 capsule 2    nortriptyline (PAMELOR) 25 MG capsule Take 1 capsule by mouth nightly 30 capsule 3    rosuvastatin (CRESTOR) 20 MG tablet take 1 tablet by mouth nightly 30 tablet 5    Continuous Blood Gluc  (FREESTYLE SHREYA 2 READER) HAI 1 each by Does not apply route 4 times daily 1 each 0    budesonide (PULMICORT) 0.25 MG/2ML nebulizer suspension Take 2 mLs by nebulization 2 times daily 60 ampule 3    insulin lispro, 1 Unit Dial, 100 UNIT/ML SOPN inject 10 units subcutaneously three times a day with meals 9 mL 1    B-D ULTRAFINE III SHORT PEN 31G X 8 MM MISC use as directed with LANTUS 100 each 5    dilTIAZem (CARDIZEM CD) 240 MG extended release capsule Take 1 capsule by mouth daily 30 capsule 5    butalbital-acetaminophen-caffeine (FIORICET, ESGIC) -40 MG per tablet Take 1 tablet by mouth every 4 hours as needed for Headaches 180 tablet 3    apixaban (ELIQUIS) 5 MG TABS tablet Take 1 tablet by mouth 2 times daily Start after done with started pack 60 tablet 5    vitamin B-12 (CYANOCOBALAMIN) 1000 MCG tablet Take 1 tablet by mouth daily 30 tablet 11    vitamin D (ERGOCALCIFEROL) 1.25 MG (04992 UT) CAPS capsule TAKE 1 CAPSULE PO q weekly 12 capsule 5    OXYGEN Inhale 5 L into the lungs continuous      ondansetron (ZOFRAN ODT) 4 MG disintegrating tablet Take 1 tablet by mouth every 8 hours as needed for Nausea or Vomiting 30 tablet 1    traZODone (DESYREL) 150 MG tablet Take 0.5 tablets by mouth nightly for 4 days TAKE 1 TABLET BY MOUTH EVERY NIGHT AT BEDTIME 4 tablet 0     No current facility-administered medications for this visit. No Known Allergies    Past Medical & Surgical History:      Diagnosis Date    Anxiety     Carotid stenosis 12/2011    50-69% on left    Carpal tunnel syndrome 12/07/2011    bilateral    Cerebral artery occlusion with cerebral infarction [facility]) 2011? left sided weakness / usually in w/c    Chronic back pain     COPD (chronic obstructive pulmonary disease) ([facility]     no pulmonologist / follows with PCP    CVA (cerebral infarction) 2011?     right sided thalamic; seen by Dr Db Alamo    Depression     Dermatophytosis 5/29/2021    Discoloration of skin of toe 6/3/2021    Ehler's-Danlos syndrome     GERD (gastroesophageal reflux disease)     History of ischemic heart disease 03/30/2017    no cardiology    Hyperlipidemia     Hypertension     Left carotid stenosis 5/29/2021    O2 dependent 5/29/2021    Obesity     PVD (peripheral vascular disease) with claudication ([facility] 8/25/2017    Tobacco abuse     Tobacco abuse     Type II or unspecified type diabetes mellitus without mention of complication, not stated as uncontrolled     Vitamin D deficiency      Past Surgical History:   Procedure Laterality Date    ABDOMINAL EXPLORATION SURGERY  03/29/2017    with bowel resection, incarcerated ventral hernia repair    CARDIAC CATHETERIZATION  1/7/2015    Dr. Ceci Jarvis  EF=55%   FFR=0.84  distal RCA bifurcation    CYST INCISION AND DRAINAGE Right 07/22/2016    Right gluteal mass incision and drainage    CYST REMOVAL  1/28/2011    excision of left arm inclusion cyst    ECHO COMPL W DOP COLOR FLOW  5/15/2013         ECHOCARDIOGRAM COMPLETE 2D W DOPPLER W COLOR  1/3/2012         ECHOCARDIOGRAM COMPLETE 2D W DOPPLER W COLOR  6/14/2012         EYE SURGERY Bilateral 2014    cataract w lens implants    HERNIA REPAIR  03/2017    abdominal    OTHER SURGICAL HISTORY  07/26/2016    re exploration left lateral wound / heel    WA DRAIN SKIN ABSCESS COMPLIC N/A 0/4/8598    EXAM UNDER ANESTHESIA  I & D BUTTOCKS performed by Jaxson Banks DO at [address]       Family History:      Problem Relation Age of Onset    High Blood Pressure Mother     COPD Mother     COPD Father     High Blood Pressure Father     Other Father     Heart Disease Brother     High Blood Pressure Brother     Other Brother     Cancer Maternal Grandmother     Heart Attack Maternal Grandfather     Other Paternal Grandmother        Social History:  Social History     Tobacco Use    Smoking status: Current Every Day Smoker     Packs/day: 1.00     Years: 34.00     Pack years: 34.00     Types: Cigarettes    Smokeless tobacco: Never Used   Substance Use Topics    Alcohol use: No     Alcohol/week: 0.0 standard drinks       Immunization History   Administered Date(s) Administered    COVID-19, Moderna, Primary or Immunocompromised, PF, 100mcg/0.5mL 04/02/2021, 04/30/2021    Influenza 11/11/2010, 12/06/2011, 10/16/2012, 10/08/2013    Influenza Vaccine, unspecified formulation 12/06/2011, 10/16/2012, 10/08/2013, 10/17/2016, 09/26/2018    Influenza Virus Vaccine 09/09/2015    Influenza, High Dose (Fluzone 65 yrs and older) 11/20/2017    Influenza, MDCK Quadv, IM, PF (Flucelvax 2 yrs and older) 11/01/2021    Influenza, Quadv, IM, PF (6 mo and older Fluzone, Flulaval, Fluarix, and 3 yrs and older Afluria) 10/17/2016, 09/26/2018, 11/13/2019, 09/23/2020    Pneumococcal Polysaccharide ([id number]) 11/03/2013 Review of Systems   Constitutional: Negative for chills and fever. HENT: Negative for congestion and rhinorrhea. Eyes: Negative for visual disturbance. Respiratory: Positive for shortness of breath (Baseline). Negative for cough. Cardiovascular: Positive for leg swelling. Negative for chest pain. Gastrointestinal: Positive for constipation. Negative for abdominal pain, nausea and vomiting. Genitourinary: Negative for dysuria and hematuria. Musculoskeletal: Positive for arthralgias. Skin: Negative for rash. Neurological: Negative for weakness and numbness. Psychiatric/Behavioral: Negative for agitation. The patient is not nervous/anxious. VS:  BP (!) 109/55   Pulse 120   Temp 97.9 °F (36.6 °C) (Temporal)   Resp 18   Ht 5' 5\" (1.651 m)   Wt 164 lb (74.4 kg)   LMP 03/20/2015   SpO2 97%   BMI 27.29 kg/m²     Physical Exam  Vitals reviewed. Constitutional:       General: She is not in acute distress. Appearance: Normal appearance. She is not ill-appearing. HENT:      Head: Normocephalic and atraumatic. Right Ear: External ear normal.      Left Ear: External ear normal.      Nose: Nose normal.      Mouth/Throat:      Mouth: Mucous membranes are moist.   Eyes:      General: No scleral icterus. Conjunctiva/sclera: Conjunctivae normal.   Cardiovascular:      Rate and Rhythm: Normal rate and regular rhythm. Heart sounds: Normal heart sounds. No murmur heard. Pulmonary:      Effort: Pulmonary effort is normal. No respiratory distress. Comments: Comfortable on nasal cannula. Baseline 4-5 L   Decreased breath sounds. No wheezing or rhonchi   Abdominal:      General: Abdomen is flat. There is distension (mildly). Palpations: Abdomen is soft. Tenderness: There is no abdominal tenderness. There is no guarding or rebound. Musculoskeletal:         General: Normal range of motion. Cervical back: Normal range of motion and neck supple.  No rigidity. Right lower leg: Edema (Pitting edema 1+) present. Left lower leg: Edema (Pitting edema 1+) present. Skin:     General: Skin is warm. Findings: No rash. Neurological:      General: No focal deficit present. Mental Status: She is alert and oriented to person, place, and time. Psychiatric:         Mood and Affect: Mood normal.         Behavior: Behavior normal.         Assessment/Plan:  Lulú Mckeon was seen today for care management. Diagnoses and all orders for this visit:    COPD, severe ([address]  Improving respiratory status. Baseline need for NC Oxygen 4-5 L. Compliant with Trilogy at night. Counseled about quitting smoking. Will follow with pulmonology. 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 Continue with current regimen. Follows with PT/OT at home to improve her weakness. Constipation, unspecified constipation type  On pain meds chronically. Advised to use dulcolax as she already is and will add miralax. -     polyethylene glycol (GLYCOLAX) 17 GM/SCOOP powder; Take 17 g by mouth daily    Need for influenza vaccination  -     INFLUENZA, MDCK QUADV, 2 YRS AND OLDER, IM, PF, PREFILL SYR OR SDV, 0.5ML (FLUCELVAX QUADV, PF)        Follow up:  3 months for copd and diabetes     Patient agrees with the above stated plan. Deidre received counseling on the following healthy behaviors: nutrition, exercise, medication adherence and tobacco cessation.     Jacinda Jerez MD  PGY-3 Family Medicine

## 2021-11-01 NOTE — PROGRESS NOTES
S: 64 y.o. female with   Chief Complaint   Patient presents with    Care Management     TCM       Pt was admitted for COPD. Pt is using trelegy at home and this is helping a ton. This is a hospital follow up. Finishing up the prednisone. Still having constipation issues. O: VS:  height is 5' 5\" (1.651 m) and weight is 164 lb (74.4 kg). Her temporal temperature is 97.9 °F (36.6 °C). Her blood pressure is 109/55 (abnormal) and her pulse is 120. Her respiration is 18 and oxygen saturation is 97%. BP Readings from Last 3 Encounters:   11/01/21 (!) 109/55   10/22/21 (!) 150/65   10/14/21 109/63     See resident note      Impression/Plan:   1) COPD - pt improving. Finish pred taper. 2) weakness - PT is working with her. 3) Prev - ask about flu vaccine. 4) DM - has freestyle [place]. Watch off prednisione. 5) constipation - encouraged miralax. Health Maintenance Due   Topic Date Due    Hepatitis C screen  Never done    Diabetic retinal exam  Never done    Hepatitis B vaccine (1 of 3 - Risk 3-dose series) Never done    DTaP/Tdap/Td vaccine (1 - Tdap) Never done    Cervical cancer screen  Never done    Colon cancer screen colonoscopy  Never done    Diabetic foot exam  12/10/2014    Shingles Vaccine (1 of 2) Never done    Low dose CT lung screening  07/09/2018    Breast cancer screen  11/07/2018    Flu vaccine (1) 09/01/2021    Diabetic microalbuminuria test  09/24/2021         Attending Physician Statement  I have discussed the case, including pertinent history and exam findings with the resident. I agree with the documented assessment and plan.       Hermelindo Petersen MD

## 2021-11-01 NOTE — TELEPHONE ENCOUNTER
I am not sure what they mean- she has severe COPD and is on chronic oxygen. What more documentation is necessary?

## 2021-11-01 NOTE — TELEPHONE ENCOUNTER
Estefanía Polanco from [employer] called requesting a letter of medical necessity for nebulizer and all supplies. Letter needs faxed to [phone].

## 2021-11-05 NOTE — PROGRESS NOTES
Patient presents for follow-up. She denies any abdominal pain. Her main complaint is intermittent nausea. She again is wondering whether the gallstones can cause this. On exam her abdomen is soft nondistended nontender. I again reviewed her findings. She has 1 very small gallstone noted on ultrasound. I do not believe her gallbladder is attributable to her symptoms. Her nausea is likely related to the polypharmacy. I do not recommend any further treatment of her gallbladder. She understands this. I did provide prescription for Zofran for her as needed. She is welcome to follow with me as needed.

## 2021-11-05 NOTE — PROGRESS NOTES
That's what I thought too, but she did not show up within 14 days. She rescheduled. That would be more than 15 days. I thought that meant regular office visit. Should I still change it?   Thank you

## 2021-11-08 NOTE — TELEPHONE ENCOUNTER
Confirmed appt with Fermín Starr for 11-9-2021 with Dr Davian Lott. Se may cancel as she is going in and out of afib and gets tired. Told her that is ok if she needs to cancel that same day.

## 2021-11-09 NOTE — TELEPHONE ENCOUNTER
Noted; thank you for calling  Please follow up tomorrow with patient and visiting nurses if they do not go to ER tonight.

## 2021-11-09 NOTE — TELEPHONE ENCOUNTER
Spoke to [employer]. She had previously left voicemail asking if labs could be ordered due to patient weakness. Returned call where she added the patient has been in a-fib since Friday evening. HR hit 187 for less than a minute. It's general between 130-170 during an episode. Her stomach is distended and after 3 doses of polyethylene glycol she is still constipated. She has not had a bowel movement since last Wednesday. She reports a 6 lb weight gain since yesterday. She was 154 and today is 160. She has had edema in her ankles and feet for over 2 weeks to the point they are unable to get her slippers on. She said visiting nurses usually comes out twice a week. She thinks they are coming tomorrow but has not seen her yet this week. Due to her Afib she has not had therapy this week. Per conversation with PCP advised Haylie Jaimes patient needed to go to the ED. [place] Jaimes is unsure if she will go but will try.

## 2021-11-10 NOTE — TELEPHONE ENCOUNTER
Xavier Ni notified and verbalizes understanding. Samantha Peals home nurse notified and verbalizes understanding.

## 2021-11-10 NOTE — TELEPHONE ENCOUNTER
Patients daughter called back to let us know that mom refused to go to the er, can we put orders in for her to have home health to come in? [facility] was there this am--called to let us know her BP was 85/73 intermittent chest pain and dizziness, daughter/mom stated that happens when she is AFIB but is refusing to go to the ER.

## 2021-11-10 NOTE — TELEPHONE ENCOUNTER
Spoke to Hussain Duke from [facility]. She said [place] Nicole is requesting labs. She will be out to the home this afternoon. I will call Hussain Duke with orders at [phone].

## 2021-11-10 NOTE — TELEPHONE ENCOUNTER
Please call patient for details.   As per conversation yesterday either wanted her in the ER to have Sadia Hartman nurse see her today

## 2021-11-10 NOTE — TELEPHONE ENCOUNTER
Received call from [address] Ryan at [facility] with Red Flag Complaint. Patient's daughter Nato Needs" (patient's POA) calling to speak with Dr Asya Robb nurse about patient refusal of ED disposition last night from provider. Epic noted from provider yesterday that nurse would reach out to patient if any refusal of ED recommendation. Arelis Bradford that I would reach out to the office and assist her with speaking to nurse for Dr Giulia Rich. No further questions at this time. Spoke with Sophia from [facility] who offered to speak with [facility] to further assist.  Yazmin Harley provided warm transfer to Sophia. Attention Provider: Thank you for allowing me to participate in the care of your patient. The patient was connected to triage in response to information provided to the ECC/PSC. Please do not respond through this encounter as the response is not directed to a shared pool.             Reason for Disposition   Caller has already spoken with the PCP (or office), and has no further questions    Protocols used: NO CONTACT OR DUPLICATE CONTACT CALL-ADULT-[place]

## 2021-11-10 NOTE — TELEPHONE ENCOUNTER
----- Message from [facility] sent at 11/10/2021  7:49 AM EST -----  Subject: Message to Provider    QUESTIONS  Information for Provider? Ady Castelan the patient POA says the patient   refused to go to the Emergency room last night and wants a call back from   [facility] nurse. She also said the patient has new symptoms of fatigue   and I am currently on the line with nurse triage because she did not want   to hold any longer. Please call Alexandra Manuel back at [phone] soon as   possible.  ---------------------------------------------------------------------------  --------------  CALL BACK INFO  What is the best way for the office to contact you? OK to leave message on   voicemail  Preferred Call Back Phone Number? [phone]  ---------------------------------------------------------------------------  --------------  SCRIPT ANSWERS  Relationship to Patient?  Self

## 2021-11-10 NOTE — LETTER
[address] [address]  Phone: [phone]  Fax: Irma Mesa MD        November 15, 2021     Patient: Yuly Tam   YOB: 1965   Date of Visit: 11/10/2021       To Whom It May Concern: It is my medical opinion that Anushka Miles requires a nebulizer for her chronic severe and oxygen dependent COPD. She is on 4-6 LPM oxygen at all times and is unable to function without it. Duration of need is lifetime. If you have any questions or concerns, please call the office.     Sincerely,        Lindsey Coulter MD

## 2021-11-10 NOTE — TELEPHONE ENCOUNTER
While I would consider labs from the [place] [address] nurse, the progression of reported SSx are escalating. My advise is to be evaluated in the nearest ER. If in Afib and rapid HR I cannot help her from afar.

## 2021-11-12 NOTE — TELEPHONE ENCOUNTER
Xavier Ni, patient's POA, called stating the patient came out of a-fib on Wednesday. She has still not had a bowel movement since 11/3. She has been taking Glycolax. She has been drinking prune juice, apple juice, Murelax, milk of magnesia, and other foods that usual cause her to go. She is passing gas but no BM. Please advise.

## 2021-11-16 NOTE — ED PROVIDER NOTES
FIRST PROVIDER CONTACT ASSESSMENT NOTE           Department of Emergency Medicine                 First Provider Note            21  5:53 PM EST    Date of Encounter: No admission date for patient encounter. Patient Name: Brooklyn Giron  : 1965  MRN: [id number]    Chief Complaint: No chief complaint on file. History of Present Illness:   Brooklyn Giron is a 64 y.o. female who presents to the ED for blood in urine. Large amounts. Unable to move bowels. On oxygen chronically. Looking pale. Feeling tired and run down. Smoker. Unable to move bowels. Focused Physical Exam:  VS:    ED Triage Vitals   BP Temp Temp src Pulse Resp SpO2 Height Weight   -- -- -- -- -- -- -- --        Physical Ex: Constitutional: Alert and non-toxic. Medical History:  has a past medical history of Anxiety, Carotid stenosis, Carpal tunnel syndrome, Cerebral artery occlusion with cerebral infarction [facility]), Chronic back pain, COPD (chronic obstructive pulmonary disease) ([facility] CVA (cerebral infarction), Depression, Dermatophytosis, Discoloration of skin of toe, Ehler's-Danlos syndrome, GERD (gastroesophageal reflux disease), History of ischemic heart disease, Hyperlipidemia, Hypertension, Left carotid stenosis, O2 dependent, Obesity, PVD (peripheral vascular disease) with claudication ([facility] Tobacco abuse, Tobacco abuse, Type II or unspecified type diabetes mellitus without mention of complication, not stated as uncontrolled, and Vitamin D deficiency. Surgical History:  has a past surgical history that includes Tubal ligation (); cyst removal (2011); ECHO Complete 2D W Doppler W Color (1/3/2012); ECHO Complete 2D W Doppler W Color (2012); ECHO Compl W Dop Color Flow (5/15/2013); Cardiac catheterization (2015); cyst incision and drainage (Right, 2016); other surgical history (2016);  Abdominal exploration surgery (2017); pr drain skin abscess complic (N/A, 7/6/2018); hernia repair (03/2017); and eye surgery (Bilateral, 2014). Social History:  reports that she has been smoking cigarettes. She has a 34.00 pack-year smoking history. She has never used smokeless tobacco. She reports that she does not drink alcohol and does not use drugs. Family History: family history includes COPD in her father and mother; Cancer in her maternal grandmother; Heart Attack in her maternal grandfather; Heart Disease in her brother; High Blood Pressure in her brother, father, and mother; Other in her brother, father, and paternal grandmother. Allergies: Patient has no known allergies.      Initial Plan of Care: Initiate Treatment-Testing, Proceed toTreatment Area When Bed Available for ED Attending/MLP to Continue Care      ---END OF FIRST PROVIDER CONTACT ASSESSMENT NOTE---  Electronically signed by Bry Esquivel PA-C   DD: 11/16/21        Bry Esquivel PA-C  11/16/21 6818

## 2021-11-17 NOTE — TELEPHONE ENCOUNTER
Jesi De La Fuente from [facility] called to let us know over night she was at the er for UTI and she gained 3 lbs, just an [facility]

## 2021-11-17 NOTE — TELEPHONE ENCOUNTER
Nurse Access contacted pt regarding ED follow up. Spoke with pt's POA, she stated they are going to schedule appt. Woke up not to long ago. POA stated she will schedule appts.

## 2021-11-17 NOTE — TELEPHONE ENCOUNTER
Patient's daughter called for ED follow up;   Scheduled with Dr Mark Dutton 11/22/21 - Monday     Also needs order for disposable chux sent to [facility], she states she hasn't had these in a while.      If agreeable, please place order and we can fax to [facility] to [phone]

## 2021-11-17 NOTE — ED PROVIDER NOTES
59-year-old female history of A. fib on Eliquis presents to the emergency department because she noted blood in her urine as well as constipation and left-sided abdominal pain. Patient chronically wears 4 to 5 L of oxygen at home. He has a history of COPD coronary artery disease diabetes with neuropathy. He came in for the abdominal issues. She states no nausea or vomiting no family bedside she is had some vomiting at home and has had difficulty getting p.o. intake she states no worsening of shortness of breath chest pain or other complaints at this time. She denies leg swelling is taken her Eliquis as prescribed    The history is provided by the patient. Abdominal Pain  Pain location:  LLQ and suprapubic  Pain quality: aching    Pain radiates to:  Does not radiate  Pain severity:  Mild  Onset quality:  Gradual  Duration:  3 days  Timing:  Intermittent  Progression:  Waxing and waning  Chronicity:  New  Relieved by:  Nothing  Worsened by:  Nothing  Ineffective treatments:  None tried  Associated symptoms: constipation, hematuria, nausea and vomiting    Associated symptoms: no anorexia, no belching, no chest pain, no chills, no cough, no diarrhea, no dysuria, no fatigue, no fever, no shortness of breath and no sore throat         Review of Systems   Constitutional: Negative for chills, fatigue and fever. HENT: Negative for ear pain, sinus pressure and sore throat. Eyes: Negative for pain, discharge and redness. Respiratory: Negative for cough, shortness of breath and wheezing. Cardiovascular: Negative for chest pain. Gastrointestinal: Positive for abdominal pain, constipation, nausea and vomiting. Negative for abdominal distention, anorexia and diarrhea. Genitourinary: Positive for hematuria. Negative for dysuria and frequency. Musculoskeletal: Negative for arthralgias and back pain. Skin: Negative for rash and wound. Neurological: Negative for weakness and headaches.    Hematological: Negative for adenopathy. All other systems reviewed and are negative. Physical Exam  Constitutional:       Appearance: Normal appearance. HENT:      Head: Normocephalic and atraumatic. Nose: Nose normal.      Mouth/Throat:      Mouth: Mucous membranes are dry. Eyes:      Extraocular Movements: Extraocular movements intact. Pupils: Pupils are equal, round, and reactive to light. Cardiovascular:      Rate and Rhythm: Regular rhythm. Tachycardia present. Pulses: Normal pulses. Heart sounds: Normal heart sounds. Pulmonary:      Effort: Pulmonary effort is normal.      Breath sounds: Normal breath sounds. Abdominal:      General: Abdomen is flat. Bowel sounds are normal.      Palpations: Abdomen is soft. Tenderness: There is abdominal tenderness in the left lower quadrant. Musculoskeletal:         General: Normal range of motion. Cervical back: Normal range of motion and neck supple. Skin:     General: Skin is warm. Capillary Refill: Capillary refill takes less than 2 seconds. Neurological:      General: No focal deficit present. Mental Status: She is alert and oriented to person, place, and time. Procedures   EKG: This EKG is signed and interpreted by the EP. Time: 20:54  Rate: 112  Rhythm: Sinus  Interpretation: sinus tachycardia  Comparison: stable as compared to patient's most recent EKG    MDM  Number of Diagnoses or Management Options  Abdominal pain, left lower quadrant  Acute cystitis with hematuria  Calculus of gallbladder without cholecystitis without obstruction  Constipation, unspecified constipation type  Dehydration  Hydronephrosis, right  Diagnosis management comments: Patient seen and examined. Labs and imaging were ordered. Work-up reveals reassuring lab work-up imaging reveals multiple incidental findings including evidence of urinary tract infection possible ureteral stone.   Patient also found to have concerns for constipation. She has stones in her gallbladder but no evidence of cholecystitis and no tenderness in the right upper quadrant. She appears to be dehydrated IV fluids were given she had improvement and desire to go home. Ultimately patient felt to be safe for discharge and outpatient follow-up. She is to follow-up with primary care physician she may have a kidney stone.            --------------------------------------------- PAST HISTORY ---------------------------------------------  Past Medical History:  has a past medical history of Anxiety, Carotid stenosis, Carpal tunnel syndrome, Cerebral artery occlusion with cerebral infarction ([address] Chronic back pain, COPD (chronic obstructive pulmonary disease) ([address] CVA (cerebral infarction), Depression, Dermatophytosis, Discoloration of skin of toe, Ehler's-Danlos syndrome, GERD (gastroesophageal reflux disease), History of ischemic heart disease, Hyperlipidemia, Hypertension, Left carotid stenosis, O2 dependent, Obesity, PVD (peripheral vascular disease) with claudication ([address] Tobacco abuse, Tobacco abuse, Type II or unspecified type diabetes mellitus without mention of complication, not stated as uncontrolled, and Vitamin D deficiency. Past Surgical History:  has a past surgical history that includes Tubal ligation (1992); cyst removal (1/28/2011); ECHO Complete 2D W Doppler W Color (1/3/2012); ECHO Complete 2D W Doppler W Color (6/14/2012); ECHO Compl W Dop Color Flow (5/15/2013); Cardiac catheterization (1/7/2015); cyst incision and drainage (Right, 07/22/2016); other surgical history (07/26/2016); Abdominal exploration surgery (03/29/2017); pr drain skin abscess complic (N/A, 8/6/6801); hernia repair (03/2017); and eye surgery (Bilateral, 2014). Social History:  reports that she has been smoking cigarettes. She has a 34.00 pack-year smoking history.  She has never used smokeless tobacco. She reports that she does not drink alcohol and does not use drugs. Family History: family history includes COPD in her father and mother; Cancer in her maternal grandmother; Heart Attack in her maternal grandfather; Heart Disease in her brother; High Blood Pressure in her brother, father, and mother; Other in her brother, father, and paternal grandmother. The patients home medications have been reviewed. Allergies: Patient has no known allergies.     -------------------------------------------------- RESULTS -------------------------------------------------  Labs:  Results for orders placed or performed during the hospital encounter of 11/16/21   CBC Auto Differential   Result Value Ref Range    WBC 8.0 4.5 - 11.5 E9/L    RBC 4.14 3.50 - 5.50 E12/L    Hemoglobin 12.0 11.5 - 15.5 g/dL    Hematocrit 38.5 34.0 - 48.0 %    MCV 93.0 80.0 - 99.9 fL    MCH 29.0 26.0 - 35.0 pg    MCHC 31.2 (L) 32.0 - 34.5 %    RDW 14.9 11.5 - 15.0 fL    Platelets 281 (H) 054 - 450 E9/L    MPV 9.2 7.0 - 12.0 fL    Neutrophils % 59.5 43.0 - 80.0 %    Immature Granulocytes % 0.4 0.0 - 5.0 %    Lymphocytes % 32.0 20.0 - 42.0 %    Monocytes % 6.1 2.0 - 12.0 %    Eosinophils % 1.5 0.0 - 6.0 %    Basophils % 0.5 0.0 - 2.0 %    Neutrophils Absolute 4.74 1.80 - 7.30 E9/L    Immature Granulocytes # 0.03 E9/L    Lymphocytes Absolute 2.55 1.50 - 4.00 E9/L    Monocytes Absolute 0.49 0.10 - 0.95 E9/L    Eosinophils Absolute 0.12 0.05 - 0.50 E9/L    Basophils Absolute 0.04 0.00 - 0.20 E9/L   Comprehensive Metabolic Panel w/ Reflex to MG   Result Value Ref Range    Sodium 135 132 - 146 mmol/L    Potassium reflex Magnesium 3.4 (L) 3.5 - 5.0 mmol/L    Chloride 88 (L) 98 - 107 mmol/L    CO2 40 (H) 22 - 29 mmol/L    Anion Gap 7 7 - 16 mmol/L    Glucose 240 (H) 74 - 99 mg/dL    BUN 11 6 - 20 mg/dL    CREATININE 0.4 (L) 0.5 - 1.0 mg/dL    GFR Non-African American >60 >=60 mL/min/1.73    GFR African American >60     Calcium 8.3 (L) 8.6 - 10.2 mg/dL    Total Protein 5.9 (L) 6.4 - 8.3 g/dL    Albumin 2.6 (L) 3.5 - 5.2 g/dL    Total Bilirubin 0.3 0.0 - 1.2 mg/dL    Alkaline Phosphatase 186 (H) 35 - 104 U/L    ALT 8 0 - 32 U/L    AST 14 0 - 31 U/L   Troponin   Result Value Ref Range    Troponin, High Sensitivity 27 (H) 0 - 9 ng/L   Urinalysis   Result Value Ref Range    Color, UA BLOODY Straw/Yellow    Clarity, UA CLOUDY (A) Clear    Glucose, Ur 100 (A) Negative mg/dL    Bilirubin Urine MODERATE (A) Negative    Ketones, Urine TRACE (A) Negative mg/dL    Specific Gravity, UA <=1.005 1.005 - 1.030    Blood, Urine LARGE (A) Negative    pH, UA >=9.0 5.0 - 9.0    Protein, UA >=300 (A) Negative mg/dL    Urobilinogen, Urine 4.0 (A) <2.0 E.U./dL    Nitrite, Urine POSITIVE (A) Negative    Leukocyte Esterase, Urine LARGE (A) Negative   Lactic Acid, Plasma   Result Value Ref Range    Lactic Acid 1.5 0.5 - 2.2 mmol/L   Microscopic Urinalysis   Result Value Ref Range    WBC, UA 1-3 0 - 5 /HPF    RBC, UA >20 0 - 2 /HPF    Bacteria, UA MANY (A) None Seen /HPF    Amorphous, UA FEW     Crystals, UA Few (A) None Seen /HPF   Magnesium   Result Value Ref Range    Magnesium 1.9 1.6 - 2.6 mg/dL   Blood Gas, Arterial   Result Value Ref Range    Date Analyzed [id number]     Time Analyzed 0002     Source: Blood Arterial     pH, Blood Gas 7.417 7.350 - 7.450    PCO2 66.5 (H) 35.0 - 45.0 mmHg    PO2 80.3 75.0 - 100.0 mmHg    HCO3 41.9 (H) 22.0 - 26.0 mmol/L    B.E. 14.6 (H) -3.0 - 3.0 mmol/L    O2 Sat 96.0 92.0 - 98.5 %    O2Hb 93.2 (L) 94.0 - 97.0 %    COHb 2.9 (H) 0.0 - 1.5 %    MetHb 0.0 0.0 - 1.5 %    O2 Content 15.8 mL/dL    HHb 3.9 0.0 - 5.0 %    tHb (est) 12.0 11.5 - 16.5 g/dL    Mode NC- 6 L     Date Of Collection      Time Collected      Pt Temp 37.0 C     ID [id number]     Lab [id number]     Critical(s) Notified .  No Critical Values    EKG 12 Lead   Result Value Ref Range    Ventricular Rate 112 BPM    Atrial Rate 112 BPM    P-R Interval 148 ms    QRS Duration 70 ms    Q-T Interval 378 ms    QTc Calculation (Bazett) 515 ms    P Axis 80 degrees    R Axis -35 degrees    T Axis 86 degrees   TYPE AND SCREEN   Result Value Ref Range    ABO/Rh A POS     Antibody Screen NEG        Radiology:  CT ABDOMEN PELVIS W IV CONTRAST Additional Contrast? None   Final Result   1. Large amount of stool in the proximal half the colon. Correlate   constipation. 2. Very distended gallbladder with cholelithiasis. 3. Concern for antral gastritis. 4. Diffuse ill-defined bladder wall thickening could relate to cystitis. 5. Unchanged right nephrolithiasis. Mild right hydronephrosis without   hydroureter or obstructing stone. Findings suggest possible mild UPJ   obstruction. 6. Unchanged hepatic steatosis. 7. Spinal compression deformities at T11, L2 and L3 are worsened/new since   11/2021. Other compression deformities/kyphoplasties are unchanged. ------------------------- NURSING NOTES AND VITALS REVIEWED ---------------------------  Date / Time Roomed:  11/16/2021 11:08 PM  ED Bed Assignment:  NISHA/NISHA    The nursing notes within the ED encounter and vital signs as below have been reviewed. BP (!) 104/59   Pulse 109   Temp 96.5 °F (35.8 °C)   Resp 18   Ht 5' 5\" (1.651 m)   Wt 160 lb (72.6 kg)   LMP 03/20/2015   SpO2 98%   BMI 26.63 kg/m²   Oxygen Saturation Interpretation: Normal      ------------------------------------------ PROGRESS NOTES ------------------------------------------  I have spoken with the patient and discussed todays results, in addition to providing specific details for the plan of care and counseling regarding the diagnosis and prognosis. Their questions are answered at this time and they are agreeable with the plan. I discussed at length with them reasons for immediate return here for re evaluation. They will followup with their primary care physician by calling their office tomorrow.       --------------------------------- ADDITIONAL PROVIDER NOTES ---------------------------------  At this time the patient is without objective evidence of an acute process requiring hospitalization or inpatient management. They have remained hemodynamically stable throughout their entire ED visit and are stable for discharge with outpatient follow-up. The plan has been discussed in detail and they are aware of the specific conditions for emergent return, as well as the importance of follow-up. Discharge Medication List as of 11/17/2021  1:31 AM      START taking these medications    Details   cefdinir (OMNICEF) 300 MG capsule Take 1 capsule by mouth 2 times daily for 7 days, Disp-14 capsule, R-0Print      naproxen (NAPROSYN) 500 MG tablet Take 1 tablet by mouth 2 times daily for 7 days, Disp-14 tablet, R-0Print             Diagnosis:  1. Constipation, unspecified constipation type    2. Acute cystitis with hematuria    3. Calculus of gallbladder without cholecystitis without obstruction    4. Abdominal pain, left lower quadrant    5. Dehydration    6. Hydronephrosis, right        Disposition:  Patient's disposition: Discharge to home  Patient's condition is stable.        Dylan Maher,   11/17/21 4730

## 2021-11-18 NOTE — PROCEDURES
[address] [address]                               PULMONARY FUNCTION    PATIENT NAME: Shubham Min                   :        1965  MED REC NO:   [id number]                            ROOM:       06  ACCOUNT NO:   [de-identified]                           ADMIT DATE: 10/17/2021  PROVIDER:     Олег Mcadams MD    DATE OF PROCEDURE:  10/22/2021    INTERPRETATION:  FVC is reduced. FEV1 is significantly reduced. FEV1/FVC ratio is reduced. There is a significant increase in flow  rates after the acute administration of bronchodilators. Lung volumes  evidenced air trapping. Diffusion capacity is markedly reduced. IMPRESSION:  Severe obstructive airways disease with significant air  trapping resulting in a decreased vital capacity. Bronchodilator  response _____. Decreased diffusion capacity is likely secondary to  emphysema given the patient's smoking history. The studies are overall consistent with GOLD class IV COPD. Clinical  correlation is advised.         Raine Hernandez MD    D: 2021 22:35:29       T: 2021 3:27:43     [email or web address]  Job#: [id number]     Doc#: [id number]    CC:

## 2021-11-24 NOTE — PROGRESS NOTES
11/24/2021    Yuly Tam is a 64 y.o. female here for: ED f/u    HPI:    Constipation and hasn't had BM in a month  Currently on Miralax, suppositories and enema, fiber supplement without any success  She is still able to pass gas  CT abdomen in ED visit showed large amount of stool in proximal half of colon, pt is also on opoid for pain which she has been cutting down on using  Has abdominal bloating and distention but no pain  Had UTI and was given antibiotics (Cefdinir) for 7 days which she has completed  Some nausea but no vomiting since Sunday  Not much appetite lately  Blood culture from ED + Anaerobic diptheroids (11/16/21), was advised by PCP to go for admission as there was concern for sepsis but pt declined  She isn't going back to the hospital and very adamant about it  Today she reports she isn't having any dysuria, hematuria or increased urinary frequency  No fever or chills at home  Denies any CV symptoms  Requiring about same 5L oxygen and no new increase in demand of oxygen      BP Readings from Last 3 Encounters:   11/24/21 (!) 104/44   11/17/21 (!) 104/59   11/03/21 115/61     Current Outpatient Medications   Medication Sig Dispense Refill    polyethylene glycol (GLYCOLAX) 17 GM/SCOOP powder Take 17 g by mouth 2 times daily 170 g 2    sennosides-docusate sodium (SENOKOT-S) 8.6-50 MG tablet Take 2 tablets by mouth daily 60 tablet 1    bisacodyl (BISAC-EVAC) 10 MG suppository Place 1 suppository rectally as needed for Constipation (not relieved by senakot, miralax or fiber) 15 suppository 1    Incontinence Supply Disposable (UNDERPADS) MISC Change as needed; uses up to 4 daily 120 each 5    ondansetron (ZOFRAN ODT) 4 MG disintegrating tablet Take 1 tablet by mouth every 8 hours as needed for Nausea or Vomiting 10 tablet 0    naproxen (NAPROSYN) 500 MG tablet Take 1 tablet by mouth 2 times daily for 7 days 14 tablet 0    tiZANidine (ZANAFLEX) 2 MG tablet Take 2 mg by mouth every 6 hours as needed      ipratropium-albuterol (DUONEB) 0.5-2.5 (3) MG/3ML SOLN nebulizer solution USE 3 ML VIA NEBULIZER EVERY 4 HOURS AS NEEDED FOR SHORTNESS OF BREATH 540 mL 5    insulin glargine (BASAGLAR KWIKPEN) 100 UNIT/ML injection pen Inject 20 Units into the skin 2 times daily 5 pen 1    predniSONE (DELTASONE) 10 MG tablet 4 tabs daily x3 days, 3 tabs daily x3 days, 2 tabs daily x3 days, 1 tab daily x3 days 30 tablet 0    cilostazol (PLETAL) 100 MG tablet Take 100 mg by mouth 2 times daily      diazePAM (VALIUM) 5 MG tablet take 1 tablet by mouth every 8 hours if needed for SPASM(S)      omeprazole (PRILOSEC) 40 MG delayed release capsule Take 1 capsule by mouth every morning (before breakfast) 30 capsule 5    fenofibrate (TRICOR) 54 MG tablet take 1 tablet by mouth once daily (Patient taking differently: Take 54 mg by mouth nightly ) 30 tablet 5    oxyCODONE-acetaminophen (PERCOCET) 5-325 MG per tablet 1 tablet every 4 hours as needed for Pain.  gabapentin (NEURONTIN) 300 MG capsule Take 1 capsule by mouth 3 times daily for 90 days.  90 capsule 2    ursodiol (ACTIGALL) 300 MG capsule Take 1 capsule by mouth 3 times daily (with meals) 90 capsule 2    nortriptyline (PAMELOR) 25 MG capsule Take 1 capsule by mouth nightly 30 capsule 3    rosuvastatin (CRESTOR) 20 MG tablet take 1 tablet by mouth nightly 30 tablet 5    Continuous Blood Gluc  (FREESTYLE SHREYA 2 READER) HAI 1 each by Does not apply route 4 times daily 1 each 0    budesonide (PULMICORT) 0.25 MG/2ML nebulizer suspension Take 2 mLs by nebulization 2 times daily 60 ampule 3    insulin lispro, 1 Unit Dial, 100 UNIT/ML SOPN inject 10 units subcutaneously three times a day with meals 9 mL 1    B-D ULTRAFINE III SHORT PEN 31G X 8 MM MISC use as directed with LANTUS 100 each 5    dilTIAZem (CARDIZEM CD) 240 MG extended release capsule Take 1 capsule by mouth daily 30 capsule 5    butalbital-acetaminophen-caffeine (FIORICET, ESGIC) -40 MG per tablet Take 1 tablet by mouth every 4 hours as needed for Headaches 180 tablet 3    apixaban (ELIQUIS) 5 MG TABS tablet Take 1 tablet by mouth 2 times daily Start after done with started pack 60 tablet 5    vitamin B-12 (CYANOCOBALAMIN) 1000 MCG tablet Take 1 tablet by mouth daily 30 tablet 11    vitamin D (ERGOCALCIFEROL) 1.25 MG (11292 UT) CAPS capsule TAKE 1 CAPSULE PO q weekly 12 capsule 5    OXYGEN Inhale 5 L into the lungs continuous      cefdinir (OMNICEF) 300 MG capsule Take 1 capsule by mouth 2 times daily for 7 days (Patient not taking: Reported on 11/24/2021) 14 capsule 0    traZODone (DESYREL) 150 MG tablet Take 0.5 tablets by mouth nightly for 4 days TAKE 1 TABLET BY MOUTH EVERY NIGHT AT BEDTIME 4 tablet 0     No current facility-administered medications for this visit. No Known Allergies    Past Medical & Surgical History:      Diagnosis Date    Anxiety     Carotid stenosis 12/2011    50-69% on left    Carpal tunnel syndrome 12/07/2011    bilateral    Cerebral artery occlusion with cerebral infarction [facility]) 2011? left sided weakness / usually in w/c    Chronic back pain     COPD (chronic obstructive pulmonary disease) ([facility] [address]     no pulmonologist / follows with PCP    CVA (cerebral infarction) 2011?     right sided thalamic; seen by Dr Breaux Flight    Depression     Dermatophytosis 5/29/2021    Discoloration of skin of toe 6/3/2021    Ehler's-Danlos syndrome     GERD (gastroesophageal reflux disease)     History of ischemic heart disease 03/30/2017    no cardiology    Hyperlipidemia     Hypertension     Left carotid stenosis 5/29/2021    O2 dependent 5/29/2021    Obesity     PVD (peripheral vascular disease) with claudication ([facility] [address] 8/25/2017    Tobacco abuse     Tobacco abuse     Type II or unspecified type diabetes mellitus without mention of complication, not stated as uncontrolled     Vitamin D deficiency      Past Surgical History:   Procedure Laterality Date    ABDOMINAL EXPLORATION SURGERY  03/29/2017    with bowel resection, incarcerated ventral hernia repair    CARDIAC CATHETERIZATION  1/7/2015    Dr. Demetrice Richardson  EF=55%   FFR=0.84  distal RCA bifurcation    CYST INCISION AND DRAINAGE Right 07/22/2016    Right gluteal mass incision and drainage    CYST REMOVAL  1/28/2011    excision of left arm inclusion cyst    ECHO COMPL W DOP COLOR FLOW  5/15/2013         ECHOCARDIOGRAM COMPLETE 2D W DOPPLER W COLOR  1/3/2012         ECHOCARDIOGRAM COMPLETE 2D W DOPPLER W COLOR  6/14/2012         EYE SURGERY Bilateral 2014    cataract w lens implants    HERNIA REPAIR  03/2017    abdominal    OTHER SURGICAL HISTORY  07/26/2016    re exploration left lateral wound / heel    NH DRAIN SKIN ABSCESS COMPLIC N/A 6/1/6168    EXAM UNDER ANESTHESIA  I & D BUTTOCKS performed by Eunice Hurst DO at [address]       Family History:      Problem Relation Age of Onset    High Blood Pressure Mother     COPD Mother     COPD Father     High Blood Pressure Father     Other Father     Heart Disease Brother     High Blood Pressure Brother     Other Brother     Cancer Maternal Grandmother     Heart Attack Maternal Grandfather     Other Paternal Grandmother        Social History:  Social History     Tobacco Use    Smoking status: Current Every Day Smoker     Packs/day: 1.00     Years: 34.00     Pack years: 34.00     Types: Cigarettes    Smokeless tobacco: Never Used   Substance Use Topics    Alcohol use: No     Alcohol/week: 0.0 standard drinks       Immunization History   Administered Date(s) Administered    COVID-19, [employer], Primary or Immunocompromised, PF, 100mcg/0.5mL 04/02/2021, 04/30/2021    Influenza 11/11/2010, 12/06/2011, 10/16/2012, 10/08/2013    Influenza Vaccine, unspecified formulation 12/06/2011, 10/16/2012, 10/08/2013, 10/17/2016, 09/26/2018    Influenza Virus Vaccine 09/09/2015    Influenza, High Dose (Fluzone 65 yrs and older) 11/20/2017    Influenza, MDCK Quadv, IM, PF (Flucelvax 2 yrs and older) 11/01/2021    Influenza, Rojean [place], IM, PF (6 mo and older Fluzone, Flulaval, Fluarix, and 3 yrs and older Afluria) 10/17/2016, 09/26/2018, 11/13/2019, 09/23/2020    Pneumococcal Polysaccharide ([id number]) 11/03/2013       Review of Systems   Constitutional: Positive for fatigue. Negative for activity change, appetite change, chills and fever. HENT: Negative for congestion, sore throat and trouble swallowing. Eyes: Negative for photophobia, pain and visual disturbance. Respiratory: Negative for cough and shortness of breath. Cardiovascular: Negative for chest pain, palpitations and leg swelling. Gastrointestinal: Positive for abdominal distention, constipation and nausea. Negative for abdominal pain, diarrhea and vomiting. Endocrine: Negative for heat intolerance and polydipsia. Genitourinary: Negative for difficulty urinating, dysuria, frequency and hematuria. Musculoskeletal: Positive for arthralgias, back pain and gait problem. Negative for joint swelling, neck pain and neck stiffness. Skin: Negative for rash and wound. Neurological: Positive for weakness. Negative for dizziness, syncope, light-headedness, numbness and headaches. Psychiatric/Behavioral: Negative for agitation, behavioral problems and confusion. The patient is nervous/anxious. VS:  BP (!) 104/44   Pulse 106   Temp 98.1 °F (36.7 °C) (Temporal)   Resp 16   Ht 5' 5\" (1.651 m)   Wt 164 lb 12.8 oz (74.8 kg)   LMP 03/20/2015   SpO2 96%   BMI 27.42 kg/m²     Physical Exam  Constitutional:       General: She is not in acute distress. Appearance: Normal appearance. She is not ill-appearing. Comments: In wheelchair, using 5 L home oxygen   HENT:      Head: Normocephalic and atraumatic. Right Ear: External ear normal.      Left Ear: External ear normal.      Nose: Nose normal. No congestion.       Mouth/Throat:      Mouth: Mucous membranes are moist.      Pharynx: Oropharynx is clear. Eyes:      General:         Right eye: No discharge. Left eye: No discharge. Extraocular Movements: Extraocular movements intact. Conjunctiva/sclera: Conjunctivae normal.   Cardiovascular:      Rate and Rhythm: Normal rate and regular rhythm. Pulses: Normal pulses. Heart sounds: Normal heart sounds. Pulmonary:      Effort: Pulmonary effort is normal. No respiratory distress. Breath sounds: No wheezing. Comments: Decreased b/l breath sounds  Abdominal:      General: Bowel sounds are normal. There is distension. Palpations: Abdomen is soft. There is no mass. Tenderness: There is no abdominal tenderness. There is no guarding or rebound. Musculoskeletal:         General: No swelling or tenderness. Normal range of motion. Cervical back: Normal range of motion and neck supple. Right lower leg: Edema (1+) present. Left lower leg: Edema (1+) present. Skin:     General: Skin is warm. Capillary Refill: Capillary refill takes less than 2 seconds. Coloration: Skin is pale. Skin is not jaundiced. Neurological:      General: No focal deficit present. Mental Status: She is alert and oriented to person, place, and time. Mental status is at baseline. Psychiatric:         Attention and Perception: Attention and perception normal.         Mood and Affect: Mood is anxious. Affect is flat. Speech: Speech normal.         Behavior: Behavior normal. Behavior is cooperative. Thought Content: Thought content normal.         Cognition and Memory: Cognition and memory normal.         Judgment: Judgment normal.         Assessment/Plan:  1. Drug-induced constipation  - constipation likely due to Percocet usage. Will excalate her BM regimen. Increase Miralax BID. Will start senokot and can continue fiber powder. Encouraged hydration.   - if no movement of bowels in 2 days, can do Bisacodyl suppository. - polyethylene glycol (GLYCOLAX) 17 GM/SCOOP powder; Take 17 g by mouth 2 times daily  Dispense: 170 g; Refill: 2  - sennosides-docusate sodium (SENOKOT-S) 8.6-50 MG tablet; Take 2 tablets by mouth daily  Dispense: 60 tablet; Refill: 1  - bisacodyl (BISAC-EVAC) 10 MG suppository; Place 1 suppository rectally as needed for Constipation (not relieved by senakot, miralax or fiber)  Dispense: 15 suppository; Refill: 1    2. Leg Edema  - improving, will order compression stocking for the pt today. Encouraged to elevate legs at home as well. 3. Blood culture positive for microorganism  -  + Anaerobic diptheroids (11/16/21) from Blood culture #2, blood culture#1 has no growth. Pt currently feeling well and no system complains. - vitals in clinic show borderline BP and slightly tachycardic.   - counseled pt that if any fever, worsening of nausea and vomiting will need to go ED ASAP for further assessment and treatment. Also can be life-threatening resulting in death if not treated. Follow up:  1 week    Patient agrees with the above stated plan. Counseled regarding above diagnosis, including possible risks and complications,  especially if left uncontrolled. Counseled regarding the possible side effects, risks, benefits and alternatives to treatment;patient and/or guardian verbalizes understanding, agrees, feels comfortable with and wishes to proceed with above treatment plan. Call or go to ED immediately if symptoms worsen or persist. Advised patient to call with any new medication issues, and, as applicable, read all Rx info from pharmacy to assure aware of all possible risks and side effects of medicationbefore taking. Patient and/or guardian given opportunity to ask questions/raise concerns. The patient verbalized comfort and understanding ofinstructions. I encourage further reading and education about your health conditions.   Information on many health conditions is provided by [facility] [employer]: [email or web address]  Please bring any questions to me at your nextvisit.       Mark Hurley MD  Family Medicine PGY-3

## 2021-11-24 NOTE — PROGRESS NOTES
including pertinent history and exam findings with the resident. I agree with the assessment, plan and orders as documented by the resident. Please refer to the resident note for additional information.       Electronically signed by Sarah Lee MD on 11/24/2021 at 4:23 PM

## 2021-11-29 NOTE — TELEPHONE ENCOUNTER
Patient was seen on 11/24. She was to have a 1 week follow up. Melva Casey states pt started yesterday with back pain, urine odor and low BP with readings today of 88/69, 94/61 and yesterday 87/52. She wonders if UTI is back. Please advise.

## 2021-11-30 PROBLEM — N39.0 COMPLICATED UTI (URINARY TRACT INFECTION): Status: ACTIVE | Noted: 2021-01-01

## 2021-11-30 PROBLEM — N39.0 SEPSIS SECONDARY TO UTI (HCC): Status: ACTIVE | Noted: 2021-01-01

## 2021-11-30 PROBLEM — A41.9 SEPSIS SECONDARY TO UTI (HCC): Status: ACTIVE | Noted: 2021-01-01

## 2021-11-30 NOTE — Clinical Note
Patient Class: Inpatient [101]   REQUIRED: Diagnosis: Sepsis secondary to UTI [facility]) [[id number]]   Estimated Length of Stay: Estimated stay of more than 2 midnights   Telemetry/Cardiac Monitoring Required?: Yes

## 2021-11-30 NOTE — TELEPHONE ENCOUNTER
[facility] called in this morning requesting f/u visit. Patient was scheduled for 12/1. She called back stating she was going to get patient to the ED as recommended by PCP. Will keep patient on the scheduled until updated.

## 2021-12-01 NOTE — PROGRESS NOTES
Patient states that she is feeling better this morning. She states that her abdomen is not as big and tight as it was yesterday. She states that the breathing treatments are spaced out too far a part to where she is wheezing heavily until she receives her next treatment. IVF  mL/hr. Continue Cefepime 1g Q12H. She denies fever, chills, SOB, chest pain, abdominal pain. Added Brovana BID. Monitor BP.

## 2021-12-01 NOTE — ED NOTES
Sbar sent [id number]. confirmed with Natanael Basket received and room ready.      Leeann Bay RN  12/01/21 [id number] - - -

## 2021-12-01 NOTE — ED NOTES
Pt incontinent of large amount of urine. Pt cleaned and depends with pure wick placed and pt placed into gown. Daughter at bedside and updated on plan of care.      Cristofer Lim RN  12/01/21 2781

## 2021-12-01 NOTE — PROGRESS NOTES
Date: 12/1/2021    Time: 3:20 AM    Patient Placed On BIPAP/CPAP/ Non-Invasive Ventilation? Yes    If no must comment. Facial area red/color change? No           If YES are Blister/Lesion present? No   If yes must notify nursing staff  BIPAP/CPAP skin barrier? Yes    Skin barrier type:mepilexlite       Comments: Pt placed on AVAPS for the night, mepilex place.          Xiang Palomino RCP     12/01/21 0315   NIV Type   Mode AVAPS   Mask Type Full face mask   Mask Size Small   Settings/Measurements   CPAP/EPAP 8 cmH2O   IPAP Min 13 cmH2O   IPAP Max 30 cmH2O   Vt Ordered 450 mL   Rate Ordered 16   Resp 21   FiO2  50 %   Vt Exhaled 476 mL   Minute Volume 10.6 Liters   Mask Leak (lpm) 36 lpm   Comfort Level Good   Using Accessory Muscles No

## 2021-12-01 NOTE — HOME CARE
Patient is active with [facility] for skilled nursing and [facility] health aide. Patient will need resumption of care orders prior to discharge.  Thank you, [facility]

## 2021-12-01 NOTE — ED NOTES
Reports heart burn, prn zofran given. Lunch tray given. Daughter at bedside. Reports some relief from SOB with breathing treatment. Call light in reach. Bed low. No c/o at this time.      Rosemary Nath RN  12/01/21 9750

## 2021-12-01 NOTE — ED NOTES
Dinner tray provided. Daughter a bedside. Bed low position. Pure wick in place.       Milly Carrillo RN  12/01/21 7625

## 2021-12-01 NOTE — ED NOTES
Large, soft stool BM in ED. Complete linen change. Gown change.      Adelso Boyler, RN  12/01/21 2974

## 2021-12-01 NOTE — ED PROVIDER NOTES
51-year-old female with past medical history of COPD, type 2 diabetes, A. fib and recent urinary tract infection presents today to the emergency department with complaints of altered mental status. Her daughter-in-law is here today with her and she provides a history. She states that the patient recently had a urinary tract infections when she was seen in the emergency department 2 weeks ago. She was sent home on [address] but she received a phone call from her PCP stating that this was the wrong antibiotic. Her antibiotic was not changed at that time. Patient point pleated the course of Omnicef 4 days ago. Her daughter-in-law states that the patient has urine that is worse in odor and darker in appearance. She is concerned that she may still have a UTI. Patient has become more confused over the past couple days. Her daughter-in-law says that this is unusual for her and states that this is how she became last time she presented with a urinary tract infection. She states that they have been monitoring the patient's vital signs and noted patient is hypotensive. Patient is denying any dysuria or hematuria. She denies any fevers, chills, nausea or vomiting. Additionally her daughter-in-law states that the patient has been constipated for over a month and has been using laxatives at home without relief of symptoms. Review of Systems   Constitutional: Negative for diaphoresis and fever. HENT: Negative for ear pain, hearing loss, rhinorrhea, sinus pain, sore throat and trouble swallowing. Eyes: Negative for pain. Respiratory: Negative for cough, shortness of breath and wheezing. Cardiovascular: Negative for chest pain and palpitations. Gastrointestinal: Negative for abdominal pain, diarrhea, nausea and vomiting. Endocrine: Negative for polyuria. Genitourinary: Positive for frequency. Negative for flank pain, hematuria and urgency.    Musculoskeletal: Negative for back pain, neck pain and neck stiffness. Neurological: Negative for dizziness, speech difficulty, weakness, light-headedness and numbness. Psychiatric/Behavioral: Negative for confusion. The patient is not nervous/anxious. Physical Exam  Constitutional:       General: She is not in acute distress. Appearance: Normal appearance. She is not ill-appearing, toxic-appearing or diaphoretic. HENT:      Head: Normocephalic and atraumatic. Nose: No rhinorrhea. Eyes:      General: No scleral icterus. Extraocular Movements: Extraocular movements intact. Pupils: Pupils are equal, round, and reactive to light. Cardiovascular:      Heart sounds: Normal heart sounds. No murmur heard. No friction rub. No gallop. Pulmonary:      Breath sounds: Wheezing present. No rhonchi or rales. Abdominal:      Palpations: Abdomen is soft. Tenderness: There is no abdominal tenderness. Musculoskeletal:         General: No swelling. Cervical back: Normal range of motion. No rigidity or tenderness. Right lower leg: No edema. Left lower leg: No edema. Lymphadenopathy:      Cervical: No cervical adenopathy. Skin:     General: Skin is warm and dry. Coloration: Skin is not jaundiced. Neurological:      General: No focal deficit present. Mental Status: She is alert and oriented to person, place, and time. Cranial Nerves: No cranial nerve deficit. Sensory: No sensory deficit. Motor: No weakness. Psychiatric:         Mood and Affect: Mood normal.         Behavior: Behavior normal.         Thought Content: Thought content normal.         Judgment: Judgment normal.          Procedures     MDM  Number of Diagnoses or Management Options  Acute cystitis with hematuria  Diagnosis management comments: This is a 51-year-old female who presents today to the emergency department with complaints of darkening urine with worsening smell as well as altered mental status.   Her daughter-in-law is here today with her and provides a history. She states that the patient has become confused over the past couple days. She is also stating that the patient has had constipation for the past month and a half that is not relieved with laxatives at home. On arrival to the emergency department patient is nontoxic, she is wheezing. Vital signs are reviewed and show that initially her blood pressure is 85/57 with a heart rate of 112. She does have a history of A. fib which may be contributing to her tachycardia. Patient was given a liter of IV fluids. On exam she does have wheezing in her lungs. This is likely secondary to her chronic COPD. Lab work was obtained and showed a normal CBC with no leukocytosis. CMP was unchanged from previous. UA was significant for a urinary tract infection. Patient was started on cefepime. Repeat blood pressure showed that her systolic was still in the 80s. Patient was given an additional liter of IV fluids. CT of the head was unremarkable. CT abdomen pelvis showed moderate constipation. Right kidney stone that was previously seen on her most recent CT resolved. Patient no longer has hydronephrosis on the right. No concern for urosepsis at this time. Repeat blood pressure showed that her blood pressure improved to the 40B systolic. Patient was given duo nebs for wheezing. Discussed all the findings from today with her daughter-in-law and informed her that the patient will be admitted for treatment of her UTI. Daughter-in-law is concerned about the constipation. Explained to her that the patient has no evidence of bowel obstruction and no stool requiring disimpaction. Discussed that this constipation is chronic in nature and will be managed on an outpatient basis. GI consult was placed. Discussed the case with family medicine resident who evaluated the patient and accepted her for admission.        ED Course as of 12/01/21 0054   Tue Nov 30, 2021 reports that she does not drink alcohol and does not use drugs. Family History: family history includes COPD in her father and mother; Cancer in her maternal grandmother; Heart Attack in her maternal grandfather; Heart Disease in her brother; High Blood Pressure in her brother, father, and mother; Other in her brother, father, and paternal grandmother. The patients home medications have been reviewed. Allergies: Patient has no known allergies.     -------------------------------------------------- RESULTS -------------------------------------------------    LABS:  Results for orders placed or performed during the hospital encounter of 11/30/21   CBC auto differential   Result Value Ref Range    WBC 11.5 4.5 - 11.5 E9/L    RBC 4.06 3.50 - 5.50 E12/L    Hemoglobin 11.8 11.5 - 15.5 g/dL    Hematocrit 37.8 34.0 - 48.0 %    MCV 93.1 80.0 - 99.9 fL    MCH 29.1 26.0 - 35.0 pg    MCHC 31.2 (L) 32.0 - 34.5 %    RDW 15.9 (H) 11.5 - 15.0 fL    Platelets 769 399 - 837 E9/L    MPV 9.2 7.0 - 12.0 fL    Neutrophils % 78.1 43.0 - 80.0 %    Immature Granulocytes % 0.5 0.0 - 5.0 %    Lymphocytes % 15.8 (L) 20.0 - 42.0 %    Monocytes % 4.8 2.0 - 12.0 %    Eosinophils % 0.2 0.0 - 6.0 %    Basophils % 0.6 0.0 - 2.0 %    Neutrophils Absolute 8.99 (H) 1.80 - 7.30 E9/L    Immature Granulocytes # 0.06 E9/L    Lymphocytes Absolute 1.82 1.50 - 4.00 E9/L    Monocytes Absolute 0.55 0.10 - 0.95 E9/L    Eosinophils Absolute 0.02 (L) 0.05 - 0.50 E9/L    Basophils Absolute 0.07 0.00 - 0.20 E9/L   Comprehensive Metabolic Panel w/ Reflex to MG   Result Value Ref Range    Sodium 139 132 - 146 mmol/L    Potassium reflex Magnesium 3.8 3.5 - 5.0 mmol/L    Chloride 94 (L) 98 - 107 mmol/L    CO2 37 (H) 22 - 29 mmol/L    Anion Gap 8 7 - 16 mmol/L    Glucose 204 (H) 74 - 99 mg/dL    BUN 8 6 - 20 mg/dL    CREATININE 0.4 (L) 0.5 - 1.0 mg/dL    GFR Non-African American >60 >=60 mL/min/1.73    GFR African American >60     Calcium 8.5 (L) 8.6 - 10.2 Hepatic steatosis. Urinary bladder wall thickening some which may be secondary to under   distension. XR CHEST (2 VW)   Final Result   Focal infiltrates and/or effusion, right lung base. EKG:  This EKG is signed and interpreted by me. Rate: 117  Rhythm: Atrial fibrillation  Interpretation: No ST elevation, ST depression. Normal intervals. Comparison: changes compared to previous EKG      ------------------------- NURSING NOTES AND VITALS REVIEWED ---------------------------  Date / Time Roomed:  11/30/2021  5:47 PM  ED Bed Assignment:  03/03    The nursing notes within the ED encounter and vital signs as below have been reviewed. Patient Vitals for the past 24 hrs:   BP Temp Pulse Resp SpO2 Height Weight   11/30/21 2210 (!) 92/54  96  96 %     11/30/21 2120 (!) 83/40  116 22 99 %     11/30/21 1439 (!) 85/57 97.5 °F (36.4 °C) 112 20 96 % 5' 5\" (1.651 m) 165 lb (74.8 kg)       Oxygen Saturation Interpretation: Normal    ------------------------------------------ PROGRESS NOTES ------------------------------------------  Re-evaluation(s):  Time: 2100  Patients symptoms show no change  Repeat physical examination is not changed    Counseling:  I have spoken with the patient and discussed todays results, in addition to providing specific details for the plan of care and counseling regarding the diagnosis and prognosis. Their questions are answered at this time and they are agreeable with the plan of admission.    --------------------------------- ADDITIONAL PROVIDER NOTES ---------------------------------  Consultations:  Time: 2330. Spoke with Family Medicine Resident. Discussed case. They will admit the patient.   This patient's ED course included: a personal history and physicial examination, re-evaluation prior to disposition, multiple bedside re-evaluations, IV medications, cardiac monitoring, continuous pulse oximetry and complex medical decision making and emergency management    This patient has remained hemodynamically stable during their ED course. Diagnosis:  1. Acute cystitis with hematuria        Disposition:  Patient's disposition: Admit to med/surg floor  Patient's condition is stable.          King Satnam DO  Resident  12/01/21 1410

## 2021-12-01 NOTE — ED NOTES
Assumed care of this patient. Daughter in room. Patient on Nasal cannula at 5 liters. Patient given breakfast tray.        Pamela Belcher RN  12/01/21 [id number]

## 2021-12-01 NOTE — PROGRESS NOTES
[facility]ending    S: 64 y.o. female with sepsis secondary to UTI as well as altered mental status and constipation. Patient is now reporting bowel movements no longer having systemic symptoms per her report. O: VS- Blood pressure (!) 94/56, pulse 103, temperature 97.8 °F (36.6 °C), temperature source Oral, resp. rate 20, height 5' 5\" (1.651 m), weight 165 lb (74.8 kg), last menstrual period 03/20/2015, SpO2 100 %, not currently breastfeeding. Exam is as noted by resident with the following changes, additions or corrections:  Awake alert and oriented no acute distress  Regular rate and rhythm  Clear to auscultation  No tenderness to palpation distended no rebound no guarding    Impressions: Active Problems:    Sepsis secondary to UTI ([address]    Complicated UTI (urinary tract infection)  Resolved Problems:    * No resolved hospital problems. *      Plan: We will admit send cultures including blood and urine initiate antibiotics to cover urinary pathogens will offer bowel regimen to help with constipation. Close monitoring will be needed. Attending Physician Statement   I have reviewed the chart with the teaching service, including family medicine documentation dated 12/1/21 and yesterday (if available). Pertinent radiology and/or labs reviewed. I have seen the patient in conjunction with the resident teaching service. I personally reviewed and performed key elements of the history and exam. I agree with the assessment, plan and orders as documented by the resident with any changes noted above. Please refer to the resident note(s) for additional information.     Kanchan Foy MD

## 2021-12-01 NOTE — ED NOTES
2 large, soft bowel movements while in ED. Complete linen change.      Marleny Salinas RN  12/01/21 [id number]

## 2021-12-01 NOTE — H&P
[address]  HISTORY AND PHYSICAL             Date: 12/1/2021        Patient Name: Conner Zhou     YOB: 1965      Age:  64 y.o. Chief Complaint     Chief Complaint   Patient presents with    Altered Mental Status     intermittent confusion    Urinary Tract Infection     pt's family states that pt has been on abx for uti, recieved call from pcp stating that abx is not covering organism    Constipation     x1 month           History Obtained From   child, chart review and the patient    History of Present Illness   Conner Zhou is a 64 y.o. female with PMHx of CVA, end stage COPD on 5L O2 at baseline, Ehler's Danlos Syndrome, HTN, DM, and GERD who presented with periods of confusion, constipation without bowel movement for one month, and urinary tract infection. Patient states she hasn't been feeling well for a few weeks and has been coughing more than usual.  She denies dysuria, but POA noted blood in her urine a few weeks ago, which prompted presentation at that time and diagnosis with UTI. She was treated for the UTI with Omnicef; urine culture later showed proteus. Patient's blood pressure was noted be low at that time; hx HTN. Patient is on several medications for her constipation and has not had a bowel movement for about a month, per POA. They have tried stool softeners, Miralax, senokot, and bisacodyl in addition to increased hydration and fiber intake without relief. CT in ED today showed significant stool burden. Patient admits to right sided abdominal pain, with baseline nausea and vomiting. She is still able to pass gas regularly. Family noted patient has been having periods of confusion and low BP since her initial UTI. Patient A&O x3 during exam today. ED Course   Patient was found to be hypotensive and tachycardic and has received 4L NS at this time.   She was given Cefepime for her UTI and provided with a duoneb treatment for her ES COPD. Workup significant for elevated alk phos, normal WBC count, UA positive for UTI with many bacteria and loaded WBCs. CT abdomen showed no hydronephrosis but nonobstructing nephrolithiasis, diffuse colonic stool, distended gallbladder with cholelithiasis. RUQ US showed sludge in gallbladder but no signs of acute cholecystitis, and minimal ascites with right pleural effusion. CXR showed right lung base effusion. Past Medical History     Past Medical History:   Diagnosis Date    Anxiety     Carotid stenosis 12/2011    50-69% on left    Carpal tunnel syndrome 12/07/2011    bilateral    Cerebral artery occlusion with cerebral infarction [facility]) 2011? left sided weakness / usually in w/c    Chronic back pain     COPD (chronic obstructive pulmonary disease) ([facility] [address]     no pulmonologist / follows with PCP    CVA (cerebral infarction) 2011?     right sided thalamic; seen by Dr Janey Oquendo     Dermatophytosis 5/29/2021    Discoloration of skin of toe 6/3/2021    Ehler's-Danlos syndrome     GERD (gastroesophageal reflux disease)     History of ischemic heart disease 03/30/2017    no cardiology    Hyperlipidemia     Hypertension     Left carotid stenosis 5/29/2021    O2 dependent 5/29/2021    Obesity     PVD (peripheral vascular disease) with claudication ([facility] [address] 8/25/2017    Tobacco abuse     Tobacco abuse     Type II or unspecified type diabetes mellitus without mention of complication, not stated as uncontrolled     Vitamin D deficiency         Past Surgical History     Past Surgical History:   Procedure Laterality Date    ABDOMINAL EXPLORATION SURGERY  03/29/2017    with bowel resection, incarcerated ventral hernia repair    CARDIAC CATHETERIZATION  1/7/2015    Dr. Geovani Nichole  EF=55%   FFR=0.84  distal RCA bifurcation    CYST INCISION AND DRAINAGE Right 07/22/2016    Right gluteal mass incision and drainage    CYST REMOVAL  1/28/2011    excision of left arm inclusion cyst  ECHO COMPL W DOP COLOR FLOW  5/15/2013         ECHOCARDIOGRAM COMPLETE 2D W DOPPLER W COLOR  1/3/2012         ECHOCARDIOGRAM COMPLETE 2D W DOPPLER W COLOR  6/14/2012         EYE SURGERY Bilateral 2014    cataract w lens implants    HERNIA REPAIR  03/2017    abdominal    OTHER SURGICAL HISTORY  07/26/2016    re exploration left lateral wound / heel    [place] DRAIN SKIN ABSCESS COMPLIC N/A 9/3/3447    EXAM UNDER ANESTHESIA  I & D BUTTOCKS performed by Jose Luis Farley DO at [address]        Medications Prior to Admission     Prior to Admission medications    Medication Sig Start Date End Date Taking?  Authorizing Provider   dilTIAZem (CARDIZEM CD) 240 MG extended release capsule take 1 capsule by mouth once daily 11/29/21   Perla Morelos MD   apixaban (ELIQUIS) 5 MG TABS tablet Take 1 tablet by mouth 2 times daily 11/26/21   Winifred Kingsley MD   polyethylene glycol [facility]) 17 GM/SCOOP powder Take 17 g by mouth 2 times daily 11/24/21   Mark Hurley MD   sennosides-docusate sodium (SENOKOT-S) 8.6-50 MG tablet Take 2 tablets by mouth daily 11/24/21   Mark Hurley MD   bisacodyl (BISAC-EVAC) 10 MG suppository Place 1 suppository rectally as needed for Constipation (not relieved by senakot, miralax or fiber) 11/24/21   Mark Hurley MD   Incontinence Supply Disposable (UNDERPADS) MISC Change as needed; uses up to 4 daily 11/18/21   Winifred Kingsley MD   ondansetron (ZOFRAN ODT) 4 MG disintegrating tablet Take 1 tablet by mouth every 8 hours as needed for Nausea or Vomiting 11/17/21 11/17/22  Bard Moises DO   naproxen (NAPROSYN) 500 MG tablet Take 1 tablet by mouth 2 times daily for 7 days 11/17/21 11/24/21  Bard Moises DO   tiZANidine (ZANAFLEX) 2 MG tablet Take 2 mg by mouth every 6 hours as needed    Historical Provider, MD   ipratropium-albuterol (DUONEB) 0.5-2.5 (3) MG/3ML SOLN nebulizer solution USE 3 ML VIA NEBULIZER EVERY 4 HOURS AS NEEDED FOR SHORTNESS OF BREATH 10/27/21 Eric Mohr MD   insulin glargine [facility]) 100 UNIT/ML injection pen Inject 20 Units into the skin 2 times daily 10/22/21   Yana Alves MD   predniSONE (DELTASONE) 10 MG tablet 4 tabs daily x3 days, 3 tabs daily x3 days, 2 tabs daily x3 days, 1 tab daily x3 days 10/22/21   Criss Zuniga MD   cilostazol (PLETAL) 100 MG tablet Take 100 mg by mouth 2 times daily    Historical Provider, MD   diazePAM (VALIUM) 5 MG tablet take 1 tablet by mouth every 8 hours if needed for SPASM(S) 10/5/21   Historical Provider, MD   omeprazole (PRILOSEC) 40 MG delayed release capsule Take 1 capsule by mouth every morning (before breakfast) 9/29/21   Jewel Serna DO   fenofibrate (TRICOR) 54 MG tablet take 1 tablet by mouth once daily  Patient taking differently: Take 54 mg by mouth nightly  9/20/21   Eric Mohr MD   oxyCODONE-acetaminophen (PERCOCET) 5-325 MG per tablet 1 tablet every 4 hours as needed for Pain. 9/7/21   Historical Provider, MD   gabapentin (NEURONTIN) 300 MG capsule Take 1 capsule by mouth 3 times daily for 90 days.  9/8/21 12/7/21  Eric Mohr MD   ursodiol (ACTIGALL) 300 MG capsule Take 1 capsule by mouth 3 times daily (with meals) 9/8/21 9/8/22  Eric Mohr MD   nortriptyline (PAMELOR) 25 MG capsule Take 1 capsule by mouth nightly 9/3/21   Eric Mohr MD   rosuvastatin (CRESTOR) 20 MG tablet take 1 tablet by mouth nightly 8/17/21   Eric Mohr MD   Continuous Blood Gluc  (FREESTYLE SHREYA 2 READER) HAI 1 each by Does not apply route 4 times daily 8/6/21   Magui Wright MD   budesonide (PULMICORT) 0.25 MG/2ML nebulizer suspension Take 2 mLs by nebulization 2 times daily 8/2/21   Eric Mohr MD   insulin lispro, 1 Unit Dial, 100 UNIT/ML SOPN inject 10 units subcutaneously three times a day with meals 7/27/21   Eric Mohr MD   rosuvastatin (CRESTOR) 20 MG tablet take 1 tablet by mouth NIGHTLY 7/19/21   Ad Whiteside MD   B-D ULTRAFINE III chest pain and palpitations. Gastrointestinal: Positive for abdominal distention (much more distended than usual), abdominal pain, constipation, nausea and vomiting. Genitourinary: Positive for hematuria. Negative for dysuria and flank pain. Musculoskeletal: Positive for arthralgias (several fractured vertebrae). Negative for myalgias. Skin: Negative for rash and wound. Neurological: Negative for dizziness and headaches. Psychiatric/Behavioral: Positive for confusion and decreased concentration. Physical Exam   BP (!) 92/54   Pulse 96   Temp 97.5 °F (36.4 °C)   Resp 22   Ht 5' 5\" (1.651 m)   Wt 165 lb (74.8 kg)   LMP 03/20/2015   SpO2 96%   BMI 27.46 kg/m²     Physical Exam  Vitals and nursing note reviewed. Constitutional:       General: She is not in acute distress. Appearance: She is obese. She is ill-appearing (chronically). HENT:      Head: Normocephalic and atraumatic. Eyes:      General:         Right eye: No discharge. Left eye: No discharge. Cardiovascular:      Rate and Rhythm: Regular rhythm. Tachycardia present. Pulmonary:      Effort: Pulmonary effort is normal. No respiratory distress. Breath sounds: Wheezing, rhonchi and rales present. Abdominal:      General: Bowel sounds are normal. There is distension. Palpations: Abdomen is soft. Tenderness: There is no abdominal tenderness. There is no guarding or rebound. Musculoskeletal:      Cervical back: Normal range of motion. No rigidity. Right lower leg: Edema (trace) present. Left lower leg: Edema (trace) present. Skin:     General: Skin is warm and dry. Neurological:      Mental Status: She is alert and oriented to person, place, and time. Mental status is at baseline.          Labs      Recent Results (from the past 24 hour(s))   CBC auto differential    Collection Time: 11/30/21  4:27 PM   Result Value Ref Range    WBC 11.5 4.5 - 11.5 E9/L    RBC 4.06 3.50 - 5.50 E12/L Hemoglobin 11.8 11.5 - 15.5 g/dL    Hematocrit 37.8 34.0 - 48.0 %    MCV 93.1 80.0 - 99.9 fL    MCH 29.1 26.0 - 35.0 pg    MCHC 31.2 (L) 32.0 - 34.5 %    RDW 15.9 (H) 11.5 - 15.0 fL    Platelets 138 667 - 202 E9/L    MPV 9.2 7.0 - 12.0 fL    Neutrophils % 78.1 43.0 - 80.0 %    Immature Granulocytes % 0.5 0.0 - 5.0 %    Lymphocytes % 15.8 (L) 20.0 - 42.0 %    Monocytes % 4.8 2.0 - 12.0 %    Eosinophils % 0.2 0.0 - 6.0 %    Basophils % 0.6 0.0 - 2.0 %    Neutrophils Absolute 8.99 (H) 1.80 - 7.30 E9/L    Immature Granulocytes # 0.06 E9/L    Lymphocytes Absolute 1.82 1.50 - 4.00 E9/L    Monocytes Absolute 0.55 0.10 - 0.95 E9/L    Eosinophils Absolute 0.02 (L) 0.05 - 0.50 E9/L    Basophils Absolute 0.07 0.00 - 0.20 E9/L   Comprehensive Metabolic Panel w/ Reflex to MG    Collection Time: 11/30/21  4:27 PM   Result Value Ref Range    Sodium 139 132 - 146 mmol/L    Potassium reflex Magnesium 3.8 3.5 - 5.0 mmol/L    Chloride 94 (L) 98 - 107 mmol/L    CO2 37 (H) 22 - 29 mmol/L    Anion Gap 8 7 - 16 mmol/L    Glucose 204 (H) 74 - 99 mg/dL    BUN 8 6 - 20 mg/dL    CREATININE 0.4 (L) 0.5 - 1.0 mg/dL    GFR Non-African American >60 >=60 mL/min/1.73    GFR African American >60     Calcium 8.5 (L) 8.6 - 10.2 mg/dL    Total Protein 5.7 (L) 6.4 - 8.3 g/dL    Albumin 2.2 (L) 3.5 - 5.2 g/dL    Total Bilirubin 0.4 0.0 - 1.2 mg/dL    Alkaline Phosphatase 164 (H) 35 - 104 U/L    ALT 12 0 - 32 U/L    AST 26 0 - 31 U/L   Lactate, Sepsis    Collection Time: 11/30/21  4:27 PM   Result Value Ref Range    Lactic Acid, Sepsis 1.3 0.5 - 1.9 mmol/L   APTT    Collection Time: 11/30/21  4:27 PM   Result Value Ref Range    aPTT 32.2 24.5 - 35.1 sec   Protime-INR    Collection Time: 11/30/21  4:27 PM   Result Value Ref Range    Protime 12.6 (H) 9.3 - 12.4 sec    INR 1.1    Urinalysis    Collection Time: 11/30/21  7:33 PM   Result Value Ref Range    Color, UA DARK YELLOW (A) Straw/Yellow    Clarity, UA CLOUDY (A) Clear    Glucose, Ur Negative Negative mg/dL    Bilirubin Urine SMALL (A) Negative    Ketones, Urine TRACE (A) Negative mg/dL    Specific Gravity, UA 1.020 1.005 - 1.030    Blood, Urine LARGE (A) Negative    pH, UA 7.5 5.0 - 9.0    Protein,  (A) Negative mg/dL    Urobilinogen, Urine 2.0 (A) <2.0 E.U./dL    Nitrite, Urine POSITIVE (A) Negative    Leukocyte Esterase, Urine LARGE (A) Negative   Microscopic Urinalysis    Collection Time: 11/30/21  7:33 PM   Result Value Ref Range    WBC, UA >20 (A) 0 - 5 /HPF    RBC, UA 10-20 (A) 0 - 2 /HPF    Epithelial Cells, UA FEW /HPF    Bacteria, UA MANY (A) None Seen /HPF   EKG 12 lead    Collection Time: 11/30/21  8:55 PM   Result Value Ref Range    Ventricular Rate 117 BPM    Atrial Rate 117 BPM    P-R Interval 158 ms    QRS Duration 74 ms    Q-T Interval 302 ms    QTc Calculation (Bazett) 421 ms    P Axis 71 degrees    R Axis 33 degrees    T Axis 83 degrees        Imaging/Diagnostics Last 24 Hours   CT ABDOMEN PELVIS WO CONTRAST Additional Contrast? None    Result Date: 11/30/2021  EXAMINATION: CT OF THE ABDOMEN AND PELVIS WITHOUT CONTRAST 11/30/2021 6:43 pm TECHNIQUE: CT of the abdomen and pelvis was performed without the administration of intravenous contrast. Multiplanar reformatted images are provided for review. Dose modulation, iterative reconstruction, and/or weight based adjustment of the mA/kV was utilized to reduce the radiation dose to as low as reasonably achievable. COMPARISON: None. HISTORY: ORDERING SYSTEM PROVIDED HISTORY: suspected stone TECHNOLOGIST PROVIDED HISTORY: Reason for exam:->suspected stone Reason for exam:->constipation Additional Contrast?->None Decision Support Exception - unselect if not a suspected or confirmed emergency medical condition->Emergency Medical Condition (MA) FINDINGS: Lower Chest: Small right pleural effusion. Organs: Hepatic steatosis. Benign splenic calcifications. Normal appearing adrenal glands and pancreas. Distended gallbladder with cholelithiasis. Multiple subcentimeter right renal calculi. No hydronephrosis. Normal left kidney. GI/Bowel: Stool distended colon. Grossly normal small bowel. Pelvis: Urinary bladder wall thickening. Peritoneum/Retroperitoneum: No free fluid or free air. Bones/Soft Tissues: Degenerative changes thoracolumbar spine with multilevel vertebroplasties. Multiple subcentimeter nonobstructing right renal calculi. No hydronephrosis. Diffuse colonic fecal retention with significant stool burden. Distended gallbladder with cholelithiasis. Cholelithiasis. Hepatic steatosis. Urinary bladder wall thickening some which may be secondary to under distension. XR CHEST (2 VW)    Result Date: 11/30/2021  EXAMINATION: TWO XRAY VIEWS OF THE CHEST 11/30/2021 3:24 pm COMPARISON: None. HISTORY: ORDERING SYSTEM PROVIDED HISTORY: cough TECHNOLOGIST PROVIDED HISTORY: Reason for exam:->cough FINDINGS: There is mild opacity at right lower lung. The left lung is clear. The heart is not enlarged. There is no pneumothorax. Focal infiltrates and/or effusion, right lung base. US GALLBLADDER RUQ    Result Date: 11/30/2021  EXAMINATION: RIGHT UPPER QUADRANT ULTRASOUND 11/30/2021 6:41 pm COMPARISON: None. HISTORY: ORDERING SYSTEM PROVIDED HISTORY: r/out cholecysitis TECHNOLOGIST PROVIDED HISTORY: Reason for exam:->r/out cholecysitis What reading provider will be dictating this exam?->CRC FINDINGS: LIVER:  The liver is enlarged with increased echogenicity without evidence of intrahepatic biliary ductal dilatation. BILIARY SYSTEM:  Gallbladder demonstrates stones and sludge without evidence of pericholecystic fluid or wall thickening. Negative sonographic Melgar's sign. Common bile duct is within normal limits measuring 3.7 mm. RIGHT KIDNEY: The right kidney is grossly unremarkable without evidence of hydronephrosis. There are multiple echogenic foci measuring up to 9 mm in the mid and inferior region.  PANCREAS:  Visualized portions of the pancreas are unremarkable. OTHER: There is minimal right upper quadrant ascites. In addition, right pleural effusion noted. Mild enlarged fatty liver. Gallstones and sludge with no sonographic evidence of acute cholecystitis. Multiple nonobstructing right renal stones. Minimal ascites and right pleural effusion. Assessment      Hospital Problems           Last Modified POA    Sepsis secondary to UTI ([facility] [address] 47/79/0305 Yes    Complicated UTI (urinary tract infection) 11/30/2021 Yes          Plan     Sepsis secondary to UTI  With AMS, hypotension. Proteus and mixed jocelin on last urine culture from 11/16. Treated previously with cefdinir. UA positive in ED 11/30. Hypotensive, not yet responding to 4L NS boluses. Continue cefepime 1g every 12 hours  IVF at 125ml/hr  May require a central line and ICU care if BP does not improve  Hold antihypertensives  Ammonia, Urine culture ordered from ED    Constipation  No BM for a month, per POA. Have tried OTC meds, several prescription meds. Significant stool burden on CT abdomen 11/30. Mineral oil enema ordered  Monitor for BM  Consider General Surgery consult    Right Pleural Effusion  Etiology unclear at this time; COPD, pneumonia? Procal ordered  Consider repeat imaging to assess for changes    End Stage COPD  Continue home pulmicort, duonebs  CPAP overnight and with naps  Uses trelegy at home, family to bring in tomorrow    Elevated Alk Phos  Gallbladder sludge on RUQ US, no sign of acute cholecystitis  Monitor until more pressing medical issues are stabilized    DM  MDSSI  Continue home lantus 20u twice daily  Hypoglycemia protocols      Consultations Ordered:  None    DVT ppx: Eliquis  GI ppx: Protonix  Diet: Carb Control  Code: Limited    Case discussed with attending physician Dr. Nata Valdes.     Electronically signed by Rasta Lucas DO on 11/30/21 at 11:34 PM EST

## 2021-12-01 NOTE — PROGRESS NOTES
[place], [place] of patient, to inform her of current care. Discussed her hypotension being managed with IVF, antibiotic treatment for UTI, as well as imaging. Answered her questions to her satisfaction.

## 2021-12-02 PROBLEM — A41.9 SEPSIS SECONDARY TO UTI (HCC): Status: ACTIVE | Noted: 2021-01-01

## 2021-12-02 PROBLEM — N39.0 SEPSIS SECONDARY TO UTI (HCC): Status: ACTIVE | Noted: 2021-01-01

## 2021-12-02 NOTE — PLAN OF CARE
Problem: Falls - Risk of:  Goal: Will remain free from falls  Description: Will remain free from falls  Outcome: Met This Shift  Goal: Absence of physical injury  Description: Absence of physical injury  Outcome: Met This Shift     Problem: Skin Integrity:  Goal: Absence of new skin breakdown  Description: Absence of new skin breakdown  Outcome: Met This Shift     Problem: Pain:  Goal: Pain level will decrease  Description: Pain level will decrease  Outcome: Met This Shift

## 2021-12-02 NOTE — PROGRESS NOTES
[address]  Progress Note      Chief complaint :  Chief Complaint   Patient presents with    Altered Mental Status     intermittent confusion    Urinary Tract Infection     pt's family states that pt has been on abx for uti, recieved call from pcp stating that abx is not covering organism    Constipation     x1 month       Subjective:  Patient states that she is doing better but stated that she needed her breathing treatment at the time she was seen. She has had multiple BMs. She has her Trelegy in room, had used hospital CPAP last night with no complaints. She denies chest pain, abdominal pain, N/V, hematuria, hematochezia. No acute overnight events. Past medical, surgical, family and social history were reviewed, non-contributory, and unchanged unless otherwise stated. ROS negative except as stated above. Objective:  BP (!) 97/55   Pulse 106   Temp 98.2 °F (36.8 °C) (Oral)   Resp 18   Ht 5' 5\" (1.651 m)   Wt 165 lb (74.8 kg)   LMP 03/20/2015   SpO2 98%   BMI 27.46 kg/m²     Physical Exam  Constitutional:       Appearance: She is not ill-appearing. HENT:      Head: Normocephalic and atraumatic. Mouth/Throat:      Mouth: Mucous membranes are moist.   Eyes:      Extraocular Movements: Extraocular movements intact. Conjunctiva/sclera: Conjunctivae normal.   Cardiovascular:      Rate and Rhythm: Normal rate and regular rhythm. Pulses: Normal pulses. Heart sounds: Normal heart sounds. No murmur heard. Pulmonary:      Effort: Pulmonary effort is normal.      Breath sounds: No stridor. Wheezing (end expiratory in B/L upper lobes) present. Comments: Diminished breath sounds diffusely. Bibasilar coarse breath sounds. Abdominal:      General: Abdomen is flat. Bowel sounds are normal. There is no distension. Palpations: Abdomen is soft. Tenderness: There is no abdominal tenderness.    Musculoskeletal:         General: No swelling. Normal range of motion. Cervical back: Normal range of motion and neck supple. Skin:     General: Skin is warm and dry. Findings: Lesion (B/L LE chronic) present. Neurological:      General: No focal deficit present. Mental Status: She is alert and oriented to person, place, and time. Psychiatric:         Mood and Affect: Mood normal.         Behavior: Behavior normal.       Labs:  Recent Results (from the past 24 hour(s))   POCT Glucose    Collection Time: 12/01/21  7:36 AM   Result Value Ref Range    Meter Glucose 208 (H) 74 - 99 mg/dL   POCT GLUCOSE    Collection Time: 12/01/21  7:52 AM   Result Value Ref Range    Glucose 208 mg/dL    QC OK? yes    POCT Glucose    Collection Time: 12/01/21 12:01 PM   Result Value Ref Range    Meter Glucose 114 (H) 74 - 99 mg/dL   POCT Glucose    Collection Time: 12/01/21  5:13 PM   Result Value Ref Range    Meter Glucose 194 (H) 74 - 99 mg/dL   POCT GLUCOSE    Collection Time: 12/01/21  5:25 PM   Result Value Ref Range    Glucose 194 mg/dL    QC OK? ok    POCT Glucose    Collection Time: 12/01/21  9:03 PM   Result Value Ref Range    Meter Glucose 70 (L) 74 - 99 mg/dL       Radiology and other tests reviewed:  XR CHEST PORTABLE   Final Result   Improved right pleural effusion with adjacent infiltrate or atelectasis. US GALLBLADDER RUQ   Final Result   Mild enlarged fatty liver. Gallstones and sludge with no sonographic evidence of acute cholecystitis. Multiple nonobstructing right renal stones. Minimal ascites and right pleural effusion. CT ABDOMEN PELVIS WO CONTRAST Additional Contrast? None   Final Result   Multiple subcentimeter nonobstructing right renal calculi. No hydronephrosis. Diffuse colonic fecal retention with significant stool burden. Distended gallbladder with cholelithiasis. Cholelithiasis. Hepatic steatosis.       Urinary bladder wall thickening some which may be secondary to under distension. XR CHEST (2 VW)   Final Result   Focal infiltrates and/or effusion, right lung base. Assessment:  Active Hospital Problems    Diagnosis Date Noted    Complicated UTI (urinary tract infection) [N39.0] 11/30/2021       Plan:  Sepsis likely due UTI  Presenting with urine culture 11/16 positive for Proteus and mixed jocelin. Treated previously with Cefdinir. UA 11/30 positive for nitrites, leukocyte esterase. Received 2g Cefepime in ED.  - Cefepime 1g Q12H, day 2/5     Hypotension  Received 1L bolus of NS x2 in ED  - IVF NS 150mL/hr     End Stage COPD  Baseline O2 use is 5L. CPAP overnight and with naps  - Uses Trelegy at home, in room  - Continue home Pulmicort BID  - Continue home Duonebs QID  - Brovana BID   - 6L O2 NC    Right Pleural Effusion  Etiology unclear at this time. CXR 11/30 revealed focal infiltrates and/or effusion, right lung base. Repeat CXR 12/1 revealed improved right pleural effusion with adj infiltrates or atelectasis  - Procal 0.05    Constipation, improving  No BM for a month, per POA. Have tried OTC meds, several prescription meds. CT Abdomen 11/30 revealed significant stool burden. - Mineral oil enema  - Senokot BID  - Monitor for BM    Elevated Alk Phos  RUQ US 11/30 revealed gallbladder sludge and stones, no evidence of acute cholecystitis.   - Continue to monitor    Type 2 DM  - Continue home Lantus 20U twice daily  - MDSSI  - Hypoglycemia protocols    Continue home medications: Tricor 54mg QD, Gabapentin 300mg TID, Pamelor 25mg QHS, Eliquis 5mg BID    Pain Control: Tylenol 650mg Q6H PRN for mild pain  DVT ppx: Eliquis  GI ppx: Protonix  Code Status: Limited   Diet: Carb Control      Bk Sheets MD   Family Medicine Resident PGY-1  12/02/21

## 2021-12-03 PROBLEM — A41.9 SEPSIS SECONDARY TO UTI (HCC): Status: RESOLVED | Noted: 2021-01-01 | Resolved: 2021-01-01

## 2021-12-03 PROBLEM — N39.0 SEPSIS SECONDARY TO UTI (HCC): Status: RESOLVED | Noted: 2021-01-01 | Resolved: 2021-01-01

## 2021-12-03 NOTE — PROGRESS NOTES
[address]  Progress Note      Chief complaint :  Chief Complaint   Patient presents with    Altered Mental Status     intermittent confusion    Urinary Tract Infection     pt's family states that pt has been on abx for uti, recieved call from pcp stating that abx is not covering organism    Constipation     x1 month       Subjective:  Patient states that she is doing better but stated that she needed her breathing treatment at the time she was seen. Did not use CPAP last night because it is uncomfortable. She has her Trelegy in room but states that staff unable to set up here. She states she is eating less and is uncomfortable here and wants to go home, states will be more in control and take better care of herself. She continues to have multiple BMs. Denies chest pain, abdominal pain, N/V, hematuria, melena. No acute overnight events. Past medical, surgical, family and social history were reviewed, non-contributory, and unchanged unless otherwise stated. ROS negative except as stated above. Objective:  BP (!) 100/51   Pulse 96   Temp 98.2 °F (36.8 °C) (Oral)   Resp 18   Ht 5' 5\" (1.651 m)   Wt 165 lb (74.8 kg)   LMP 03/20/2015   SpO2 100%   BMI 27.46 kg/m²     Physical Exam  Constitutional:       Appearance: She is not ill-appearing. HENT:      Head: Normocephalic and atraumatic. Mouth/Throat:      Mouth: Mucous membranes are moist.   Eyes:      Extraocular Movements: Extraocular movements intact. Conjunctiva/sclera: Conjunctivae normal.   Cardiovascular:      Rate and Rhythm: Normal rate and regular rhythm. Pulses: Normal pulses. Heart sounds: Normal heart sounds. No murmur heard. Pulmonary:      Effort: Pulmonary effort is normal.      Breath sounds: No stridor. Wheezing (end expiratory in B/L upper lobes) present. Comments: Diminished breath sounds diffusely. Bibasilar coarse breath sounds.   Abdominal:      General: Abdomen is flat. Bowel sounds are normal. There is no distension. Palpations: Abdomen is soft. Tenderness: There is no abdominal tenderness. Musculoskeletal:         General: No swelling. Normal range of motion. Cervical back: Normal range of motion and neck supple. Skin:     General: Skin is warm and dry. Findings: Lesion (B/L LE chronic) present. Neurological:      General: No focal deficit present. Mental Status: She is alert and oriented to person, place, and time.    Psychiatric:         Mood and Affect: Mood normal.         Behavior: Behavior normal.       Labs:  Recent Results (from the past 24 hour(s))   POCT Glucose    Collection Time: 12/02/21  6:38 AM   Result Value Ref Range    Meter Glucose 95 74 - 99 mg/dL   CBC auto differential    Collection Time: 12/02/21  9:11 AM   Result Value Ref Range    WBC 12.3 (H) 4.5 - 11.5 E9/L    RBC 4.09 3.50 - 5.50 E12/L    Hemoglobin 12.1 11.5 - 15.5 g/dL    Hematocrit 40.2 34.0 - 48.0 %    MCV 98.3 80.0 - 99.9 fL    MCH 29.6 26.0 - 35.0 pg    MCHC 30.1 (L) 32.0 - 34.5 %    RDW 16.5 (H) 11.5 - 15.0 fL    Platelets 523 431 - 161 E9/L    MPV 8.8 7.0 - 12.0 fL    Neutrophils % 65.5 43.0 - 80.0 %    Immature Granulocytes % 0.5 0.0 - 5.0 %    Lymphocytes % 26.9 20.0 - 42.0 %    Monocytes % 5.8 2.0 - 12.0 %    Eosinophils % 0.6 0.0 - 6.0 %    Basophils % 0.7 0.0 - 2.0 %    Neutrophils Absolute 8.03 (H) 1.80 - 7.30 E9/L    Immature Granulocytes # 0.06 E9/L    Lymphocytes Absolute 3.30 1.50 - 4.00 E9/L    Monocytes Absolute 0.71 0.10 - 0.95 E9/L    Eosinophils Absolute 0.07 0.05 - 0.50 E9/L    Basophils Absolute 0.09 0.00 - 0.20 E9/L   POCT Glucose    Collection Time: 12/02/21 11:28 AM   Result Value Ref Range    Meter Glucose 99 74 - 99 mg/dL   SPECIMEN REJECTION    Collection Time: 12/02/21 11:54 AM   Result Value Ref Range    Rejected Test CMPX     Reason for Rejection see below    Comprehensive metabolic panel    Collection Time: 12/02/21  1:10 PM   Result Value Ref Range    Sodium 142 132 - 146 mmol/L    Potassium 3.5 3.5 - 5.0 mmol/L    Chloride 105 98 - 107 mmol/L    CO2 33 (H) 22 - 29 mmol/L    Anion Gap 4 (L) 7 - 16 mmol/L    Glucose 92 74 - 99 mg/dL    BUN 7 6 - 20 mg/dL    CREATININE 0.5 0.5 - 1.0 mg/dL    GFR Non-African American >60 >=60 mL/min/1.73    GFR African American >60     Calcium 7.8 (L) 8.6 - 10.2 mg/dL    Total Protein 4.2 (L) 6.4 - 8.3 g/dL    Albumin 1.8 (L) 3.5 - 5.2 g/dL    Total Bilirubin <0.2 0.0 - 1.2 mg/dL    Alkaline Phosphatase 122 (H) 35 - 104 U/L    ALT 11 0 - 32 U/L    AST 15 0 - 31 U/L   POCT Glucose    Collection Time: 12/02/21  3:31 PM   Result Value Ref Range    Meter Glucose 61 (L) 74 - 99 mg/dL   POCT Glucose    Collection Time: 12/02/21  5:19 PM   Result Value Ref Range    Meter Glucose 81 74 - 99 mg/dL   POCT Glucose    Collection Time: 12/02/21  9:06 PM   Result Value Ref Range    Meter Glucose 53 (L) 74 - 99 mg/dL   POCT Glucose    Collection Time: 12/02/21 10:10 PM   Result Value Ref Range    Meter Glucose 90 74 - 99 mg/dL       Radiology and other tests reviewed:  XR CHEST PORTABLE   Final Result   Improved right pleural effusion with adjacent infiltrate or atelectasis. US GALLBLADDER RUQ   Final Result   Mild enlarged fatty liver. Gallstones and sludge with no sonographic evidence of acute cholecystitis. Multiple nonobstructing right renal stones. Minimal ascites and right pleural effusion. CT ABDOMEN PELVIS WO CONTRAST Additional Contrast? None   Final Result   Multiple subcentimeter nonobstructing right renal calculi. No hydronephrosis. Diffuse colonic fecal retention with significant stool burden. Distended gallbladder with cholelithiasis. Cholelithiasis. Hepatic steatosis. Urinary bladder wall thickening some which may be secondary to under   distension. XR CHEST (2 VW)   Final Result   Focal infiltrates and/or effusion, right lung base. Assessment:  Active Hospital Problems    Diagnosis Date Noted    Sepsis secondary to UTI ([facility] [address] [A41.9, N39.0] 26/34/6064    Complicated UTI (urinary tract infection) [N39.0] 11/30/2021       Plan:  Sepsis likely due to UTI  Presenting with urine culture 11/16 positive for Proteus and mixed jocelin. Treated previously with Cefdinir. UA 11/30 positive for nitrites, leukocyte esterase. Received 2g Cefepime in ED. Cefepime 1g x3 doses. - Toxic encephalopathy improving  - Urine cx collected 12/1 <10,000, Proteus  - Rocephin 1g Q24H, day 2    Hypotension  Baseline systolic BOP is around 878. Received 1L bolus of NS x2 in ED.  - IVF D/C  - Continue to monitor     End Stage COPD  Baseline O2 use is 5L. CPAP overnight and with naps  - Uses Trelegy at home, in room  - Continue home Pulmicort BID  - Continue home Duonebs QID  - Brovana BID   - 5L O2 NC    Right Pleural Effusion  Etiology unclear at this time. CXR 11/30 revealed focal infiltrates and/or effusion, right lung base. Repeat CXR 12/1 revealed improved right pleural effusion with adj infiltrates or atelectasis  - Procal 0.05    Constipation, improving  No BM for a month per POA. Have tried OTC meds, several prescription meds. CT Abdomen 11/30 revealed significant stool burden. - Mineral oil enema  - Senokot BID  - Monitor for BM    Elevated Alk Phos  RUQ US 11/30 revealed gallbladder sludge and stones, no evidence of acute cholecystitis.  166->124  - Continue to monitor    Type 2 DM  Home Lantus 20U BID  - Lantus 10U BID now held due to hypogelycemia  - MDSSI  - Hypoglycemia protocols    Continue home medications: Tricor 54mg QD, Gabapentin 300mg TID, Pamelor 25mg QHS, Eliquis 5mg BID    Pain Control: Tylenol 650mg Q6H PRN for mild pain, Home Percocet 5-325mg for moderate pain  DVT ppx: Home Eliquis 5mg BID  GI ppx: Protonix  Code Status: Limited   Diet: Carb Control, Low Fat/Low Cholesterol      Inna Onofre MD   Family Medicine Resident PGY-1  12/02/21

## 2021-12-03 NOTE — DISCHARGE INSTR - DIET

## 2021-12-03 NOTE — PROGRESS NOTES
Occupational Therapy  OCCUPATIONAL THERAPY INITIAL EVALUATION     [address] [facility] [place], [address]        AKPF:                                                  Patient Name: Duncan Puga    MRN: [id number]    : 1965    Room: [address] [place] [address]      Evaluating OT: Jana Jameson, OTR/L [id number]      Referring Provider: Rosalie Mendoza DO    Specific Provider Orders/Date: OT eval and treat 21      Diagnosis:  Sepsis secondary to UTI ([facility] [address] [A41.9, N39.0]  Acute cystitis with hematuria [P28.57]  Complicated UTI (urinary tract infection) [N39.0]      Pertinent Medical History: anxiety, carotid stenosis, B carpal tunnel syndrome, chronic back pain, COPD, CVA (R side), depression, GERD, HTN, DM type 2      Precautions:  Fall Risk, O2     Assessment of current deficits    [x] Functional mobility  [x]ADLs  [x] Strength               []Cognition    [x] Functional transfers   [x] IADLs         [x] Safety Awareness   [x]Endurance    [] Fine Coordination              [x] Balance      [] Vision/perception   []Sensation     []Gross Motor Coordination  [] ROM  [] Delirium                   [] Motor Control     OT PLAN OF CARE   OT POC based on physician orders, patient diagnosis and results of clinical assessment    Frequency/Duration 2-4 days/wk for 2 weeks PRN   Specific OT Treatment Interventions to include:   * Instruction/training on adapted ADL techniques and AE recommendations to increase functional independence within precautions       * Training on energy conservation strategies, correct breathing pattern and techniques to improve independence/tolerance for self-care routine  * Functional transfer/mobility training/DME recommendations for increased independence, safety, and fall prevention  * Patient/Family education to increase follow through with safety techniques and functional independence  * Recommendation of environmental modifications for increased safety with functional transfers/mobility and ADLs  * Therapeutic exercise to improve motor endurance, ROM, and functional strength for ADLs/functional transfers  * Therapeutic activities to facilitate/challenge dynamic balance, stand tolerance for increased safety and independence with ADLs        Recommended Adaptive Equipment: TBD     Home Living: Pt lives with family in 1 story house with a ramp entry. Bathroom setup: Pt sponge bathes and uses BSC   Equipment owned: wheeled walker, w/c, 5L O2    Prior Level of Function: assist from family with ADLs , family completes with IADLs; functional mobility: wheeled walker short distances, w/c longer distances    Pain Level: Pt reported 8/10 pain in back and stomach but was agreeable to therapy  Cognition: A&O: 4/4; WFL command follow demonstrated. Memory:  fair   Sequencing:  fair   Problem solving:  fair   Judgement/safety:  fair     Functional Assessment:  AM-PAC Daily Activity Raw Score: 14/24   Initial Eval Status  Date: 12/3/21 Treatment Status  Date: STGs = LTGs  Time frame: 10-14 days   Feeding Set up     Grooming Min A  Sup/Mod I   UB Dressing Mod A  SBA/Sup   LB Dressing Max A   Min A-with use of AD as appropriate/needed   Bathing Max A  Min A -with use of AD as appropriate/needed   Toileting Max A for pericare in standing  Min A    Bed Mobility  Supine to sit: Mod A  Sit to supine: Min A   Sup to maximize participation in ADLs and functional tasks   Functional Transfers Mod A X2 with wheeled walker  Sit<>stand from bed  Cues for hand and feet placement and technique  CGA/SBA   Functional Mobility Min A with wheeled walker for side steps toward [facility] for better positioning. Pt reported feeling dizzy in standing. Subsided when seated.   SBA -with device as needed to maximize independence with ADLs and functional task completion   Balance Sitting:     Static:  Sup    Dynamic:CGA/SBA  Standing: Min A  I for static/dynamic sitting balance to maximize independence with ADLs and functional task completion    SBA/Sup for standing balance to maximize independence with ADLs and functional task completion   Activity Tolerance Poor with light activity. Pt limited by weakness and fatigue. Unable to obtain pulse ox d/t length of pt's nails. fair with ADL activity. Pt will demonstrate good understanding of education provided on EC/WS techniques    Visual/  Perceptual Glasses: none at bedside                Additional long-term goal: Pt will increase functional independence to PLOF to allow pt to live in least restrictive environment. Hand Dominance R   AROM (PROM) Strength   RUE  Grossly WFL Grossly WFL   LUE Grossly WFL Grossly WFL     Hearing: WFL   Sensation:  No c/o numbness or tingling   Tone: WFL   Edema: none noted    Comments: Upon arrival patient lying in bed. At end of session, patient returned to bed with call light and phone within reach, all lines and tubes intact. Overall patient demonstrated decreased independence and safety during completion of ADL/functional transfer/mobility tasks. Pt would benefit from continued skilled OT to increase safety and independence with completion of ADL/IADL tasks for functional independence and quality of life. Rehab Potential: Good for established goals     Patient / Family Goal: none stated    Patient and/or family were instructed on functional diagnosis, prognosis/goals and OT plan of care. Demonstrated fair understanding.      Eval Complexity: Low    Time In: 1028  Time Out: 1043    Min Units   OT Eval Low 97165  x  1   OT Eval Medium 27826      OT Eval High 09815      OT Re-Eval [id number]       Therapeutic Ex [id number]       Therapeutic Activities 82996       ADL/Self Care 90078       Orthotic Management 87047       Manual 52988     Neuro Re-Ed 90038       Non-Billable Time          Evaluation Time additionally includes thorough review of current medical information, gathering information on past medical history/social history and prior level of function, interpretation of standardized testing/informal observation of tasks, assessment of data and development of plan of care and goals.             Heath Leal, OTR/L, [id number]

## 2021-12-03 NOTE — CARE COORDINATION
Care coordination:   Met with patient and daughter bedside to discuss care transitions and discharge planning. Discussed AMPAC and recommendations for KARIS after discharge. Patient adamantly refusing any type of rehabilitation after discharge. Explained the need to be able to ambulate at home for optimal health outcomes. Patient becoming angry with discussion of rehab. Will discharge with resumption of [facility]. Orders placed. Wears 6l/nc continuous at home through [employer] and has a trilogy supplied thru them as well (verified). Plan remains back home. DME- [employer] and scooter. Gina Brody RN  Case Management.

## 2021-12-03 NOTE — DISCHARGE SUMMARY
Physician Discharge Summary  [facility]     Patient ID:  Shoshana Scott  [id number]  65 y.o.  1965    Admit date: 11/30/2021    Discharge date:  12/03/2021    Admission Diagnoses:   Sepsis secondary to UTI ([facility] [address] [A41.9, N39.0]  Acute cystitis with hematuria [U13.92]  Complicated UTI (urinary tract infection) [N39.0]    Discharge Diagnoses: Active Problems:    Complicated UTI (urinary tract infection)  Resolved Problems:    Sepsis secondary to UTI [facility])      Consults: None  Procedures: None    Hospital Course: Shoshana Scott is a 64 y.o. female who presented with AMS, hypotension, with known urine culture of 11/6 positive for Proteus actively being treated with Cefdinir and was admitted for suspected sepsis secondary to UTI. Treatment was started with 2L NS boluses and 2g Cefepime with continuous IVF infusion and 1g Cefepime Q12. Patient's therapy was changed to Rocephin; received approximately 3 days of effective therapy during admission. Blood cultures x2 were negative for growth after 24 hours. UA 11/30 was positive for nitrites and leukocyte esterase. Urine culture collected 11/30 was not cultured due to lab error. Urine culture 12/1 collected <10,000 Proteus. Patient presented with focal infiltrates and/or effusion of right side as seen on CXR 11/30 with repeat CXR 12/1 revealign improved right pleural effusion with adjacent infiltrates or atelectasis. Patient also complained of constipation with no BM for the past month. CT Abdomen 11/30 revealed significant stool burden and therefore received enema and daily Senokot-S with adequate return of BM. Home treatment for COPD was continued with the addition of David Pew. She required 6L on admission but was easily weaned to baseline home O2 use of 5L. RUQ US 11/30 revealed gallbladder sludge and stones, no evidence of acute cholecystitis.  Patient was started on home insulin regimen of Lantus 20U BID with MDSSI but Lantus was decreased to 10U BID due to decreased oral intake from disinterest in hospital meals. Patient had improvement in clinical condition with resolving AMS, constipation, and BP improving to baseline. Patient was transitioned to Cefdinir on discharge for total effective therapy of 10 days with a bowel regimen. Patient was discharged in a stable and improved condition. Post Discharge Follow up: Primary care    Medication changes: Please see below    Discharge Exam:   Physical Exam  Constitutional:       Appearance: She is not ill-appearing. HENT:      Head: Normocephalic and atraumatic. Mouth/Throat:      Mouth: Mucous membranes are moist.   Eyes:      Extraocular Movements: Extraocular movements intact. Conjunctiva/sclera: Conjunctivae normal.   Cardiovascular:      Rate and Rhythm: Normal rate and regular rhythm. Pulses: Normal pulses. Heart sounds: Normal heart sounds. No murmur heard. Pulmonary:      Effort: Pulmonary effort is normal.      Breath sounds: No stridor. No wheezing, CTA B/L. Abdominal:      General: Abdomen is flat. Bowel sounds are normal. There is no distension. Palpations: Abdomen is soft. Tenderness: There is no abdominal tenderness. Musculoskeletal:         General: No swelling. Normal range of motion. Cervical back: Normal range of motion and neck supple. Skin:     General: Skin is warm and dry. Findings: Chronic B/L 1+ LE edema, Lesion(B/L LE chronic) present. Neurological:      General: No focal deficit present. Mental Status: She is alert and oriented to person, place, and time. Psychiatric:         Mood and Affect: Mood normal.         Behavior: Behavior normal    Disposition: home  Patient condition: good    Patient Instructions:    Activity: activity as tolerated  Diet: diabetic diet    Discharge Medication List:     Medication List      START taking these medications    cefdinir 300 MG capsule  Commonly known as: OMNICEF  Take 1 capsule by mouth 2 times daily for 7 days     hydrocortisone-pramoxine 2.5-1 % Crea cream  Apply topically 3 times daily        CHANGE how you take these medications    fenofibrate 54 MG tablet  Commonly known as: TRICOR  take 1 tablet by mouth once daily  What changed: See the new instructions. CONTINUE taking these medications    apixaban 5 MG Tabs tablet  Commonly known as: Eliquis  Take 1 tablet by mouth 2 times daily     B-D ULTRAFINE III SHORT PEN 31G X 8 MM Misc  Generic drug: Insulin Pen Needle  use as directed with LANTUS     Basaglar KwikPen 100 UNIT/ML injection pen  Generic drug: insulin glargine  Inject 20 Units into the skin 2 times daily     bisacodyl 10 MG suppository  Commonly known as: Bisac-Evac  Place 1 suppository rectally as needed for Constipation (not relieved by senakot, miralax or fiber)     budesonide 0.25 MG/2ML nebulizer suspension  Commonly known as: Pulmicort  Take 2 mLs by nebulization 2 times daily     butalbital-acetaminophen-caffeine -40 MG per tablet  Commonly known as: FIORICET, ESGIC  Take 1 tablet by mouth every 4 hours as needed for Headaches     cilostazol 100 MG tablet  Commonly known as: PLETAL     diazePAM 5 MG tablet  Commonly known as: VALIUM     dilTIAZem 240 MG extended release capsule  Commonly known as: CARDIZEM CD  take 1 capsule by mouth once daily     FreeStyle Flaquita 2 [place] Josie  1 each by Does not apply route 4 times daily     gabapentin 300 MG capsule  Commonly known as: NEURONTIN  Take 1 capsule by mouth 3 times daily for 90 days.      insulin lispro (1 Unit Dial) 100 UNIT/ML Sopn  inject 10 units subcutaneously three times a day with meals     ipratropium-albuterol 0.5-2.5 (3) MG/3ML Soln nebulizer solution  Commonly known as: DUONEB  USE 3 ML VIA NEBULIZER EVERY 4 HOURS AS NEEDED FOR SHORTNESS OF BREATH     naproxen 500 MG tablet  Commonly known as: Naprosyn  Take 1 tablet by mouth 2 times daily for 7 days     nortriptyline 25 MG capsule  Commonly known as: Pamelor  Take 1 capsule by mouth nightly     omeprazole 40 MG delayed release capsule  Commonly known as: PRILOSEC  Take 1 capsule by mouth every morning (before breakfast)     ondansetron 4 MG disintegrating tablet  Commonly known as: Zofran ODT  Take 1 tablet by mouth every 8 hours as needed for Nausea or Vomiting     oxyCODONE-acetaminophen 5-325 MG per tablet  Commonly known as: PERCOCET     OXYGEN     polyethylene glycol 17 GM/SCOOP powder  Commonly known as: GLYCOLAX  Take 17 g by mouth 2 times daily     rosuvastatin 20 MG tablet  Commonly known as: CRESTOR  take 1 tablet by mouth nightly     sennosides-docusate sodium 8.6-50 MG tablet  Commonly known as: SENOKOT-S  Take 2 tablets by mouth daily     tiZANidine 2 MG tablet  Commonly known as: ZANAFLEX     traZODone 150 MG tablet  Commonly known as: DESYREL  Take 0.5 tablets by mouth nightly for 4 days TAKE 1 TABLET BY MOUTH EVERY NIGHT AT BEDTIME     Underpads Misc  Change as needed; uses up to 4 daily     ursodiol 300 MG capsule  Commonly known as:  Actigall  Take 1 capsule by mouth 3 times daily (with meals)     vitamin B-12 1000 MCG tablet  Commonly known as: CYANOCOBALAMIN  Take 1 tablet by mouth daily     vitamin D 1.25 MG (93892 UT) Caps capsule  Commonly known as: ERGOCALCIFEROL  TAKE 1 CAPSULE PO q weekly        STOP taking these medications    metoprolol tartrate 25 MG tablet  Commonly known as: LOPRESSOR     predniSONE 10 MG tablet  Commonly known as: Moe Patel           Where to Get Your Medications      These medications were sent to [facility], [address] Donnice Frankel [phone]  [address] [address]    Phone: [phone]   · cefdinir 300 MG capsule  · hydrocortisone-pramoxine 2.5-1 % Crea cream  · sennosides-docusate sodium 8.6-50 MG tablet           Follow up:    DO [facility] [address] [address]  [phone]    Follow up in 2-3 days    Marzella Castleman, MD  Family Medicine Resident PGY-1

## 2021-12-03 NOTE — PROGRESS NOTES
Physical Therapy Initial Assessment     Name: Rachid Alvarez  : 1965  MRN: [id number]      Date of Service: 12/3/2021    Evaluating PT:  Monta Sever, PT, DPT [id number]      Room #:  2678/9040-Q  Diagnosis:  Sepsis secondary to UTI ([facility] [A41.9, N39.0]  Acute cystitis with hematuria [C61.41]  Complicated UTI (urinary tract infection) [N39.0]  PMHx/PSHx:   Past Medical History           Date Comments     Hyperlipidemia [E78.5]       Depression [F32. A]       Ehler's-Danlos syndrome [Q79.60]       Tobacco abuse [Z72.0]       Anxiety [F41.9]       Chronic back pain [M54.9, G89.29]       Type II or unspecified type diabetes mellitus without mention of complication, not stated as uncontrolled [E11.9]       Obesity [E66.9]       Carpal tunnel syndrome [G56.00] 2011 bilateral     CVA (cerebral infarction) [I63.9] ? right sided thalamic; seen by Dr Breaux Flight     Carotid stenosis [I65.29] 2011 50-69% on left     Vitamin D deficiency [E55.9]       Tobacco abuse [Z72.0]       Hypertension [I10]       History of ischemic heart disease [Z86.79] 2017 no cardiology     PVD (peripheral vascular disease) with claudication ([facility] [I73.9] 2017      COPD (chronic obstructive pulmonary disease) ([facility] [J44.9]  no pulmonologist / follows with PCP     Cerebral artery occlusion with cerebral infarction [facility]) [I63.50] ?  left sided weakness / usually in w/c     GERD (gastroesophageal reflux disease) [K21.9]       Dermatophytosis [B35.9] 2021      Left carotid stenosis [I65.22] 2021      O2 dependent [Z99.81] 2021      Discoloration of skin of toe [L81.9] 6/3/2021          Past Surgical History         Laterality Date Comments   Tubal ligation [SHX77]      CYST REMOVAL [SHX22]  2011 excision of left arm inclusion cyst   ECHO Complete 2D W Doppler W Color [ECH10]  1/3/2012     ECHO Complete 2D W Doppler W Color [ECH10]  2012     ECHO Compl W Dop Color Flow [CMM3902]  5/15/2013   Cardiac catheterization [KCS920]  1/7/2015 Dr. Annabelle Spencer  EF=55%   FFR=0.84  distal RCA bifurcation   CYST INCISION AND DRAINAGE [SHX14] Right 07/22/2016 Right gluteal mass incision and drainage   OTHER SURGICAL HISTORY [ADO535]  07/26/2016 re exploration left lateral wound / heel   Abdominal exploration surgery [facility]  03/29/2017 with bowel resection, incarcerated ventral hernia repair   [place] DRAIN SKIN ABSCESS COMPLIC [30622] N/A 4/8/0062 EXAM UNDER ANESTHESIA  I & D BUTTOCKS performed by Denisse Lopez DO at [address]  03/2017 abdominal   EYE SURGERY [QLZ953] Bilateral 2014 cataract w lens implants         Procedure/Surgery:  NA  Precautions:  Falls, O2  Equipment Needs:  Has [employer], WC    SUBJECTIVE:    Pt lives with her family in a 1 story home with ramp to enter. Pt ambulated very short distances with [employer] with assist PTA. Pt reported being primarily in bed or the WC. OBJECTIVE:   Initial Evaluation  Date: 12/3/2021 Treatment Date:  NA Short Term/ Long Term   Goals   AM-PAC 6 Clicks 08/63     Was pt agreeable to Eval/treatment? Yes      Does pt have pain? Reported 8/10 back pain. Bed Mobility  Rolling: Min A  Supine to sit: Mod A  Sit to supine: Min A  Scooting: Min A  Rolling: SBA  Supine to sit: SBA  Sit to supine: SBA  Scooting: SBA   Transfers Sit to stand: Mod A+2  Stand to sit: Mod A+2  Stand pivot: NT  Sit to stand: Min A  Stand to sit: Min A  Stand pivot: Min A   Ambulation    2 feet laterally with [employer] with Min A  >10 feet with [employer] with Min A   Stair negotiation: ascended and descended  NT  NA   ROM BUE:  WFL  BLE:  WFL     Strength BUE:  4-/5  BLE:  4-/5  Increase strength 1/3 grade.    Balance Sitting EOB:  SBA  Dynamic Standing:  Min A with [employer]  Sitting EOB:  Supervision  Dynamic Standing:  Min A with [employer]     Pt is A & O x 3  Sensation:  Pt denies numbness and tingling to extremities  Edema:  None noted    Vitals:  Blood Pressure at rest - Blood Pressure post session -   Heart assistance to complete task. · Therapeutic exercises: As noted above  · Neuromuscular education: NT    Pt's/ family goals   1. To feel better. Prognosis is fair for reaching above PT goals. Patient and or family understand(s) diagnosis, prognosis, and plan of care. Yes     PHYSICAL THERAPY PLAN OF CARE:    PT POC is established based on physician order and patient diagnosis     Referring provider/PT Order:    12/02/21 1815  PT eval and treat  Start:  12/02/21 1815,   End:  12/02/21 1815,   ONE TIME,   Standing Count:  1 Occurrences,   R         Porter Krause, DO     Diagnosis:  Sepsis secondary to UTI ([facility] [A41.9, N39.0]  Acute cystitis with hematuria [N96.47]  Complicated UTI (urinary tract infection) [N39.0]  Specific instructions for next treatment:  Subsequent PT sessions with focus on improved endurance with mobility    Current Treatment Recommendations:     [x] Strengthening to improve independence with functional mobility   [] ROM to improve independence with functional mobility   [x] Balance Training to improve static/dynamic balance and to reduce fall risk  [x] Endurance Training to improve activity tolerance during functional mobility   [x] Transfer Training to improve safety and independence with all functional transfers   [x] Gait Training to improve gait mechanics, endurance and asses need for appropriate assistive device  [] Stair Training in preparation for safe discharge home and/or into the community   [x] Positioning to prevent skin breakdown and contractures  [x] Safety and Education Training   [x] Patient/Caregiver Education   [] HEP  [] Other     PT long term treatment goals are located in above grid    Frequency of treatments: 2-5x/week x 1-2 weeks.     Time in  1025  Time out  1043    Total Treatment Time  12 minutes     Evaluation Time includes thorough review of current medical information, gathering information on past medical history/social history and prior level of function, completion of standardized testing/informal observation of tasks, assessment of data and education on plan of care and goals.     CPT codes:  [x] Low Complexity PT evaluation 03758  [] Moderate Complexity PT evaluation 04664  [] High Complexity PT evaluation 81881  [] PT Re-evaluation 53265  [] Gait training 79158 - minutes  [] Manual therapy 12567 - minutes  [x] Therapeutic activities 01893 12 minutes  [] Therapeutic exercises 87814 - minutes  [] Neuromuscular reeducation 12346 - minutes     Sandra Broussard, PT, DPT  License [id number]

## 2021-12-07 NOTE — TELEPHONE ENCOUNTER
Daughter-in-law, Marissa Pinto, called stating that today Yolette Thomas has been hypotensive with blood pressures of systolics in the 34I. She also states that her mother-in-law has been experiencing left-sided edema in her left leg up to her buttock. She stated that patient had an appointment scheduled for tomorrow but did not feel that this could wait to be evaluated in the office. Daughter-in-law was advised to take patient to the emergency department for further evaluation tonight.

## 2021-12-07 NOTE — LETTER
[facility] 6W Med Surg  [facility] [address]  Phone: [phone]  Family Medicine Residency      December 9, 2021          Sheng Wade has a terminal illness and is now under hospice care. If you have any questions or concerns, please don't hesitate to call.      Agatha Petty MD    12/09/2021   [phone]

## 2021-12-07 NOTE — LETTER
[facility] 6W Med Surg  [facility] [address]  Phone: [phone]  Family Medicine Residency      December 9, 2021          America Amaral has a terminal illness and is now under hospice care. If you have any questions or concerns, please don't hesitate to call.                Alda Mann MD    12/09/2021   [phone]

## 2021-12-07 NOTE — ED NOTES
Bed: 18  Expected date: 12/7/21  Expected time:   Means of arrival:   Comments:  JENNY Cristina RN  12/07/21 7912

## 2021-12-07 NOTE — TELEPHONE ENCOUNTER
Claire from [facility] reports today patient had BP of 80/54, glucose of 67 with fatigue and dizziness.

## 2021-12-08 PROBLEM — J12.82 PNEUMONIA DUE TO COVID-19 VIRUS: Status: ACTIVE | Noted: 2021-01-01

## 2021-12-08 PROBLEM — D64.9 NORMOCYTIC ANEMIA: Status: ACTIVE | Noted: 2021-01-01

## 2021-12-08 PROBLEM — U07.1 PNEUMONIA DUE TO COVID-19 VIRUS: Status: ACTIVE | Noted: 2021-01-01

## 2021-12-08 NOTE — H&P
[address] [facility]  Resident History and Physical      CHIEF COMPLAINT:    Chief Complaint   Patient presents with    Shortness of Breath     Shortness of breath for 4 days and getting worse with dry cough. Wears O2 @ 5L [place] at baseline. History of Present Illness:   Yuriy Loya  is a 64 y.o. female patient of Audrey Meehan MD  with a pertinent PMHx of anemia, depression, Ama-Danlos syndrome, MARIAA, tobacco abuse, PVD, DM2, PAF, CAD who presented to the ER with a chief complaint of lightheadedness, low blood pressure, and shortness of breath. Patient states that she was feeling pretty \"crappy\" today. She was recently discharged on Friday after a hospital stay due to sepsis secondary to a UTI. She says since then she has had issues with low blood pressure, but is unsure what medication she is taking and is unsure how low her blood pressure is getting. Patient was a poor historian therefore her daughter-in-law, Sumaya Kwon, was called at the patient's request.  Sumaya Kwon states that since being discharged on Friday patient's blood pressures have been occasionally below 90 systolic. Have been regularly monitoring these and they were consistently below 90 today. With a blood pressure of 83/50 at 5 PM this evening. They have also been monitoring the patient's blood glucose which was 67 today. Patient has had a poor appetite per her daughter-in-law. The patient has been complaining of abdominal pain, lightheadedness, dizziness, shortness of breath, and diarrhea. She denies fever, chills, chest pain, nausea, vomiting, constipation. In the ED, the patient was hypotensive and tachycardic. She presented on her home O2 of 5L and was saturating at 96% on interview.  Laboratory work-up was significant for new normocytic anemia with a hemoglobin of 10.0 (patient refused rectal), CMP showed elevated CO2 though near patient's baseline, elevated proBNP of 925, and ABG which showed PCO2 of 72.2, PO2 40.5, O2 sat 80.7 which was thought to be venous in nature, Covid positive imaging included a chest x-ray which showed slightly worsening of the opacities in the right to mid lower lung, right pleural effusion, mild central pulmonary vascular congestion. EKG showed sinus tach with occasional PVCs, there was diffuse artifact, no ST elevation or depression noted. Patient was ordered duo nebs and a normal saline bolus in the ED. Family medicine was consulted to admit the patient for Covid pneumonia. ROS:   Review of Systems   Constitutional: Positive for appetite change. Negative for chills, fatigue and fever. HENT: Negative for congestion, rhinorrhea and sore throat. Eyes: Positive for visual disturbance. Respiratory: Positive for cough (non productive) and shortness of breath. Negative for chest tightness. Cardiovascular: Negative for chest pain and palpitations. Gastrointestinal: Positive for abdominal pain. Negative for constipation, diarrhea, nausea and vomiting. Genitourinary: Negative for dysuria and frequency. Neurological: Positive for dizziness and light-headedness. Negative for syncope, weakness and headaches. All other systems reviewed and are negative. PMHx:  Past Medical History:   Diagnosis Date    Anxiety     Carotid stenosis 12/2011    50-69% on left    Carpal tunnel syndrome 12/07/2011    bilateral    Cerebral artery occlusion with cerebral infarction [facility]) 2011? left sided weakness / usually in w/c    Chronic back pain     COPD (chronic obstructive pulmonary disease) ([facility]     no pulmonologist / follows with PCP    CVA (cerebral infarction) 2011?     right sided thalamic; seen by Dr Chloe Hutchinson Depression     Dermatophytosis 5/29/2021    Discoloration of skin of toe 6/3/2021    Ehler's-Danlos syndrome     GERD (gastroesophageal reflux disease)     History of ischemic heart disease 03/30/2017    no cardiology    Hyperlipidemia     Hypertension     Left carotid stenosis 5/29/2021    O2 dependent 5/29/2021    Obesity     PVD (peripheral vascular disease) with claudication ([facility] 8/25/2017    Tobacco abuse     Tobacco abuse     Type II or unspecified type diabetes mellitus without mention of complication, not stated as uncontrolled     Vitamin D deficiency        PSHx  Past Surgical History:   Procedure Laterality Date    ABDOMINAL EXPLORATION SURGERY  03/29/2017    with bowel resection, incarcerated ventral hernia repair    CARDIAC CATHETERIZATION  1/7/2015    Dr. Geovani Nichole  EF=55%   FFR=0.84  distal RCA bifurcation    CYST INCISION AND DRAINAGE Right 07/22/2016    Right gluteal mass incision and drainage    CYST REMOVAL  1/28/2011    excision of left arm inclusion cyst    ECHO COMPL W DOP COLOR FLOW  5/15/2013         ECHOCARDIOGRAM COMPLETE 2D W DOPPLER W COLOR  1/3/2012         ECHOCARDIOGRAM COMPLETE 2D W DOPPLER W COLOR  6/14/2012         EYE SURGERY Bilateral 2014    cataract w lens implants    HERNIA REPAIR  03/2017    abdominal    OTHER SURGICAL HISTORY  07/26/2016    re exploration left lateral wound / heel    [place] DRAIN SKIN ABSCESS COMPLIC N/A 2/5/5039    EXAM UNDER ANESTHESIA  I & D BUTTOCKS performed by Lila Unger DO at [address]       Medications Prior to Admission:    Prior to Admission medications    Medication Sig Start Date End Date Taking?  Authorizing Provider   sennosides-docusate sodium (SENOKOT-S) 8.6-50 MG tablet Take 2 tablets by mouth daily 12/3/21  Yes Claudene Kotyk, MD   cefdinir (OMNICEF) 300 MG capsule Take 1 capsule by mouth 2 times daily for 7 days 12/3/21 12/10/21 Yes Claudene Kotyk, MD   apixaban (ELIQUIS) 5 MG TABS tablet Take 1 tablet by mouth 2 times daily 11/26/21  Yes Janelle Sharp MD   polyethylene glycol [facility]) 17 GM/SCOOP powder Take 17 g by mouth 2 times daily 11/24/21  Yes Norris Jamil MD   bisacodyl (BISAC-EVAC) 10 MG suppository Place 1 suppository rectally as needed for Constipation (not relieved by senakot, miralax or fiber) 11/24/21  Yes Kvng Beck MD   ondansetron (ZOFRAN ODT) 4 MG disintegrating tablet Take 1 tablet by mouth every 8 hours as needed for Nausea or Vomiting 11/17/21 11/17/22 Yes Myah Umana DO   tiZANidine (ZANAFLEX) 2 MG tablet Take 2 mg by mouth every 6 hours as needed   Yes Historical Provider, MD   ipratropium-albuterol (DUONEB) 0.5-2.5 (3) MG/3ML SOLN nebulizer solution USE 3 ML VIA NEBULIZER EVERY 4 HOURS AS NEEDED FOR SHORTNESS OF BREATH 10/27/21  Yes Reece Russell MD   cilostazol (PLETAL) 100 MG tablet Take 100 mg by mouth 2 times daily   Yes Historical Provider, MD   omeprazole (PRILOSEC) 40 MG delayed release capsule Take 1 capsule by mouth every morning (before breakfast) 9/29/21  Yes Cari Ramirez DO   fenofibrate (TRICOR) 54 MG tablet take 1 tablet by mouth once daily  Patient taking differently: Take 54 mg by mouth nightly  9/20/21  Yes Reece Russell MD   oxyCODONE-acetaminophen (PERCOCET) 5-325 MG per tablet 1 tablet every 4 hours as needed for Pain. 9/7/21  Yes Historical Provider, MD   gabapentin (NEURONTIN) 300 MG capsule Take 1 capsule by mouth 3 times daily for 90 days.  9/8/21 12/8/21 Yes Reece Russell MD   ursodiol (ACTIGALL) 300 MG capsule Take 1 capsule by mouth 3 times daily (with meals) 9/8/21 9/8/22 Yes Reece Russell MD   nortriptyline [facility]) 25 MG capsule Take 1 capsule by mouth nightly 9/3/21  Yes Reece Russell MD   rosuvastatin (CRESTOR) 20 MG tablet take 1 tablet by mouth nightly 8/17/21  Yes Reece Russell MD   budesonide (PULMICORT) 0.25 MG/2ML nebulizer suspension Take 2 mLs by nebulization 2 times daily 8/2/21  Yes Reece Russell MD   butalbital-acetaminophen-caffeine (FIORICET, [facility]) -48 MG per tablet Take 1 tablet by mouth every 4 hours as needed for Headaches 5/18/21  Yes Reece Russell MD   traZODone (DESYREL) 150 MG tablet Take 0.5 tablets by mouth nightly for 4 days TAKE 1 TABLET BY MOUTH EVERY NIGHT AT BEDTIME 5/18/21 12/8/21 Yes Kacie Elena MD   vitamin B-12 (CYANOCOBALAMIN) 1000 MCG tablet Take 1 tablet by mouth daily 4/20/21  Yes Kacie Elena MD   OXYGEN Inhale 5 L into the lungs continuous   Yes Historical Provider, MD   Pramoxine-HC (HYDROCORTISONE-PRAMOXINE) 2.5-1 % CREA cream Apply topically 3 times daily 12/3/21   Daria Cramer MD   dilTIAZem (CARDIZEM CD) 240 MG extended release capsule take 1 capsule by mouth once daily 11/29/21   Gus Raygoza MD   Incontinence Supply Disposable (UNDERPADS) MISC Change as needed; uses up to 4 daily 11/18/21   Kacie Elena MD   naproxen (NAPROSYN) 500 MG tablet Take 1 tablet by mouth 2 times daily for 7 days 11/17/21 11/24/21  Liliana Overall, DO   insulin glargine [facility]) 100 UNIT/ML injection pen Inject 20 Units into the skin 2 times daily 10/22/21   Rob Mendoza MD   diazePAM (VALIUM) 5 MG tablet take 1 tablet by mouth every 8 hours if needed for SPASM(S) 10/5/21   Historical Provider, MD   Continuous Blood Gluc  (FREESTYLE SHREYA 2 READER) HAI 1 each by Does not apply route 4 times daily 8/6/21   Gato Cruz MD   insulin lispro, 1 Unit Dial, 100 UNIT/ML SOPN inject 10 units subcutaneously three times a day with meals 7/27/21   Kacie Elena MD   rosuvastatin (CRESTOR) 20 MG tablet take 1 tablet by mouth NIGHTLY 7/19/21   Florentin Mcduffie MD   B-D ULTRAFINE III SHORT PEN 31G X 8 MM MISC use as directed with LANTUS 7/6/21   Kacie Elena MD   vitamin D (ERGOCALCIFEROL) 1.25 MG (99420 UT) CAPS capsule TAKE 1 CAPSULE PO q weekly 3/9/21   Kacie Elena MD   metoprolol tartrate (LOPRESSOR) 25 MG tablet TAKE 1/2 TABLET BY MOUTH TWICE DAILY 9/9/19 4/18/21  Kacie Elena MD        Allergies:   Patient has no known allergies. Social History:    reports that she has been smoking cigarettes. She has a 34.00 pack-year smoking history.  She has never used smokeless tobacco. She reports that she does not drink alcohol and does not use drugs. Family History:   family history includes COPD in her father and mother; Cancer in her maternal grandmother; Heart Attack in her maternal grandfather; Heart Disease in her brother; High Blood Pressure in her brother, father, and mother; Other in her brother, father, and paternal grandmother. PHYSICAL EXAM:    Vitals:  BP (!) 83/48   Pulse 115   Temp 98.1 °F (36.7 °C)   Resp 16   Ht 5' 5\" (1.651 m)   Wt 165 lb (74.8 kg)   LMP 03/20/2015   SpO2 96%   BMI 27.46 kg/m²     Physical Exam  Constitutional:       General: She is not in acute distress. Appearance: Normal appearance. HENT:      Head: Normocephalic and atraumatic. Mouth/Throat:      Mouth: Mucous membranes are moist.      Pharynx: Oropharynx is clear. Eyes:      Extraocular Movements: Extraocular movements intact. Conjunctiva/sclera: Conjunctivae normal.   Cardiovascular:      Rate and Rhythm: Normal rate and regular rhythm. Pulses: Normal pulses. Heart sounds: Normal heart sounds. No murmur heard. Pulmonary:      Effort: Pulmonary effort is normal.      Breath sounds: Wheezing and rhonchi present. Abdominal:      General: Bowel sounds are normal. There is no distension. Palpations: Abdomen is soft. There is no mass. Tenderness: There is abdominal tenderness (diffuse). There is no guarding or rebound. Musculoskeletal:         General: No swelling. Cervical back: Normal range of motion. Right lower leg: Edema (trace) present. Left lower leg: Edema (trace) present. Skin:     General: Skin is warm and dry. Neurological:      General: No focal deficit present. Mental Status: She is alert and oriented to person, place, and time. Cranial Nerves: No cranial nerve deficit.    Psychiatric:         Attention and Perception: Attention normal.         Mood and Affect: Mood normal.         LABS:  Recent Results (from the 12/07/21  8:30 PM   Result Value Ref Range    Pro- (H) 0 - 125 pg/mL   Lactate, Sepsis    Collection Time: 12/07/21  8:30 PM   Result Value Ref Range    Lactic Acid, Sepsis 1.0 0.5 - 1.9 mmol/L   Blood Gas, Arterial    Collection Time: 12/07/21  9:50 PM   Result Value Ref Range    Date Analyzed [id number]     Time Analyzed 3660     Source: Blood Arterial     pH, Blood Gas 7.419 7.350 - 7.450    PCO2 72.2 (HH) 35.0 - 45.0 mmHg    PO2 40.5 (LL) 75.0 - 100.0 mmHg    HCO3 45.7 (H) 22.0 - 26.0 mmol/L    B.E. 18.3 (H) -3.0 - 3.0 mmol/L    O2 Sat 80.7 (L) 92.0 - 98.5 %    O2Hb 77.9 (L) 94.0 - 97.0 %    COHb 3.3 (H) 0.0 - 1.5 %    MetHb 0.2 0.0 - 1.5 %    O2 Content 11.2 mL/dL    HHb 18.6 (H) 0.0 - 5.0 %    tHb (est) 10.2 (L) 11.5 - 16.5 g/dL    Mode NC- 5 L     Date Of Collection      Time Collected      Pt Temp 37.0 C     ID [id number]     Lab 14560     Critical(s) Notified Handed report to /PATY    COVID-19, Rapid    Collection Time: 12/07/21 10:03 PM    Specimen: Nasopharyngeal Swab   Result Value Ref Range    SARS-CoV-2, NAAT DETECTED (A) Not Detected       XR CHEST PORTABLE   Final Result   Slight worsening of the opacity in the right mid to lower lung. Right   pleural effusion. Mild central pulmonary vascular congestion.       RECOMMENDATION:   (Recommend upright PA and lateral chest radiographs 6-8 weeks after   resolution of patient's symptoms to ensure complete resolution of   radiographic findings.)             ASSESSMENT/PLAN:      Active Hospital Problems    Diagnosis Date Noted    Pneumonia due to COVID-19 virus [U07.1, J12.82] 12/08/2021    Normocytic anemia [D64.9] 12/08/2021    COPD [facility]chronic obstructive pulmonary disease) ([facility] [J44.9] 05/29/2021    Uncontrolled type 2 diabetes mellitus with hyperglycemia (HCC) [E11.65]      COVID Pneumonia  -Droplet plus isolation   -ABG showed severe hypercapnia and severe hypoxia, thought to be venous  Will repeat ABG  -Tele-monitoring  -Nasal Cannula 5L  -Decadron 6 mg PO Daily  -Continue Pulmicort and Duonebs   -Labs: Ferritin, D-Dimer, CRP, LDH, and Vitamin 25 hydroxy ordered   -Monitor fever curve; Tylenol 650 mg every 6 hours PRN   -Hold off on Baricitinib until inflammatory labs come back    Hypotension  - Poor oral intake since Friday with COVID infection  - Recently hypotensive <75 systolic on home BP cuff  - 92/52 while interviewing  - 1 L NS bolus in the ED  - Start 115 mL/hr maintenance fluids    Diffuse abdominal pain  Patient with a 5-day history of diarrhea and abdominal pain  Diffusely tender on physical exam  CT abdomen pelvis without contrast ordered    DM2  Start medium dose sliding scale  Hold home Lantus with recent hypoglycemic event  Diabetic diet  Hypoglycemia protocol ordered    PAF  EKG showed sinus tachycardia  Hold home Cardizem due to hypotension  Hold home Eliquis due to drop in hemoglobin, occult stool pending  Troponin pending    Hyperlipidemia  Continue home rosuvastatin and fenofibrate    GERD  -Continue PPI    Insomnia  Continue home nortriptyline    Chronic constipation  Patient has not required any of her home stool softeners, will hold for now    Chronic pain  Continue home Percocet 5 mg as needed    Continue home medication: Vitamin D, ursodiol, Pulmicort, cefdinir (day 5)    Hold home medication: pedal, Valium, Cardizem    DVT ppx: Hold Eliquis, PCDs  GI ppx: Prilosec  Code Status: Limited code, No chest compressions      Case discussed with attending on call Dr. Praful Elizabeth DO  Family Medicine Resident PGY-2  12/8/2021

## 2021-12-08 NOTE — ED PROVIDER NOTES
[facility]  eMERGENCY dEPARTMENT eNCOUnter      Pt Name: Mikel Cho  MRN: [id number]  [place] 1965  Date of evaluation: 12/7/2021  Provider: Hari Mcmanus MD     CHIEF COMPLAINT       Chief Complaint   Patient presents with    Shortness of Breath     Shortness of breath for 4 days and getting worse with dry cough. Wears O2 @ 5L [place] at baseline. HISTORY OF PRESENT ILLNESS   (Location/Symptom, Timing/Onset,Context/Setting, Quality, Duration, Modifying Factors, Severity) Note limiting factors. Patient presents here with shortness of breath. The patient's apparently on 5 L of O2 all the time. She began having increasing shortness of breath and cough. She was able to smoke however. She is not complaining of any chest pain. She did have her vaccine but no booster. Has not had a fever. Her shortness of breath is been worsening for 4 days. She is coughing but not able to produce any sputum. She has not had a documented fever. She denies nausea or vomiting. Never states they have been having problems with low blood pressure today. She did show me some numbers that were all in the 80s. She also said that all these problems started after she was admitted for CO2 retention. She does wear her trilogy at night. She also is on 5 L of O2 the rest of the time. Mikel Cho is a 64 y.o. female who presents to the emergency department     Nursing Notes were reviewed. REVIEW OF SYSTEMS    (2+ for4; 10+ for level 5)     Review of Systems   Constitutional: Positive for fatigue. Negative for appetite change, chills, fever and unexpected weight change. HENT: Negative for congestion, drooling, nosebleeds, rhinorrhea and trouble swallowing. Eyes: Negative for pain and visual disturbance. Respiratory: Positive for cough and shortness of breath. Negative for chest tightness and wheezing.     Cardiovascular: Negative for chest pain, palpitations and leg swelling. Gastrointestinal: Positive for nausea. Negative for abdominal pain, blood in stool, diarrhea and vomiting. Endocrine: Negative for polydipsia and polyuria. Genitourinary: Negative for difficulty urinating, dysuria, flank pain, frequency, hematuria and urgency. Musculoskeletal: Negative for arthralgias, back pain, myalgias and neck pain. Skin: Negative for pallor and rash. Allergic/Immunologic: Negative for environmental allergies. Neurological: Positive for weakness. Negative for dizziness, syncope, speech difficulty, light-headedness, numbness and headaches. Hematological: Does not bruise/bleed easily. Psychiatric/Behavioral: Negative for confusion and decreased concentration. All other systems reviewed and are negative. PAST MEDICAL HISTORY     Past Medical History:   Diagnosis Date    Anxiety     Carotid stenosis 12/2011    50-69% on left    Carpal tunnel syndrome 12/07/2011    bilateral    Cerebral artery occlusion with cerebral infarction [facility]) 2011? left sided weakness / usually in w/c    Chronic back pain     COPD (chronic obstructive pulmonary disease) ([facility] [address]     no pulmonologist / follows with PCP    CVA (cerebral infarction) 2011?     right sided thalamic; seen by Dr Farris [place]    Depression     Dermatophytosis 5/29/2021    Discoloration of skin of toe 6/3/2021    Ehler's-Danlos syndrome     GERD (gastroesophageal reflux disease)     History of ischemic heart disease 03/30/2017    no cardiology    Hyperlipidemia     Hypertension     Left carotid stenosis 5/29/2021    O2 dependent 5/29/2021    Obesity     PVD (peripheral vascular disease) with claudication ([facility] [address] 8/25/2017    Tobacco abuse     Tobacco abuse     Type II or unspecified type diabetes mellitus without mention of complication, not stated as uncontrolled     Vitamin D deficiency        SURGICALHISTORY       Past Surgical History:   Procedure Laterality Date    ABDOMINAL EXPLORATION SURGERY  03/29/2017    with bowel resection, incarcerated ventral hernia repair    CARDIAC CATHETERIZATION  1/7/2015    Dr. Sherrie Pascal  EF=55%   FFR=0.84  distal RCA bifurcation    CYST INCISION AND DRAINAGE Right 07/22/2016    Right gluteal mass incision and drainage    CYST REMOVAL  1/28/2011    excision of left arm inclusion cyst    ECHO COMPL W DOP COLOR FLOW  5/15/2013         ECHOCARDIOGRAM COMPLETE 2D W DOPPLER W COLOR  1/3/2012         ECHOCARDIOGRAM COMPLETE 2D W DOPPLER W COLOR  6/14/2012         EYE SURGERY Bilateral 2014    cataract w lens implants    HERNIA REPAIR  03/2017    abdominal    OTHER SURGICAL HISTORY  07/26/2016    re exploration left lateral wound / heel    NH DRAIN SKIN ABSCESS COMPLIC N/A 2/3/6296    EXAM UNDER ANESTHESIA  I & D BUTTOCKS performed by Pauly Mazariegos DO [facility] [address]       Previous Medications    APIXABAN (ELIQUIS) 5 MG TABS TABLET    Take 1 tablet by mouth 2 times daily    B-D ULTRAFINE III SHORT PEN 31G X 8 MM MISC    use as directed with LANTUS    BISACODYL (BISAC-EVAC) 10 MG SUPPOSITORY    Place 1 suppository rectally as needed for Constipation (not relieved by senakot, miralax or fiber)    BUDESONIDE (PULMICORT) 0.25 MG/2ML NEBULIZER SUSPENSION    Take 2 mLs by nebulization 2 times daily    BUTALBITAL-ACETAMINOPHEN-CAFFEINE (FIORICET, ESGIC) -40 MG PER TABLET    Take 1 tablet by mouth every 4 hours as needed for Headaches    CEFDINIR (OMNICEF) 300 MG CAPSULE    Take 1 capsule by mouth 2 times daily for 7 days    CILOSTAZOL (PLETAL) 100 MG TABLET    Take 100 mg by mouth 2 times daily    CONTINUOUS BLOOD GLUC  (FREESTYLE SHREYA 2 READER) HAI    1 each by Does not apply route 4 times daily    DIAZEPAM (VALIUM) 5 MG TABLET    take 1 tablet by mouth every 8 hours if needed for SPASM(S)    DILTIAZEM (CARDIZEM CD) 240 MG EXTENDED RELEASE CAPSULE    take 1 capsule by mouth once daily FENOFIBRATE (TRICOR) 54 MG TABLET    take 1 tablet by mouth once daily    GABAPENTIN (NEURONTIN) 300 MG CAPSULE    Take 1 capsule by mouth 3 times daily for 90 days. INCONTINENCE SUPPLY DISPOSABLE (UNDERPADS) MISC    Change as needed; uses up to 4 daily    INSULIN GLARGINE (BASAGLAR KWIKPEN) 100 UNIT/ML INJECTION PEN    Inject 20 Units into the skin 2 times daily    INSULIN LISPRO, 1 UNIT DIAL, 100 UNIT/ML SOPN    inject 10 units subcutaneously three times a day with meals    IPRATROPIUM-ALBUTEROL (DUONEB) 0.5-2.5 (3) MG/3ML SOLN NEBULIZER SOLUTION    USE 3 ML VIA NEBULIZER EVERY 4 HOURS AS NEEDED FOR SHORTNESS OF BREATH    NAPROXEN (NAPROSYN) 500 MG TABLET    Take 1 tablet by mouth 2 times daily for 7 days    NORTRIPTYLINE (PAMELOR) 25 MG CAPSULE    Take 1 capsule by mouth nightly    OMEPRAZOLE (PRILOSEC) 40 MG DELAYED RELEASE CAPSULE    Take 1 capsule by mouth every morning (before breakfast)    ONDANSETRON (ZOFRAN ODT) 4 MG DISINTEGRATING TABLET    Take 1 tablet by mouth every 8 hours as needed for Nausea or Vomiting    OXYCODONE-ACETAMINOPHEN (PERCOCET) 5-325 MG PER TABLET    1 tablet every 4 hours as needed for Pain.      OXYGEN    Inhale 5 L into the lungs continuous    POLYETHYLENE GLYCOL (GLYCOLAX) 17 GM/SCOOP POWDER    Take 17 g by mouth 2 times daily    PRAMOXINE-HC (HYDROCORTISONE-PRAMOXINE) 2.5-1 % CREA CREAM    Apply topically 3 times daily    ROSUVASTATIN (CRESTOR) 20 MG TABLET    take 1 tablet by mouth nightly    SENNOSIDES-DOCUSATE SODIUM (SENOKOT-S) 8.6-50 MG TABLET    Take 2 tablets by mouth daily    TIZANIDINE (ZANAFLEX) 2 MG TABLET    Take 2 mg by mouth every 6 hours as needed    TRAZODONE (DESYREL) 150 MG TABLET    Take 0.5 tablets by mouth nightly for 4 days TAKE 1 TABLET BY MOUTH EVERY NIGHT AT BEDTIME    URSODIOL (ACTIGALL) 300 MG CAPSULE    Take 1 capsule by mouth 3 times daily (with meals)    VITAMIN B-12 (CYANOCOBALAMIN) 1000 MCG TABLET    Take 1 tablet by mouth daily    VITAMIN D (ERGOCALCIFEROL) 1.25 MG (35431 UT) CAPS CAPSULE    TAKE 1 CAPSULE PO q weekly            Patient has no known allergies. FAMILY HISTORY       Family History   Problem Relation Age of Onset    High Blood Pressure Mother     COPD Mother     COPD Father     High Blood Pressure Father     Other Father     Heart Disease Brother     High Blood Pressure Brother     Other Brother     Cancer Maternal Grandmother     Heart Attack Maternal Grandfather     Other Paternal Grandmother           SOCIAL HISTORY       Social History     Socioeconomic History    Marital status: Single     Spouse name: Not on file    Number of children: Not on file    Years of education: Not on file    Highest education level: Not on file   Occupational History     Comment: [employer]/ [employer]   Tobacco Use    Smoking status: Current Every Day Smoker     Packs/day: 1.00     Years: 34.00     Pack years: 34.00     Types: Cigarettes    Smokeless tobacco: Never Used   Vaping Use    Vaping Use: Never used   Substance and Sexual Activity    Alcohol use: No     Alcohol/week: 0.0 standard drinks    Drug use: No    Sexual activity: Not Currently   Other Topics Concern    Not on file   Social History Narrative    Not on file     Social Determinants of Health     Financial Resource Strain: Low Risk     Difficulty of Paying Living Expenses: Not hard at all   Food Insecurity: No Food Insecurity    Worried About Running Out of Food in the Last Year: Never true    Clarke of Food in the Last Year: Never true   Transportation Needs:     Lack of Transportation (Medical): Not on file    Lack of Transportation (Non-Medical):  Not on file   Physical Activity:     Days of Exercise per Week: Not on file    Minutes of Exercise per Session: Not on file   Stress:     Feeling of Stress : Not on file   Social Connections:     Frequency of Communication with Friends and Family: Not on file    Frequency of Social Gatherings with Breath sounds: No stridor. Wheezing and rales present. Chest:      Chest wall: No tenderness. Abdominal:      General: There is no distension. Palpations: Abdomen is soft. There is no mass. Tenderness: There is no abdominal tenderness. There is no guarding or rebound. Musculoskeletal:         General: No tenderness or deformity. Normal range of motion. Cervical back: Normal range of motion and neck supple. Lymphadenopathy:      Cervical: No cervical adenopathy. Skin:     General: Skin is warm and dry. Coloration: Skin is not pale. Findings: No erythema or rash. Neurological:      General: No focal deficit present. Mental Status: She is alert and oriented to person, place, and time. Cranial Nerves: No cranial nerve deficit. Motor: No abnormal muscle tone. Coordination: Coordination normal.   Psychiatric:         Behavior: Behavior normal.      Comments: Difficult to evaluate. Patient minimally answering questions         DIAGNOSTIC RESULTS     EKG (Per Emergency Physician):   Irregular rhythm rate of 114. Nonspecific ST segment changes. A lot of artifact defective skewers the rhythm although it may be sinus with occasional PVCs. RADIOLOGY (Per Yocasta Brooke): Interpretation per the Radiologist below, if available at the time of this note:  XR CHEST PORTABLE    Result Date: 12/7/2021  EXAMINATION: ONE XRAY VIEW OF THE CHEST 12/7/2021 8:47 pm COMPARISON: Chest series from December 1, 2021 HISTORY: [facility]: shortness of breath TECHNOLOGIST PROVIDED HISTORY: Reason for exam:->shortness of breath FINDINGS: Interval worsening in the opacity in the right mid to lower lung. Probable right pleural effusion. Left lung appears relatively clear. No pneumothorax. Cardiomediastinal silhouette appears unremarkable. Mild central pulmonary vascular congestion.   Osseous and thoracic soft tissue structures demonstrate no acute findings. Post vertebroplasty changes noted. Slight worsening of the opacity in the right mid to lower lung. Right pleural effusion. Mild central pulmonary vascular congestion. RECOMMENDATION: (Recommend upright PA and lateral chest radiographs 6-8 weeks after resolution of patient's symptoms to ensure complete resolution of radiographic findings. )       :  Labs Reviewed   COVID-19, RAPID - Abnormal; Notable for the following components:       Result Value    SARS-CoV-2, NAAT DETECTED (*)     All other components within normal limits   CBC WITH AUTO DIFFERENTIAL - Abnormal; Notable for the following components:    RBC 3.40 (*)     Hemoglobin 10.0 (*)     Hematocrit 32.7 (*)     MCHC 30.6 (*)     RDW 16.4 (*)     Lymphocytes % 18.5 (*)     Lymphocytes Absolute 1.14 (*)     Eosinophils Absolute 0.02 (*)     All other components within normal limits   COMPREHENSIVE METABOLIC PANEL W/ REFLEX TO MG FOR LOW K - Abnormal; Notable for the following components:    Chloride 97 (*)     CO2 38 (*)     Anion Gap 4 (*)     Glucose 136 (*)     BUN 3 (*)     CREATININE 0.3 (*)     Calcium 7.2 (*)     Total Protein 4.7 (*)     Albumin 1.6 (*)     AST 70 (*)     All other components within normal limits   BRAIN NATRIURETIC PEPTIDE - Abnormal; Notable for the following components:    Pro- (*)     All other components within normal limits   BLOOD GAS, ARTERIAL - Abnormal; Notable for the following components:    PCO2 72.2 (*)     PO2 40.5 (*)     HCO3 45.7 (*)     B.E. 18.3 (*)     O2 Sat 80.7 (*)     O2Hb 77.9 (*)     COHb 3.3 (*)     HHb 18.6 (*)     tHb (est) 10.2 (*)     All other components within normal limits   LACTATE, SEPSIS   LACTATE, SEPSIS   ARTERIAL BLOOD GAS, RESPIRATORY ONLY   ARTERIAL BLOOD GAS, RESPIRATORY ONLY       All other labs were within normal range or not returned as of this dictation.     EMERGENCY DEPARTMENT COURSE and DIFFERENTIALDIAGNOSIS/MDM:   Vitals:    Vitals:    12/07/21 1920 12/07/21 1930 BP: (!) 97/57    Pulse: 112 117   Resp: 20    Temp: 98.1 °F (36.7 °C)    SpO2: 97%    Weight: 165 lb (74.8 kg)    Height: 5' 5\" (1.651 m)        Medications   ipratropium-albuterol (DUONEB) nebulizer solution 1 ampule (has no administration in time range)   0.9 % sodium chloride bolus (has no administration in time range)   ipratropium-albuterol (DUONEB) nebulizer solution 1 ampule (has no administration in time range)   dexamethasone (DECADRON) injection 10 mg (has no administration in time range)       MDM  Number of Diagnoses or Management Options  Chronic obstructive pulmonary disease, unspecified COPD type ([address]  CO2 retention  COVID: established and improving  Hypotension, unspecified hypotension type  Hypoxia  Diagnosis management comments: Patient has multiple medical problems. She presents here with increasing shortness of breath. She is on O2. She was vaccinated but did not get a booster. She also has been having low blood pressures throughout the day. Patient does appear somewhat dry. She was given a fluid bolus and pressure now is 97. That is more near her baseline. She was wheezing and felt short of breath. She was found to have Covid. She was given dexamethasone also given aerosols. She is saturating at 95 to 97% on her 5 L. He was also found to have elevated CO2. This is not as high as it has been in the past and she appears to be somewhat compensating however she does have some degree of respiratory insufficiency. I did speak with Dr. Opal Cote on-call for family practice. He will be here to evaluate the patient for admission. Critical care 25 minutes for assessment and treatment of hypotension and the respiratory  distress. .      CONSULTS:  None    PROCEDURES:  Unless otherwise noted below, none     Procedures    FINAL IMPRESSION      1. COVID    2. Hypoxia    3. Chronic obstructive pulmonary disease, unspecified COPD type ([address]    4. Hypotension, unspecified hypotension type    5. CO2 retention        DISPOSITION/PLAN   DISPOSITION Decision To Admit 12/07/2021 11:14:53 PM      PATIENT REFERRED TO:  No follow-up provider specified. DISCHARGE MEDICATIONS:  New Prescriptions    No medications on file          (Please note:  Portions of this note were completed with a voice recognition program.  Efforts were made to edit thedictations but occasionally words and phrases are mis-transcribed.)    Form v2016. J.[id number]    Zaki Madison MD (electronically signed)  Emergency Medicine Provider         Zaki Madison MD  12/07/21 [id number]

## 2021-12-08 NOTE — ED NOTES
Pt was discharged home from this facility on Friday 12/3. Pt states that she does not know what medications she takes and that she smokes approx 1/2 of a pack of cigarettes a day.       Ginny Boland RN  12/07/21 1930

## 2021-12-08 NOTE — PROGRESS NOTES
Database completed to the best of my ability. Pharmacy and medication list verified with the Sadaf Delatorre who is the daughter in law. She does have POA paperwork and was told she needs to bring those in so we can verify code status. Patient follows with Dr Juan Rangel (pulm) Dr Luis Parmar (card) and Dr Ivanna Han ([facility]) as an out patient.

## 2021-12-08 NOTE — PROGRESS NOTES
[facility] [address]  Progress Note      Chief complaint :  Chief Complaint   Patient presents with    Shortness of Breath     Shortness of breath for 4 days and getting worse with dry cough. Wears O2 @ 5L [place] at baseline. Subjective:  Patient states that she is not feeling good and is having trouble breathing. She continues to have diarrhea. Denies fever, chills, sweats, cough, abdominal pain, N/V, dysuria. No acute overnight events. Past medical, surgical, family and social history were reviewed, non-contributory, and unchanged unless otherwise stated. ROS negative except as stated above. Objective:  BP (!) 83/48   Pulse 115   Temp 98.1 °F (36.7 °C)   Resp 16   Ht 5' 5\" (1.651 m)   Wt 165 lb (74.8 kg)   LMP 03/20/2015   SpO2 96%   BMI 27.46 kg/m²     Physical Exam  Constitutional:       Appearance: Normal appearance. HENT:      Head: Normocephalic and atraumatic. Mouth/Throat:      Mouth: Mucous membranes are moist.   Eyes:      Extraocular Movements: Extraocular movements intact. Conjunctiva/sclera: Conjunctivae normal.   Cardiovascular:      Rate and Rhythm: Normal rate and regular rhythm. Pulses: Normal pulses. Heart sounds: Normal heart sounds. No murmur heard. Pulmonary:      Effort: Pulmonary effort is normal.      Breath sounds: No stridor. Wheezing (diffuse) present. Abdominal:      General: Abdomen is flat. Bowel sounds are normal.      Palpations: Abdomen is soft. Tenderness: There is no abdominal tenderness. Musculoskeletal:         General: No swelling. Normal range of motion. Cervical back: Normal range of motion and neck supple. Comments: B/L LE edema, trace-chronic. Skin:     General: Skin is warm and dry. Neurological:      General: No focal deficit present. Mental Status: She is alert and oriented to person, place, and time.    Psychiatric:         Mood and Affect: Mood normal. Behavior: Behavior normal.       Labs:  Recent Results (from the past 24 hour(s))   EKG 12 Lead    Collection Time: 12/07/21  8:07 PM   Result Value Ref Range    Ventricular Rate 114 BPM    Atrial Rate 114 BPM    P-R Interval 128 ms    QRS Duration 76 ms    Q-T Interval 332 ms    QTc Calculation (Bazett) 457 ms    P Axis 106 degrees    R Axis 26 degrees    T Axis 64 degrees   CBC Auto Differential    Collection Time: 12/07/21  8:30 PM   Result Value Ref Range    WBC 6.2 4.5 - 11.5 E9/L    RBC 3.40 (L) 3.50 - 5.50 E12/L    Hemoglobin 10.0 (L) 11.5 - 15.5 g/dL    Hematocrit 32.7 (L) 34.0 - 48.0 %    MCV 96.2 80.0 - 99.9 fL    MCH 29.4 26.0 - 35.0 pg    MCHC 30.6 (L) 32.0 - 34.5 %    RDW 16.4 (H) 11.5 - 15.0 fL    Platelets 984 580 - 995 E9/L    MPV 9.8 7.0 - 12.0 fL    Neutrophils % 70.2 43.0 - 80.0 %    Immature Granulocytes % 0.6 0.0 - 5.0 %    Lymphocytes % 18.5 (L) 20.0 - 42.0 %    Monocytes % 9.9 2.0 - 12.0 %    Eosinophils % 0.3 0.0 - 6.0 %    Basophils % 0.5 0.0 - 2.0 %    Neutrophils Absolute 4.32 1.80 - 7.30 E9/L    Immature Granulocytes # 0.04 E9/L    Lymphocytes Absolute 1.14 (L) 1.50 - 4.00 E9/L    Monocytes Absolute 0.61 0.10 - 0.95 E9/L    Eosinophils Absolute 0.02 (L) 0.05 - 0.50 E9/L    Basophils Absolute 0.03 0.00 - 0.20 E9/L   Comprehensive Metabolic Panel w/ Reflex to MG    Collection Time: 12/07/21  8:30 PM   Result Value Ref Range    Sodium 139 132 - 146 mmol/L    Potassium reflex Magnesium 4.3 3.5 - 5.0 mmol/L    Chloride 97 (L) 98 - 107 mmol/L    CO2 38 (H) 22 - 29 mmol/L    Anion Gap 4 (L) 7 - 16 mmol/L    Glucose 136 (H) 74 - 99 mg/dL    BUN 3 (L) 6 - 20 mg/dL    CREATININE 0.3 (L) 0.5 - 1.0 mg/dL    GFR Non-African American >60 >=60 mL/min/1.73    GFR African American >60     Calcium 7.2 (L) 8.6 - 10.2 mg/dL    Total Protein 4.7 (L) 6.4 - 8.3 g/dL    Albumin 1.6 (L) 3.5 - 5.2 g/dL    Total Bilirubin 0.3 0.0 - 1.2 mg/dL    Alkaline Phosphatase 103 35 - 104 U/L    ALT 23 0 - 32 U/L    AST 70 (H) 0 - 31 U/L   Brain Natriuretic Peptide    Collection Time: 12/07/21  8:30 PM   Result Value Ref Range    Pro- (H) 0 - 125 pg/mL   Lactate, Sepsis    Collection Time: 12/07/21  8:30 PM   Result Value Ref Range    Lactic Acid, Sepsis 1.0 0.5 - 1.9 mmol/L   Blood Gas, Arterial    Collection Time: 12/07/21  9:50 PM   Result Value Ref Range    Date Analyzed [id number]     Time Analyzed 2150     Source: Blood Arterial     pH, Blood Gas 7.419 7.350 - 7.450    PCO2 72.2 (HH) 35.0 - 45.0 mmHg    PO2 40.5 (LL) 75.0 - 100.0 mmHg    HCO3 45.7 (H) 22.0 - 26.0 mmol/L    B.E. 18.3 (H) -3.0 - 3.0 mmol/L    O2 Sat 80.7 (L) 92.0 - 98.5 %    O2Hb 77.9 (L) 94.0 - 97.0 %    COHb 3.3 (H) 0.0 - 1.5 %    MetHb 0.2 0.0 - 1.5 %    O2 Content 11.2 mL/dL    HHb 18.6 (H) 0.0 - 5.0 %    tHb (est) 10.2 (L) 11.5 - 16.5 g/dL    Mode NC- 5 L     Date Of Collection      Time Collected      Pt Temp 37.0 C     ID [id number]     Lab [id number]     Critical(s) Notified Handed report to /PATY    COVID-19, Rapid    Collection Time: 12/07/21 10:03 PM    Specimen: Nasopharyngeal Swab   Result Value Ref Range    SARS-CoV-2, NAAT DETECTED (A) Not Detected   LACTATE DEHYDROGENASE    Collection Time: 12/08/21  3:18 AM   Result Value Ref Range     (H) 135 - 214 U/L   Troponin    Collection Time: 12/08/21  3:18 AM   Result Value Ref Range    Troponin, High Sensitivity 24 (H) 0 - 9 ng/L       Radiology and other tests reviewed:  CT ABDOMEN PELVIS WO CONTRAST Additional Contrast? None   Final Result   1. Small to moderate right pleural effusion is slightly larger as compared to   prior. There is adjacent peripheral consolidation in the right lower lobe   and minimally in the right middle lobe. 2. Mild ascites slightly increased from prior. 3. Mild generalized edema. 4. Cholelithiasis. 5. Ill definition around pancreatic head and body. This could be acute   pancreatitis. Correlate clinically. 6. Right-sided nephrolithiasis.          XR CHEST PORTABLE   Final Result   Slight worsening of the opacity in the right mid to lower lung. Right   pleural effusion. Mild central pulmonary vascular congestion. RECOMMENDATION:   (Recommend upright PA and lateral chest radiographs 6-8 weeks after   resolution of patient's symptoms to ensure complete resolution of   radiographic findings.)             Assessment:  Active Hospital Problems    Diagnosis Date Noted    Pneumonia due to COVID-19 virus [U07.1, J12.82] 12/08/2021    Normocytic anemia [D64.9] 12/08/2021    COPD (chronic obstructive pulmonary disease) ([facility] [address] [J44.9] 05/29/2021    [facility] [E11.65]        Plan:  COVID Pneumonia  - Droplet plus isolation   - Tele-monitoring  - Monitor fever curve; Tylenol 650 mg every 6 hours PRN   - Labs: . Ferritin, D-Dimer, CRP, and Vitamin 25 hydroxy pending  - Hold off on Baricitinib until inflammatory labs come back  - Nasal Cannula 5L SpO2 96  - Decadron 6 mg PO QD  - Symbicort nightly  - Spiriva Respimat nightly  - Albuterol Q4H while awake  - Remdesivir 100mg Q24H  - ABG showed severe hypercapnia and severe hypoxia, thought to be venous  - Repeat ABG pending     Hypotension  Patient does have baseline hypotension with systolic range of . Received 1L NS bolus in ED. Most recent /55  - Poor oral intake since Friday with COVID infection  - Recently hypotensive <95 systolic on home BP cuff  - Start 115 mL/hr maintenance fluids  - Continue to monitor     Diffuse abdominal pain   Patient with a 5-day history of diarrhea and abdominal pain   Diffusely tender on physical exam   CT abdomen pelvis- small to moderate R pleural effusion larger as compared to prior, Adj peripheral consolidation in the right lower lobe and minimally in right middle lobe. Possible acute pancreatitis. Right sided nephrolithiasis.    - LFTs ALT 23, AST 70, Alk hos 103, Albumin 1.6, Bili 0.3, T Protein 4.7    DM2   Hold home Lantus with recent hypoglycemic event   Start medium dose sliding scale   Hypoglycemia protocol ordered   Diabetic diet     Paroxysmal Atrial Fibrillation   EKG showed sinus tachycardia   Hold home Cardizem due to hypotension   Hold home Eliquis due to drop in hemoglobin, occult stool pending   Troponin 24, repeat pending     Hyperlipidemia   Continue home rosuvastatin and fenofibrate      GERD  - Continue PPI     Insomnia   Continue home nortriptyline     Chronic constipation   Patient has not required any of her home stool softeners, will hold for now     Chronic pain   Continue home Percocet 5 mg as needed     Continue home medication: Vitamin D, ursodiol, Pulmicort, cefdinir (day 5)     Hold home medication: pedal, Valium, Cardizem     DVT ppx: Hold Eliquis, PCDs  GI ppx: Prilosec  Code Status: Limited code, No chest compressions      Marianne Carmen MD   Family Medicine Resident PGY-1  12/08/21

## 2021-12-08 NOTE — PROGRESS NOTES
Date: 12/8/2021    Time: 6:16 PM    Patient Placed On BIPAP/CPAP/ Non-Invasive Ventilation? Yes    If no must comment. Facial area red/color change? No           If YES are Blister/Lesion present? No   If yes must notify nursing staff  BIPAP/CPAP skin barrier?   Yes    Skin barrier type:mepilexlite     Patient placed on AVAPS - 16, 450, 50%, 8+  Comments:        Elyse Balbuena RCP

## 2021-12-08 NOTE — ED NOTES
Pt incontinent of small amount of urine and liquid, brown BM. Pt skin to groin, coccyx and labial area is red and inflammed. Pt states that she is always incontinent of urine and sometimes stool. Pt cleaned, depends and pure wick placed. Call bell is within reach.       Eric Ribeiro RN  12/08/21 1845

## 2021-12-08 NOTE — ED NOTES
[facility] faxed, spoke to Floor, confirmation received. Pt to be transported momentarily.       Sarah De Jesus RN  12/08/21 1388

## 2021-12-08 NOTE — ED NOTES
Pt incontinent of moderate amount of gelatinous, brown stool with skin to coccyx and labial areas reddened. Pt cleaned with depends and pure wick place. VS done and pt placed in position of comfort with warm blankets given.       Dalton Gregorio RN  12/08/21 3208

## 2021-12-09 NOTE — PROGRESS NOTES
[facility] [address]  Progress Note      Chief complaint :  Chief Complaint   Patient presents with    Shortness of Breath     Shortness of breath for 4 days and getting worse with dry cough. Wears O2 @ 5L [place] at baseline. Subjective:  Patient seen on AVAPS this morning. She states that she is not feeling well and having trouble breathing. She also states that she has pain and point to her epigastric region. Denies fever, chills, sweats, cough, N/V, diarrhea, dysuria. RRT called this morning after respiratory depression with decreased responsiveness likely due to Percocet dose this morning and not using AVAPs. ABG revealed respiratory acidosis. Nurse repeatedly tried to get patient back on AVAPS as she kept refusing. Repeat ABG with patient on AVAPS revealed improved pH and CO2. Evaluated by Dr. Jennifer Brown. Past medical, surgical, family and social history were reviewed, non-contributory, and unchanged unless otherwise stated. ROS negative except as stated above. Objective:  /78   Pulse 95   Temp 97.9 °F (36.6 °C) (Oral)   Resp 25   Ht 5' 5\" (1.651 m)   Wt 170 lb 14.4 oz (77.5 kg)   LMP 03/20/2015   SpO2 98%   BMI 28.44 kg/m²     Physical Exam  Constitutional:       Appearance: Normal appearance. HENT:      Head: Normocephalic and atraumatic. Mouth/Throat:      Mouth: Mucous membranes are moist.   Eyes:      Extraocular Movements: Extraocular movements intact. Conjunctiva/sclera: Conjunctivae normal.   Cardiovascular:      Rate and Rhythm: Normal rate and regular rhythm. Pulses: Normal pulses. Heart sounds: Normal heart sounds. No murmur heard. Pulmonary:      Effort: Pulmonary effort is normal.      Breath sounds: No stridor. Wheezing (diffuse) present. Comments: Interference from AVAPS  Abdominal:      General: Abdomen is flat. Bowel sounds are normal.      Palpations: Abdomen is soft. Tenderness:  There is no abdominal tenderness. Musculoskeletal:         General: No swelling. Normal range of motion. Cervical back: Normal range of motion and neck supple. Comments: B/L LE edema, trace-chronic. Skin:     General: Skin is warm and dry. Neurological:      General: No focal deficit present. Mental Status: She is alert and oriented to person, place, and time.    Psychiatric:         Mood and Affect: Mood normal.         Behavior: Behavior normal.       Labs:  Recent Results (from the past 24 hour(s))   D-dimer, quantitative    Collection Time: 12/08/21  8:22 AM   Result Value Ref Range    D-Dimer, Quant 473 ng/mL DDU   Comprehensive Metabolic Panel w/ Reflex to MG    Collection Time: 12/08/21  8:22 AM   Result Value Ref Range    Sodium 142 132 - 146 mmol/L    Potassium reflex Magnesium 3.4 (L) 3.5 - 5.0 mmol/L    Chloride 99 98 - 107 mmol/L    CO2 35 (H) 22 - 29 mmol/L    Anion Gap 8 7 - 16 mmol/L    Glucose 181 (H) 74 - 99 mg/dL    BUN 3 (L) 6 - 20 mg/dL    CREATININE 0.3 (L) 0.5 - 1.0 mg/dL    GFR Non-African American >60 >=60 mL/min/1.73    GFR African American >60     Calcium 6.9 (L) 8.6 - 10.2 mg/dL    Total Protein 4.7 (L) 6.4 - 8.3 g/dL    Albumin 1.6 (L) 3.5 - 5.2 g/dL    Total Bilirubin <0.2 0.0 - 1.2 mg/dL    Alkaline Phosphatase 107 (H) 35 - 104 U/L    ALT 19 0 - 32 U/L    AST 53 (H) 0 - 31 U/L   Procalcitonin    Collection Time: 12/08/21  8:22 AM   Result Value Ref Range    Procalcitonin 0.09 (H) 0.00 - 0.08 ng/mL   CBC Auto Differential    Collection Time: 12/08/21  8:22 AM   Result Value Ref Range    WBC 3.5 (L) 4.5 - 11.5 E9/L    RBC 3.37 (L) 3.50 - 5.50 E12/L    Hemoglobin 9.8 (L) 11.5 - 15.5 g/dL    Hematocrit 32.2 (L) 34.0 - 48.0 %    MCV 95.5 80.0 - 99.9 fL    MCH 29.1 26.0 - 35.0 pg    MCHC 30.4 (L) 32.0 - 34.5 %    RDW 16.5 (H) 11.5 - 15.0 fL    Platelets 108 438 - 435 E9/L    MPV 9.3 7.0 - 12.0 fL    Neutrophils % 88.0 (H) 43.0 - 80.0 %    Lymphocytes % 7.0 (L) 20.0 - 42.0 % Monocytes % 3.0 2.0 - 12.0 %    Eosinophils % 0.0 0.0 - 6.0 %    Basophils % 0.0 0.0 - 2.0 %    Neutrophils Absolute 3.12 1.80 - 7.30 E9/L    Lymphocytes Absolute 0.28 (L) 1.50 - 4.00 E9/L    Monocytes Absolute 0.10 0.10 - 0.95 E9/L    Eosinophils Absolute 0.00 (L) 0.05 - 0.50 E9/L    Basophils Absolute 0.00 0.00 - 0.20 E9/L    Atypical Lymphocytes Relative 1.0 0.0 - 4.0 %    Myelocyte Percent 1.0 0 - 0 %    Smudge Cells 1+     Anisocytosis 1+     Polychromasia 1+     Hypochromia 1+    Magnesium    Collection Time: 12/08/21  8:22 AM   Result Value Ref Range    Magnesium 1.6 1.6 - 2.6 mg/dL   Lipase    Collection Time: 12/08/21  8:22 AM   Result Value Ref Range    Lipase 6 (L) 13 - 60 U/L   Troponin    Collection Time: 12/08/21  8:22 AM   Result Value Ref Range    Troponin, High Sensitivity 22 (H) 0 - 9 ng/L   POCT Glucose    Collection Time: 12/08/21  8:44 AM   Result Value Ref Range    Meter Glucose 157 (H) 74 - 99 mg/dL   POCT Glucose    Collection Time: 12/08/21 12:27 PM   Result Value Ref Range    Meter Glucose 109 (H) 74 - 99 mg/dL   POCT glucose    Collection Time: 12/08/21 12:28 PM   Result Value Ref Range    Glucose 109 mg/dL    QC OK?  yes    POCT Glucose    Collection Time: 12/08/21  4:14 PM   Result Value Ref Range    Meter Glucose 92 74 - 99 mg/dL   POCT Glucose    Collection Time: 12/08/21 10:01 PM   Result Value Ref Range    Meter Glucose 119 (H) 74 - 99 mg/dL   Basic Metabolic Panel w/ Reflex to MG    Collection Time: 12/09/21  2:25 AM   Result Value Ref Range    Sodium 140 132 - 146 mmol/L    Potassium reflex Magnesium 3.4 (L) 3.5 - 5.0 mmol/L    Chloride 101 98 - 107 mmol/L    CO2 31 (H) 22 - 29 mmol/L    Anion Gap 8 7 - 16 mmol/L    Glucose 103 (H) 74 - 99 mg/dL    BUN 3 (L) 6 - 20 mg/dL    CREATININE 0.3 (L) 0.5 - 1.0 mg/dL    GFR Non-African American >60 >=60 mL/min/1.73    GFR African American >60     Calcium 7.2 (L) 8.6 - 10.2 mg/dL   Magnesium    Collection Time: 12/09/21  2:25 AM   Result Value Ref Range    Magnesium 1.8 1.6 - 2.6 mg/dL   POCT Glucose    Collection Time: 12/09/21  3:38 AM   Result Value Ref Range    Meter Glucose 109 (H) 74 - 99 mg/dL   CBC auto differential    Collection Time: 12/09/21  3:42 AM   Result Value Ref Range    WBC 8.8 4.5 - 11.5 E9/L    RBC 4.09 3.50 - 5.50 E12/L    Hemoglobin 11.9 11.5 - 15.5 g/dL    Hematocrit 40.7 34.0 - 48.0 %    MCV 99.5 80.0 - 99.9 fL    MCH 29.1 26.0 - 35.0 pg    MCHC 29.2 (L) 32.0 - 34.5 %    RDW 16.7 (H) 11.5 - 15.0 fL    Platelets 923 694 - 332 E9/L    MPV 9.7 7.0 - 12.0 fL    Neutrophils % 62.1 43.0 - 80.0 %    Immature Granulocytes % 3.3 0.0 - 5.0 %    Lymphocytes % 28.4 20.0 - 42.0 %    Monocytes % 5.6 2.0 - 12.0 %    Eosinophils % 0.1 0.0 - 6.0 %    Basophils % 0.5 0.0 - 2.0 %    Neutrophils Absolute 5.45 1.80 - 7.30 E9/L    Immature Granulocytes # 0.29 E9/L    Lymphocytes Absolute 2.49 1.50 - 4.00 E9/L    Monocytes Absolute 0.49 0.10 - 0.95 E9/L    Eosinophils Absolute 0.01 (L) 0.05 - 0.50 E9/L    Basophils Absolute 0.04 0.00 - 0.20 E9/L   Fibrinogen    Collection Time: 12/09/21  3:42 AM   Result Value Ref Range    Fibrinogen 417 225 - 540 mg/dL   Lactic acid, plasma    Collection Time: 12/09/21  3:42 AM   Result Value Ref Range    Lactic Acid 0.9 0.5 - 2.2 mmol/L   Blood Gas, Arterial    Collection Time: 12/09/21  4:19 AM   Result Value Ref Range    Date Analyzed [id number]     Time Analyzed 9299     Source: Blood Arterial     pH, Blood Gas 7.140 (LL) 7.350 - 7.450    PCO2 134.5 (HH) 35.0 - 45.0 mmHg    PO2 102.0 (H) 75.0 - 100.0 mmHg    HCO3 44.8 (H) 22.0 - 26.0 mmol/L    B.E. 11.0 (H) -3.0 - 3.0 mmol/L    O2 Sat 96.6 92.0 - 98.5 %    PO2/FIO2 2.04 mmHg/%    AaDO2 90.7 mmHg    RI(T) 89 %    O2Hb 95.4 94.0 - 97.0 %    COHb 0.8 0.0 - 1.5 %    MetHb 0.4 0.0 - 1.5 %    O2 Content 16.9 mL/dL    HHb 3.4 0.0 - 5.0 %    tHb (est) 12.5 11.5 - 16.5 g/dL    Mode avaps      FIO2 50.0 %    Rr Mechanical 16.0 b/min    Vt Mechanical 450.0 mL    Peep/Cpap 5. 0 cmH2O    Comment Called results to Dr. Estela Lakhani     Date Of Collection      Time Collected      Pt Temp 37.0 C     ID [id number]     Lab [id number]     Critical(s) Notified Called to Dr. Chidi Sargent, Arterial    Collection Time: 12/09/21  5:25 AM   Result Value Ref Range    Date Analyzed [id number]     Time Analyzed 0525     Source: Blood Arterial     pH, Blood Gas 7.232 (L) 7.350 - 7.450    PCO2 95.0 (HH) 35.0 - 45.0 mmHg    PO2 97.7 75.0 - 100.0 mmHg    HCO3 39.1 (H) 22.0 - 26.0 mmol/L    B.E. 8.4 (H) -3.0 - 3.0 mmol/L    O2 Sat 97.1 92.0 - 98.5 %    PO2/FIO2 1.95 mmHg/%    AaDO2 139.4 mmHg    RI(T) 143 %    O2Hb 96.0 94.0 - 97.0 %    COHb 0.9 0.0 - 1.5 %    MetHb 0.2 0.0 - 1.5 %    O2 Content 16.2 mL/dL    HHb 2.9 0.0 - 5.0 %    tHb (est) 11.9 11.5 - 16.5 g/dL    Mode AVAPS     FIO2 50.0 %    Rr Mechanical 20.0 b/min    Vt Mechanical 500.0 mL    Peep/Cpap 8.0 cmH2O    Date Of Collection      Time Collected      Pt Temp 37.0 C     ID [id number]     Lab [id number]     Critical(s) Notified Handed report to Dr/RN        Radiology and other tests reviewed:  CT ABDOMEN PELVIS WO CONTRAST Additional Contrast? None   Final Result   1. Small to moderate right pleural effusion is slightly larger as compared to   prior. There is adjacent peripheral consolidation in the right lower lobe   and minimally in the right middle lobe. 2. Mild ascites slightly increased from prior. 3. Mild generalized edema. 4. Cholelithiasis. 5. Ill definition around pancreatic head and body. This could be acute   pancreatitis. Correlate clinically. 6. Right-sided nephrolithiasis. XR CHEST PORTABLE   Final Result   Slight worsening of the opacity in the right mid to lower lung. Right   pleural effusion. Mild central pulmonary vascular congestion.       RECOMMENDATION:   (Recommend upright PA and lateral chest radiographs 6-8 weeks after   resolution of patient's symptoms to ensure complete resolution of   radiographic findings.)             Assessment:  Active Hospital Problems    Diagnosis Date Noted    Pneumonia due to COVID-19 virus [U07.1, J12.82] 12/08/2021    Normocytic anemia [D64.9] 12/08/2021    COPD (chronic obstructive pulmonary disease) ([facility] [address] [J44.9] 05/29/2021    Uncontrolled type 2 diabetes mellitus with hyperglycemia ([facility]) [E11.65]        Plan:  COVID Pneumonia  - Droplet plus isolation   - Tele-monitoring  - Monitor fever curve; Tylenol 650 mg every 6 hours PRN   - Labs: , CRP 1.9, Ferritin 451, Fibrinogen 417, D-Dimer 473, and Vitamin 25 hydroxy 33  - Hold off on Baricitinib until inflammatory labs come back  - Nasal Cannula 6L SpO2 97 on 12/8  - Decadron 6 mg PO QD, day 2  - Remdesivir 100mg Q24H day 2  - Symbicort nightly  - Spiriva Respimat nightly  - Albuterol Q4H while awake  - ABG showed severe hypercapnia and severe hypoxia, thought to be venous     Hypotension  Patient does have baseline hypotension with systolic range of . Received 1L NS bolus in ED. Most recent /55  - Poor oral intake since Friday with COVID infection  - Recently hypotensive <70 systolic on home BP cuff  - Continue 115 mL/hr maintenance fluids  - Continue to monitor     Diffuse abdominal pain   Patient with a 5-day history of diarrhea and abdominal pain   Diffusely tender on physical exam   CT abdomen pelvis- small to moderate R pleural effusion larger as compared to prior, Adj peripheral consolidation in the right lower lobe and minimally in right middle lobe. Possible acute pancreatitis. Right sided nephrolithiasis.    - AST 70->53  - Lipase 6    DM2   Hold home Lantus with recent hypoglycemic event   Start medium dose sliding scale   Hypoglycemia protocol ordered   Diabetic diet     Paroxysmal Atrial Fibrillation   EKG showed sinus tachycardia   Hold home Cardizem due to hypotension   Hold home Eliquis due to drop in hemoglobin, occult stool pending   Troponin 24, repeat 22     Hyperlipidemia   Continue home rosuvastatin and fenofibrate      GERD  - Continue PPI     Insomnia   Continue home nortriptyline     Chronic constipation   Patient has not required any of her home stool softeners, will hold for now     Chronic pain   Continue home Percocet 5 mg as needed     Continue home medication: Vitamin D, ursodiol, Pulmicort, cefdinir (day 5)     Hold home medication: pedal, Valium, Cardizem     DVT ppx: Hold Eliquis, PCDs  GI ppx: Prilosec  Code Status: Limited code, No chest compressions      Conrado Guajardo MD   Family Medicine Resident PGY-1  12/09/21

## 2021-12-09 NOTE — PROGRESS NOTES
Patient's daughter in law and Franco Islas, was called to update on patient's status. Discussed RRT event that occurred this morning. Discussed patient's code status changed to [facility] and her decision for hospice. Informed her of Deidre's desire to talk to her. Davian Raygoza was updated on all information and expressed understanding.

## 2021-12-09 NOTE — PLAN OF CARE
Pt has vascillated between using nasal cannula and BiPAP. Pt intermittently states that she feels as though she can't breathe despite sats of 96% and greater on BiPAP, similar readings with nasal cannula.

## 2021-12-09 NOTE — PROGRESS NOTES
Patient found with decreased responsiveness, lying in her bed. Approximately an hour prior to patient change in condition, she was administered 1 Percocet tab to assist with back pain. BiPap placed back on patient and vitals/blood sugar checked. Vitals and blood sugar were unremarkable, and Dr. Minerva Cheng was updated, ordering an ABG which showed respiratory acidosis with a critically high CO2. Dr. Minerva Cheng updated and plans order for Narcan and repeat ABG. Update:  Repeat ABGs drawn with improved pH and CO2 levels. Spoke with PA for Dr. Minerva Cheng who wants to hold off on Narcan for now. Will change dose of Decadron due at 5AM to IV versus PO secondary to patient's decreased mental alertness (albeit improved from initial changes).

## 2021-12-09 NOTE — PROGRESS NOTES
Family requesting comfort medications while in hospital until discharge tomorrow. Secure message to Dr. Sobeida Reyes. Awaiting new orders. Updated patient on plan of care.

## 2021-12-09 NOTE — PROGRESS NOTES
HOSPICE [facility]     Liaison Information Visit Note              Patient Name: Yarely Esparza date:  12/7/2021   Hospital Admitting Physician:  Lora Hale MD   PCP:  Donna Sauceda MD  Primary Insurance: Payor: Bernice 58 /  /  /    Emergency Contact: Coty Molina  Advance Directive:  Yes  {DPOA-HC Name-Relation:  [address] [facility]/ DIL/POA   Phone: [phone]    Terminal Diagnosis Acute on chronic respiratory failure; COPD; Covid: Ehler's Donlos Syndrome as confirmed by Dr. Rigoberto Harrington Problem List:   Patient Active Problem List   Diagnosis Code    Hyperlipidemia E78.5    Depression F32. A    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0    MARIAA (obstructive sleep apnea) G47.33    Tobacco abuse Z72.0    Carpal tunnel syndrome G56.00    Vitamin D deficiency E55.9    Diastasis recti M62.08    PVD (peripheral vascular disease) with claudication ([facility]) I73.9    Compression fracture of L1 lumbar vertebra ([facility]) S32.010A    H/O: stroke Z86.73    Diabetic polyneuropathy associated with type 2 diabetes mellitus ([facility]) E11.42    PAF (paroxysmal atrial fibrillation) ([facility]) I48.0    Coronary artery disease involving native coronary artery of native heart without angina pectoris I25.10    DNR (do not resuscitate) Z73    COPD (chronic obstructive pulmonary disease) ([facility]) J44.9    Uncontrolled type 2 diabetes mellitus with hyperglycemia ([facility]) E11.65    Dermatophytosis B35.9    Left carotid stenosis I65.22    O2 dependent W78.09    Complicated UTI (urinary tract infection) N39.0    Acute cystitis with hematuria N30.01    Pneumonia due to COVID-19 virus U07.1, J12.82    Normocytic anemia D64.9    CO2 retention E87.2     Code Status Order: DNR-CC   Allergies:  Patient has no known allergies.   Family Goal: Comfort Care  Meeting held with Mary Mora  The hospice benefit and philosophy were explained including that hospice is end of life care in which, per Medicare, a patient has a terminal diagnosis that life expectancy would be 6 months or less. Hospice care is a service that is covered by most insurance plans. The following levels of hospice care were discussed including, routine level of hospice care at private home or facility, which patient/family is responsible for any room and board fees at the facility, and general in patient level of care (GIP) at the [facility] for short term symptom management. Per Medicare guidelines, a patient is considered appropriate for GIP if they have uncontrolled symptoms such as pain, agitation, labored breathing or nausea/vomiting. Once symptoms become managed, the patient would need to be moved to a lower level of care such as home with hospice, or ECF with hospice. Family informed that with the routine level of care at home or ECF,  the hospice team consists of the RN who visits 1-3 times a week, a  who visits within the first five days of the hospice election, the personal care team who visit 1-3 times a week, non-medical volunteers and Chaplains. Explained that at home in routine level of care, familles are responsible for the 24 hour care. Discussed that under hospice care patient would not receive chemotherapy, radiation, immune therapy, IV antibiotics, dialysis or blood transfusions. Explained that once in hospice care, all aggressive treatments would be stopped and allow nature to takes its course with focus on comfort care for the patient. Made a visit to floor and saw Rachelle Silva through window. She is on bipap. Labored breathing and was given ativan 1mg prior to visit. Placed call to Jimmy De Dios. She is understanding of hospice and has cared for other loved ones on hospice. [facility] stated Rachelle Silva would want to be home and they want her to come home. They have decided to focus on comfort and proceed with hospice services. Reviewed patient not being on continuous bipap on hospice, [facility] is understanding.  DME needed in the home being hospital bed, oxygen, and romi lift. DME to be delivered tomorrow 12/10/21 10am-2pm. Family would like Dr. Tracey Rodriguez to follow called and spoke with his nurse Haylie Jaimes who stated he would follow under hospice. Lani Hand also requested physician to call her today. Updated charge nurse, Triny Martinez. She will perfect serve physician to call Jimmy De Dios. Requested comfort medications be placed for patient to have during the hospital and scripts for discharge. Will utilize meds to beds program.  Will set up transportation tomorrow. [facility] intake department aware and patient to be admitted tomorrow. Discharge Plan:  Discharge Disposition; home with hospice  [facility] plan:  1. Complete discharge home tomorrow,  2. Please call Phillip Valencia [phone] with any questions. 3. Patient not currently under the care of hospice.     Electronically signed by Janina Chou RN on 12/9/2021 at 12:50 PM

## 2021-12-09 NOTE — PROGRESS NOTES
Unsure of consult request.  Patient is not hospice and will be discharged tomorrow  [facility].  Harjinder Vaughn, CNS, Wound Care

## 2021-12-09 NOTE — CARE COORDINATION
Social work / Discharge Planning:       [address]. Hospice order noted. RN updated social work that per the resident, family / Jose Manuel Torres has no preference for hospice agency. Referral called to [facility]. Awaiting update from liaison.   Electronically signed by RAEANN Segovia on 12/9/2021 at 11:03 AM

## 2021-12-09 NOTE — PROGRESS NOTES
12/09/21 0435   NIV Type   Equipment Type V60   Mode AVAPS   Mask Type Full face mask   Mask Size Medium   Settings/Measurements   CPAP/EPAP 8 cmH2O   IPAP Min 20 cmH2O   IPAP Max 28 cmH2O   Vt Ordered 500 mL   Rate Ordered 20   Resp 25   FiO2  50 %   I Time/ I Time % 1.05 s   Vt Exhaled 423 mL   Minute Volume 10.2 Liters   Mask Leak (lpm) 47 lpm   Comfort Level Fair   Using Accessory Muscles No   SpO2 98     Post critical ABG results Rate increased to 20, VT increased to 500, and EPAP inc to 8. Will repeat ABG at 0500 per Dr. Nuno Jackson.       Keon Alvarez, RCP

## 2021-12-10 NOTE — HOME CARE
Patient is active with [facility] for skilled nursing; will need resumption of care orders prior to discharge.    Thank you, [facility]

## 2021-12-10 NOTE — PROGRESS NOTES
[facility] [address]  Progress Note      Chief complaint :  Chief Complaint   Patient presents with    Shortness of Breath     Shortness of breath for 4 days and getting worse with dry cough. Wears O2 @ 5L N[place] at baseline. Subjective:  Patient seen awake and on NC. She is not answering questions this morning. She did shake her head no when asked about any pain. Unable to assess ROS due to patient's deconditioning. Past medical, surgical, family and social history were reviewed, non-contributory, and unchanged unless otherwise stated. Objective:  /84   Pulse 104   Temp 98.4 °F (36.9 °C) (Axillary)   Resp 24   Ht 5' 5\" (1.651 m)   Wt 170 lb 14.4 oz (77.5 kg)   LMP 03/20/2015   SpO2 94%   BMI 28.44 kg/m²     Physical Exam  Constitutional:       Appearance: Normal appearance. HENT:      Head: Normocephalic and atraumatic. Mouth/Throat:      Mouth: Mucous membranes are moist.   Eyes:      Extraocular Movements: Extraocular movements intact. Conjunctiva/sclera: Conjunctivae normal.   Cardiovascular:      Rate and Rhythm: Normal rate and regular rhythm. Pulses: Normal pulses. Heart sounds: Normal heart sounds. No murmur heard. Pulmonary:      Effort: Pulmonary effort is normal.      Breath sounds: No stridor. Wheezing (diffuse) present. Abdominal:      General: Abdomen is flat. Bowel sounds are normal.      Palpations: Abdomen is soft. Tenderness: There is no abdominal tenderness. Musculoskeletal:         General: No swelling. Normal range of motion. Cervical back: Normal range of motion and neck supple. Comments: B/L LE edema, trace-chronic. Skin:     General: Skin is warm and dry. Neurological:      General: No focal deficit present. Mental Status: She is alert and oriented to person, place, and time.    Psychiatric:         Mood and Affect: Mood normal.         Behavior: Behavior normal. Labs:  Recent Results (from the past 24 hour(s))   POCT Glucose    Collection Time: 12/09/21  7:01 AM   Result Value Ref Range    Meter Glucose 116 (H) 74 - 99 mg/dL   EKG 12 Lead    Collection Time: 12/09/21  9:00 AM   Result Value Ref Range    Ventricular Rate 105 BPM    Atrial Rate 105 BPM    P-R Interval 130 ms    QRS Duration 70 ms    Q-T Interval 312 ms    QTc Calculation (Bazett) 412 ms    P Axis 83 degrees    R Axis 83 degrees    T Axis 83 degrees   POCT Glucose    Collection Time: 12/09/21 11:40 AM   Result Value Ref Range    Meter Glucose 118 (H) 74 - 99 mg/dL       Radiology and other tests reviewed:  XR CHEST PORTABLE   Final Result   Comparison size of moderate right pleural effusion         CT ABDOMEN PELVIS WO CONTRAST Additional Contrast? None   Final Result   1. Small to moderate right pleural effusion is slightly larger as compared to   prior. There is adjacent peripheral consolidation in the right lower lobe   and minimally in the right middle lobe. 2. Mild ascites slightly increased from prior. 3. Mild generalized edema. 4. Cholelithiasis. 5. Ill definition around pancreatic head and body. This could be acute   pancreatitis. Correlate clinically. 6. Right-sided nephrolithiasis. XR CHEST PORTABLE   Final Result   Slight worsening of the opacity in the right mid to lower lung. Right   pleural effusion. Mild central pulmonary vascular congestion.       RECOMMENDATION:   (Recommend upright PA and lateral chest radiographs 6-8 weeks after   resolution of patient's symptoms to ensure complete resolution of   radiographic findings.)             Assessment:  Active Hospital Problems    Diagnosis Date Noted    CO2 retention [E87.2]     Pneumonia due to COVID-19 virus [U07.1, J12.82] 12/08/2021    Normocytic anemia [D64.9] 12/08/2021    COPD (chronic obstructive pulmonary disease) ([facility] [J44.9] 05/29/2021    Uncontrolled type 2 diabetes mellitus with hyperglycemia [facility] ursodiol, Pulmicort, cefdinir (day 5)     Hold home medication: pedal, Valium, Cardizem     DVT ppx: Hold Eliquis, PCDs  GI ppx: Prilosec  Code Status: Limited code, No chest compressions      Farheen Noriega MD   Family Medicine Resident PGY-1  12/10/21

## 2021-12-10 NOTE — PROGRESS NOTES
PCP/social visit  Have been aware of patient's case and condition throughout her hospital stay  Aware of decision to progress to hospice, agree  Patient was seen in room around 11 am today  She is awake, in a sniffing position with labored breathing  Tracking with eyes but did not answer to verbal stimuli.   Did not follow commands  Emotional support offered  Discussed with residency rounding team  Electronically signed by Deysi Mejia MD on 12/10/2021 at 1:30 PM

## 2021-12-10 NOTE — PROGRESS NOTES
Patient's Jumana Cisse, was called regarding patient's current status and that she has been discharged with home hospice. She inquired needing a letter in regards to patient's condition; letter was signed and placed in patient's chart for her to receive. All questions answered to her satisfaction.

## 2021-12-10 NOTE — PROGRESS NOTES
Nutrition Assessment     Type and Reason for Visit: Initial, Positive Nutrition Screen    Nutrition Assessment:  Pt to be assessed d/t (+) Nutrition Screen for Wt Loss/Poor Intake. Chart reviewed. Unfortunately, pt currently comfort care only w/ noted plan for hospice on d/c. Will sign-off. Please consult if RD needed.     Electronically signed by Nereida Arenas RD, LD on 12/10/21 at 9:56 AM EST    Contact: ext [phone]

## 2021-12-10 NOTE — PROGRESS NOTES
Received scripts. Contacted [employer] for billing approval. Gave scripts and billing information to pharmacy. Set up transportation [facility]. Updated [facility] regarding medications coming home and transport time. HOTV to admit today.   Electronically signed by Tati Chacko RN on 12/10/2021 at 9:59 AM

## 2021-12-10 NOTE — PROGRESS NOTES
CLINICAL PHARMACY NOTE: MEDS TO BEDS    Total # of Prescriptions Filled: 3   The following medications were delivered to the patient:  · Morphine sol  · Glycopyrrolate 1 mg  · Lorazepam 0.5     Additional Documentation:

## 2021-12-10 NOTE — PROGRESS NOTES
Date: 12/9/2021    Time: 8:51 PM    Patient Placed On BIPAP/CPAP/ Non-Invasive Ventilation? Yes    If no must comment. Facial area red/color change? No           If YES are Blister/Lesion present? No   If yes must notify nursing staff  BIPAP/CPAP skin barrier?   Yes    Skin barrier type:mepilexlite       Comments:        Kashif Weldon RCP

## 2021-12-10 NOTE — DISCHARGE SUMMARY
Physician Discharge Summary  [facility]     Patient ID:  Timmy Tian  [id number]  64 y.o.  1965    Admit date: 12/7/2021    Discharge date:  12/10/21    Admission Diagnoses:   Hypoxia [R09.02]  CO2 retention [E87.2]  Hypotension, unspecified hypotension type [I95.9]  Chronic obstructive pulmonary disease, unspecified COPD type ([facility] [J44.9]  COVID [U07.1]  Pneumonia due to COVID-19 virus [U07.1, J12.82]    Discharge Diagnoses:  Principal Problem:    Pneumonia due to COVID-19 virus  Active Problems:    COPD (chronic obstructive [facility] disease) [facility]    Uncontrolled type 2 diabetes mellitus with hyperglycemia ([facility]    Normocytic anemia    CO2 retention  Resolved Problems:    * No resolved hospital problems. *      Consults: None  Procedures: None    Hospital Course: Timmy Tian is a 64 y.o. female who presented with SOB, hypotension, lightheadedness, found to be COVID positive, who was admitted for hypoxia due to COVID pneumonia. Patient was placed on continuous IVF and 6L oxygen on NC, 1L above her baseline. Patient was started on Remdesivir and received one dose before qualified for Baricitinib. Received further management with Decadron, Symbicort, in addition to home COPD management, and supportive care. ABG on admission revealed severe hypercapnia and severe hypoxia. CT Abdomen Pelvis 12/8 revealed small to moderate right pleural effusion larger as compared to prior, adjacent peripheral consolidation in the right lower lobe and minimally in right middle lobe, possible acute pancreatitis and right sided nephrolithiasis. AST trended down and lipase was 6. On day 3 of admission RRT was called due to respiratory depression and decreased responsiveness. Patient had not used AVAPS through the night and had received home dose of Percocet for chronic back pain. ABG revealed severe respiratory acidosis.  Multiple attempts were made by nurse to place AVAPS with persistent patient refusal. Repeat ABG after considerable placement of AVAPS revealed improvement but continued respiratory acidosis. CXR 12/9 revealed mild increase in size of moderate right pleural effusion. In alert and oriented state, goals of care were discussed with patient who decided to become DNR-CC. Patient decided that she wanted to be home with hospice care which was [facility]. Patient was discharged in a stable condition with home hospice. Post Discharge Follow up: He Rosado MD as needed    Medication changes: Please see below    Discharge Exam:   Physical Exam  Constitutional:       Appearance: Normal appearance. She is ill-appearing. Comments: Is unable to follow directions or respond to question. Is able to track. HENT:      Head: Normocephalic and atraumatic. Mouth/Throat:      Mouth: Mucous membranes are moist.   Eyes:      Extraocular Movements: Extraocular movements intact. Conjunctiva/sclera: Conjunctivae normal.   Cardiovascular:      Rate and Rhythm: Normal rate and regular rhythm. Pulses: Normal pulses. Heart sounds: Normal heart sounds. No murmur heard. Pulmonary:      Effort: Pulmonary effort is normal.      Breath sounds: Wheezing (diffuse) present. Comments: 6L NC  Abdominal:      General: Bowel sounds are normal.      Palpations: Abdomen is soft. Tenderness: There is no abdominal tenderness. Musculoskeletal:         General: No swelling. Normal range of motion. Cervical back: Normal range of motion and neck supple. Comments: Chronic trace B/L edema   Skin:     Comments: Cold extremities   Neurological:      General: No focal deficit present. Mental Status: She is alert and oriented to person, place, and time.    Psychiatric:         Mood and Affect: Mood normal.         Behavior: Behavior normal.        Disposition: Home with hospice care  Patient condition: Stable    Patient Instructions: Under hospice care  Activity: Activity as tolerated  Diet: regular diet    Discharge Medication List:     Medication List      START taking these medications    glycopyrrolate 1 MG tablet  Commonly known as: Robinul  Take 2 tablets by mouth every 6 hours as needed (secretions)     LORazepam 0.5 MG tablet  Commonly known as: Ativan  Take 2 tablets by mouth every 4 hours as needed (agitation) for up to 30 days. morphine 20 MG/5ML solution  Take 0.5 mLs by mouth every 2 hours as needed for Pain for up to 30 days. CHANGE how you take these medications    fenofibrate 54 MG tablet  Commonly known as: TRICOR  take 1 tablet by mouth once daily  What changed: See the new instructions.      vitamin D 1.25 MG (32027 UT) Caps capsule  Commonly known as: ERGOCALCIFEROL  TAKE 1 CAPSULE PO q weekly  What changed: additional instructions        CONTINUE taking these medications    apixaban 5 MG Tabs tablet  Commonly known as: Eliquis  Take 1 tablet by mouth 2 times daily     B-D ULTRAFINE III SHORT PEN 31G X 8 MM Misc  Generic drug: Insulin Pen Needle  use as directed with LANTUS     Basaglar KwikPen 100 UNIT/ML injection pen  Generic drug: insulin glargine  Inject 20 Units into the skin 2 times daily     bisacodyl 10 MG suppository  Commonly known as: Bisac-Evac  Place 1 suppository rectally as needed for Constipation (not relieved by senakot, miralax or fiber)     budesonide 0.25 MG/2ML nebulizer suspension  Commonly known as: Pulmicort  Take 2 mLs by nebulization 2 times daily     butalbital-acetaminophen-caffeine -40 MG per tablet  Commonly known as: FIORICET, ESGIC  Take 1 tablet by mouth every 4 hours as needed for Headaches     cilostazol 100 MG tablet  Commonly known as: PLETAL     diazePAM 5 MG tablet  Commonly known as: VALIUM     dilTIAZem 240 MG extended release capsule  Commonly known as: CARDIZEM CD  take 1 capsule by mouth once daily     FreeStyle Flaquita 2 Eaton Josie  1 each by Does not apply route 4 times daily gabapentin 300 MG capsule  Commonly known as: NEURONTIN  Take 1 capsule by mouth 3 times daily for 90 days. hydrocortisone-pramoxine 2.5-1 % Crea cream  Apply topically 3 times daily     insulin lispro (1 Unit Dial) 100 UNIT/ML Sopn  inject 10 units subcutaneously three times a day with meals     ipratropium-albuterol 0.5-2.5 (3) MG/3ML Soln nebulizer solution  Commonly known as: DUONEB  USE 3 ML VIA NEBULIZER EVERY 4 HOURS AS NEEDED FOR SHORTNESS OF BREATH     naproxen 500 MG tablet  Commonly known as: Naprosyn  Take 1 tablet by mouth 2 times daily for 7 days     nortriptyline 25 MG capsule  Commonly known as: Pamelor  Take 1 capsule by mouth nightly     omeprazole 40 MG delayed release capsule  Commonly known as: PRILOSEC  Take 1 capsule by mouth every morning (before breakfast)     ondansetron 4 MG disintegrating tablet  Commonly known as: Zofran ODT  Take 1 tablet by mouth every 8 hours as needed for Nausea or Vomiting     oxyCODONE-acetaminophen 5-325 MG per tablet  Commonly known as: PERCOCET     OXYGEN     polyethylene glycol 17 GM/SCOOP powder  Commonly known as: GLYCOLAX  Take 17 g by mouth 2 times daily     rosuvastatin 20 MG tablet  Commonly known as: CRESTOR  take 1 tablet by mouth nightly     sennosides-docusate sodium 8.6-50 MG tablet  Commonly known as: SENOKOT-S  Take 2 tablets by mouth daily     tiZANidine 2 MG tablet  Commonly known as: ZANAFLEX     traZODone 150 MG tablet  Commonly known as: DESYREL  Take 0.5 tablets by mouth nightly for 4 days TAKE 1 TABLET BY MOUTH EVERY NIGHT AT BEDTIME     Underpads Misc  Change as needed; uses up to 4 daily     ursodiol 300 MG capsule  Commonly known as:  Actigall  Take 1 capsule by mouth 3 times daily (with meals)     vitamin B-12 1000 MCG tablet  Commonly known as: CYANOCOBALAMIN  Take 1 tablet by mouth daily        STOP taking these medications    cefdinir 300 MG capsule  Commonly known as: OMNICEF     metoprolol tartrate 25 MG tablet  Commonly known as:  LOPRESSOR           Where to Get Your Medications      You can get these medications from any pharmacy    Bring a paper prescription for each of these medications  · glycopyrrolate 1 MG tablet  · LORazepam 0.5 MG tablet  · morphine 20 MG/5ML solution           Follow up:  [address] [facility] [address], [address]  [facility] [place] [address]     Call  As needed        Karen Mojica MD  Family Medicine Resident PGY-1  12/10/21

## 2021-12-12 NOTE — TELEPHONE ENCOUNTER
Received phone call from [facility] [address] [place] [address], about patient's morphine. Patient actively dying, respirations 38, heart rate 140s. Morphine increased from 2mg every 2 hours to 5 mg every 2 hours. Nurse to call back if ativan needed even after increase in morphine dose.   Electronically signed by Esperanza Jade DO on 12/11/2021 at 11:23 PM

## 2021-12-13 ENCOUNTER — CARE COORDINATION (OUTPATIENT)
Dept: CASE MANAGEMENT | Age: 56
End: 2021-12-13

## 2021-12-13 NOTE — CARE COORDINATION
[facility] Transitions Initial Follow Up Call    Call within 2 business days of discharge: Yes    Patient: Maximino Desai Patient : 1965   MRN: [id number]  Reason for Admission: COVID; Hypoxia  Discharge Date: 12/10/21 RARS: Readmission Risk Score: 28.7 ( )      Last Discharge [facility]       Complaint Diagnosis Description Type Department Provider    21 Shortness of Breath COVID . Nadai Banks . ED to Hosp-Admission (Discharged) (ADMITTED) Ovidio Marroquin MD; Cheng Mercado MD           CTN spoke with Raman Dow from [address] [place] [address]; confirmed patient is active with Hospice services. CTN to sign off.        Rebekah Loredo RN

## 2021-12-15 DIAGNOSIS — Z51.5 HOSPICE CARE PATIENT: Primary | ICD-10-CM

## 2023-09-15 NOTE — TELEPHONE ENCOUNTER
Caller: Kristine Rivas    Relationship: Self    Best call back number: [phone]    Requested Prescriptions:   Requested Prescriptions     Pending Prescriptions Disp Refills    carvedilol (COREG) 3.125 MG tablet [Pharmacy Med Name: CARVEDILOL 3.125 MG TABLET] 180 tablet 1     Sig: TAKE ONE TABLET BY MOUTH TWICE A DAY    pantoprazole (PROTONIX) 40 MG EC tablet [Pharmacy Med Name: PANTOPRAZOLE SOD DR 40 MG TAB] 90 tablet 1     Sig: TAKE ONE TABLET BY MOUTH DAILY    lisinopril (PRINIVIL,ZESTRIL) 40 MG tablet [Pharmacy Med Name: LISINOPRIL 40 MG TABLET] 90 tablet 1     Sig: TAKE ONE TABLET BY MOUTH DAILY        Pharmacy where request should be sent: [facility] [id number] [id number] [facility] [address] - [phone]  - [phone] FX     Last office visit with prescribing clinician: 5/6/2022   Last telemedicine visit with prescribing clinician: Visit date not found   Next office visit with prescribing clinician: 9/25/2023     Additional details provided by patient: PATIENT MADE A MYCHART APPOINTMENT AND WILL TAKE HER LABS BEFORE TUESDAY SEPTEMBER 19, 23 AND IS ASKING THE PROVIDER TO REFILL HER MEDICATION PLEASE    Does the patient have less than a 3 day supply:  [] Yes  [] No    Would you like a call back once the refill request has been completed: [] Yes [] No    If the office needs to give you a call back, can they leave a voicemail: [] Yes [] No    Neil Ty   09/15/23 10:14 EDT          Call from [facility] includes the [facility] with question following the patients hospital stay on Thursday.   Tried to call back but had to leave a message

## 2024-01-03 NOTE — CONSULTS
GENERAL SURGERY  CONSULT NOTE  7/6/2018    Physician Consulted: Dr. Laureen Galvez  Reason for Consult: Buttock abscess  Referring Physician: Dr. Kamila Tang is a 48 y.o. female who presents for evaluation of right buttock abscess. She was initially seen for this 1 week ago but left AMA from the ER because she didn't want her hospitalization to prevent her daughter from attending a function. She was unable to follow up with her PCP or see a surgeon within this time. She presents back with worsening pain, erythema, fever,s chills. She has had this before- 7/2016. She has not had a colonoscopy. Past Medical History:   Diagnosis Date    Anxiety     Carotid stenosis 12/2011    50-69% on left    Carotid stenosis 12/1/2011    Carpal tunnel syndrome 12/7/2011    Cerebral artery occlusion with cerebral infarction (HCC)     Chronic back pain     COPD (chronic obstructive pulmonary disease) (HCC)     CVA (cerebral infarction) 2011?     right sided thalamic; seen by Dr Frankie Toure syndrome     Fall 8/21/2017    History of ischemic heart disease 3/30/2017    Hyperlipidemia     Hypertension     Obesity     MARIAA (obstructive sleep apnea)     Pneumonia     PVD (peripheral vascular disease) with claudication ([facility] [address] 8/25/2017    S/P hernia repair 3/30/2017    Tobacco abuse     Tobacco abuse     Troponin level elevated 1/5/2015    negative heart cath    Type II or unspecified type diabetes mellitus without mention of complication, not stated as uncontrolled     Vitamin D deficiency        Past Surgical History:   Procedure Laterality Date    ABDOMINAL EXPLORATION SURGERY  03/29/2017    with bowel resection, incarcerated ventral hernia repair    CARDIAC CATHETERIZATION  1/7/2015    Dr. Zohreh Roach  EF=55%   FFR=0.84  distal RCA bifurcation    CYST INCISION AND DRAINAGE Right 07/22/2016    Right gluteal mass incision and drainage    CYST REMOVAL  1/28/2011    excision Pressure Mother     COPD Mother     COPD Father     High Blood Pressure Father     Other Father     Heart Disease Brother     High Blood Pressure Brother     Other Brother     Cancer Maternal Grandmother     Heart Attack Maternal Grandfather     Other Paternal Grandmother        Social History   Substance Use Topics    Smoking status: Current Every Day Smoker     Packs/day: 0.50     Years: 34.00     Types: Cigarettes    Smokeless tobacco: Never Used      Comment: down to 0.5 pack per day, smoked 5 pk per day for 2 years    Alcohol use No         Review of Systems   General ROS: positive for  - malaise  Hematological and Lymphatic ROS: negative  Respiratory ROS: no cough, shortness of breath, or wheezing  Cardiovascular ROS: no chest pain or dyspnea on exertion  Gastrointestinal ROS: positive for - buttock pain  Genito-Urinary ROS: no dysuria, trouble voiding, or hematuria  Musculoskeletal ROS: negative      PHYSICAL EXAM:    Vitals:    07/06/18 1130   BP: (!) 111/57   Pulse: 98   Resp:    Temp:    SpO2:        General Appearance:  awake, alert, oriented, in no acute distress  Skin:  Skin color, texture, turgor normal. No rashes or lesions. Head/face:  NCAT  Eyes:  No gross abnormalities. Lungs:  Normal expansion. Clear to auscultation. No rales, rhonchi, or wheezing. Heart:  Heart regular rate and rhythm  Abdomen:  Soft, non-tender, normal bowel sounds. Extremities: Extremities warm to touch, pink, with no edema.   Female Rectal: internal exam deferred, right buttock indurated and tender from anus to right hip, area of skin breakdown lateral to anus, no drainage    LABS:    CBC  Recent Labs      07/06/18   0955   WBC  21.6*   HGB  13.1   HCT  40.2   PLT  379     BMP    Recent Labs      07/06/18   0955   LIPASE  10*       ASSESSMENT:  48 y.o. female with right perianal and buttock abscess    PLAN:  NPO  IVF  IV abx  For operative drainage today  D/w Dr. Elsi Child    Electronically signed by Ayaka Ramos 193.04 DISPLAY PLAN FREE TEXT

## 2024-03-03 NOTE — PROGRESS NOTES
24 hr chart check complete. DR. LOCKHART DISCHARGE INSTRUCTIONS    Pt Name: Noe Moctezuma  Medical Record Number: [id number]  Today's Date: 3/3/2024    GENERAL ANESTHESIA OR SEDATION  1. Do not drive or operate hazardous machinery for 24 hours.  2. Do not make important business or personal decisions for 24 hours.  3. Do not drink alcoholic beverages or use tobacco for 24 hours.    ACTIVITY INSTRUCTIONS:  Ambulate 4 times a day for approximately 10-15  minutes each time     You may resume normal activity tomorrow. Do not engage in strenuous activity that may place stress on your incision.    You may drive when no longer taking pain medication and you are able to comfortably use the gas/brake pedal. Do not drive long distance, in town driving recommended    avoid heavy lifting, tugging, pullings greater than 10-15 lbs for 6 weeks postoperatively, or until released by Physician/CNP      DIET INSTRUCTIONS:  Normal at home diet    MEDICATIONS  You may resume your daily prescription medication schedule unless otherwise specified.      Pain medication at discharge - use only as prescribed- refills may be available to you at your follow up appointments if needed and warranted.  Narcotics should be used for only short term and we highly encouraged our patients to wean off appropriately and use other means for pain such as non pharmacologic measures and over the counter tylenol or ibuprofen if no restrictions apply. We do  know that surgical pain is real and will not hesitate to help eliminate some of your discomfort. However we will not be able to completely make you pain free and it is important to determine what pain level is tolerable for you     Narcotics cause constipation and we recommend taking a colace daily and Miralax if needed to help reduce the risk of constipation    Increasing water intake if no restrictions will also help eliminate constipation    Ambulation 4 times a day 15 minute each time will help reduce pain each day and help  relieve constipation      WOUND/DRESSING INSTRUCTIONS:  Always ensure you and your care giver clean hands before and after caring for the  wound.  Keep dressing clean and dry, Change when soiled or wet.    Leave incision open to air if not draining   Allow steri-strips to fall off on their own.   Ice operative site for 20 minutes 4 times a day as needed     May wash over incision in shower daily,  but do not soak in a bath.      ABDOMINAL/LAPAROSCOPIC SURGERY  [x]You are encouraged to get up and move around as this helps with the circulation and speeds up the healing process.  [x]Breath deeply and cough from time to time. This helps to clear your lungs and helps prevent pneumonia.  [x]Supporting your incision with a pillow or your hand helps to minimize discomfort and pain.  [x]Laparoscopic patients may develop shoulder pain in the first 48 hours from the gas used during the procedure.    FOLLOW-UP CARE. SPECIFICALLY WATCH FOR:   Fever over 101 degrees by mouth   Increased redness, warmth, hardness at operative site.   Blood soaked dressing (small amounts of oozing may be normal.)   Increased or progressive drainage from the surgical area   Inability to urinate or blood in the urine   Pain not relieved by the medications ordered   Persistent nausea and/or vomiting, unable to retain fluids.    FOLLOW-UP APPOINTMENT:  As scheduled     Call my office if you have any problem that concerns you [phone]. After hours, you can reach the answering service via the office phone number. IF YOU NEED IMMEDIATE ATTENTION, GO TO THE EMERGENCY ROOM AND YOUR DOCTOR WILL BE CONTACTED.    Prepared By:  ALL Randolph - CNP CNP  For Caroline Covington MD    Electronically signed 3/3/2024 at 11:17 AM

## (undated) DEVICE — JELLY,LUBE,STERILE,FLIP TOP,TUBE,4-OZ: Brand: MEDLINE

## (undated) DEVICE — 3M™ STERI-STRIP™ COMPOUND BENZOIN TINCTURE 40 BAGS/CARTON 4 CARTONS/CASE C1544: Brand: 3M™ STERI-STRIP™

## (undated) DEVICE — WIPE,PROTECTANT,SKIN,SUREPREP: Brand: MEDLINE

## (undated) DEVICE — DOUBLE BASIN SET: Brand: MEDLINE INDUSTRIES, INC.

## (undated) DEVICE — BAG SPECIMEN BIOHAZARD 6IN X 9IN

## (undated) DEVICE — PLATE ES AD W 9FT CRD 2

## (undated) DEVICE — CONTAINER VACUTAINER ANAER CULTURE SWAB

## (undated) DEVICE — DRAIN PENROSE L12IN DIA1/2IN USED TO PROMOTE DRNAGE IN OPN

## (undated) DEVICE — PACK PROCEDURE SURG GEN CUST

## (undated) DEVICE — E-Z CLEAN, NON-STICK, PTFE COATED, ELECTROSURGICAL BLADE ELECTRODE, MODIFIED EXTENDED INSULATION, 2.5 INCH (6.35 CM): Brand: MEGADYNE

## (undated) DEVICE — COVER HNDL LT DISP

## (undated) DEVICE — READY WET SKIN SCRUB TRAY-LF: Brand: MEDLINE INDUSTRIES, INC.

## (undated) DEVICE — SHEET, T, LAPAROTOMY, STERILE: Brand: MEDLINE

## (undated) DEVICE — GOWN,SIRUS,FABRNF,XL,20/CS: Brand: MEDLINE

## (undated) DEVICE — TRAP SPEC MUCUS FOR SUCT

## (undated) DEVICE — HOSE CONN FOR WST MGMT SYS NEPTUNE 2

## (undated) DEVICE — ELECTRODE PT RET AD L9FT HI MOIST COND ADH HYDRGEL CORDED

## (undated) DEVICE — YANKAUER,OPEN TIP,W/O VENT,STERILE: Brand: MEDLINE INDUSTRIES, INC.

## (undated) DEVICE — SWAB SPEC COLL SHFT L5.25IN POLYUR FOAM TIP SFT DBL MEDIA